# Patient Record
Sex: FEMALE | Race: WHITE | NOT HISPANIC OR LATINO | Employment: OTHER | URBAN - METROPOLITAN AREA
[De-identification: names, ages, dates, MRNs, and addresses within clinical notes are randomized per-mention and may not be internally consistent; named-entity substitution may affect disease eponyms.]

---

## 2017-04-14 ENCOUNTER — CONVERSION ENCOUNTER (OUTPATIENT)
Dept: CARDIOLOGY | Facility: CLINIC | Age: 82
End: 2017-04-14

## 2017-10-13 ENCOUNTER — HOSPITAL ENCOUNTER (OUTPATIENT)
Dept: CARDIOLOGY | Facility: HOSPITAL | Age: 82
Discharge: HOME | End: 2017-10-13
Attending: INTERNAL MEDICINE | Admitting: INTERNAL MEDICINE

## 2017-10-13 ENCOUNTER — CONVERSION ENCOUNTER (OUTPATIENT)
Dept: CARDIOLOGY | Facility: CLINIC | Age: 82
End: 2017-10-13

## 2017-12-15 ENCOUNTER — HOSPITAL ENCOUNTER (OUTPATIENT)
Dept: MAMMOGRAPHY | Facility: HOSPITAL | Age: 82
Discharge: HOME | End: 2017-12-15
Attending: NURSE PRACTITIONER | Admitting: NURSE PRACTITIONER

## 2018-10-22 ENCOUNTER — CONVERSION ENCOUNTER (OUTPATIENT)
Dept: CARDIOLOGY | Facility: CLINIC | Age: 83
End: 2018-10-22

## 2018-10-29 ENCOUNTER — HOSPITAL ENCOUNTER (OUTPATIENT)
Dept: CARDIOLOGY | Facility: HOSPITAL | Age: 83
Discharge: HOME | End: 2018-10-29
Attending: INTERNAL MEDICINE | Admitting: INTERNAL MEDICINE

## 2018-11-02 ENCOUNTER — CONVERSION ENCOUNTER (OUTPATIENT)
Dept: CARDIOLOGY | Facility: CLINIC | Age: 83
End: 2018-11-02

## 2018-11-28 ENCOUNTER — CONVERSION ENCOUNTER (OUTPATIENT)
Dept: CARDIOLOGY | Facility: CLINIC | Age: 83
End: 2018-11-28

## 2018-12-06 ENCOUNTER — OFFICE VISIT (OUTPATIENT)
Dept: CARDIAC SURGERY | Facility: CLINIC | Age: 83
End: 2018-12-06

## 2018-12-06 VITALS
WEIGHT: 191.2 LBS | DIASTOLIC BLOOD PRESSURE: 88 MMHG | RESPIRATION RATE: 20 BRPM | SYSTOLIC BLOOD PRESSURE: 155 MMHG | HEART RATE: 88 BPM | HEIGHT: 63 IN | TEMPERATURE: 97.8 F | BODY MASS INDEX: 33.88 KG/M2 | OXYGEN SATURATION: 91 %

## 2018-12-06 DIAGNOSIS — I63.429 CEREBROVASCULAR ACCIDENT (CVA) DUE TO EMBOLISM OF ANTERIOR CEREBRAL ARTERY, UNSPECIFIED BLOOD VESSEL LATERALITY (HCC): ICD-10-CM

## 2018-12-06 DIAGNOSIS — I05.0 RHEUMATIC MITRAL STENOSIS: Primary | ICD-10-CM

## 2018-12-06 DIAGNOSIS — I07.1 MODERATE TRICUSPID REGURGITATION: ICD-10-CM

## 2018-12-06 DIAGNOSIS — I25.10 ATHEROSCLEROSIS OF NATIVE CORONARY ARTERY OF NATIVE HEART WITHOUT ANGINA PECTORIS: ICD-10-CM

## 2018-12-06 PROCEDURE — 99204 OFFICE O/P NEW MOD 45 MIN: CPT | Performed by: THORACIC SURGERY (CARDIOTHORACIC VASCULAR SURGERY)

## 2018-12-06 RX ORDER — CHOLECALCIFEROL (VITAMIN D3) 25 MCG
1 CAPSULE ORAL EVERY OTHER DAY
COMMUNITY
End: 2022-01-01

## 2018-12-06 RX ORDER — ESCITALOPRAM OXALATE 10 MG/1
5 TABLET ORAL DAILY
Status: ON HOLD | COMMUNITY
Start: 2017-11-21 | End: 2021-01-07

## 2018-12-06 RX ORDER — DOCUSATE CALCIUM 240 MG
240 CAPSULE ORAL 2 TIMES DAILY PRN
COMMUNITY
End: 2020-12-08

## 2018-12-06 RX ORDER — LORATADINE 10 MG/1
10 TABLET, ORALLY DISINTEGRATING ORAL DAILY
COMMUNITY
Start: 2017-10-16 | End: 2019-09-06

## 2018-12-06 RX ORDER — ATORVASTATIN CALCIUM 20 MG/1
20 TABLET, FILM COATED ORAL NIGHTLY
COMMUNITY
Start: 2018-08-27 | End: 2021-01-01 | Stop reason: SDUPTHER

## 2018-12-06 RX ORDER — VIT A/VIT C/VIT E/ZINC/COPPER 4296-226
CAPSULE ORAL
COMMUNITY
End: 2020-06-17

## 2018-12-06 RX ORDER — TEMAZEPAM 15 MG/1
15 CAPSULE ORAL NIGHTLY PRN
COMMUNITY
Start: 2018-03-12 | End: 2021-01-01 | Stop reason: SDUPTHER

## 2018-12-06 RX ORDER — PHENOL 1.4 %
600 AEROSOL, SPRAY (ML) MUCOUS MEMBRANE
COMMUNITY
End: 2020-06-17 | Stop reason: HOSPADM

## 2018-12-06 RX ORDER — LEVOTHYROXINE SODIUM 0.05 MG/1
TABLET ORAL
COMMUNITY
Start: 2018-10-08 | End: 2020-11-30 | Stop reason: DRUGHIGH

## 2018-12-06 RX ORDER — ESOMEPRAZOLE MAGNESIUM 40 MG/1
40 CAPSULE, DELAYED RELEASE ORAL
COMMUNITY
Start: 2018-12-03 | End: 2022-01-01

## 2018-12-08 PROBLEM — I63.9 CEREBROVASCULAR ACCIDENT (CVA) DUE TO EMBOLISM (HCC): Status: ACTIVE | Noted: 2018-12-08

## 2018-12-08 PROBLEM — I05.0 RHEUMATIC MITRAL STENOSIS: Status: ACTIVE | Noted: 2018-12-08

## 2018-12-08 PROBLEM — I34.0 SEVERE MITRAL REGURGITATION: Status: ACTIVE | Noted: 2018-12-08

## 2018-12-08 PROBLEM — I07.1 MODERATE TRICUSPID REGURGITATION: Status: ACTIVE | Noted: 2018-12-08

## 2018-12-08 PROBLEM — I25.10 ATHEROSCLEROSIS OF NATIVE CORONARY ARTERY OF NATIVE HEART WITHOUT ANGINA PECTORIS: Status: ACTIVE | Noted: 2018-12-08

## 2018-12-08 PROBLEM — I34.0 SEVERE MITRAL REGURGITATION: Status: RESOLVED | Noted: 2018-12-08 | Resolved: 2018-12-08

## 2018-12-08 NOTE — PROGRESS NOTES
12/8/2018    Seen on 12/6/18    Chief Complaint     Dyspnea    History of Present Illness:       Dear Dr. Valiente, Demond Trivedi* and Colleagues,  It was nice to see Karen Rubio in consultation at your request. She is a 83 y.o. female with a history of a CVA in 2016, HTN, hyperlipidemia and a heart murmur who has developed progressive dyspnea of minimal effort and orthopnea. She has difficulty climbing stairs and fatigues very easily. She denies PND, recent TIA, syncope, angina or LE edema. She had a 2 D echo followed by a ESTEFANI that showed moderate to severe MS/MR, moderate TR, bi-atrial enlargement and EF of 65-70%. A stress test was abnormal with apical ischemia. She had a cardiac cath that showed 60-70 % ostial RCA, PAPs of 73/30 with a mean of 48 mmhg. She denies rheumatic or scarlet fever as a child.    Patient Active Problem List   Diagnosis   • Moderate tricuspid regurgitation   • Atherosclerosis of native coronary artery of native heart without angina pectoris   • Cerebrovascular accident (CVA) due to embolism (CMS/[facility])   • Rheumatic mitral stenosis       Past Medical History:   Diagnosis Date   • Abnormality of left atrial appendage 05/17/2016    Suspected Clot Noted on ESTEFANI   • Acute renal failure (CMS/HCC) 08/06/2016-[facility]    Front Dehydration   • Arthritis    • Breast density 12/2017   • Carotid stenosis 11/05/2018    R @ 60% and L @ 10-20% Noted on Cardiac Cath & 16-49% on L&R    • Heart murmur    • History of echocardiogram 10/13/2017    Calcified Aortic Leaftlets; Mild Aortic Stenosis Present; Mild Mitral Stenosis; Bilateral Enlargement Noted;  Moderate TR; LV Function of 55-60%   • History of echocardiogram 12/5/14-[facility]    Moderate L Vent Hypertrophy Noted; L Atrial Dilation; Moderate MR; Mild TR; Mild-Moderate Aortic Reg Noted; Normal Root Size/No Effusion   • History of echocardiogram 5/16/16-[facility]    Normal Findings with Mild-Moderate MVS Noted   • History of EKG 2004/2016 & 11/5/18-[facility]    All  Sinus Rhythm w/Infarct Ischemnic Changes Noted   • HLD (hyperlipidemia)     Controlled w/Meds   • Hx of hyperglycemia    • Hypertension     Controlled w/Meds   • Hypothyroidism     Levothyroxine   • Joint pain    • Left atrial dilatation 12/05/2014    Mildly Noted on Echo   • Left ventricular hypertrophy 12/05/2014    Noted on Echo w/EF @ 55-60%   • Mild aortic regurgitation 12/05/2014    to Moderate Noted on Echo   • Mild aortic stenosis 10/13/2017    Noted on Echo & ESTEFANI-11/5/18-Moderate    • Mild mitral insufficiency 10/13/2017    Noted on Echo & ESTEFANI-11/5/18   • Mild tricuspid regurgitation 12/05/2014    Noted on Echo   • Moderate mitral regurgitation 12/05/2014    Noted on Echo   • Moderate mitral stenosis 10/13/2017    Noted on Echo   • Moderate tricuspid insufficiency 10/13/2017    Noted on Echo   • Pneumonia involving left lung 08/6/16-[facility]   • Pulmonary hypertension (CMS/HCC) 10/13/2017 & 11/5/18-Cath    Severe Noted on Echo & Cath   • Slurred speech 05/16/2016   • SOB (shortness of breath) 08/2016   • Thickened mitral leaflet 10/13/2017 & ESTEFANI-11/5/18    Severely Calcified Noted on Echo   • Tricuspid valve insufficiency 11/05/2018    Noted on ESTEFANI       Past Surgical History:   Procedure Laterality Date   • CARDIAC CATHETERIZATION Left 11/5/18-[facility]    w/L Coronary Angiography--Dr. Hernández-RCA @ 60% with Pulmonary Hypertension Noted   • ESTEFANI  05/17/2016-[facility]    Dr. Hernández--Severly Calcified Mitral Leaflets w/Mild-Moderate Mitral Stenosis/Insufficiency; Sig Aortic Stenosis Noted; Suspecion of L Atrial Appendage Clot Noted   • ESTEFANI  11/5/18-[facility]    Dr. Hernández--Moderate Mitral Insufficiency Noted; Mild-Moderate AVS; Moderate Tricuspid Insufficiency Noted; EF of 65-70%       Allergies   Allergen Reactions   • Hydrocodone Shortness Of Breath         Current Outpatient Medications:   •  apixaban (ELIQUIS) 5 MG tablet tablet, TAKE 1 TABLET TWICE DAILY, Disp: , Rfl:   •  atorvastatin (LIPITOR) 20 MG tablet, TAKE 1 TABLET EVERY DAY,  Disp: , Rfl:   •  calcium carbonate (OS-SHERWIN) 600 MG tablet, Take 600 mg by mouth., Disp: , Rfl:   •  Cholecalciferol (VITAMIN D-3) 1000 units capsule, Take 1 capsule by mouth Daily., Disp: , Rfl:   •  docusate calcium (SURFAK) 240 MG capsule, Take  by mouth Daily., Disp: , Rfl:   •  escitalopram (LEXAPRO) 10 MG tablet, Take 10 mg by mouth Daily., Disp: , Rfl:   •  esomeprazole (nexIUM) 40 MG capsule, TAKE 1 CAPSULE EVERY DAY, Disp: , Rfl:   •  levothyroxine (SYNTHROID, LEVOTHROID) 50 MCG tablet, TAKE 1 TABLET EVERY DAY, Disp: , Rfl:   •  loratadine (CLARITIN REDITABS) 10 MG disintegrating tablet, Take 10 mg by mouth Daily., Disp: , Rfl:   •  Multiple Vitamins-Minerals (ICAPS) capsule, Take  by mouth., Disp: , Rfl:   •  temazepam (RESTORIL) 15 MG capsule, TAKE ONE CAPSULE BY MOUTH EVERY NIGHT AT BEDTIME AS NEEDED FOR SLEEP, Disp: , Rfl:     Social History     Socioeconomic History   • Marital status:      Spouse name: Not on file   • Number of children: Not on file   • Years of education: Not on file   • Highest education level: Not on file   Social Needs   • Financial resource strain: Not on file   • Food insecurity - worry: Not on file   • Food insecurity - inability: Not on file   • Transportation needs - medical: Not on file   • Transportation needs - non-medical: Not on file   Occupational History   • Occupation: RETIRED-[employer]   Tobacco Use   • Smoking status: Former Smoker     Types: Cigarettes   • Smokeless tobacco: Never Used   Substance and Sexual Activity   • Alcohol use: No     Frequency: Never   • Drug use: No   • Sexual activity: Defer     Birth control/protection: Post-menopausal   Other Topics Concern   • Not on file   Social History Narrative   • Not on file       Family History   Problem Relation Age of Onset   • Heart disease Mother    • Cancer Father      Review of Systems   Constitutional: Positive for fatigue.   Respiratory: Positive for shortness of breath. Negative for chest  tightness and wheezing.    Cardiovascular: Positive for palpitations. Negative for chest pain and leg swelling.   Genitourinary: Negative for difficulty urinating and dysuria.   Neurological: Negative for dizziness, syncope and numbness.   All other systems reviewed and are negative.       Physical Exam:    Vital Signs:  Weight: 86.7 kg (191 lb 3.2 oz)   Body mass index is 33.87 kg/m².  Temp: 97.8 °F (36.6 °C)   Heart Rate: 88   BP: 155/88     Physical Exam   Constitutional: She is oriented to person, place, and time. She appears well-developed and well-nourished.   HENT:   Head: Normocephalic and atraumatic.   Nose: Nose normal.   Mouth/Throat: Oropharynx is clear and moist.   Eyes: Conjunctivae are normal. Pupils are equal, round, and reactive to light.   Neck: Normal range of motion. Neck supple. No JVD present. No thyromegaly present.   Cardiovascular: Normal rate, regular rhythm, S1 normal, S2 normal and intact distal pulses. PMI is not displaced. Exam reveals no gallop and no friction rub.   Murmur heard.  Pulses:       Radial pulses are 2+ on the right side, and 2+ on the left side.        Popliteal pulses are 2+ on the right side, and 2+ on the left side.   3/6 systolic murmur in apex   Pulmonary/Chest: Effort normal and breath sounds normal. She has no rales.   Abdominal: Soft. Bowel sounds are normal. She exhibits no distension and no mass. There is no tenderness.   Musculoskeletal: Normal range of motion. She exhibits no tenderness or deformity.   Lymphadenopathy:     She has no cervical adenopathy.   Neurological: She is alert and oriented to person, place, and time. She has normal reflexes.   Skin: Skin is warm and dry. No rash noted. No erythema.   Psychiatric: She has a normal mood and affect. Her behavior is normal.   No groin or neck adenopathy     Assessment:     Problem List Items Addressed This Visit        Cardiovascular and Mediastinum    Moderate tricuspid regurgitation    Atherosclerosis of  native coronary artery of native heart without angina pectoris    Cerebrovascular accident (CVA) due to embolism (CMS/HCC)    Rheumatic mitral stenosis - Primary        1. Symptomatic MS/MR  2. CHF class III  3. Hx of embolic stroke  4. Single vessel CAD with abnormal stress test  5. Obesity  6. Moderate TR  7.   Orthopnea    Recommendation/Plan:     I discussed to the patient and family my recommendation of mitral valve replacement, TV repair, KRISTAL ligation and CABG to RCA. I quoted a risk (STS calculated mortality of 10 %), complications of 10 % and possibility of rehab after surgery. They verbalized understanding and the wish to proceed.    Thank you for allowing me to participate in her care.    Regards,    Gibson Medina M.D.

## 2019-01-16 ENCOUNTER — HOSPITAL ENCOUNTER (OUTPATIENT)
Dept: RESPIRATORY THERAPY | Facility: HOSPITAL | Age: 84
Discharge: HOME | End: 2019-01-16
Attending: INTERNAL MEDICINE | Admitting: INTERNAL MEDICINE

## 2019-01-16 LAB
ALBUMIN SERPL-MCNC: 3.8 G/DL (ref 3.5–4.8)
ALP SERPL-CCNC: 70 IU/L (ref 32–91)
ALT SERPL-CCNC: 14 IU/L (ref 14–54)
AST SERPL-CCNC: 19 IU/L (ref 15–41)
BILIRUB DIRECT SERPL-MCNC: 0.1 MG/DL (ref 0.1–0.5)
BILIRUB SERPL-MCNC: 0.8 MG/DL (ref 0.3–1.2)
CONV TOTAL PROTEIN: 7.2 G/DL (ref 6.1–7.9)

## 2019-01-17 LAB — MYELOPEROXIDASE AB SER-ACNC: <0.2 U

## 2019-01-18 LAB
ANA SER QL IA: NORMAL
CHROMATIN AB SERPL-ACNC: <20 [IU]/ML (ref 0–20)

## 2019-01-24 ENCOUNTER — HOSPITAL ENCOUNTER (OUTPATIENT)
Dept: RESPIRATORY THERAPY | Facility: HOSPITAL | Age: 84
Discharge: HOME | End: 2019-01-24
Attending: INTERNAL MEDICINE | Admitting: INTERNAL MEDICINE

## 2019-02-01 ENCOUNTER — HOSPITAL ENCOUNTER (OUTPATIENT)
Dept: RESPIRATORY THERAPY | Facility: HOSPITAL | Age: 84
Discharge: HOME | End: 2019-02-01
Attending: NURSE PRACTITIONER | Admitting: NURSE PRACTITIONER

## 2019-02-13 ENCOUNTER — CONVERSION ENCOUNTER (OUTPATIENT)
Dept: CARDIOLOGY | Facility: CLINIC | Age: 84
End: 2019-02-13

## 2019-02-20 ENCOUNTER — TELEPHONE (OUTPATIENT)
Dept: CARDIAC SURGERY | Facility: CLINIC | Age: 84
End: 2019-02-20

## 2019-05-13 ENCOUNTER — HOSPITAL ENCOUNTER (OUTPATIENT)
Dept: LAB | Facility: HOSPITAL | Age: 84
Discharge: HOME | End: 2019-05-13
Attending: INTERNAL MEDICINE | Admitting: INTERNAL MEDICINE

## 2019-05-13 LAB
ANION GAP SERPL CALC-SCNC: 13.1 MMOL/L (ref 10–20)
BNP SERPL-MCNC: 784 PG/ML
BUN SERPL-MCNC: 16 MG/DL (ref 8–20)
BUN/CREAT SERPL: 14.5 (ref 5.4–26.2)
CALCIUM SERPL-MCNC: 9.3 MG/DL (ref 8.9–10.3)
CHLORIDE SERPL-SCNC: 102 MMOL/L (ref 101–111)
CONV CO2: 27 MMOL/L (ref 22–32)
CREAT UR-MCNC: 1.1 MG/DL (ref 0.4–1)
GLUCOSE SERPL-MCNC: 117 MG/DL (ref 65–99)
POTASSIUM SERPL-SCNC: 4.1 MMOL/L (ref 3.6–5.1)
SODIUM SERPL-SCNC: 138 MMOL/L (ref 136–144)

## 2019-05-30 ENCOUNTER — HOSPITAL ENCOUNTER (OUTPATIENT)
Dept: LAB | Facility: HOSPITAL | Age: 84
Discharge: HOME | End: 2019-05-30
Attending: INTERNAL MEDICINE | Admitting: INTERNAL MEDICINE

## 2019-05-30 LAB
ANION GAP SERPL CALC-SCNC: 12.4 MMOL/L (ref 10–20)
BNP SERPL-MCNC: 89 PG/ML
BUN SERPL-MCNC: 15 MG/DL (ref 8–20)
BUN/CREAT SERPL: 15 (ref 5.4–26.2)
CALCIUM SERPL-MCNC: 9.5 MG/DL (ref 8.9–10.3)
CHLORIDE SERPL-SCNC: 97 MMOL/L (ref 101–111)
CONV CO2: 31 MMOL/L (ref 22–32)
CREAT UR-MCNC: 1 MG/DL (ref 0.4–1)
GLUCOSE SERPL-MCNC: 96 MG/DL (ref 65–99)
POTASSIUM SERPL-SCNC: 3.4 MMOL/L (ref 3.6–5.1)
SODIUM SERPL-SCNC: 137 MMOL/L (ref 136–144)

## 2019-06-03 ENCOUNTER — CONVERSION ENCOUNTER (OUTPATIENT)
Dept: CARDIOLOGY | Facility: CLINIC | Age: 84
End: 2019-06-03

## 2019-06-04 VITALS
SYSTOLIC BLOOD PRESSURE: 125 MMHG | BODY MASS INDEX: 33.73 KG/M2 | HEART RATE: 67 BPM | DIASTOLIC BLOOD PRESSURE: 75 MMHG | WEIGHT: 190.4 LBS | OXYGEN SATURATION: 94 %

## 2019-06-04 VITALS
DIASTOLIC BLOOD PRESSURE: 78 MMHG | BODY MASS INDEX: 32.4 KG/M2 | OXYGEN SATURATION: 93 % | BODY MASS INDEX: 32.51 KG/M2 | HEART RATE: 63 BPM | WEIGHT: 194.5 LBS | OXYGEN SATURATION: 94 % | SYSTOLIC BLOOD PRESSURE: 132 MMHG | SYSTOLIC BLOOD PRESSURE: 119 MMHG | WEIGHT: 201.25 LBS | SYSTOLIC BLOOD PRESSURE: 138 MMHG | HEART RATE: 63 BPM | WEIGHT: 188.75 LBS | DIASTOLIC BLOOD PRESSURE: 70 MMHG | WEIGHT: 197.5 LBS | OXYGEN SATURATION: 93 % | DIASTOLIC BLOOD PRESSURE: 72 MMHG | BODY MASS INDEX: 34.54 KG/M2 | SYSTOLIC BLOOD PRESSURE: 113 MMHG | HEART RATE: 66 BPM | OXYGEN SATURATION: 92 % | DIASTOLIC BLOOD PRESSURE: 73 MMHG | DIASTOLIC BLOOD PRESSURE: 73 MMHG | DIASTOLIC BLOOD PRESSURE: 75 MMHG | BODY MASS INDEX: 34.45 KG/M2 | BODY MASS INDEX: 32.4 KG/M2 | HEART RATE: 126 BPM | OXYGEN SATURATION: 93 % | OXYGEN SATURATION: 96 % | WEIGHT: 188.75 LBS | WEIGHT: 189.4 LBS | BODY MASS INDEX: 33.9 KG/M2 | SYSTOLIC BLOOD PRESSURE: 138 MMHG | SYSTOLIC BLOOD PRESSURE: 125 MMHG | HEART RATE: 64 BPM | HEART RATE: 65 BPM

## 2019-06-06 NOTE — PROGRESS NOTES
Visit Type:  Follow-up Visit  Primary Care Provider:  Eliza Clayton NP    Chief Complaint:  Follow-Up of LE Edema .    History of Present Illness:  6/3/2019  83 -year-old white female patient with known history of hypertension hyperlipidemia admit to the hospital at [facility] in May 2016 with acute CVA. a transesophageal echocardiogram showed suspicion for clot and also mild to moderate mitral   stenosis  Mild to moderate mitral insufficiency  Normal LV systolic function  patient was started on anticoagulation     She stopped  beta-blockers and a losartan due to low blood pressure        Her stress Myoview showed apical ischemia 2018    Cardiac catheterization showed up to 60% ostial RCA disease, 2018   severe pulmonary hypertension more than 70 mm of Hg   transesophageal echocardiogram showed hyperdynamic LV systolic function EF 65-70% moderate mitral stenosis and insufficiency moderate tricuspid insufficiency mild to moderate aortic stenosis   I will review the films with CV surgery for the time being the suggest aggressive medical treatment risk factor modification   Pulmonary follow-up regarding pulmonary hypertension   patient was cleared by Pulmonary for surgery   I discussed with the patient about all the risks and benefits of valve surgery plus or minus single-vessel CABG   patient has some limitations but she is doing reasonably well   Patient and family decided to hold off on the surgery for the time being   the fully understand all the risks of  waiting              Vital Signs:    Patient Profile:    83 Years Old Female  Height:     64 inches  Weight:     190.4 pounds  (Measured Weight:  190.4 lbs.  oz.)  BMI:        32.68  Pulse rate: 67 / minute  O2 Sat:     94 %  Room Air:   room air without exertion    BP sittin / 75  (left arm)  Cuff size:  regular   Vitals Entered By: Fadumo Ledezma CMA (Kathy  3, 2019 11:35 AM)    Medications:  Medications were reviewed with the  patient during this visit.    Allergies:   HYDROCODONE (Critical)    Allergies were reviewed with the patient during this visit.    Current Allergies (reviewed today):  HYDROCODONE (Critical)    Current Medications (including medications started today):   POTASSIUM CHLORIDE ER 10 MEQ ORAL CAPSULE EXTENDED RELEASE (POTASSIUM CHLORIDE) Take 1 tablet by mouth every day  FUROSEMIDE 40 MG ORAL TABLET (FUROSEMIDE) Take (1) one tablet by mouth daily  ALBUTEROL SULFATE (2.5 MG/3ML) 0.083% INHALATION NEBULIZATION SOLUTION (ALBUTEROL SULFATE) prn  RA TURMERIC 500 MG ORAL CAPSULE (TURMERIC) Take 2 tablets by mouth daily  VITAMIN D3 5000 UNIT ORAL CAPSULE (CHOLECALCIFEROL) Take 1 tablet by mouth daily  ICAPS AREDS FORMULA ORAL TABLET (MULTIPLE VITAMINS-MINERALS) Take 1 tablet by mouth daily  CALCIUM + D3 600-200 MG-UNIT ORAL TABLET (CALCIUM CARB-CHOLECALCIFEROL) Take 1 tablet by mouth daily  TEMAZEPAM 15 MG ORAL CAPSULE (TEMAZEPAM) Take 1 capsle by mouth daily at bedtime  LEVOTHYROXINE SODIUM 50 MCG ORAL TABLET (LEVOTHYROXINE SODIUM) Take 1 tablet by mouth daily  ELIQUIS 5 MG ORAL TABLET (APIXABAN) Take 1 tablet by mouth twice daily  ESOMEPRAZOLE MAGNESIUM 40 MG ORAL CAPSULE DELAYED RELEASE (ESOMEPRAZOLE MAGNESIUM) Take 1 capsule by mouth daily  LIPITOR 20 MG ORAL TABLET (ATORVASTATIN CALCIUM) Take 1 tablet by mouth daily at bedtime  ESCITALOPRAM OXALATE 10 MG ORAL TABLET (ESCITALOPRAM OXALATE) Take 1 tablet by mouth daily      Past Medical History:     Reviewed history from 11/27/2018 and no changes required:        Hyperlipidemia        Hypertension~Pulmonary        Hypothyroidism        C V A / Stroke (Cardioembolic from left atrial clot)        G E R D        Cataracts        Obesity        Heart Murmur        Mitral Stenosis    Past Surgical History:     Reviewed history from 11/28/2018 and no changes required:        Cholecystectomy        Tonsillectomy        Knee Arthroscopy        basal cell cancer removed from nose          MOHS procedure with skin graft         ESTEFANI (11/05/2018)        Heart Catherization: No Intervention (11/05/2018)        Right Cataract Extraction (11/12/2018)    Family History Summary:      Reviewed history Last on 02/13/2019 and no changes required:06/03/2019  Daughter - Has FH Diabetes - Entered On: 6/15/2016  Sister - Has Family History of Liver Disease - Entered On: 6/15/2016  Sister - Has FH Kidney Disease - Entered On: 6/15/2016  Sister - Has Family History of Melanoma - Entered On: 6/15/2016  Father - Has FH Other Cancer - Entered On: 6/15/2016  Mother - Has FH Heart Disease - Entered On: 6/15/2016    General Comments - FH:  MI- Mom     Social History:     Reviewed history from 10/22/2018 and no changes required:                Retired        Children~3        Patient is a former smoker.        Passive Smoke: N        Alcohol Use: N        Drug Use: N        HIV/High Risk: N        Regular Exercise: N                Risk Factors:     Smoked Tobacco Use:  Former smoker     Cigarettes:  Yes        Year quit:  2001        Years Since Last Quit:  18  Smokeless Tobacco Use:  Never  Passive smoke exposure:  no  Drug use:  no  HIV high-risk behavior:  no  Caffeine use:  1 drinks per day  Alcohol use:  no  Exercise:  no    Family History Risk Factors:     Family History of MI in females < 65 years old:  no     Family History of MI in males < 55 years old:  no          Physical Exam    General:      well developed, well nourished, in no acute distress.    Head:      normocephalic and atraumatic.    Eyes:      conjunctiva and sclera clear with out nystagmus.    Mouth:      no deformity or lesions with good dentition.    Neck:      no bruit and no JVD.    Lungs:      clear bilaterally to auscultation.    Heart:      regular rhythm, no rubs and no gallops.    2/6 systolic murmur left sternal border  Abdomen:      non-tender.    Msk:       arthritic changes noted  Pulses:      pulses normal in all 4  extremities.    Extremities:      no pedal edema.    Neurologic:      Non focal  Skin:       within normal  Psych:      alert and cooperative; normal mood and affect; normal attention span and concentration.      Diabetes Management Exam:      Foot Exam (with socks and/or shoes not present):        Pulses:           pulses normal in all 4 extremities.        Blood Pressure:  Today's BP: 125/75 mm Hg    Labwork:   Most Recent Lab Results:   LDL: 59 mg/dL 04/07/2017        Impression & Recommendations:    Problem # 1:  PULMONARY HYPERTENSION (ICD-416.0) (EQA84-D63.0)   follow-up with Pulmonary  Her updated medication list for this problem includes:     Furosemide 40 Mg Oral Tablet (Furosemide) ..... Take (1) one tablet by mouth daily    Orders:  96609-Qda Vst-Est Level III (CPT-78499)      Problem # 2:  Hypertension (ICD-401.9) (HDT59-S05)   stable  Her updated medication list for this problem includes:     Furosemide 40 Mg Oral Tablet (Furosemide) ..... Take (1) one tablet by mouth daily    Orders:  [id number]-Jvs Vst-Est Level III (CPT-73545)      Problem # 3:  HYPERLIPIDEMIA (ICD-272.4) (WLU63-K63.5)   stable  Her updated medication list for this problem includes:     Lipitor 20 Mg Oral Tablet (Atorvastatin calcium) ..... Take 1 tablet by mouth daily at bedtime    Orders:  [id number] Vst-Est Level III (CPT-80034)      Problem # 4:  Mitral stenosis (ICD-394.0) (TCQ49-Q73.0)   continue conservative treatment stable  Orders:  [id number]-Flc Vst-Est Level III (CPT-39292)      Medications Added to Medication List This Visit:  1)  Potassium Chloride Er 10 Meq Oral Capsule Extended Release (Potassium chloride) .... Take 1 tablet by mouth every day      Medications:  ELIQUIS 5 MG ORAL TABLET (APIXABAN) Take 1 tablet by mouth twice daily  #180[Tablet] x 3      Entered by: Fadumo Ledezma CMA      Authorized by:  Demond Valiente MD      Electronically signed by:   Demond Valiente MD on 06/03/2019      Method used:     Electronically to               [facility](NOT MAIL)* (retail)              [address] [place]  [address]              Ph: [phone]              Fax: [phone]      Note to Pharmacy: Route: ORAL;       RxID:   [id number]  POTASSIUM CHLORIDE ER 10 MEQ ORAL CAPSULE EXTENDED RELEASE (POTASSIUM CHLORIDE) Take 1 tablet by mouth every day  #30[Capsule] x 1      Entered by: Fadumo Ledezma CMA      Authorized by:  Demond Valiente MD      Electronically signed by:   Fadumo Ledezma CMA on 06/03/2019      Method used:    Electronically to               [facility] (retail)              [address] [place]  [address]              Ph: [phone]              Fax: [phone]      Note to Pharmacy: Route: ORAL;       RxID:   [id number]                Medication Administration    Orders Added:  1)  79671-Xtb Vst-Est Level III [CPT-76168]  ]      Electronically signed by Demond Valiente MD on 06/03/2019 at 1:25 PM  ________________________________________________________________________       Disclaimer: Converted Note message may not contain all data elements that existed in the legacy source system. Please see [employer] System for the original note details.

## 2019-06-18 RX ORDER — FUROSEMIDE 40 MG/1
TABLET ORAL
Qty: 90 TABLET | Refills: 0 | Status: SHIPPED | OUTPATIENT
Start: 2019-06-18 | End: 2019-10-28 | Stop reason: SDUPTHER

## 2019-07-29 ENCOUNTER — TELEPHONE (OUTPATIENT)
Dept: CARDIOLOGY | Facility: CLINIC | Age: 84
End: 2019-07-29

## 2019-07-29 NOTE — TELEPHONE ENCOUNTER
Please fax orders to [facility] registration for labs for upcoming apt, pt going 8/28

## 2019-08-23 RX ORDER — POTASSIUM CHLORIDE 750 MG/1
CAPSULE, EXTENDED RELEASE ORAL
Qty: 30 CAPSULE | Refills: 0 | Status: SHIPPED | OUTPATIENT
Start: 2019-08-23 | End: 2019-10-28 | Stop reason: SDUPTHER

## 2019-08-28 ENCOUNTER — LAB (OUTPATIENT)
Dept: LAB | Facility: HOSPITAL | Age: 84
End: 2019-08-28

## 2019-08-28 ENCOUNTER — TRANSCRIBE ORDERS (OUTPATIENT)
Dept: ADMINISTRATIVE | Facility: HOSPITAL | Age: 84
End: 2019-08-28

## 2019-08-28 DIAGNOSIS — I10 HYPERTENSION, UNSPECIFIED TYPE: ICD-10-CM

## 2019-08-28 DIAGNOSIS — I27.0 PRIMARY PULMONARY HYPERTENSION (HCC): Primary | ICD-10-CM

## 2019-08-28 DIAGNOSIS — I27.0 PRIMARY PULMONARY HYPERTENSION (HCC): ICD-10-CM

## 2019-08-28 DIAGNOSIS — E78.5 HYPERLIPIDEMIA, UNSPECIFIED HYPERLIPIDEMIA TYPE: ICD-10-CM

## 2019-08-28 LAB
ANION GAP SERPL CALCULATED.3IONS-SCNC: 16.2 MMOL/L (ref 5–15)
BUN BLD-MCNC: 12 MG/DL (ref 8–20)
BUN/CREAT SERPL: 10.9 (ref 5.4–26.2)
CALCIUM SPEC-SCNC: 8.8 MG/DL (ref 8.9–10.3)
CHLORIDE SERPL-SCNC: 100 MMOL/L (ref 101–111)
CO2 SERPL-SCNC: 27 MMOL/L (ref 22–32)
CREAT BLD-MCNC: 1.1 MG/DL (ref 0.4–1)
GFR SERPL CREATININE-BSD FRML MDRD: 47 ML/MIN/1.73
GLUCOSE BLD-MCNC: 102 MG/DL (ref 65–99)
POTASSIUM BLD-SCNC: 4.2 MMOL/L (ref 3.6–5.1)
SODIUM BLD-SCNC: 139 MMOL/L (ref 136–144)

## 2019-08-28 PROCEDURE — 80048 BASIC METABOLIC PNL TOTAL CA: CPT

## 2019-08-28 PROCEDURE — 36415 COLL VENOUS BLD VENIPUNCTURE: CPT

## 2019-09-05 NOTE — PROGRESS NOTES
Subjective:     Encounter Date:09/06/2019      Patient ID: Karen Rubio is a 84 y.o. female.    Chief Complaint: Mitral Insufficiency & HTN  History of Present Illness   84 -year-old white female patient with known history of hypertension hyperlipidemia admit to the hospital at [facility] in May 2016 with acute CVA. a transesophageal echocardiogram showed suspicion for clot and also mild to moderate mitral   stenosis  Mild to moderate mitral insufficiency  Normal LV systolic function  patient was started on anticoagulation      She stopped  beta-blockers and a losartan due to low blood pressure      Her stress Myoview showed apical ischemia October 2018     Cardiac catheterization showed up to 60% ostial RCA disease, nov 2018   severe pulmonary hypertension more than 70 mm of Hg   transesophageal echocardiogram showed hyperdynamic LV systolic function EF 65-70% moderate mitral stenosis and insufficiency moderate tricuspid insufficiency mild to moderate aortic stenosis   I will review the films with CV surgery for the time being the suggest aggressive medical treatment risk factor modification   Pulmonary follow-up regarding pulmonary hypertension   patient was cleared by Pulmonary for surgery   I discussed with the patient about all the risks and benefits of valve surgery plus or minus single-vessel CABG   patient has some limitations but she is doing reasonably well   Patient and family decided to hold off on the surgery for the time being   the fully understand all the risks of  waiting    Past Medical History:   Diagnosis Date   • Abnormality of left atrial appendage 05/17/2016    Suspected Clot Noted on ESTEFANI   • Acute renal failure (CMS/HCC) 08/06/2016-[facility]    Front Dehydration   • Arthritis    • Breast density 12/2017   • Carotid stenosis 11/05/2018    R @ 60% and L @ 10-20% Noted on Cardiac Cath & 16-49% on L&R    • Heart murmur    • History of echocardiogram 10/13/2017    Calcified Aortic  Leaftlets; Mild Aortic Stenosis Present; Mild Mitral Stenosis; Bilateral Enlargement Noted;  Moderate TR; LV Function of 55-60%   • History of echocardiogram 12/5/14-[facility]    Moderate L Vent Hypertrophy Noted; L Atrial Dilation; Moderate MR; Mild TR; Mild-Moderate Aortic Reg Noted; Normal Root Size/No Effusion   • History of echocardiogram 5/16/16-[facility]    Normal Findings with Mild-Moderate MVS Noted   • History of EKG 2004/2016 & 11/5/18-[facility]    All Sinus Rhythm w/Infarct Ischemnic Changes Noted   • HLD (hyperlipidemia)     Controlled w/Meds   • Hx of hyperglycemia    • Hypertension     Controlled w/Meds   • Hypothyroidism     Levothyroxine   • Joint pain    • Left atrial dilatation 12/05/2014    Mildly Noted on Echo   • Left ventricular hypertrophy 12/05/2014    Noted on Echo w/EF @ 55-60%   • Mild aortic regurgitation 12/05/2014    to Moderate Noted on Echo   • Mild aortic stenosis 10/13/2017    Noted on Echo & ESTEFANI-11/5/18-Moderate    • Mild mitral insufficiency 10/13/2017    Noted on Echo & ESTEFANI-11/5/18   • Mild tricuspid regurgitation 12/05/2014    Noted on Echo   • Moderate mitral regurgitation 12/05/2014    Noted on Echo   • Moderate mitral stenosis 10/13/2017    Noted on Echo   • Moderate tricuspid insufficiency 10/13/2017    Noted on Echo   • Pneumonia involving left lung 08/6/16-[facility] ER   • Pulmonary hypertension (CMS/HCC) 10/13/2017 & 11/5/18-Cath    Severe Noted on Echo & Cath   • Slurred speech 05/16/2016   • SOB (shortness of breath) 08/2016   • Thickened mitral leaflet 10/13/2017 & ESTEFANI-11/5/18    Severely Calcified Noted on Echo   • Tricuspid valve insufficiency 11/05/2018    Noted on ESTEFANI     Past Surgical History:   Procedure Laterality Date   • CARDIAC CATHETERIZATION Left 11/5/18-[facility]    w/L Coronary Angiography--Dr. Hernández-RCA @ 60% with Pulmonary Hypertension Noted   • ESTEFANI  05/17/2016-[facility]    Dr. Hernández--Severly Calcified Mitral Leaflets w/Mild-Moderate Mitral Stenosis/Insufficiency; Sig Aortic Stenosis Noted;  "Suspecion of L Atrial Appendage Clot Noted   • ESTEFANI  11/5/18-[facility]    Dr. Hernández--Moderate Mitral Insufficiency Noted; Mild-Moderate AVS; Moderate Tricuspid Insufficiency Noted; EF of 65-70%     /68 (BP Location: Left arm, Patient Position: Sitting, Cuff Size: Adult)   Pulse 71   Ht 160 cm (63\")   Wt 87.5 kg (193 lb)   SpO2 90%   BMI 34.19 kg/m²   Family History   Problem Relation Age of Onset   • Heart disease Mother    • Cancer Father        Current Outpatient Medications:   •  albuterol (PROVENTIL) (2.5 MG/3ML) 0.083% nebulizer solution, ALBUTEROL SULFATE (2.5 MG/3ML) 0.083% NEBU, Disp: , Rfl:   •  apixaban (ELIQUIS) 5 MG tablet tablet, TAKE 1 TABLET TWICE DAILY, Disp: , Rfl:   •  atorvastatin (LIPITOR) 20 MG tablet, TAKE 1 TABLET EVERY DAY, Disp: , Rfl:   •  calcium carbonate (OS-SHERWIN) 600 MG tablet, Take 600 mg by mouth., Disp: , Rfl:   •  Cholecalciferol (VITAMIN D-3) 1000 units capsule, Take 1 capsule by mouth Daily., Disp: , Rfl:   •  docusate calcium (SURFAK) 240 MG capsule, Take  by mouth Daily., Disp: , Rfl:   •  escitalopram (LEXAPRO) 10 MG tablet, Take 10 mg by mouth Daily., Disp: , Rfl:   •  esomeprazole (nexIUM) 40 MG capsule, TAKE 1 CAPSULE EVERY DAY, Disp: , Rfl:   •  furosemide (LASIX) 40 MG tablet, TAKE ONE TABLET BY MOUTH DAILY, Disp: 90 tablet, Rfl: 0  •  levothyroxine (SYNTHROID, LEVOTHROID) 50 MCG tablet, TAKE 1 TABLET EVERY DAY, Disp: , Rfl:   •  Multiple Vitamins-Minerals (ICAPS) capsule, Take  by mouth., Disp: , Rfl:   •  potassium chloride (MICRO-K) 10 MEQ CR capsule, TAKE ONE CAPSULE BY MOUTH DAILY, Disp: 30 capsule, Rfl: 0  •  temazepam (RESTORIL) 15 MG capsule, TAKE ONE CAPSULE BY MOUTH EVERY NIGHT AT BEDTIME AS NEEDED FOR SLEEP, Disp: , Rfl:   Social History     Socioeconomic History   • Marital status:      Spouse name: Not on file   • Number of children: Not on file   • Years of education: Not on file   • Highest education level: Not on file   Occupational History   • " Occupation: RETIRED-[employer]   Tobacco Use   • Smoking status: Former Smoker     Types: Cigarettes     Last attempt to quit: 2002     Years since quittin.7   • Smokeless tobacco: Never Used   Substance and Sexual Activity   • Alcohol use: No     Frequency: Never   • Drug use: No   • Sexual activity: Defer     Birth control/protection: Post-menopausal     Allergies   Allergen Reactions   • Hydrocodone Shortness Of Breath     Review of Systems   Constitution: Negative for fever and malaise/fatigue.   HENT: Negative for congestion and hearing loss.    Eyes: Negative for double vision and visual disturbance.   Cardiovascular: Negative for chest pain, claudication, dyspnea on exertion, leg swelling and syncope.   Respiratory: Negative for cough and shortness of breath.    Endocrine: Negative for cold intolerance.   Skin: Negative for color change and rash.   Musculoskeletal: Negative for arthritis and joint pain.   Gastrointestinal: Negative for abdominal pain and heartburn.   Genitourinary: Negative for hematuria.   Neurological: Negative for excessive daytime sleepiness and dizziness.   Psychiatric/Behavioral: Negative for depression. The patient is not nervous/anxious.    All other systems reviewed and are negative.             Objective:     Physical Exam   Constitutional: She is oriented to person, place, and time. She appears well-developed and well-nourished. She is cooperative.   HENT:   Head: Normocephalic and atraumatic.   Mouth/Throat: Uvula is midline and oropharynx is clear and moist. No oral lesions.   Eyes: Conjunctivae are normal. No scleral icterus.   Neck: Trachea normal. Neck supple. No JVD present. Carotid bruit is not present. No thyromegaly present.   Cardiovascular: Normal rate, regular rhythm, S1 normal, S2 normal, intact distal pulses and normal pulses. PMI is not displaced. Exam reveals no gallop and no friction rub.   Murmur heard.  Pulmonary/Chest: Effort normal and breath sounds  normal.   Abdominal: Soft. Bowel sounds are normal.   Musculoskeletal: Normal range of motion.   Neurological: She is alert and oriented to person, place, and time. She has normal strength.   No focal deficits   Skin: Skin is warm. No cyanosis.   Psychiatric: She has a normal mood and affect.       Procedures    Lab Review:       Assessment:          Diagnosis Plan   1. Atherosclerosis of native coronary artery of native heart without angina pectoris  CK    Comprehensive Metabolic Panel    Lipid Panel   2. Cerebrovascular accident (CVA) due to embolism of anterior cerebral artery, unspecified blood vessel laterality (CMS/HCC)  CK    Comprehensive Metabolic Panel    Lipid Panel   3. Mixed hyperlipidemia  CK    Comprehensive Metabolic Panel    Lipid Panel   4. Pulmonary hypertension (CMS/HCC)  CK    Comprehensive Metabolic Panel    Lipid Panel   5. Mitral valve stenosis, unspecified etiology  CK    Comprehensive Metabolic Panel    Lipid Panel          Plan:       Continue medical treatment currently stable  History of CVA on anticoagulation stable  Dyslipidemia needs aggressive cardiac risk factor modification  Pulmonary hypertension followed by pulmonary  Mitral valve stenosis on conservative treatment

## 2019-09-06 ENCOUNTER — OFFICE VISIT (OUTPATIENT)
Dept: CARDIOLOGY | Facility: CLINIC | Age: 84
End: 2019-09-06

## 2019-09-06 VITALS
WEIGHT: 193 LBS | HEIGHT: 63 IN | OXYGEN SATURATION: 90 % | BODY MASS INDEX: 34.2 KG/M2 | DIASTOLIC BLOOD PRESSURE: 68 MMHG | HEART RATE: 71 BPM | SYSTOLIC BLOOD PRESSURE: 134 MMHG

## 2019-09-06 DIAGNOSIS — I27.20 PULMONARY HYPERTENSION (HCC): ICD-10-CM

## 2019-09-06 DIAGNOSIS — E78.2 MIXED HYPERLIPIDEMIA: ICD-10-CM

## 2019-09-06 DIAGNOSIS — I25.10 ATHEROSCLEROSIS OF NATIVE CORONARY ARTERY OF NATIVE HEART WITHOUT ANGINA PECTORIS: Primary | ICD-10-CM

## 2019-09-06 DIAGNOSIS — I63.429 CEREBROVASCULAR ACCIDENT (CVA) DUE TO EMBOLISM OF ANTERIOR CEREBRAL ARTERY, UNSPECIFIED BLOOD VESSEL LATERALITY (HCC): ICD-10-CM

## 2019-09-06 DIAGNOSIS — I05.0 MITRAL VALVE STENOSIS, UNSPECIFIED ETIOLOGY: ICD-10-CM

## 2019-09-06 PROCEDURE — 99214 OFFICE O/P EST MOD 30 MIN: CPT | Performed by: INTERNAL MEDICINE

## 2019-09-06 RX ORDER — ALBUTEROL SULFATE 2.5 MG/3ML
SOLUTION RESPIRATORY (INHALATION)
COMMUNITY
Start: 2019-02-13 | End: 2019-12-02

## 2019-10-28 ENCOUNTER — TELEPHONE (OUTPATIENT)
Dept: CARDIOLOGY | Facility: CLINIC | Age: 84
End: 2019-10-28

## 2019-10-28 RX ORDER — FUROSEMIDE 40 MG/1
TABLET ORAL
Qty: 30 TABLET | Refills: 0 | Status: SHIPPED | OUTPATIENT
Start: 2019-10-28 | End: 2020-03-14 | Stop reason: HOSPADM

## 2019-10-28 RX ORDER — POTASSIUM CHLORIDE 750 MG/1
CAPSULE, EXTENDED RELEASE ORAL
Qty: 30 CAPSULE | Refills: 0 | Status: SHIPPED | OUTPATIENT
Start: 2019-10-28 | End: 2020-02-17

## 2019-10-28 NOTE — TELEPHONE ENCOUNTER
Patient has referral into pulmonary doctor from her PCP, but it's in [place]. Nidia is asking if Dev can ref her to pulm doctor over here? [phone]

## 2019-10-28 NOTE — TELEPHONE ENCOUNTER
S/W Nidia pt seen Dr. Irving in Jan 2019 and I gave her his office number. She will call me if she needs anything else.

## 2019-12-02 ENCOUNTER — OFFICE VISIT (OUTPATIENT)
Dept: PULMONOLOGY | Facility: HOSPITAL | Age: 84
End: 2019-12-02

## 2019-12-02 VITALS
BODY MASS INDEX: 34.04 KG/M2 | SYSTOLIC BLOOD PRESSURE: 132 MMHG | DIASTOLIC BLOOD PRESSURE: 75 MMHG | HEIGHT: 62 IN | OXYGEN SATURATION: 90 % | HEART RATE: 69 BPM | WEIGHT: 185 LBS

## 2019-12-02 DIAGNOSIS — R06.09 DYSPNEA ON EXERTION: Primary | ICD-10-CM

## 2019-12-02 DIAGNOSIS — I27.20 PULMONARY HYPERTENSION (HCC): ICD-10-CM

## 2019-12-02 DIAGNOSIS — J96.11 CHRONIC RESPIRATORY FAILURE WITH HYPOXIA (HCC): ICD-10-CM

## 2019-12-02 PROCEDURE — G0463 HOSPITAL OUTPT CLINIC VISIT: HCPCS

## 2019-12-02 RX ORDER — ALBUTEROL SULFATE 90 UG/1
2 AEROSOL, METERED RESPIRATORY (INHALATION) EVERY 4 HOURS PRN
COMMUNITY
End: 2022-01-01

## 2019-12-02 RX ORDER — DICYCLOMINE HYDROCHLORIDE 10 MG/1
1 CAPSULE ORAL 4 TIMES DAILY
COMMUNITY
Start: 2019-10-10 | End: 2020-06-17

## 2019-12-02 RX ORDER — RANITIDINE 300 MG/1
1 TABLET ORAL DAILY
COMMUNITY
Start: 2019-10-02 | End: 2020-06-17

## 2019-12-02 NOTE — PROGRESS NOTES
12/2/2019     Karen Rubio  1935      Chief Complaint   Patient presents with   • Pulmonary hypertension   • Valvular heart disease         Subjective   84 year old female presented to clinic today for follow up to her Dyspnea.  She was last seen in clinic by me on 2/1/19.  She denies any hospitalizations or antibiotics since last seen.  She was being evaluated for valvular heart surgery, but has decided against any intervention.  She continues to follow up with cardiologist Dr. Hernández.  She is now using continuous oxygen therapy with 2 liters by nasal cannula since October.  She is using an albuterol rescue inhaler as needed.  No cough.  She remains with shortness of air with activity that is better at rest.      Review of System  General: Denies fevers or chills.  Denies any weakness + fatigue.  Denies any weight loss or weight gain.  HEENT: Denies sore throat, rhinorrhea, ear pain.  Denies any change in vision.   LUNGS: + shortness of breath No wheezing.  Denies any cough.  Denies any hemoptysis.  CARDIAC: Denies any chest pain, palpitations. Denies edema  ABD: Denies any abdominal pain.  Denies any N/V/D  PSYCH: Negative for suicidal ideas. Denies anxiety or depression  All other systems reviewed and negative unless stated in HPI       Objective    Vitals:    12/02/19 1108   BP: 132/75   Pulse: 69   SpO2: 90%       General: NAD, alert and oriented x 4, appears age  Cardiac: S1, S2, RRR, no murmur, no edema  Pulmonary: good air movement bilaterally, no wheezing   Abdomen: soft, non-tender, non-distended  : Voids independently, no CVA tenderness   Musculoskeletal: Moves all extremities, equal strength bilaterally  Neuro: alert and oriented x 3, CN 2 - 12 grossly intact  Skin: warm, dry, and intact          Current Outpatient Medications:   •  albuterol sulfate  (90 Base) MCG/ACT inhaler, Inhale 2 puffs Every 4 (Four) Hours As Needed for Wheezing or Shortness of Air., Disp: , Rfl:   •  apixaban  (ELIQUIS) 5 MG tablet tablet, TAKE 1 TABLET TWICE DAILY, Disp: , Rfl:   •  atorvastatin (LIPITOR) 20 MG tablet, TAKE 1 TABLET EVERY DAY, Disp: , Rfl:   •  calcium carbonate (OS-SHERWIN) 600 MG tablet, Take 600 mg by mouth., Disp: , Rfl:   •  Cholecalciferol (VITAMIN D-3) 1000 units capsule, Take 1 capsule by mouth Daily., Disp: , Rfl:   •  dicyclomine (BENTYL) 10 MG capsule, Take 1 capsule by mouth 4 (Four) Times a Day., Disp: , Rfl:   •  docusate calcium (SURFAK) 240 MG capsule, Take  by mouth Daily., Disp: , Rfl:   •  escitalopram (LEXAPRO) 10 MG tablet, Take 10 mg by mouth Daily., Disp: , Rfl:   •  esomeprazole (nexIUM) 40 MG capsule, TAKE 1 CAPSULE EVERY DAY, Disp: , Rfl:   •  furosemide (LASIX) 40 MG tablet, TAKE ONE TABLET BY MOUTH DAILY, Disp: 30 tablet, Rfl: 0  •  levothyroxine (SYNTHROID, LEVOTHROID) 50 MCG tablet, TAKE 1 TABLET EVERY DAY, Disp: , Rfl:   •  Multiple Vitamins-Minerals (ICAPS) capsule, Take  by mouth., Disp: , Rfl:   •  potassium chloride (MICRO-K) 10 MEQ CR capsule, TAKE ONE CAPSULE BY MOUTH DAILY, Disp: 30 capsule, Rfl: 0  •  raNITIdine (ZANTAC) 300 MG tablet, Take 1 tablet by mouth Daily., Disp: , Rfl:   •  rifAXIMin (XIFAXAN PO), Take  by mouth., Disp: , Rfl:   •  temazepam (RESTORIL) 15 MG capsule, TAKE ONE CAPSULE BY MOUTH EVERY NIGHT AT BEDTIME AS NEEDED FOR SLEEP, Disp: , Rfl:   •  TURMERIC PO, Take  by mouth., Disp: , Rfl:     Social History     Socioeconomic History   • Marital status:      Spouse name: Not on file   • Number of children: Not on file   • Years of education: Not on file   • Highest education level: Not on file   Occupational History   • Occupation: RETIRED-[employer]   Tobacco Use   • Smoking status: Former Smoker     Types: Cigarettes     Last attempt to quit:      Years since quittin.9   • Smokeless tobacco: Never Used   Substance and Sexual Activity   • Alcohol use: No     Frequency: Never   • Drug use: No   • Sexual activity: Defer     Birth  control/protection: Post-menopausal       Past Medical History:   Diagnosis Date   • Abnormality of left atrial appendage 05/17/2016    Suspected Clot Noted on ESTEFANI   • Acute renal failure (CMS/HCC) 08/06/2016-[facility]    Front Dehydration   • Arthritis    • Breast density 12/2017   • Carotid stenosis 11/05/2018    R @ 60% and L @ 10-20% Noted on Cardiac Cath & 16-49% on L&R    • Heart murmur    • History of echocardiogram 10/13/2017    Calcified Aortic Leaftlets; Mild Aortic Stenosis Present; Mild Mitral Stenosis; Bilateral Enlargement Noted;  Moderate TR; LV Function of 55-60%   • History of echocardiogram 12/5/14-[facility]    Moderate L Vent Hypertrophy Noted; L Atrial Dilation; Moderate MR; Mild TR; Mild-Moderate Aortic Reg Noted; Normal Root Size/No Effusion   • History of echocardiogram 5/16/16-[facility]    Normal Findings with Mild-Moderate MVS Noted   • History of EKG 2004/2016 & 11/5/18-[facility]    All Sinus Rhythm w/Infarct Ischemnic Changes Noted   • HLD (hyperlipidemia)     Controlled w/Meds   • Hx of hyperglycemia    • Hypertension     Controlled w/Meds   • Hypothyroidism     Levothyroxine   • Joint pain    • Left atrial dilatation 12/05/2014    Mildly Noted on Echo   • Left ventricular hypertrophy 12/05/2014    Noted on Echo w/EF @ 55-60%   • Mild aortic regurgitation 12/05/2014    to Moderate Noted on Echo   • Mild aortic stenosis 10/13/2017    Noted on Echo & ESTEFANI-11/5/18-Moderate    • Mild mitral insufficiency 10/13/2017    Noted on Echo & ESTEFANI-11/5/18   • Mild tricuspid regurgitation 12/05/2014    Noted on Echo   • Moderate mitral regurgitation 12/05/2014    Noted on Echo   • Moderate mitral stenosis 10/13/2017    Noted on Echo   • Moderate tricuspid insufficiency 10/13/2017    Noted on Echo   • Pneumonia involving left lung 08/6/16-[facility] ER   • Pulmonary hypertension (CMS/HCC) 10/13/2017 & 11/5/18-[facility]    Severe Noted on Echo & Cath   • Slurred speech 05/16/2016   • SOB (shortness of breath) 08/2016   • Thickened mitral  leaflet 10/13/2017 & ESTEFANI-11/5/18    Severely Calcified Noted on Echo   • Tricuspid valve insufficiency 11/05/2018    Noted on ESTEFANI               Diagnoses and all orders for this visit:    1. Dyspnea on exertion (Primary)    2. Chronic respiratory failure with hypoxia (CMS/HCC)    3. Pulmonary hypertension (CMS/HCC)      PLAN:  -cont with supplemental oxygen, feels better with use and is benefiting from use  -cont with albuterol rescue inhaler prn  -previous ECHO, EF 65-70% with moderate mitral stenosis, moderate tricuspid insufficiency, mild to moderate aortic stenosis  -pulmonary htn likely related to cardiac/valcular heart disease     This document has been electronically signed by: ZACHARIAH Castillo APRN

## 2020-02-17 RX ORDER — POTASSIUM CHLORIDE 750 MG/1
CAPSULE, EXTENDED RELEASE ORAL
Qty: 30 CAPSULE | Refills: 0 | Status: SHIPPED | OUTPATIENT
Start: 2020-02-17 | End: 2020-03-14 | Stop reason: HOSPADM

## 2020-03-03 ENCOUNTER — LAB (OUTPATIENT)
Dept: LAB | Facility: HOSPITAL | Age: 85
End: 2020-03-03

## 2020-03-03 DIAGNOSIS — I05.0 MITRAL VALVE STENOSIS, UNSPECIFIED ETIOLOGY: ICD-10-CM

## 2020-03-03 DIAGNOSIS — I25.10 ATHEROSCLEROSIS OF NATIVE CORONARY ARTERY OF NATIVE HEART WITHOUT ANGINA PECTORIS: ICD-10-CM

## 2020-03-03 DIAGNOSIS — I27.20 PULMONARY HYPERTENSION (HCC): ICD-10-CM

## 2020-03-03 DIAGNOSIS — E78.2 MIXED HYPERLIPIDEMIA: ICD-10-CM

## 2020-03-03 DIAGNOSIS — I63.429 CEREBROVASCULAR ACCIDENT (CVA) DUE TO EMBOLISM OF ANTERIOR CEREBRAL ARTERY, UNSPECIFIED BLOOD VESSEL LATERALITY (HCC): ICD-10-CM

## 2020-03-03 LAB
ALBUMIN SERPL-MCNC: 4 G/DL (ref 3.5–5.2)
ALBUMIN/GLOB SERPL: 1.1 G/DL
ALP SERPL-CCNC: 114 U/L (ref 39–117)
ALT SERPL W P-5'-P-CCNC: 10 U/L (ref 1–33)
ANION GAP SERPL CALCULATED.3IONS-SCNC: 13.5 MMOL/L (ref 5–15)
AST SERPL-CCNC: 17 U/L (ref 1–32)
BILIRUB SERPL-MCNC: 0.8 MG/DL (ref 0.2–1.2)
BUN BLD-MCNC: 23 MG/DL (ref 8–23)
BUN/CREAT SERPL: 12 (ref 7–25)
CALCIUM SPEC-SCNC: 9.2 MG/DL (ref 8.6–10.5)
CHLORIDE SERPL-SCNC: 98 MMOL/L (ref 98–107)
CHOLEST SERPL-MCNC: 94 MG/DL (ref 0–200)
CK SERPL-CCNC: 19 U/L (ref 20–180)
CO2 SERPL-SCNC: 28.5 MMOL/L (ref 22–29)
CREAT BLD-MCNC: 1.92 MG/DL (ref 0.57–1)
GFR SERPL CREATININE-BSD FRML MDRD: 25 ML/MIN/1.73
GLOBULIN UR ELPH-MCNC: 3.5 GM/DL
GLUCOSE BLD-MCNC: 117 MG/DL (ref 65–99)
HDLC SERPL-MCNC: 39 MG/DL (ref 40–60)
LDLC SERPL CALC-MCNC: 34 MG/DL (ref 0–100)
LDLC/HDLC SERPL: 0.88 {RATIO}
POTASSIUM BLD-SCNC: 3.9 MMOL/L (ref 3.5–5.2)
PROT SERPL-MCNC: 7.5 G/DL (ref 6–8.5)
SODIUM BLD-SCNC: 140 MMOL/L (ref 136–145)
TRIGL SERPL-MCNC: 104 MG/DL (ref 0–150)
VLDLC SERPL-MCNC: 20.8 MG/DL (ref 5–40)

## 2020-03-03 PROCEDURE — 82550 ASSAY OF CK (CPK): CPT

## 2020-03-03 PROCEDURE — 80053 COMPREHEN METABOLIC PANEL: CPT

## 2020-03-03 PROCEDURE — 36415 COLL VENOUS BLD VENIPUNCTURE: CPT

## 2020-03-03 PROCEDURE — 80061 LIPID PANEL: CPT

## 2020-03-09 ENCOUNTER — APPOINTMENT (OUTPATIENT)
Dept: GENERAL RADIOLOGY | Facility: HOSPITAL | Age: 85
End: 2020-03-09

## 2020-03-09 ENCOUNTER — HOSPITAL ENCOUNTER (INPATIENT)
Facility: HOSPITAL | Age: 85
LOS: 5 days | Discharge: NURSING, REHAB OR LONG-TERM CARE | End: 2020-03-14
Attending: INTERNAL MEDICINE | Admitting: INTERNAL MEDICINE

## 2020-03-09 PROBLEM — I48.91 ATRIAL FIBRILLATION WITH RVR (HCC): Status: ACTIVE | Noted: 2020-03-09

## 2020-03-09 PROBLEM — I48.91 ATRIAL FIBRILLATION, NEW ONSET (HCC): Status: ACTIVE | Noted: 2020-03-09

## 2020-03-09 PROBLEM — J96.10 CHRONIC RESPIRATORY FAILURE (HCC): Chronic | Status: ACTIVE | Noted: 2020-03-09

## 2020-03-09 PROBLEM — E87.6 HYPOKALEMIA: Status: ACTIVE | Noted: 2020-03-09

## 2020-03-09 PROBLEM — J18.9 CAP (COMMUNITY ACQUIRED PNEUMONIA): Status: ACTIVE | Noted: 2020-03-09

## 2020-03-09 PROBLEM — N28.9 ACUTE RENAL INSUFFICIENCY: Status: ACTIVE | Noted: 2020-03-09

## 2020-03-09 PROBLEM — R55 SYNCOPE: Status: ACTIVE | Noted: 2020-03-09

## 2020-03-09 PROBLEM — Z86.73 HISTORY OF CVA (CEREBROVASCULAR ACCIDENT): Chronic | Status: ACTIVE | Noted: 2018-12-08

## 2020-03-09 LAB
ALBUMIN SERPL-MCNC: 3.5 G/DL (ref 3.5–5.2)
ALBUMIN/GLOB SERPL: 0.8 G/DL
ALP SERPL-CCNC: 228 U/L (ref 39–117)
ALT SERPL W P-5'-P-CCNC: 31 U/L (ref 1–33)
ANION GAP SERPL CALCULATED.3IONS-SCNC: 15 MMOL/L (ref 5–15)
AST SERPL-CCNC: 48 U/L (ref 1–32)
B PERT DNA SPEC QL NAA+PROBE: NOT DETECTED
BASOPHILS # BLD AUTO: 0 10*3/MM3 (ref 0–0.2)
BASOPHILS NFR BLD AUTO: 0.2 % (ref 0–1.5)
BILIRUB SERPL-MCNC: 1.4 MG/DL (ref 0.2–1.2)
BUN BLD-MCNC: 27 MG/DL (ref 8–23)
BUN/CREAT SERPL: 17.8 (ref 7–25)
C PNEUM DNA NPH QL NAA+NON-PROBE: NOT DETECTED
CALCIUM SPEC-SCNC: 9.6 MG/DL (ref 8.6–10.5)
CHLORIDE SERPL-SCNC: 95 MMOL/L (ref 98–107)
CO2 SERPL-SCNC: 29 MMOL/L (ref 22–29)
CREAT BLD-MCNC: 1.52 MG/DL (ref 0.57–1)
DEPRECATED RDW RBC AUTO: 47.7 FL (ref 37–54)
EOSINOPHIL # BLD AUTO: 0.1 10*3/MM3 (ref 0–0.4)
EOSINOPHIL NFR BLD AUTO: 0.8 % (ref 0.3–6.2)
ERYTHROCYTE [DISTWIDTH] IN BLOOD BY AUTOMATED COUNT: 15.5 % (ref 12.3–15.4)
FLUAV H1 2009 PAND RNA NPH QL NAA+PROBE: NOT DETECTED
FLUAV H1 HA GENE NPH QL NAA+PROBE: NOT DETECTED
FLUAV H3 RNA NPH QL NAA+PROBE: NOT DETECTED
FLUAV SUBTYP SPEC NAA+PROBE: NOT DETECTED
FLUAV SUBTYP SPEC NAA+PROBE: NOT DETECTED
FLUBV RNA ISLT QL NAA+PROBE: NOT DETECTED
FLUBV RNA ISLT QL NAA+PROBE: NOT DETECTED
GFR SERPL CREATININE-BSD FRML MDRD: 33 ML/MIN/1.73
GLOBULIN UR ELPH-MCNC: 4.6 GM/DL
GLUCOSE BLD-MCNC: 100 MG/DL (ref 65–99)
HADV DNA SPEC NAA+PROBE: NOT DETECTED
HCOV 229E RNA SPEC QL NAA+PROBE: NOT DETECTED
HCOV HKU1 RNA SPEC QL NAA+PROBE: NOT DETECTED
HCOV NL63 RNA SPEC QL NAA+PROBE: NOT DETECTED
HCOV OC43 RNA SPEC QL NAA+PROBE: NOT DETECTED
HCT VFR BLD AUTO: 39.5 % (ref 34–46.6)
HGB BLD-MCNC: 12.7 G/DL (ref 12–15.9)
HMPV RNA NPH QL NAA+NON-PROBE: NOT DETECTED
HPIV1 RNA SPEC QL NAA+PROBE: NOT DETECTED
HPIV2 RNA SPEC QL NAA+PROBE: NOT DETECTED
HPIV3 RNA NPH QL NAA+PROBE: NOT DETECTED
HPIV4 P GENE NPH QL NAA+PROBE: NOT DETECTED
LYMPHOCYTES # BLD AUTO: 1.3 10*3/MM3 (ref 0.7–3.1)
LYMPHOCYTES NFR BLD AUTO: 11.6 % (ref 19.6–45.3)
M PNEUMO IGG SER IA-ACNC: NOT DETECTED
MCH RBC QN AUTO: 28.3 PG (ref 26.6–33)
MCHC RBC AUTO-ENTMCNC: 32.2 G/DL (ref 31.5–35.7)
MCV RBC AUTO: 88 FL (ref 79–97)
MONOCYTES # BLD AUTO: 1 10*3/MM3 (ref 0.1–0.9)
MONOCYTES NFR BLD AUTO: 9.3 % (ref 5–12)
NEUTROPHILS # BLD AUTO: 8.6 10*3/MM3 (ref 1.7–7)
NEUTROPHILS NFR BLD AUTO: 78.1 % (ref 42.7–76)
NRBC BLD AUTO-RTO: 0.1 /100 WBC (ref 0–0.2)
NT-PROBNP SERPL-MCNC: 8621 PG/ML (ref 5–1800)
PLATELET # BLD AUTO: 183 10*3/MM3 (ref 140–450)
PMV BLD AUTO: 9.7 FL (ref 6–12)
POTASSIUM BLD-SCNC: 3.3 MMOL/L (ref 3.5–5.2)
PROCALCITONIN SERPL-MCNC: 0.13 NG/ML (ref 0.1–0.25)
PROT SERPL-MCNC: 8.1 G/DL (ref 6–8.5)
RBC # BLD AUTO: 4.49 10*6/MM3 (ref 3.77–5.28)
RHINOVIRUS RNA SPEC NAA+PROBE: NOT DETECTED
RSV RNA NPH QL NAA+NON-PROBE: NOT DETECTED
SODIUM BLD-SCNC: 139 MMOL/L (ref 136–145)
T3FREE SERPL-MCNC: 1.96 PG/ML (ref 2–4.4)
T4 FREE SERPL-MCNC: 1.31 NG/DL (ref 0.93–1.7)
TROPONIN T SERPL-MCNC: <0.01 NG/ML (ref 0–0.03)
TSH SERPL DL<=0.05 MIU/L-ACNC: 5.39 UIU/ML (ref 0.27–4.2)
WBC NRBC COR # BLD: 11 10*3/MM3 (ref 3.4–10.8)

## 2020-03-09 PROCEDURE — 84481 FREE ASSAY (FT-3): CPT | Performed by: INTERNAL MEDICINE

## 2020-03-09 PROCEDURE — 85025 COMPLETE CBC W/AUTO DIFF WBC: CPT | Performed by: NURSE PRACTITIONER

## 2020-03-09 PROCEDURE — 84443 ASSAY THYROID STIM HORMONE: CPT | Performed by: NURSE PRACTITIONER

## 2020-03-09 PROCEDURE — 94640 AIRWAY INHALATION TREATMENT: CPT

## 2020-03-09 PROCEDURE — 84484 ASSAY OF TROPONIN QUANT: CPT | Performed by: NURSE PRACTITIONER

## 2020-03-09 PROCEDURE — 0099U HC BIOFIRE FILMARRAY RESP PANEL 1: CPT | Performed by: INTERNAL MEDICINE

## 2020-03-09 PROCEDURE — 87502 INFLUENZA DNA AMP PROBE: CPT | Performed by: INTERNAL MEDICINE

## 2020-03-09 PROCEDURE — 99222 1ST HOSP IP/OBS MODERATE 55: CPT | Performed by: INTERNAL MEDICINE

## 2020-03-09 PROCEDURE — 80053 COMPREHEN METABOLIC PANEL: CPT | Performed by: NURSE PRACTITIONER

## 2020-03-09 PROCEDURE — 71045 X-RAY EXAM CHEST 1 VIEW: CPT

## 2020-03-09 PROCEDURE — 84145 PROCALCITONIN (PCT): CPT | Performed by: NURSE PRACTITIONER

## 2020-03-09 PROCEDURE — 99223 1ST HOSP IP/OBS HIGH 75: CPT | Performed by: INTERNAL MEDICINE

## 2020-03-09 PROCEDURE — 83880 ASSAY OF NATRIURETIC PEPTIDE: CPT | Performed by: NURSE PRACTITIONER

## 2020-03-09 PROCEDURE — 93005 ELECTROCARDIOGRAM TRACING: CPT | Performed by: NURSE PRACTITIONER

## 2020-03-09 PROCEDURE — 84439 ASSAY OF FREE THYROXINE: CPT | Performed by: INTERNAL MEDICINE

## 2020-03-09 PROCEDURE — 25010000002 CEFTRIAXONE PER 250 MG: Performed by: NURSE PRACTITIONER

## 2020-03-09 RX ORDER — SODIUM CHLORIDE 0.9 % (FLUSH) 0.9 %
10 SYRINGE (ML) INJECTION EVERY 12 HOURS SCHEDULED
Status: DISCONTINUED | OUTPATIENT
Start: 2020-03-09 | End: 2020-03-14

## 2020-03-09 RX ORDER — ONDANSETRON 4 MG/1
4 TABLET, FILM COATED ORAL EVERY 6 HOURS PRN
Status: DISCONTINUED | OUTPATIENT
Start: 2020-03-09 | End: 2020-03-14 | Stop reason: HOSPADM

## 2020-03-09 RX ORDER — POTASSIUM CHLORIDE 20 MEQ/1
40 TABLET, EXTENDED RELEASE ORAL AS NEEDED
Status: DISCONTINUED | OUTPATIENT
Start: 2020-03-09 | End: 2020-03-14 | Stop reason: HOSPADM

## 2020-03-09 RX ORDER — SODIUM CHLORIDE 0.9 % (FLUSH) 0.9 %
10 SYRINGE (ML) INJECTION EVERY 12 HOURS SCHEDULED
Status: DISCONTINUED | OUTPATIENT
Start: 2020-03-09 | End: 2020-03-14 | Stop reason: HOSPADM

## 2020-03-09 RX ORDER — ESCITALOPRAM OXALATE 10 MG/1
10 TABLET ORAL DAILY
Status: DISCONTINUED | OUTPATIENT
Start: 2020-03-10 | End: 2020-03-14 | Stop reason: HOSPADM

## 2020-03-09 RX ORDER — MAGNESIUM SULFATE HEPTAHYDRATE 40 MG/ML
2 INJECTION, SOLUTION INTRAVENOUS AS NEEDED
Status: DISCONTINUED | OUTPATIENT
Start: 2020-03-09 | End: 2020-03-14 | Stop reason: HOSPADM

## 2020-03-09 RX ORDER — IPRATROPIUM BROMIDE AND ALBUTEROL SULFATE 2.5; .5 MG/3ML; MG/3ML
3 SOLUTION RESPIRATORY (INHALATION)
Status: DISCONTINUED | OUTPATIENT
Start: 2020-03-09 | End: 2020-03-14 | Stop reason: HOSPADM

## 2020-03-09 RX ORDER — ACETAMINOPHEN 325 MG/1
650 TABLET ORAL EVERY 4 HOURS PRN
Status: DISCONTINUED | OUTPATIENT
Start: 2020-03-09 | End: 2020-03-14 | Stop reason: HOSPADM

## 2020-03-09 RX ORDER — PANTOPRAZOLE SODIUM 40 MG/1
40 TABLET, DELAYED RELEASE ORAL EVERY MORNING
Status: DISCONTINUED | OUTPATIENT
Start: 2020-03-10 | End: 2020-03-14 | Stop reason: HOSPADM

## 2020-03-09 RX ORDER — BISACODYL 5 MG/1
5 TABLET, DELAYED RELEASE ORAL DAILY PRN
Status: DISCONTINUED | OUTPATIENT
Start: 2020-03-09 | End: 2020-03-14 | Stop reason: HOSPADM

## 2020-03-09 RX ORDER — SODIUM CHLORIDE 9 MG/ML
100 INJECTION, SOLUTION INTRAVENOUS CONTINUOUS
Status: DISCONTINUED | OUTPATIENT
Start: 2020-03-09 | End: 2020-03-11

## 2020-03-09 RX ORDER — HEPARIN SODIUM 5000 [USP'U]/ML
5000 INJECTION, SOLUTION INTRAVENOUS; SUBCUTANEOUS EVERY 12 HOURS SCHEDULED
Status: DISCONTINUED | OUTPATIENT
Start: 2020-03-09 | End: 2020-03-09

## 2020-03-09 RX ORDER — ACETAMINOPHEN 650 MG/1
650 SUPPOSITORY RECTAL EVERY 4 HOURS PRN
Status: DISCONTINUED | OUTPATIENT
Start: 2020-03-09 | End: 2020-03-14 | Stop reason: HOSPADM

## 2020-03-09 RX ORDER — ALBUTEROL SULFATE 2.5 MG/3ML
2.5 SOLUTION RESPIRATORY (INHALATION)
Status: DISCONTINUED | OUTPATIENT
Start: 2020-03-09 | End: 2020-03-14 | Stop reason: HOSPADM

## 2020-03-09 RX ORDER — SODIUM CHLORIDE 0.9 % (FLUSH) 0.9 %
10 SYRINGE (ML) INJECTION AS NEEDED
Status: DISCONTINUED | OUTPATIENT
Start: 2020-03-09 | End: 2020-03-14 | Stop reason: HOSPADM

## 2020-03-09 RX ORDER — CHOLECALCIFEROL (VITAMIN D3) 125 MCG
5 CAPSULE ORAL NIGHTLY PRN
Status: DISCONTINUED | OUTPATIENT
Start: 2020-03-09 | End: 2020-03-14 | Stop reason: HOSPADM

## 2020-03-09 RX ORDER — DICYCLOMINE HYDROCHLORIDE 10 MG/1
10 CAPSULE ORAL 4 TIMES DAILY
Status: DISCONTINUED | OUTPATIENT
Start: 2020-03-09 | End: 2020-03-14 | Stop reason: HOSPADM

## 2020-03-09 RX ORDER — DILTIAZEM HYDROCHLORIDE 5 MG/ML
10 INJECTION INTRAVENOUS ONCE
Status: COMPLETED | OUTPATIENT
Start: 2020-03-10 | End: 2020-03-10

## 2020-03-09 RX ORDER — DILTIAZEM HCL IN NACL,ISO-OSM 125 MG/125
5-15 PLASTIC BAG, INJECTION (ML) INTRAVENOUS
Status: DISCONTINUED | OUTPATIENT
Start: 2020-03-10 | End: 2020-03-12

## 2020-03-09 RX ORDER — POTASSIUM CHLORIDE 1.5 G/1.77G
40 POWDER, FOR SOLUTION ORAL AS NEEDED
Status: DISCONTINUED | OUTPATIENT
Start: 2020-03-09 | End: 2020-03-14 | Stop reason: HOSPADM

## 2020-03-09 RX ORDER — ONDANSETRON 2 MG/ML
4 INJECTION INTRAMUSCULAR; INTRAVENOUS EVERY 6 HOURS PRN
Status: DISCONTINUED | OUTPATIENT
Start: 2020-03-09 | End: 2020-03-14 | Stop reason: HOSPADM

## 2020-03-09 RX ORDER — LEVOTHYROXINE SODIUM 0.05 MG/1
50 TABLET ORAL
Status: DISCONTINUED | OUTPATIENT
Start: 2020-03-10 | End: 2020-03-14 | Stop reason: HOSPADM

## 2020-03-09 RX ORDER — ACETAMINOPHEN 160 MG/5ML
650 SOLUTION ORAL EVERY 4 HOURS PRN
Status: DISCONTINUED | OUTPATIENT
Start: 2020-03-09 | End: 2020-03-14 | Stop reason: HOSPADM

## 2020-03-09 RX ORDER — BISACODYL 10 MG
10 SUPPOSITORY, RECTAL RECTAL DAILY PRN
Status: DISCONTINUED | OUTPATIENT
Start: 2020-03-09 | End: 2020-03-14 | Stop reason: HOSPADM

## 2020-03-09 RX ORDER — ALUMINA, MAGNESIA, AND SIMETHICONE 2400; 2400; 240 MG/30ML; MG/30ML; MG/30ML
15 SUSPENSION ORAL EVERY 6 HOURS PRN
Status: DISCONTINUED | OUTPATIENT
Start: 2020-03-09 | End: 2020-03-14 | Stop reason: HOSPADM

## 2020-03-09 RX ORDER — DOCUSATE SODIUM 100 MG/1
100 CAPSULE, LIQUID FILLED ORAL DAILY
Status: DISCONTINUED | OUTPATIENT
Start: 2020-03-09 | End: 2020-03-14 | Stop reason: HOSPADM

## 2020-03-09 RX ORDER — MAGNESIUM SULFATE 1 G/100ML
1 INJECTION INTRAVENOUS AS NEEDED
Status: DISCONTINUED | OUTPATIENT
Start: 2020-03-09 | End: 2020-03-14 | Stop reason: HOSPADM

## 2020-03-09 RX ORDER — NITROGLYCERIN 0.4 MG/1
0.4 TABLET SUBLINGUAL
Status: DISCONTINUED | OUTPATIENT
Start: 2020-03-09 | End: 2020-03-14 | Stop reason: HOSPADM

## 2020-03-09 RX ORDER — ATORVASTATIN CALCIUM 20 MG/1
20 TABLET, FILM COATED ORAL DAILY
Status: DISCONTINUED | OUTPATIENT
Start: 2020-03-10 | End: 2020-03-14 | Stop reason: HOSPADM

## 2020-03-09 RX ADMIN — DICYCLOMINE HYDROCHLORIDE 10 MG: 10 CAPSULE ORAL at 21:40

## 2020-03-09 RX ADMIN — IPRATROPIUM BROMIDE AND ALBUTEROL SULFATE 3 ML: .5; 3 SOLUTION RESPIRATORY (INHALATION) at 19:50

## 2020-03-09 RX ADMIN — DOXYCYCLINE 100 MG: 100 INJECTION, POWDER, LYOPHILIZED, FOR SOLUTION INTRAVENOUS at 17:57

## 2020-03-09 RX ADMIN — RIFAXIMIN 200 MG: 200 TABLET ORAL at 21:40

## 2020-03-09 RX ADMIN — Medication 10 ML: at 14:36

## 2020-03-09 RX ADMIN — APIXABAN 2.5 MG: 2.5 TABLET, FILM COATED ORAL at 16:30

## 2020-03-09 RX ADMIN — DICYCLOMINE HYDROCHLORIDE 10 MG: 10 CAPSULE ORAL at 17:04

## 2020-03-09 RX ADMIN — Medication 10 ML: at 21:41

## 2020-03-09 RX ADMIN — SODIUM CHLORIDE 500 ML: 900 INJECTION, SOLUTION INTRAVENOUS at 16:32

## 2020-03-09 RX ADMIN — CEFTRIAXONE SODIUM 1 G: 1 INJECTION, POWDER, FOR SOLUTION INTRAMUSCULAR; INTRAVENOUS at 21:40

## 2020-03-09 RX ADMIN — SODIUM CHLORIDE 100 ML/HR: 900 INJECTION, SOLUTION INTRAVENOUS at 16:34

## 2020-03-09 RX ADMIN — METOPROLOL TARTRATE 25 MG: 25 TABLET, FILM COATED ORAL at 16:30

## 2020-03-09 RX ADMIN — DOCUSATE SODIUM 100 MG: 100 CAPSULE, LIQUID FILLED ORAL at 16:30

## 2020-03-09 NOTE — H&P
[facility]      Patient Name: Karen Rubio  : 1935  MRN: [id number]  Primary Care Physician: Eliza Clayton APRN  Date of admission: 3/9/2020    Patient Care Team:  Eliza Clayton APRN as PCP - General (Nurse Practitioner)  Eliza Clayton APRN as PCP - Family Medicine  Demond Valiente MD (Cardiology)  Gibson Medina MD as Surgeon (Cardiothoracic Surgery)          Subjective   History Present Illness     Chief Complaint: Direct admit for new onset afib and acute kidney injury       Ms. Rubio is a 84 y.o. female with known moderate mitral stenosis, moderate tricuspid insufficiency, mild to moderate aortic stenosis followed outpatient by cardiologist Dr. Hernández. She has chronic respiratory failure on supplemental O2 via nasal cannula. She was seen by Dr. Hernández in his office 3/9/20 with reports of fatigue, syncope, and elevated heart rate in the teens. Her symptoms have been ongoing for approximately 2 weeks. She denied any chest pain or palpitations. She has had some shortness of breath but is chronically short of breath. She saw her PCP and had her oxygen increased from 2L to 3L and given a nebulizer machine. No known recent illness. She stated she has been going to the bathroom to brush her teeth and then passed out on two different occasions with most recent over a week ago. She denied any injury to herself. She ambulates independently at home and sometimes with a walker. In Dr. Hernández's office she was noted to have atrial fibrillation with rapid ventricular rate. She was directly admitted for further workup and Dr. Hernández plans ESTEFANI on Wednesday. He also noted she had abnormal kidney function and wanted this evaluated. Upon exam patient's heart rate is 90s-1teens.       Review of Systems   Constitution: Positive for malaise/fatigue.   HENT: Negative.    Eyes: Negative.    Cardiovascular: Positive for syncope.   Respiratory: Negative.     Endocrine: Negative.    Hematologic/Lymphatic: Negative.    Skin: Negative.    Musculoskeletal: Negative.    Gastrointestinal: Negative.    Genitourinary: Negative.    Neurological: Positive for weakness.   Psychiatric/Behavioral: Negative.    Allergic/Immunologic: Negative.    All other systems reviewed and are negative.          Personal History     Past Medical History:   Past Medical History:   Diagnosis Date   • Abnormality of left atrial appendage 05/17/2016    Suspected Clot Noted on ESTEFANI   • Acute renal failure (CMS/HCC) 08/06/2016-[facility]    Front Dehydration   • Arthritis    • Breast density 12/2017   • Carotid stenosis 11/05/2018    R @ 60% and L @ 10-20% Noted on Cardiac Cath & 16-49% on L&R    • Heart murmur    • History of echocardiogram 10/13/2017    Calcified Aortic Leaftlets; Mild Aortic Stenosis Present; Mild Mitral Stenosis; Bilateral Enlargement Noted;  Moderate TR; LV Function of 55-60%   • History of echocardiogram 12/5/14-[facility]    Moderate L Vent Hypertrophy Noted; L Atrial Dilation; Moderate MR; Mild TR; Mild-Moderate Aortic Reg Noted; Normal Root Size/No Effusion   • History of echocardiogram 5/16/16-[facility]    Normal Findings with Mild-Moderate MVS Noted   • History of EKG 2004/2016 & 11/5/18-[facility]    All Sinus Rhythm w/Infarct Ischemnic Changes Noted   • HLD (hyperlipidemia)     Controlled w/Meds   • Hx of hyperglycemia    • Hypertension     Controlled w/Meds   • Hypothyroidism     Levothyroxine   • Joint pain    • Left atrial dilatation 12/05/2014    Mildly Noted on Echo   • Left ventricular hypertrophy 12/05/2014    Noted on Echo w/EF @ 55-60%   • Mild aortic regurgitation 12/05/2014    to Moderate Noted on Echo   • Mild aortic stenosis 10/13/2017    Noted on Echo & ESTEFANI-11/5/18-Moderate    • Mild mitral insufficiency 10/13/2017    Noted on Echo & ESTEFANI-11/5/18   • Mild tricuspid regurgitation 12/05/2014    Noted on Echo   • Moderate mitral regurgitation 12/05/2014    Noted on Echo   • Moderate mitral  stenosis 10/13/2017    Noted on Echo   • Moderate tricuspid insufficiency 10/13/2017    Noted on Echo   • Pneumonia involving left lung 08/6/16-[facility]   • Pulmonary hypertension (CMS/HCC) 10/13/2017 & 11/5/18-Cath    Severe Noted on Echo & Cath   • Slurred speech 05/16/2016   • SOB (shortness of breath) 08/2016   • Thickened mitral leaflet 10/13/2017 & ESTEFANI-11/5/18    Severely Calcified Noted on Echo   • Tricuspid valve insufficiency 11/05/2018    Noted on ESTEFANI       Surgical History:      Past Surgical History:   Procedure Laterality Date   • CARDIAC CATHETERIZATION Left 11/5/18-[facility]    w/L Coronary Angiography--Dr. Hernández-RCA @ 60% with Pulmonary Hypertension Noted   • CHOLECYSTECTOMY     • KNEE ARTHROSCOPY     • ESTEFANI  05/17/2016-[facility]    Dr. Hernández--Severly Calcified Mitral Leaflets w/Mild-Moderate Mitral Stenosis/Insufficiency; Sig Aortic Stenosis Noted; Suspecion of L Atrial Appendage Clot Noted   • ESTEFANI  11/5/18-[facility]    Dr. Hernández--Moderate Mitral Insufficiency Noted; Mild-Moderate AVS; Moderate Tricuspid Insufficiency Noted; EF of 65-70%   • TONSILLECTOMY             Family History: family history includes Cancer in her father; Heart disease in her mother.     Social History:  reports that she quit smoking about 18 years ago. Her smoking use included cigarettes. She has never used smokeless tobacco. She reports that she does not drink alcohol or use drugs.      Medications:  Prior to Admission medications    Medication Sig Start Date End Date Taking? Authorizing Provider   albuterol sulfate  (90 Base) MCG/ACT inhaler Inhale 2 puffs Every 4 (Four) Hours As Needed for Wheezing or Shortness of Air.    Jan Sommer MD   apixaban (ELIQUIS) 5 MG tablet tablet Take 1 tablet by mouth 2 (Two) Times a Day. 1/20/20   Demond Valiente MD   atorvastatin (LIPITOR) 20 MG tablet TAKE 1 TABLET EVERY DAY 8/27/18   Jan Sommer MD   calcium carbonate (OS-SHERWIN) 600 MG tablet Take 600 mg by mouth.    Provider  MD Jan   Cholecalciferol (VITAMIN D-3) 1000 units capsule Take 1 capsule by mouth Daily.    Jan Sommer MD   dicyclomine (BENTYL) 10 MG capsule Take 1 capsule by mouth 4 (Four) Times a Day. 10/10/19   Jan Sommer MD   docusate calcium (SURFAK) 240 MG capsule Take  by mouth Daily.    Jan Sommer MD   escitalopram (LEXAPRO) 10 MG tablet Take 10 mg by mouth Daily. 11/21/17   Jan Sommer MD   esomeprazole (nexIUM) 40 MG capsule TAKE 1 CAPSULE EVERY DAY 12/3/18   Jan Sommer MD   furosemide (LASIX) 40 MG tablet TAKE ONE TABLET BY MOUTH DAILY 10/28/19   Demond Valiente MD   ipratropium-albuterol (DUO-NEB) 0.5-2.5 mg/3 ml nebulizer  1/27/20   Jan Sommer MD   levothyroxine (SYNTHROID, LEVOTHROID) 50 MCG tablet TAKE 1 TABLET EVERY DAY 10/8/18   Jan Sommer MD   Multiple Vitamins-Minerals (ICAPS) capsule Take  by mouth.    Jan Sommer MD   potassium chloride (MICRO-K) 10 MEQ CR capsule TAKE ONE CAPSULE BY MOUTH DAILY 2/17/20   Demond Valiente MD   raNITIdine (ZANTAC) 300 MG tablet Take 1 tablet by mouth Daily. 10/2/19   Jan Sommer MD   rifAXIMin (XIFAXAN PO) Take  by mouth.    Jan Sommer MD   temazepam (RESTORIL) 15 MG capsule TAKE ONE CAPSULE BY MOUTH EVERY NIGHT AT BEDTIME AS NEEDED FOR SLEEP 3/12/18   Jan Sommer MD   TURMERIC PO Take  by mouth.    Jan Sommer MD       Allergies:    Allergies   Allergen Reactions   • Hydrocodone Shortness Of Breath       Objective   Objective     Vital Signs  Temp:  [97.8 °F (36.6 °C)] 97.8 °F (36.6 °C)  Heart Rate:  [] 114  Resp:  [22-24] 22  BP: (112-128)/(72-74) 120/74  SpO2:  [92 %-99 %] 99 %  on  Flow (L/min):  [3] 3;   Device (Oxygen Therapy): nasal cannula  Body mass index is 28.38 kg/m².    Physical Exam   Constitutional: She is oriented to person, place, and time. She appears well-developed and well-nourished. No distress.    HENT:   Head: Normocephalic and atraumatic.   Nose: Nose normal.   Eyes: Pupils are equal, round, and reactive to light. Conjunctivae and EOM are normal. Left eye exhibits no discharge.   Neck: Normal range of motion.   Cardiovascular: Normal heart sounds and intact distal pulses. An irregular rhythm present. Tachycardia present. Exam reveals no gallop and no friction rub.   No murmur heard.  Pulmonary/Chest: Effort normal. No stridor. No respiratory distress. She has no wheezes. She has rhonchi in the right lower field and the left lower field.   Abdominal: Soft. Bowel sounds are normal. She exhibits no distension. There is no tenderness.   Musculoskeletal: Normal range of motion. She exhibits no edema, tenderness or deformity.   Neurological: She is alert and oriented to person, place, and time. No cranial nerve deficit.   Skin: Skin is warm and dry. No rash noted. She is not diaphoretic. No erythema.   Psychiatric: She has a normal mood and affect. Her behavior is normal.   Nursing note and vitals reviewed.      Results Review:  I have personally reviewed most recent cardiac tracings, lab results and radiology images and interpretations and agree with findings.    Results from last 7 days   Lab Units 03/09/20  1418   WBC 10*3/mm3 11.00*   HEMOGLOBIN g/dL 12.7   HEMATOCRIT % 39.5   PLATELETS 10*3/mm3 183     Results from last 7 days   Lab Units 03/09/20  1418   SODIUM mmol/L 139   POTASSIUM mmol/L 3.3*   CHLORIDE mmol/L 95*   CO2 mmol/L 29.0   BUN mg/dL 27*   CREATININE mg/dL 1.52*   GLUCOSE mg/dL 100*   CALCIUM mg/dL 9.6   ALT (SGPT) U/L 31   AST (SGOT) U/L 48*   TROPONIN T ng/mL <0.010   PROBNP pg/mL 8,621.0*     Estimated Creatinine Clearance: 30.9 mL/min (A) (by C-G formula based on SCr of 1.52 mg/dL (H)).  Brief Urine Lab Results     None          Microbiology Results (last 10 days)     ** No results found for the last 240 hours. **          ECG/EMG Results (most recent)     Procedure Component Value Units  Date/Time    ECG 12 Lead [[id number]] Collected:  03/09/20 1334     Updated:  03/09/20 1337    Narrative:       HEART RATE= 104  bpm  RR Interval= 576  ms  MT Interval=   ms  P Horizontal Axis=   deg  P Front Axis=   deg  QRSD Interval= 103  ms  QT Interval= 379  ms  QRS Axis= 120  deg  T Wave Axis= 41  deg  - ABNORMAL ECG -  Atrial fibrillation  Probable right ventricular hypertrophy  Borderline ST elevation, anterior leads  Electronically Signed By:   Date and Time of Study: 2020-03-09 13:34:19          Results for orders placed in visit on 05/16/16   SCANNED VASCULAR STUDIES       Results for orders placed in visit on 12/06/18   SCANNED - ECHOCARDIOGRAM       Xr Chest 1 View    Result Date: 3/9/2020  Hazy opacities throughout left lung, suggesting pneumonia.  Electronically Signed By-DR. Bernard Arriaga MD On:3/9/2020 2:40 PM This report was finalized on [id number] by DR. Bernard Arriaga MD.        Estimated Creatinine Clearance: 30.9 mL/min (A) (by C-G formula based on SCr of 1.52 mg/dL (H)).    Assessment/Plan   Assessment/Plan       Active Hospital Problems    Diagnosis  POA   • **Atrial fibrillation, new onset (CMS/HCC) [I48.91]  Yes     Priority: High   • Atrial fibrillation with RVR (CMS/HCC) [I48.91]  Yes     Priority: High   • CAP (community acquired pneumonia) [J18.9]  Yes     Priority: High   • Acute renal insufficiency [N28.9]  Yes     Priority: High   • Syncope [R55]  Yes     Priority: High   • Hypokalemia [E87.6]  Yes   • Chronic respiratory failure (CMS/HCC) [J96.10]  Yes   • Moderate tricuspid regurgitation [I07.1]  Yes   • Atherosclerosis of native coronary artery of native heart without angina pectoris [I25.10]  Yes   • History of CVA (cerebrovascular accident) [Z86.73]  Not Applicable   • Pulmonary hypertension (CMS/HCC) [I27.20]  Yes   • Hyperlipidemia [E78.5]  Yes   • Mitral valve stenosis [I05.0]  Yes      Resolved Hospital Problems   No resolved problems to display.     New onset Atrial  fibrillation with rapid ventricular rate  -cardiology managing and ordered metoprolol 25mg q12 hours  -started on eliquis  -Troponin negative  -proBNP 8621  -TSH 5.39 with free T4 1.31  -CXR showed hazy opacities throughout left lung suggesting pneumonia    Pneumonia- CAP  -CXR showed hazy opacities throughout left lung suggesting pneumonia  -WBC 11.0, afebrile  -check sputum culture, urine antigens, procalcitonin  -start IV antibiotics rocephin, doxycycline  -check RVP    Acute renal insufficiency  -creatinine 1.52  -possibly from dehydration  -check UA  -low rate IVFs  -monitor Is and Os    Hypokalemia  -Replacement protocol    Syncope  -could have been from atrial fibrillation  -cardiology consulted as above    Chronic respiratory failure on chronic supplemental O2 2-3L  -Continue home dose 3L and monitor oxygen saturation  -duonebs q6 hours  -CXR showed hazy opacities throughout left lung suggesting pneumonia    Valvular heart disease  -moderate mitral stenosis, moderate tricuspid insufficiency, mild to moderate aortic stenosis     Pulmonary hypertension    CAD   -[facility] 2018: 60% disease of ostial RCA, pulmonary hypertension  -no previous intervention    H/o CVA without deficits    Hyperlipidemia  -Continue home statin    Hypothyroidism  -Continue home Synthroid    Depression  -Continue home Lexapro      VTE Prophylaxis - eliquis      CODE STATUS:    Code Status and Medical Interventions:   Ordered at: 03/09/20 1258     Level Of Support Discussed With:    Patient     Code Status:    CPR     Medical Interventions (Level of Support Prior to Arrest):    Full       Admission Status:  I believe this patient meets inpatient criteria.      I discussed the patient's findings and my recommendations with patient, family and nursing staff.        Electronically signed by ZACHARIAH Connolly, 03/09/20, 12:55 PM.  [facility] Hospitalist Team

## 2020-03-10 LAB
ANION GAP SERPL CALCULATED.3IONS-SCNC: 15 MMOL/L (ref 5–15)
BASOPHILS # BLD AUTO: 0 10*3/MM3 (ref 0–0.2)
BASOPHILS NFR BLD AUTO: 0.2 % (ref 0–1.5)
BUN BLD-MCNC: 29 MG/DL (ref 8–23)
BUN/CREAT SERPL: 20.3 (ref 7–25)
CALCIUM SPEC-SCNC: 9.2 MG/DL (ref 8.6–10.5)
CHLORIDE SERPL-SCNC: 101 MMOL/L (ref 98–107)
CO2 SERPL-SCNC: 24 MMOL/L (ref 22–29)
CREAT BLD-MCNC: 1.43 MG/DL (ref 0.57–1)
DEPRECATED RDW RBC AUTO: 49.4 FL (ref 37–54)
EOSINOPHIL # BLD AUTO: 0.1 10*3/MM3 (ref 0–0.4)
EOSINOPHIL NFR BLD AUTO: 0.7 % (ref 0.3–6.2)
ERYTHROCYTE [DISTWIDTH] IN BLOOD BY AUTOMATED COUNT: 15.8 % (ref 12.3–15.4)
GFR SERPL CREATININE-BSD FRML MDRD: 35 ML/MIN/1.73
GLUCOSE BLD-MCNC: 109 MG/DL (ref 65–99)
HCT VFR BLD AUTO: 38.2 % (ref 34–46.6)
HGB BLD-MCNC: 12.1 G/DL (ref 12–15.9)
L PNEUMO1 AG UR QL IA: NEGATIVE
LYMPHOCYTES # BLD AUTO: 1.4 10*3/MM3 (ref 0.7–3.1)
LYMPHOCYTES NFR BLD AUTO: 15 % (ref 19.6–45.3)
MAGNESIUM SERPL-MCNC: 1.7 MG/DL (ref 1.6–2.4)
MCH RBC QN AUTO: 27.8 PG (ref 26.6–33)
MCHC RBC AUTO-ENTMCNC: 31.5 G/DL (ref 31.5–35.7)
MCV RBC AUTO: 88.3 FL (ref 79–97)
MONOCYTES # BLD AUTO: 1 10*3/MM3 (ref 0.1–0.9)
MONOCYTES NFR BLD AUTO: 10.9 % (ref 5–12)
NEUTROPHILS # BLD AUTO: 7 10*3/MM3 (ref 1.7–7)
NEUTROPHILS NFR BLD AUTO: 73.2 % (ref 42.7–76)
NRBC BLD AUTO-RTO: 0 /100 WBC (ref 0–0.2)
PLATELET # BLD AUTO: 159 10*3/MM3 (ref 140–450)
PMV BLD AUTO: 10.1 FL (ref 6–12)
POTASSIUM BLD-SCNC: 4.4 MMOL/L (ref 3.5–5.2)
RBC # BLD AUTO: 4.33 10*6/MM3 (ref 3.77–5.28)
S PNEUM AG SPEC QL LA: NEGATIVE
SODIUM BLD-SCNC: 140 MMOL/L (ref 136–145)
TROPONIN T SERPL-MCNC: <0.01 NG/ML (ref 0–0.03)
WBC NRBC COR # BLD: 9.5 10*3/MM3 (ref 3.4–10.8)

## 2020-03-10 PROCEDURE — 97535 SELF CARE MNGMENT TRAINING: CPT

## 2020-03-10 PROCEDURE — 85025 COMPLETE CBC W/AUTO DIFF WBC: CPT | Performed by: NURSE PRACTITIONER

## 2020-03-10 PROCEDURE — 83735 ASSAY OF MAGNESIUM: CPT | Performed by: NURSE PRACTITIONER

## 2020-03-10 PROCEDURE — 99233 SBSQ HOSP IP/OBS HIGH 50: CPT | Performed by: INTERNAL MEDICINE

## 2020-03-10 PROCEDURE — 84484 ASSAY OF TROPONIN QUANT: CPT | Performed by: NURSE PRACTITIONER

## 2020-03-10 PROCEDURE — 87899 AGENT NOS ASSAY W/OPTIC: CPT | Performed by: NURSE PRACTITIONER

## 2020-03-10 PROCEDURE — 80048 BASIC METABOLIC PNL TOTAL CA: CPT | Performed by: NURSE PRACTITIONER

## 2020-03-10 PROCEDURE — 97162 PT EVAL MOD COMPLEX 30 MIN: CPT

## 2020-03-10 PROCEDURE — 97116 GAIT TRAINING THERAPY: CPT

## 2020-03-10 PROCEDURE — 99232 SBSQ HOSP IP/OBS MODERATE 35: CPT | Performed by: INTERNAL MEDICINE

## 2020-03-10 PROCEDURE — 94799 UNLISTED PULMONARY SVC/PX: CPT

## 2020-03-10 PROCEDURE — 97165 OT EVAL LOW COMPLEX 30 MIN: CPT

## 2020-03-10 PROCEDURE — 25010000002 CEFTRIAXONE PER 250 MG: Performed by: NURSE PRACTITIONER

## 2020-03-10 RX ADMIN — DOXYCYCLINE 100 MG: 100 INJECTION, POWDER, LYOPHILIZED, FOR SOLUTION INTRAVENOUS at 17:18

## 2020-03-10 RX ADMIN — DICYCLOMINE HYDROCHLORIDE 10 MG: 10 CAPSULE ORAL at 08:45

## 2020-03-10 RX ADMIN — APIXABAN 2.5 MG: 2.5 TABLET, FILM COATED ORAL at 08:46

## 2020-03-10 RX ADMIN — DICYCLOMINE HYDROCHLORIDE 10 MG: 10 CAPSULE ORAL at 11:31

## 2020-03-10 RX ADMIN — Medication 10 ML: at 08:51

## 2020-03-10 RX ADMIN — RIFAXIMIN 200 MG: 200 TABLET ORAL at 08:46

## 2020-03-10 RX ADMIN — IPRATROPIUM BROMIDE AND ALBUTEROL SULFATE 3 ML: .5; 3 SOLUTION RESPIRATORY (INHALATION) at 07:23

## 2020-03-10 RX ADMIN — DICYCLOMINE HYDROCHLORIDE 10 MG: 10 CAPSULE ORAL at 21:06

## 2020-03-10 RX ADMIN — SODIUM CHLORIDE 100 ML/HR: 900 INJECTION, SOLUTION INTRAVENOUS at 00:24

## 2020-03-10 RX ADMIN — SODIUM CHLORIDE 5 MG/HR: 900 INJECTION, SOLUTION INTRAVENOUS at 00:22

## 2020-03-10 RX ADMIN — METOPROLOL TARTRATE 25 MG: 25 TABLET, FILM COATED ORAL at 08:45

## 2020-03-10 RX ADMIN — CEFTRIAXONE SODIUM 1 G: 1 INJECTION, POWDER, FOR SOLUTION INTRAMUSCULAR; INTRAVENOUS at 21:06

## 2020-03-10 RX ADMIN — ESCITALOPRAM OXALATE 10 MG: 10 TABLET ORAL at 08:45

## 2020-03-10 RX ADMIN — DILTIAZEM HYDROCHLORIDE 10 MG: 5 INJECTION INTRAVENOUS at 02:23

## 2020-03-10 RX ADMIN — OYSTER SHELL CALCIUM WITH VITAMIN D 1 TABLET: 500; 200 TABLET, FILM COATED ORAL at 08:46

## 2020-03-10 RX ADMIN — IPRATROPIUM BROMIDE AND ALBUTEROL SULFATE 3 ML: .5; 3 SOLUTION RESPIRATORY (INHALATION) at 19:22

## 2020-03-10 RX ADMIN — DOXYCYCLINE 100 MG: 100 INJECTION, POWDER, LYOPHILIZED, FOR SOLUTION INTRAVENOUS at 05:11

## 2020-03-10 RX ADMIN — Medication 10 ML: at 08:46

## 2020-03-10 RX ADMIN — ATORVASTATIN CALCIUM 20 MG: 20 TABLET, FILM COATED ORAL at 08:46

## 2020-03-10 RX ADMIN — RIFAXIMIN 200 MG: 200 TABLET ORAL at 21:06

## 2020-03-10 RX ADMIN — DICYCLOMINE HYDROCHLORIDE 10 MG: 10 CAPSULE ORAL at 17:17

## 2020-03-10 RX ADMIN — APIXABAN 5 MG: 5 TABLET, FILM COATED ORAL at 21:06

## 2020-03-10 RX ADMIN — PANTOPRAZOLE SODIUM 40 MG: 40 TABLET, DELAYED RELEASE ORAL at 08:46

## 2020-03-10 RX ADMIN — METOPROLOL TARTRATE 25 MG: 25 TABLET, FILM COATED ORAL at 21:06

## 2020-03-10 NOTE — THERAPY EVALUATION
Acute Care - Occupational Therapy Initial Evaluation   [facility]     Patient Name: Karen Rubio  : 1935  MRN: [id number]  Today's Date: 3/10/2020             Admit Date: 3/9/2020     No diagnosis found.  Patient Active Problem List   Diagnosis   • Severe mitral regurgitation   • Moderate tricuspid regurgitation   • Atherosclerosis of native coronary artery of native heart without angina pectoris   • History of CVA (cerebrovascular accident)   • Rheumatic mitral stenosis   • Heart murmur   • Hyperlipidemia   • Hypertension   • Pulmonary hypertension (CMS/Formerly [facility])   • Mitral valve stenosis   • Atrial fibrillation, new onset (CMS/Formerly [facility])   • Atrial fibrillation with RVR (CMS/Formerly [facility])   • CAP (community acquired pneumonia)   • Acute renal insufficiency   • Hypokalemia   • Syncope   • Chronic respiratory failure (CMS/Formerly [facility])     Past Medical History:   Diagnosis Date   • Abnormality of left atrial appendage 2016    Suspected Clot Noted on ESTEFANI   • Acute renal failure (CMS/HCC) 2016-[facility]    Front Dehydration   • Arthritis    • Breast density 2017   • Carotid stenosis 2018    R @ 60% and L @ 10-20% Noted on Cardiac Cath & 16-49% on L&R    • Heart murmur    • History of echocardiogram 10/13/2017    Calcified Aortic Leaftlets; Mild Aortic Stenosis Present; Mild Mitral Stenosis; Bilateral Enlargement Noted;  Moderate TR; LV Function of 55-60%   • History of echocardiogram 14-[facility]    Moderate L Vent Hypertrophy Noted; L Atrial Dilation; Moderate MR; Mild TR; Mild-Moderate Aortic Reg Noted; Normal Root Size/No Effusion   • History of echocardiogram 16-[facility]    Normal Findings with Mild-Moderate MVS Noted   • History of EKG  & 18-[facility]    All Sinus Rhythm w/Infarct Ischemnic Changes Noted   • HLD (hyperlipidemia)     Controlled w/Meds   • Hx of hyperglycemia    • Hypertension     Controlled w/Meds   • Hypothyroidism     Levothyroxine   • Joint pain    • Left atrial dilatation 2014     Mildly Noted on Echo   • Left ventricular hypertrophy 12/05/2014    Noted on Echo w/EF @ 55-60%   • Mild aortic regurgitation 12/05/2014    to Moderate Noted on Echo   • Mild aortic stenosis 10/13/2017    Noted on Echo & ESTEFANI-11/5/18-Moderate    • Mild mitral insufficiency 10/13/2017    Noted on Echo & ESTEFANI-11/5/18   • Mild tricuspid regurgitation 12/05/2014    Noted on Echo   • Moderate mitral regurgitation 12/05/2014    Noted on Echo   • Moderate mitral stenosis 10/13/2017    Noted on Echo   • Moderate tricuspid insufficiency 10/13/2017    Noted on Echo   • Pneumonia involving left lung 08/6/16-[facility]   • Pulmonary hypertension (CMS/HCC) 10/13/2017 & 11/5/18-Cath    Severe Noted on Echo & Cath   • Slurred speech 05/16/2016   • SOB (shortness of breath) 08/2016   • Thickened mitral leaflet 10/13/2017 & ESTEFANI-11/5/18    Severely Calcified Noted on Echo   • Tricuspid valve insufficiency 11/05/2018    Noted on ESTEFANI     Past Surgical History:   Procedure Laterality Date   • CARDIAC CATHETERIZATION Left 11/5/18-[facility]    w/L Coronary Angiography--Dr. Hernández-RCA @ 60% with Pulmonary Hypertension Noted   • CHOLECYSTECTOMY     • KNEE ARTHROSCOPY     • ESTEFANI  05/17/2016-[facility]    Dr. Hernández--Severly Calcified Mitral Leaflets w/Mild-Moderate Mitral Stenosis/Insufficiency; Sig Aortic Stenosis Noted; Suspecion of L Atrial Appendage Clot Noted   • ESTEFANI  11/5/18-[facility]    Dr. Hernández--Moderate Mitral Insufficiency Noted; Mild-Moderate AVS; Moderate Tricuspid Insufficiency Noted; EF of 65-70%   • TONSILLECTOMY            OT ASSESSMENT FLOWSHEET (last 12 hours)      Occupational Therapy Evaluation     Row Name 03/10/20 0955                   OT Evaluation Time/Intention    Subjective Information  complains of;fatigue  -SR        Document Type  evaluation  -SR        Mode of Treatment  individual therapy  -SR        Comment  Pt lives at home alone.  She uses rollator and cane for mobility.  She uses shower chair for bathing.  Reports she makes freezer meals.   Reports she does her own grocery shopping, though recently feels like she is having difficulty due to SOA and fatigue.    -SR           General Information    Equipment Currently Used at Home  rollator;shower chair;cane, straight  -SR        Pertinent History of Current Functional Problem  Pt admitted for SOA and fatigue.  She was found to be in afib with rvr.   -SR        Existing Precautions/Restrictions  fall  -SR           Relationship/Environment    Lives With  alone  -SR           Resource/Environmental Concerns    Current Living Arrangements  home/apartment/condo  -SR           Home Main Entrance    Number of Stairs, Main Entrance  two Ramp as well  -SR           Cognitive Assessment/Intervention- PT/OT    Orientation Status (Cognition)  oriented x 3 Reports [facility], though knows she is in NA.   -SR           Bed Mobility Assessment/Treatment    Bed Mobility Assessment/Treatment  supine-sit  -SR        Supine-Sit [place] (Bed Mobility)  minimum assist (75% patient effort)  -SR           Functional Mobility    Functional Mobility- Ind. Level  minimum assist (75% patient effort)  -SR        Functional Mobility- Device  rolling walker  -SR           Transfer Assessment/Treatment    Transfer Assessment/Treatment  sit-stand transfer  -SR           Sit-Stand Transfer    Sit-Stand [place] (Transfers)  minimum assist (75% patient effort)  -SR        Assistive Device (Sit-Stand Transfers)  walker, front-wheeled  -SR           ADL Assessment/Intervention    BADL Assessment/Intervention  lower body dressing;grooming  -SR           Lower Body Dressing Assessment/Training    Lower Body Dressing [place] Level  lower body dressing skills;minimum assist (75% patient effort)  -SR        Comment (Lower Body Dressing)  Assist to thread LLE through pants.    -SR           Grooming Assessment/Training    [place] Level (Grooming)  set up;hair care, combing/brushing  -SR        Grooming Position   supported sitting  -SR           General ROM    GENERAL ROM COMMENTS  BUE WFL   -SR           MMT (Manual Muscle Testing)    General MMT Comments  BUE 4/5  -SR           Positioning and Restraints    Pre-Treatment Position  in bed  -SR        Post Treatment Position  chair  -SR        In Chair  call light within reach;encouraged to call for assist;exit alarm on  -SR           Pain Assessment    Additional Documentation  Pain Scale: Word Pre/Post-Treatment (Group)  -SR           Pain Scale: Word Pre/Post-Treatment    Pain: Word Scale, Pretreatment  0 - no pain  -SR        Pain: Word Scale, Post-Treatment  0 - no pain  -SR           Clinical Impression (OT)    Criteria for Skilled Therapeutic Interventions Met (OT Eval)  yes;treatment indicated  -SR        Rehab Potential (OT Eval)  good, to achieve stated therapy goals  -SR        Therapy Frequency (OT Eval)  3 times/wk  -SR        Predicted Duration of Therapy Intervention (Therapy Eval)  Until discharge   -SR        Anticipated Discharge Disposition (OT)  inpatient rehabilitation facility  -SR           Planned OT Interventions    Planned Therapy Interventions (OT Eval)  activity tolerance training;BADL retraining;functional balance retraining;transfer/mobility retraining;ROM/therapeutic exercise;occupation/activity based interventions;strengthening exercise  -SR           OT Goals    Transfer Goal Selection (OT)  transfer, OT goal 1  -SR        Dressing Goal Selection (OT)  dressing, OT goal 1  -SR        Toileting Goal Selection (OT)  toileting, OT goal 1  -SR           Transfer Goal 1 (OT)    Activity/Assistive Device (Transfer Goal 1, OT)  transfers, all  -SR        [place] Level/Cues Needed (Transfer Goal 1, OT)  supervision required  -SR        Time Frame (Transfer Goal 1, OT)  2 weeks  -SR           Dressing Goal 1 (OT)    Activity/Assistive Device (Dressing Goal 1, OT)  dressing skills, all  -SR        [place]/Cues Needed (Dressing Goal 1, OT)   supervision required  -SR        Time Frame (Dressing Goal 1, OT)  2 weeks  -SR           Toileting Goal 1 (OT)    Activity/Device (Toileting Goal 1, OT)  toileting skills, all  -SR        [place] Level/Cues Needed (Toileting Goal 1, OT)  supervision required  -SR        Time Frame (Toileting Goal 1, OT)  2 weeks  -SR           Living Environment    Home Accessibility  stairs to enter home  -SR          User Key  (r) = Recorded By, (t) = Taken By, (c) = Cosigned By    Initials Name Effective Dates    SR Giana Kothari OT 03/01/19 -          Occupational Therapy Education                 Title: PT OT SLP Therapies (In Progress)     Topic: Occupational Therapy (In Progress)     Point: ADL training (Done)     Description:   Instruct learner(s) on proper safety adaptation and remediation techniques during self care or transfers.   Instruct in proper use of assistive devices.              Learning Progress Summary           Patient Acceptance, E,TB, VU by  at 3/10/2020 1207                   Point: Body mechanics (Done)     Description:   Instruct learner(s) on proper positioning and spine alignment during self-care, functional mobility activities and/or exercises.              Learning Progress Summary           Patient Acceptance, E,TB, VU by SR at 3/10/2020 1207                               User Key     Initials Effective Dates Name Provider Type Discipline     03/01/19 -  Giana Kothari OT Occupational Therapist OT                  OT Recommendation and Plan  Outcome Summary/Treatment Plan (OT)  Anticipated Discharge Disposition (OT): inpatient rehabilitation facility  Planned Therapy Interventions (OT Eval): activity tolerance training, BADL retraining, functional balance retraining, transfer/mobility retraining, ROM/therapeutic exercise, occupation/activity based interventions, strengthening exercise  Therapy Frequency (OT Eval): 3 times/wk  Outcome Summary: 84-year-old white female  patient with known history of moderate to severe mitral stenosis history of severe pulmonary hypertension single-vessel CAD decided not to undergo CABG and valve replacement.  She was admitted with SOA and afib.  She presents with general weakness and low activity tolerence.  She require assist for transfers and lower body ADLs.  She lives alone and is not safe to go home without asisst.  She will require IP rehab at discharge.        Time Calculation:     Therapy Charges for Today     Code Description Service Date Service Provider Modifiers Qty    [id number]  OT EVAL LOW COMPLEXITY 3 3/10/2020 Giana Kothari, OT GO 1    [id number]  OT SELF CARE/MGMT/TRAIN EA 15 MIN 3/10/2020 Giana Kothari OT GO 1               Giana Kothari OT  3/10/2020

## 2020-03-10 NOTE — PLAN OF CARE
Problem: Patient Care Overview  Goal: Plan of Care Review  Outcome: Ongoing (interventions implemented as appropriate)  Flowsheets (Taken 3/10/2020 1540)  Progress: improving  Plan of Care Reviewed With: patient  Outcome Summary: Pt is 85 yo female admitted for new onset A-fib, TRACY, and PNA.  Pt requiring Josette for safety with functional mobility using RW.  Pt fatigues easily and exhibits O2 desat requiring seated rest break for recovery.  Pt appears far from functional mobility baseline of indep living home alone.  At this time due to deficits, PT is recommending IP rehab to address deficits.  PT will follow 3x/week while at [facility] and continue to assess d/c needs.

## 2020-03-10 NOTE — PROGRESS NOTES
Continued Stay Note  [facility]     Patient Name: Karen Rubio  MRN: [id number]  Today's Date: 3/10/2020    Admit Date: 3/9/2020    Discharge Plan     Row Name 03/10/20 1830       Plan    Plan  PT recommends inpt rehab. CM or SW will obtain ECF choices on 3/11. PASRR approved. Will require pre-cert for inpt rehab.      Lora Delgado, KWANW, LSW  PRN   Phone: [phone]

## 2020-03-10 NOTE — PROGRESS NOTES
Discharge Planning Assessment  [facility]     Patient Name: Karen Rubio  MRN: [id number]  Today's Date: 3/10/2020    Admit Date: 3/9/2020    Discharge Needs Assessment     Row Name 03/10/20 1544       Living Environment    Lives With  alone    Current Living Arrangements  home/apartment/condo    Primary Care Provided by  self    Able to Return to Prior Arrangements  -- Pending Hospital Course        Resource/Environmental Concerns    Resource/Environmental Concerns  none    Transportation Concerns  car, none       Transition Planning    Patient/Family Anticipates Transition to  home    Patient/Family Anticipated Services at Transition  none    Transportation Anticipated  family or friend will provide       Discharge Needs Assessment    Readmission Within the Last 30 Days  no previous admission in last 30 days    Concerns to be Addressed  no discharge needs identified    Equipment Currently Used at Home  oxygen;rollator;bath bench Uses oxygen at 3 L cont from [employer]     Anticipated Changes Related to Illness  none    Equipment Needed After Discharge  none        Discharge Plan     Row Name 03/10/20 1547       Plan    Plan  Pending Hospital Course              Demographic Summary     Row Name 03/10/20 1544       General Information    Admission Type  inpatient    Required Notices Provided  Important Message from Medicare    Referral Source  admission list    Reason for Consult  discharge planning    Preferred Language  English        Functional Status     Row Name 03/10/20 1544       Functional Status, IADL    Medications  independent    Meal Preparation  independent    Housekeeping  independent    Laundry  independent    Shopping  assistive person        DC Barriers - Merle GTT    [place] RN, CM  Office Phone [phone]  Cell [phone]

## 2020-03-10 NOTE — THERAPY EVALUATION
Patient Name: Karen Rubio  : 1935    MRN: [id number]                              Today's Date: 3/10/2020       Admit Date: 3/9/2020    Visit Dx: No diagnosis found.  Patient Active Problem List   Diagnosis   • Severe mitral regurgitation   • Moderate tricuspid regurgitation   • Atherosclerosis of native coronary artery of native heart without angina pectoris   • History of CVA (cerebrovascular accident)   • Rheumatic mitral stenosis   • Heart murmur   • Hyperlipidemia   • Hypertension   • Pulmonary hypertension (CMS/Formerly [facility])   • Mitral valve stenosis   • Atrial fibrillation, new onset (CMS/[facility]ly [facility])   • Atrial fibrillation with RVR (CMS/Formerly [facility])   • CAP (community acquired pneumonia)   • Acute renal insufficiency   • Hypokalemia   • Syncope   • Chronic respiratory failure (CMS/Formerly [facility])     Past Medical History:   Diagnosis Date   • Abnormality of left atrial appendage 2016    Suspected Clot Noted on ESTEFANI   • Acute renal failure (CMS/HCC) 2016-[facility]    Front Dehydration   • Arthritis    • Breast density 2017   • Carotid stenosis 2018    R @ 60% and L @ 10-20% Noted on Cardiac Cath & 16-49% on L&R    • Heart murmur    • History of echocardiogram 10/13/2017    Calcified Aortic Leaftlets; Mild Aortic Stenosis Present; Mild Mitral Stenosis; Bilateral Enlargement Noted;  Moderate TR; LV Function of 55-60%   • History of echocardiogram 14-[facility]    Moderate L Vent Hypertrophy Noted; L Atrial Dilation; Moderate MR; Mild TR; Mild-Moderate Aortic Reg Noted; Normal Root Size/No Effusion   • History of echocardiogram 16-[facility]    Normal Findings with Mild-Moderate MVS Noted   • History of EKG  & 18-[facility]    All Sinus Rhythm w/Infarct Ischemnic Changes Noted   • HLD (hyperlipidemia)     Controlled w/Meds   • Hx of hyperglycemia    • Hypertension     Controlled w/Meds   • Hypothyroidism     Levothyroxine   • Joint pain    • Left atrial dilatation 2014    Mildly Noted on Echo   • Left  ventricular hypertrophy 12/05/2014    Noted on Echo w/EF @ 55-60%   • Mild aortic regurgitation 12/05/2014    to Moderate Noted on Echo   • Mild aortic stenosis 10/13/2017    Noted on Echo & ESTEFANI-11/5/18-Moderate    • Mild mitral insufficiency 10/13/2017    Noted on Echo & ESTEFANI-11/5/18   • Mild tricuspid regurgitation 12/05/2014    Noted on Echo   • Moderate mitral regurgitation 12/05/2014    Noted on Echo   • Moderate mitral stenosis 10/13/2017    Noted on Echo   • Moderate tricuspid insufficiency 10/13/2017    Noted on Echo   • Pneumonia involving left lung 08/6/16-[facility]   • Pulmonary hypertension (CMS/HCC) 10/13/2017 & 11/5/18-Cath    Severe Noted on Echo & Cath   • Slurred speech 05/16/2016   • SOB (shortness of breath) 08/2016   • Thickened mitral leaflet 10/13/2017 & ESTEFANI-11/5/18    Severely Calcified Noted on Echo   • Tricuspid valve insufficiency 11/05/2018    Noted on ESTEFANI     Past Surgical History:   Procedure Laterality Date   • CARDIAC CATHETERIZATION Left 11/5/18-[facility]    w/L Coronary Angiography--Dr. Hernández-RCA @ 60% with Pulmonary Hypertension Noted   • CHOLECYSTECTOMY     • KNEE ARTHROSCOPY     • ESTEFANI  05/17/2016-[facility]    Dr. Hernández--Severly Calcified Mitral Leaflets w/Mild-Moderate Mitral Stenosis/Insufficiency; Sig Aortic Stenosis Noted; Suspecion of L Atrial Appendage Clot Noted   • ESTEFANI  11/5/18-[facility]    Dr. Hernández--Moderate Mitral Insufficiency Noted; Mild-Moderate AVS; Moderate Tricuspid Insufficiency Noted; EF of 65-70%   • TONSILLECTOMY       General Information     Row Name 03/10/20 1534          PT Evaluation Time/Intention    Document Type  evaluation  -HD     Mode of Treatment  physical therapy  -HD     Row Name 03/10/20 1534          General Information    Patient Profile Reviewed?  yes  -HD     Prior Level of Function  independent: previously utilizing cane or RW, 3L O2 at home  -HD     Existing Precautions/Restrictions  oxygen therapy device and L/min;fall  -HD     Row Name 03/10/20 153           Relationship/Environment    Lives With  alone  -HD     Row Name 03/10/20 1534          Resource/Environmental Concerns    Current Living Arrangements  home/apartment/condo  -HD     Row Name 03/10/20 1534          Home Main Entrance    Number of Stairs, Main Entrance  two or use of ramp  -HD     Row Name 03/10/20 1534          Cognitive Assessment/Intervention- PT/OT    Orientation Status (Cognition)  oriented x 3  -HD     Cognitive Assessment/Intervention Comment  confused to day of month   -HD     Row Name 03/10/20 1534          Safety Issues, Functional Mobility    Safety Issues Affecting Function (Mobility)  insight into deficits/self awareness;judgment;problem solving;safety precaution awareness  -HD     Impairments Affecting Function (Mobility)  balance;endurance/activity tolerance;postural/trunk control;shortness of breath;strength  -HD       User Key  (r) = Recorded By, (t) = Taken By, (c) = Cosigned By    Initials Name Provider Type    HD Lisette Vasquez, PT Physical Therapist        Mobility     Row Name 03/10/20 1535          Bed Mobility Assessment/Treatment    Bed Mobility Assessment/Treatment  sit-supine  -HD     Sit-Supine [place] (Bed Mobility)  minimum assist (75% patient effort)  -HD     Row Name 03/10/20 1535          Sit-Stand Transfer    Sit-Stand [place] (Transfers)  minimum assist (75% patient effort)  -HD     Assistive Device (Sit-Stand Transfers)  walker, front-wheeled  -HD     Row Name 03/10/20 1535          Gait/Stairs Assessment/Training    Gait/Stairs Assessment/Training  gait/ambulation independence;gait/ambulation assistive device  -HD     [place] Level (Gait)  minimum assist (75% patient effort)  -HD     Assistive Device (Gait)  walker, front-wheeled  -HD     Distance in Feet (Gait)  80 ft, 60 ft  -HD     Comment (Gait/Stairs)  lateral sway noted, impaired balance, Josette for safety with navigating RW  -HD       User Key  (r) = Recorded By, (t) = Taken By, (c) = Cosigned By     Initials Name Provider Type     Lisette Vasquez, PT Physical Therapist        Obj/Interventions     Row Name 03/10/20 1537          General ROM    GENERAL ROM COMMENTS  BLEs WFL  -HD     Row Name 03/10/20 1537          MMT (Manual Muscle Testing)    General MMT Comments  BLEs grossly 4/5 MMT   -HD     Row Name 03/10/20 1537          Static Sitting Balance    Level of [place] (Unsupported Sitting, Static Balance)  supervision  -HD     Sitting Position (Unsupported Sitting, Static Balance)  sitting on edge of bed  -HD     Time Able to Maintain Position (Unsupported Sitting, Static Balance)  1 to 2 minutes  -HD     Row Name 03/10/20 1537          Dynamic Sitting Balance    Level of [place], Reaches Outside Midline (Sitting, Dynamic Balance)  standby assist;contact guard assist  -HD     Sitting Position, Reaches Outside Midline (Sitting, Dynamic Balance)  sitting on edge of bed  -HD     Row Name 03/10/20 1537          Static Standing Balance    Level of [place] (Supported Standing, Static Balance)  contact guard assist;minimal assist, 75% patient effort  -HD     Time Able to Maintain Position (Supported Standing, Static Balance)  1 to 2 minutes  -HD     Assistive Device Utilized (Supported Standing, Static Balance)  walker, rolling  -HD     [facility] Name 03/10/20 1537          Dynamic Standing Balance    Level of [place], Reaches Outside Midline (Standing, Dynamic Balance)  minimal assist, 75% patient effort  -HD     Time Able to Maintain Position, Reaches Outside Midline (Standing, Dynamic Balance)  2 to 3 minutes  -HD     Assistive Device Utilized (Supported Standing, Dynamic Balance)  walker, rolling  -HD       User Key  (r) = Recorded By, (t) = Taken By, (c) = Cosigned By    Initials Name Provider Type     Lisette Vasquez, PT Physical Therapist        Goals/Plan     Row Name 03/10/20 1539          Bed Mobility Goal 1 (PT)    Activity/Assistive Device (Bed Mobility Goal 1, PT)  bed mobility  activities, all  -HD     [place] Level/Cues Needed (Bed Mobility Goal 1, PT)  independent  -HD     Time Frame (Bed Mobility Goal 1, PT)  2 weeks  -HD     Row Name 03/10/20 1539          Transfer Goal 1 (PT)    Activity/Assistive Device (Transfer Goal 1, PT)  transfers, all  -HD     [place] Level/Cues Needed (Transfer Goal 1, PT)  independent  -HD     Time Frame (Transfer Goal 1, PT)  2 weeks  -HD     Row Name 03/10/20 1539          Gait Training Goal 1 (PT)    Activity/Assistive Device (Gait Training Goal 1, PT)  gait (walking locomotion);assistive device use  -HD     [place] Level (Gait Training Goal 1, PT)  supervision required  -HD     Distance (Gait Goal 1, PT)  150 ft  -HD     Time Frame (Gait Training Goal 1, PT)  2 weeks  -HD       User Key  (r) = Recorded By, (t) = Taken By, (c) = Cosigned By    Initials Name Provider Type    HD Lisette Vasquez, PT Physical Therapist        Clinical Impression     Row Name 03/10/20 1538          Pain Assessment    Additional Documentation  Pain Scale: Numbers Pre/Post-Treatment (Group)  -HD     Row Name 03/10/20 1538          Pain Scale: Numbers Pre/Post-Treatment    Pain Scale: Numbers, Pretreatment  0/10 - no pain  -HD     Pain Scale: Numbers, Post-Treatment  0/10 - no pain  -HD     Row Name 03/10/20 1538          Physical Therapy Clinical Impression    Patient/Family Goals Statement (PT Clinical Impression)  Pt is 83 yo female admitted for new onset A-fib, TRACY, and PNA.    -HD     Criteria for Skilled Interventions Met (PT Clinical Impression)  yes;treatment indicated  -HD     Rehab Potential (PT Clinical Summary)  good, to achieve stated therapy goals  -HD     Predicted Duration of Therapy (PT)  2 weeks   -HD     Row Name 03/10/20 1532          Vital Signs    Pre SpO2 (%)  96  -HD     O2 Delivery Pre Treatment  supplemental O2  -HD     Intra SpO2 (%)  86  -HD     O2 Delivery Intra Treatment  supplemental O2  -HD     Post SpO2 (%)  94  -HD     O2 Delivery  Post Treatment  supplemental O2  -HD     Row Name 03/10/20 1538          Positioning and Restraints    Pre-Treatment Position  sitting in chair/recliner  -HD     Post Treatment Position  bed  -HD     In Bed  notified nsg;encouraged to call for assist;call light within reach;exit alarm on;with family/caregiver  -HD       User Key  (r) = Recorded By, (t) = Taken By, (c) = Cosigned By    Initials Name Provider Type    Lisette Hill, PT Physical Therapist        Outcome Measures     Row Name 03/10/20 1540          Functional Assessment    Outcome Measure Options  --  -HD       User Key  (r) = Recorded By, (t) = Taken By, (c) = Cosigned By    Initials Name Provider Type    Lisette Hill, PT Physical Therapist        Physical Therapy Education                 Title: PT OT SLP Therapies (In Progress)     Topic: Physical Therapy (In Progress)     Point: Mobility training (In Progress)     Description:   Instruct learner(s) on safety and technique for assisting patient out of bed, chair or wheelchair.  Instruct in the proper use of assistive devices, such as walker, crutches, cane or brace.              Patient Friendly Description:   It's important to get you on your feet again, but we need to do so in a way that is safe for you. Falling has serious consequences, and your personal safety is the most important thing of all.        When it's time to get out of bed, one of us or a family member will sit next to you on the bed to give you support.     If your doctor or nurse tells you to use a walker, crutches, a cane, or a brace, be sure you use it every time you get out of bed, even if you think you don't need it.    Learning Progress Summary           Patient Acceptance, E, NR by HD at 3/10/2020 1540                   Point: Precautions (In Progress)     Description:   Instruct learner(s) on prescribed precautions during mobility and gait tasks              Learning Progress Summary           Patient Acceptance, E,  NR by HD at 3/10/2020 1540                               User Key     Initials Effective Dates Name Provider Type Discipline    HD 03/01/19 -  Lisette Vasquez, PT Physical Therapist PT              PT Recommendation and Plan  Planned Therapy Interventions (PT Eval): balance training, bed mobility training, gait training, postural re-education, transfer training, neuromuscular re-education, strengthening, patient/family education  Outcome Summary/Treatment Plan (PT)  Anticipated Discharge Disposition (PT): inpatient rehabilitation facility  Plan of Care Reviewed With: patient  Progress: improving  Outcome Summary: Pt is 83 yo female admitted for new onset A-fib, TRACY, and PNA.  Pt requiring Josette for safety with functional mobility using RW.  Pt fatigues easily and exhibits O2 desat requiring seated rest break for recovery.  Pt appears far from functional mobility baseline of indep living home alone.  At this time due to deficits, PT is recommending IP rehab to address deficits.  PT will follow 3x/week while at [facility] and continue to assess d/c needs.     Time Calculation:   PT Charges     Row Name 03/10/20 1542             Time Calculation    Start Time  1402  -HD      Stop Time  1428  -HD      Time Calculation (min)  26 min  -HD      PT Received On  03/10/20  -      PT - Next Appointment  03/11/20  -      PT Goal Re-Cert Due Date  03/24/20  -         Time Calculation- PT    Total Timed Code Minutes- PT  10 minute(s)  -HD        User Key  (r) = Recorded By, (t) = Taken By, (c) = Cosigned By    Initials Name Provider Type     Lisette Vasquez, PT Physical Therapist        Therapy Charges for Today     Code Description Service Date Service Provider Modifiers Qty    82324135667 HC PT EVAL MOD COMPLEXITY 3 3/10/2020 Lisette Vasquez, PT GP 1    58016330985 HC GAIT TRAINING EA 15 MIN 3/10/2020 Lisette Vasquez, PT GP 1               Lisette Vasquez, PT  3/10/2020

## 2020-03-10 NOTE — PROGRESS NOTES
[facility] Daily Progress Note      Hospitalist Team  LOS 1 days      Patient Care Team:  Eliza Clayton APRN as PCP - General (Nurse Practitioner)  Eliza Clayton APRN as PCP - Family Medicine  Demond Valiente MD (Cardiology)  Gibson Medina MD as Surgeon (Cardiothoracic Surgery)    Patient Location: 263/1      Subjective   Subjective     Chief Complaint / Subjective  No chief complaint on file.        Brief Synopsis of Hospital Course/HPI  The patient is an 84-year-old female who was admitted from Dr. Hernández cardiology office with new onset atrial fibrillation.  The patient arrived to the hospital was found to be in acute renal insufficiency secondary to a pneumonia.  The patient has been placed on intravenous antibiotics, breathing treatments and IV rehydration.  Overall the patient is showing significant improvement today.  Cardiology has been consulted and is following the patient.    Date:  3/10/2020  The patient is sitting up eating her lunch today.  She looks much better and much more comfortable today than on admission.  She is also markedly more alert oriented and appropriate.    Review of Systems   Constitution: Negative.   HENT: Negative.    Eyes: Negative.    Cardiovascular: Negative.         Less issues with a rapid heart rate.  She has no complaints of chest pain.   Respiratory: Positive for cough and shortness of breath.         The patient does have some cough and shortness of breath but it is improved over yesterday's examination.   Endocrine: Negative.    Hematologic/Lymphatic: Negative.    Skin: Negative.    Musculoskeletal: Negative.    Gastrointestinal: Negative.    Genitourinary: Negative.    Neurological: Negative.    Psychiatric/Behavioral: Negative.    Allergic/Immunologic: Negative.    All other systems reviewed and are negative.    Objective   Objective      Vital Signs  Temp:  [97.4 °F (36.3 °C)-99.2 °F (37.3 °C)] 99.2 °F  "(37.3 °C)  Heart Rate:  [] 79  Resp:  [16-28] 24  BP: (115-162)/(62-89) 162/86  Oxygen Therapy  SpO2: 99 %  Pulse Oximetry Type: Continuous  Device (Oxygen Therapy): nasal cannula  Flow (L/min): 3  Flowsheet Rows      First Filed Value   Admission Height  170.2 cm (67\") Documented at 03/09/2020 1302   Admission Weight  82.2 kg (181 lb 3.5 oz) Documented at 03/09/2020 1302        Intake & Output (last 3 days)       03/07 0701 - 03/08 0700 03/08 0701 - 03/09 0700 03/09 0701 - 03/10 0700 03/10 0701 - 03/11 0700    P.O.   240 480    IV Piggyback   200     Total Intake(mL/kg)   440 (5.4) 480 (5.8)    Urine (mL/kg/hr)    150 (0.2)    Total Output    150    Net   +440 +330                Lines, Drains & Airways    Active LDAs     Name:   Placement date:   Placement time:   Site:   Days:    Peripheral IV 03/09/20 1533 Left Wrist   03/09/20    1533    Wrist   1    Peripheral IV 03/10/20 0000 Left;Lateral Antecubital   03/10/20    0000    Antecubital   less than 1                  Physical Exam:    Physical Exam   Constitutional: She is oriented to person, place, and time. She appears well-developed and well-nourished. No distress.   HENT:   Head: Normocephalic and atraumatic.   Right Ear: External ear normal.   Left Ear: External ear normal.   Nose: Nose normal.   Mouth/Throat: Oropharynx is clear and moist. No oropharyngeal exudate.   Eyes: Pupils are equal, round, and reactive to light. Conjunctivae and EOM are normal. Right eye exhibits no discharge. Left eye exhibits no discharge. No scleral icterus.   Neck: Normal range of motion. No JVD present. No tracheal deviation present. No thyromegaly present.   Cardiovascular: Normal rate, normal heart sounds and intact distal pulses. Exam reveals no gallop and no friction rub.   No murmur heard.  Irregularly irregular with a 3/6 systolic ejection murmur best at the left lower sternal border.   Pulmonary/Chest: Effort normal. No stridor. No respiratory distress. She has no " wheezes. She has no rales. She exhibits no tenderness.   Few rhonchi bilaterally in the bases.   Abdominal: Soft. Bowel sounds are normal. She exhibits no distension and no mass. There is no tenderness. There is no rebound and no guarding. No hernia.   Musculoskeletal: Normal range of motion. She exhibits no edema, tenderness or deformity.   Lymphadenopathy:     She has no cervical adenopathy.   Neurological: She is alert and oriented to person, place, and time. No cranial nerve deficit or sensory deficit. She exhibits normal muscle tone. Coordination normal.   Skin: Skin is warm and dry. No rash noted. She is not diaphoretic. No erythema.   Psychiatric: She has a normal mood and affect. Her behavior is normal.   Nursing note and vitals reviewed.        Procedures:  Results Review:     I reviewed the patient's new clinical results.    Lab Results (last 24 hours)     Procedure Component Value Units Date/Time    Troponin [[id number]]  (Normal) Collected:  03/10/20 1330    Specimen:  Blood Updated:  03/10/20 1448     Troponin T <0.010 ng/mL     Narrative:       Troponin T Reference Range:  <= 0.03 ng/mL-   Negative for AMI  >0.03 ng/mL-     Abnormal for myocardial necrosis.  Clinicians would have to utilize clinical acumen, EKG, Troponin and serial changes to determine if it is an Acute Myocardial Infarction or myocardial injury due to an underlying chronic condition.       Results may be falsely decreased if patient taking Biotin.      S. Pneumo Ag Urine or CSF - Urine, Urine, Clean Catch [[id number]]  (Normal) Collected:  03/10/20 1311    Specimen:  Urine, Clean Catch Updated:  03/10/20 1410     Strep Pneumo Ag Negative    Legionella Antigen, Urine - Urine, Urine, Clean Catch [[id number]]  (Normal) Collected:  03/10/20 1311    Specimen:  Urine, Clean Catch Updated:  03/10/20 1410     LEGIONELLA ANTIGEN, URINE Negative    Basic Metabolic Panel [[id number]]  (Abnormal) Collected:  03/10/20 0532    Specimen:  Blood Updated:   03/10/20 0644     Glucose 109 mg/dL      BUN 29 mg/dL      Creatinine 1.43 mg/dL      Sodium 140 mmol/L      Potassium 4.4 mmol/L      Comment: Result checked         Chloride 101 mmol/L      CO2 24.0 mmol/L      Calcium 9.2 mg/dL      eGFR Non African Amer 35 mL/min/1.73      BUN/Creatinine Ratio 20.3     Anion Gap 15.0 mmol/L     Narrative:       GFR Normal >60  Chronic Kidney Disease <60  Kidney Failure <15      Troponin [[id number]]  (Normal) Collected:  03/10/20 0532    Specimen:  Blood Updated:  03/10/20 0642     Troponin T <0.010 ng/mL     Narrative:       Troponin T Reference Range:  <= 0.03 ng/mL-   Negative for AMI  >0.03 ng/mL-     Abnormal for myocardial necrosis.  Clinicians would have to utilize clinical acumen, EKG, Troponin and serial changes to determine if it is an Acute Myocardial Infarction or myocardial injury due to an underlying chronic condition.       Results may be falsely decreased if patient taking Biotin.      Magnesium [[id number]]  (Normal) Collected:  03/10/20 0532    Specimen:  Blood Updated:  03/10/20 0642     Magnesium 1.7 mg/dL     CBC Auto Differential [[id number]]  (Abnormal) Collected:  03/10/20 0532    Specimen:  Blood Updated:  03/10/20 0601     WBC 9.50 10*3/mm3      RBC 4.33 10*6/mm3      Hemoglobin 12.1 g/dL      Hematocrit 38.2 %      MCV 88.3 fL      MCH 27.8 pg      MCHC 31.5 g/dL      RDW 15.8 %      RDW-SD 49.4 fl      MPV 10.1 fL      Platelets 159 10*3/mm3      Neutrophil % 73.2 %      Lymphocyte % 15.0 %      Monocyte % 10.9 %      Eosinophil % 0.7 %      Basophil % 0.2 %      Neutrophils, Absolute 7.00 10*3/mm3      Lymphocytes, Absolute 1.40 10*3/mm3      Monocytes, Absolute 1.00 10*3/mm3      Eosinophils, Absolute 0.10 10*3/mm3      Basophils, Absolute 0.00 10*3/mm3      nRBC 0.0 /100 WBC     Troponin [[id number]]  (Normal) Collected:  03/10/20 0019    Specimen:  Blood Updated:  03/10/20 0102     Troponin T <0.010 ng/mL     Narrative:       Troponin T Reference  Range:  <= 0.03 ng/mL-   Negative for AMI  >0.03 ng/mL-     Abnormal for myocardial necrosis.  Clinicians would have to utilize clinical acumen, EKG, Troponin and serial changes to determine if it is an Acute Myocardial Infarction or myocardial injury due to an underlying chronic condition.       Results may be falsely decreased if patient taking Biotin.      T3, Free [[id number]]  (Abnormal) Collected:  03/09/20 1418    Specimen:  Blood Updated:  03/09/20 2219     T3, Free 1.96 pg/mL     Narrative:       Results may be falsely increased if patient taking Biotin.      Respiratory Panel, PCR - Swab, Nasopharynx [[id number]]  (Normal) Collected:  03/09/20 1541    Specimen:  Swab from Nasopharynx Updated:  03/09/20 2109     ADENOVIRUS, PCR Not Detected     Coronavirus 229E Not Detected     Coronavirus HKU1 Not Detected     Coronavirus NL63 Not Detected     Coronavirus OC43 Not Detected     Human Metapneumovirus Not Detected     Human Rhinovirus/Enterovirus Not Detected     Influenza B PCR Not Detected     Parainfluenza Virus 1 Not Detected     Parainfluenza Virus 2 Not Detected     Parainfluenza Virus 3 Not Detected     Parainfluenza Virus 4 Not Detected     Bordetella pertussis pcr Not Detected     Influenza A H1 2009 PCR Not Detected     Chlamydophila pneumoniae PCR Not Detected     Mycoplasma pneumo by PCR Not Detected     Influenza A PCR Not Detected     Influenza A H3 Not Detected     Influenza A H1 Not Detected     RSV, PCR Not Detected    Narrative:       The coronavirus on the RVP is NOT COVID-19 and is NOT indicative of infection with COVID-19.     Procalcitonin [[id number]]  (Normal) Collected:  03/09/20 1844    Specimen:  Blood Updated:  03/09/20 1946     Procalcitonin 0.13 ng/mL     Narrative:       As a Marker for Sepsis (Non-Neonates):   1. <0.5 ng/mL represents a low risk of severe sepsis and/or septic shock.  1. >2 ng/mL represents a high risk of severe sepsis and/or septic shock.    As a Marker for Lower  "Respiratory Tract Infections that require antibiotic therapy:  PCT on Admission     Antibiotic Therapy             6-12 Hrs later  > 0.5                Strongly Recommended            >0.25 - <0.5         Recommended  0.1 - 0.25           Discouraged                   Remeasure/reassess PCT  <0.1                 Strongly Discouraged          Remeasure/reassess PCT      As 28 day mortality risk marker: \"Change in Procalcitonin Result\" (> 80 % or <=80 %) if Day 0 (or Day 1) and Day 4 values are available. Refer to http://[email or web address] – [email or web address]-[email or web address]/   Change in PCT <=80 %   A decrease of PCT levels below or equal to 80 % defines a positive change in PCT test result representing a higher risk for 28-day all-cause mortality of patients diagnosed with severe sepsis or septic shock.  Change in PCT > 80 %   A decrease of PCT levels of more than 80 % defines a negative change in PCT result representing a lower risk for 28-day all-cause mortality of patients diagnosed with severe sepsis or septic shock.                Results may be falsely decreased if patient taking Biotin.     Influenza Antigen, Rapid - Swab, Nasopharynx [[id number]]  (Normal) Collected:  03/09/20 1541    Specimen:  Swab from Nasopharynx Updated:  03/09/20 1928     Influenza A PCR Not Detected     Influenza B PCR Not Detected    T4, Free [[id number]]  (Normal) Collected:  03/09/20 1418    Specimen:  Blood Updated:  03/09/20 1605     Free T4 1.31 ng/dL     Narrative:       Results may be falsely increased if patient taking Biotin.          No results found for: HGBA1C            No results found for: LIPASE  Lab Results   Component Value Date    CHOL 94 03/03/2020    CHLPL 132 10/02/2019    TRIG 104 03/03/2020    HDL 39 (L) 03/03/2020    LDL 34 03/03/2020     No results found for: INTRAOP, PREDX, FINALDX, COMDX    Microbiology Results (last 10 days)     Procedure Component Value - Date/Time    S. Pneumo Ag Urine or CSF - Urine, Urine, Clean Catch " [[id number]]  (Normal) Collected:  03/10/20 1311    Lab Status:  Final result Specimen:  Urine, Clean Catch Updated:  03/10/20 1410     Strep Pneumo Ag Negative    Legionella Antigen, Urine - Urine, Urine, Clean Catch [[id number]]  (Normal) Collected:  03/10/20 1311    Lab Status:  Final result Specimen:  Urine, Clean Catch Updated:  03/10/20 1410     LEGIONELLA ANTIGEN, URINE Negative    Respiratory Panel, PCR - Swab, Nasopharynx [[id number]]  (Normal) Collected:  03/09/20 1541    Lab Status:  Final result Specimen:  Swab from Nasopharynx Updated:  03/09/20 2109     ADENOVIRUS, PCR Not Detected     Coronavirus 229E Not Detected     Coronavirus HKU1 Not Detected     Coronavirus NL63 Not Detected     Coronavirus OC43 Not Detected     Human Metapneumovirus Not Detected     Human Rhinovirus/Enterovirus Not Detected     Influenza B PCR Not Detected     Parainfluenza Virus 1 Not Detected     Parainfluenza Virus 2 Not Detected     Parainfluenza Virus 3 Not Detected     Parainfluenza Virus 4 Not Detected     Bordetella pertussis pcr Not Detected     Influenza A H1 2009 PCR Not Detected     Chlamydophila pneumoniae PCR Not Detected     Mycoplasma pneumo by PCR Not Detected     Influenza A PCR Not Detected     Influenza A H3 Not Detected     Influenza A H1 Not Detected     RSV, PCR Not Detected    Narrative:       The coronavirus on the RVP is NOT COVID-19 and is NOT indicative of infection with COVID-19.     Influenza Antigen, Rapid - Swab, Nasopharynx [[id number]]  (Normal) Collected:  03/09/20 1541    Lab Status:  Final result Specimen:  Swab from Nasopharynx Updated:  03/09/20 1928     Influenza A PCR Not Detected     Influenza B PCR Not Detected        ECG/EMG Results (most recent)     Procedure Component Value Units Date/Time    ECG 12 Lead [[id number]] Collected:  03/09/20 1334     Updated:  03/09/20 1337    Narrative:       HEART RATE= 104  bpm  RR Interval= 576  ms  RI Interval=   ms  P Horizontal Axis=   deg  P Front  Axis=   deg  QRSD Interval= 103  ms  QT Interval= 379  ms  QRS Axis= 120  deg  T Wave Axis= 41  deg  - ABNORMAL ECG -  Atrial fibrillation  Probable right ventricular hypertrophy  Borderline ST elevation, anterior leads  Electronically Signed By:   Date and Time of Study: 2020-03-09 13:34:19        Results for orders placed in visit on 05/16/16   SCANNED VASCULAR STUDIES     Results for orders placed in visit on 12/06/18   SCANNED - ECHOCARDIOGRAM     Xr Chest 1 View    Result Date: 3/9/2020  Hazy opacities throughout left lung, suggesting pneumonia.  Electronically Signed By-DR. Bernard Arriaga MD On:3/9/2020 2:40 PM This report was finalized on [id number] by DR. Bernard Arriaga MD.    Xrays, labs reviewed personally by physician.    Medication Review:   I have reviewed the patient's current medication list    Scheduled Meds    apixaban 5 mg Oral Q12H   atorvastatin 20 mg Oral Daily   calcium-vitamin D 1 tablet Oral Daily   cefTRIAXone 1 g Intravenous Q24H   dicyclomine 10 mg Oral 4x Daily   docusate sodium 100 mg Oral Daily   doxycycline 100 mg Intravenous Q12H   escitalopram 10 mg Oral Daily   ipratropium-albuterol 3 mL Nebulization Q6H - RT   levothyroxine 50 mcg Oral Q AM   metoprolol tartrate 25 mg Oral Q12H   pantoprazole 40 mg Oral QAM   rifAXIMin 200 mg Oral Q12H   sodium chloride 10 mL Intravenous Q12H   sodium chloride 10 mL Intravenous Q12H     Meds Infusions    dilTIAZem 5-15 mg/hr Last Rate: 5 mg/hr (03/10/20 0022)   sodium chloride 100 mL/hr Last Rate: 100 mL/hr (03/10/20 0024)     Meds PRN  •  acetaminophen **OR** acetaminophen **OR** acetaminophen  •  albuterol  •  aluminum-magnesium hydroxide-simethicone  •  bisacodyl  •  bisacodyl  •  magnesium hydroxide  •  magnesium sulfate **OR** magnesium sulfate in D5W 1g/100mL (PREMIX)  •  melatonin  •  nitroglycerin  •  ondansetron **OR** ondansetron  •  potassium chloride  •  potassium chloride  •  sodium chloride  •  sodium  chloride    Assessment/Plan   Assessment/Plan     Active Hospital Problems    Diagnosis  POA   • **Atrial fibrillation, new onset (CMS/HCC) [I48.91]  Yes     Priority: High   • Moderate tricuspid regurgitation [I07.1]  Yes     Priority: High   • Atherosclerosis of native coronary artery of native heart without angina pectoris [I25.10]  Yes     Priority: High   • Pulmonary hypertension (CMS/HCC) [I27.20]  Yes     Priority: High   • Mitral valve stenosis [I05.0]  Yes     Priority: High   • History of CVA (cerebrovascular accident) [Z86.73]  Not Applicable     Priority: Medium   • Hyperlipidemia [E78.5]  Yes     Priority: Low   • Atrial fibrillation with RVR (CMS/HCC) [I48.91]  Yes   • CAP (community acquired pneumonia) [J18.9]  Yes   • Acute renal insufficiency [N28.9]  Yes   • Hypokalemia [E87.6]  Yes   • Syncope [R55]  Yes   • Chronic respiratory failure (CMS/HCC) [J96.10]  Yes      Resolved Hospital Problems   No resolved problems to display.       MEDICAL DECISION MAKING COMPLEXITY BY PROBLEM:   1.  New onset atrial fibrillation with rapid ventricular response  -   Rate is now controlled  -   The patient was already therapeutic on Eliquis prior to this development will be continued.  -   Patient has ruled out for myocardial injury    2.  Community-acquired pneumonia  -   Continue Rocephin and doxycycline  -   Continue breathing treatments  -   Repeat chest x-ray in a.m.    3.  Acute renal insufficiency  -   Continue IV fluids  -   Overall this is resolving  -   Continue to follow closely    4.   Syncope  -   Likely related to the new development of atrial fibrillation with rapid ventricular response  -   No ongoing issues noted since admission  -   Myocardial injury has been ruled out    5.  Pulmonary hypertension  -   Continue Eliquis    6.  Coronary artery disease  -   Left heart cath in 2018 found with a 60% RCA ostial lesion with pulmonary hypertension being noted  -   No intervention    7.  History of CVA  -    No permanent defects    8.  Hypothyroidism  -   Continue home Synthroid  -   We will check free T4 and T3 as TSH is mildly elevated.          VTE Prophylaxis -   Mechanical Order History:      Ordered        03/09/20 1258  Place Sequential Compression Device  Once         03/09/20 1258  Maintain Sequential Compression Device  Continuous                 Pharmalogical Order History:     Ordered     Dose Route Frequency Stop    03/10/20 0927  apixaban (ELIQUIS) tablet 5 mg      5 mg PO Every 12 Hours Scheduled --    03/09/20 1538  heparin (porcine) 5000 UNIT/ML injection 5,000 Units  Status:  Discontinued      5,000 Units SC Every 12 Hours Scheduled 03/09/20 1547    03/09/20 1547  apixaban (ELIQUIS) tablet 2.5 mg  Status:  Discontinued      2.5 mg PO 2 Times Daily 03/09/20 1551    03/09/20 1551  apixaban (ELIQUIS) tablet 2.5 mg  Status:  Discontinued      2.5 mg PO Every 12 Hours Scheduled 03/10/20 0927            Code Status -   Code Status and Medical Interventions:   Ordered at: 03/09/20 1258     Level Of Support Discussed With:    Patient     Code Status:    CPR     Medical Interventions (Level of Support Prior to Arrest):    Full       Discharge Planning      SLP OP Goals     Row Name 03/10/20 1542          Time Calculation    PT Goal Re-Cert Due Date  03/24/20  -HD       User Key  (r) = Recorded By, (t) = Taken By, (c) = Cosigned By    Initials Name Provider Type    HD Lisette Vasquez, PT Physical Therapist          Destination      Coordination has not been started for this encounter.      Durable Medical Equipment      Coordination has not been started for this encounter.      Dialysis/Infusion      Coordination has not been started for this encounter.      Home Medical Care      Coordination has not been started for this encounter.      Therapy      Coordination has not been started for this encounter.      Community Resources      Coordination has not been started for this encounter.            Electronically  signed by Starla Meyer MD, 03/10/20, 15:57.  [facility]ist Team

## 2020-03-10 NOTE — PLAN OF CARE
Pt resting abed at this time. Continues on Cardizem drip and IV fluids.  Plan for ESTEFANI tomorrow am.

## 2020-03-10 NOTE — CONSULTS
Referring Provider: Dr. wilkes  Reason for Consultation: New onset atrial fibrillation    Patient Care Team:  Eliza Clayton APRN as PCP - General (Nurse Practitioner)  Eliza Clayton APRN as PCP - Family Medicine  Demond Valiente MD (Cardiology)  Gibson Medina MD as Surgeon (Cardiothoracic Surgery)    Chief complaint shortness of breath and weakness        History of present illness:  Karen Rubio is a 84 y.o. female who presents with worsening weakness.  84-year-old white female patient with known history of moderate to severe mitral stenosis history of severe pulmonary hypertension single-vessel CAD decided not to undergo CABG and valve replacement now comes to see me in the office with worsening shortness of breath and fatigue in the last 2 weeks  EKG showed new onset atrial fibrillation with rapid ventricle rate  Also acute renal failure noted with worsening creatinine  She denies of any chest pain syncopal episodes lower extremity edema PND orthopnea  Patient was admitted for further evaluation    Review of Systems   Constitution: Positive for malaise/fatigue. Negative for fever.   HENT: Negative for congestion and hearing loss.    Eyes: Negative for double vision and visual disturbance.   Cardiovascular: Positive for dyspnea on exertion. Negative for chest pain, claudication, leg swelling and syncope.   Respiratory: Positive for shortness of breath. Negative for cough.    Endocrine: Negative for cold intolerance.   Skin: Negative for color change and rash.   Musculoskeletal: Negative for arthritis and joint pain.   Gastrointestinal: Negative for abdominal pain and heartburn.   Genitourinary: Negative for hematuria.   Neurological: Negative for excessive daytime sleepiness and dizziness.   Psychiatric/Behavioral: Negative for depression. The patient is not nervous/anxious.    All other systems reviewed and are negative.      History  Past Medical History:   Diagnosis Date   •  Abnormality of left atrial appendage 05/17/2016    Suspected Clot Noted on ESTEFANI   • Acute renal failure (CMS/HCC) 08/06/2016-[facility]    Front Dehydration   • Arthritis    • Breast density 12/2017   • Carotid stenosis 11/05/2018    R @ 60% and L @ 10-20% Noted on Cardiac Cath & 16-49% on L&R    • Heart murmur    • History of echocardiogram 10/13/2017    Calcified Aortic Leaftlets; Mild Aortic Stenosis Present; Mild Mitral Stenosis; Bilateral Enlargement Noted;  Moderate TR; LV Function of 55-60%   • History of echocardiogram 12/5/14-[facility]    Moderate L Vent Hypertrophy Noted; L Atrial Dilation; Moderate MR; Mild TR; Mild-Moderate Aortic Reg Noted; Normal Root Size/No Effusion   • History of echocardiogram 5/16/16-[facility]    Normal Findings with Mild-Moderate MVS Noted   • History of EKG 2004/2016 & 11/5/18-[facility]    All Sinus Rhythm w/Infarct Ischemnic Changes Noted   • HLD (hyperlipidemia)     Controlled w/Meds   • Hx of hyperglycemia    • Hypertension     Controlled w/Meds   • Hypothyroidism     Levothyroxine   • Joint pain    • Left atrial dilatation 12/05/2014    Mildly Noted on Echo   • Left ventricular hypertrophy 12/05/2014    Noted on Echo w/EF @ 55-60%   • Mild aortic regurgitation 12/05/2014    to Moderate Noted on Echo   • Mild aortic stenosis 10/13/2017    Noted on Echo & ESTEFANI-11/5/18-Moderate    • Mild mitral insufficiency 10/13/2017    Noted on Echo & ESTEFANI-11/5/18   • Mild tricuspid regurgitation 12/05/2014    Noted on Echo   • Moderate mitral regurgitation 12/05/2014    Noted on Echo   • Moderate mitral stenosis 10/13/2017    Noted on Echo   • Moderate tricuspid insufficiency 10/13/2017    Noted on Echo   • Pneumonia involving left lung 08/6/16-[facility]   • Pulmonary hypertension (CMS/HCC) 10/13/2017 & 11/5/18-[facility]    Severe Noted on Echo & Cath   • Slurred speech 05/16/2016   • SOB (shortness of breath) 08/2016   • Thickened mitral leaflet 10/13/2017 & ESTEFANI-11/5/18    Severely Calcified Noted on Echo   • Tricuspid valve  insufficiency 2018    Noted on ESTEFANI       Past Surgical History:   Procedure Laterality Date   • CARDIAC CATHETERIZATION Left 18-[facility]    w/L Coronary Angiography--Dr. Hernández-RCA @ 60% with Pulmonary Hypertension Noted   • CHOLECYSTECTOMY     • KNEE ARTHROSCOPY     • ESTEFANI  2016-[facility]    Dr. Hernández--Severly Calcified Mitral Leaflets w/Mild-Moderate Mitral Stenosis/Insufficiency; Sig Aortic Stenosis Noted; Suspecion of L Atrial Appendage Clot Noted   • ESTEFANI  18-[facility]    Dr. Hernández--Moderate Mitral Insufficiency Noted; Mild-Moderate AVS; Moderate Tricuspid Insufficiency Noted; EF of 65-70%   • TONSILLECTOMY         Family History   Problem Relation Age of Onset   • Heart disease Mother    • Cancer Father        Social History     Tobacco Use   • Smoking status: Former Smoker     Types: Cigarettes     Last attempt to quit: 2002     Years since quittin.1   • Smokeless tobacco: Never Used   Substance Use Topics   • Alcohol use: No     Frequency: Never   • Drug use: No        Medications Prior to Admission   Medication Sig Dispense Refill Last Dose   • albuterol sulfate  (90 Base) MCG/ACT inhaler Inhale 2 puffs Every 4 (Four) Hours As Needed for Wheezing or Shortness of Air.   Taking   • apixaban (ELIQUIS) 5 MG tablet tablet Take 1 tablet by mouth 2 (Two) Times a Day. 180 tablet 2 Taking   • atorvastatin (LIPITOR) 20 MG tablet TAKE 1 TABLET EVERY DAY   Taking   • calcium carbonate (OS-SHERWIN) 600 MG tablet Take 600 mg by mouth.   Taking   • Cholecalciferol (VITAMIN D-3) 1000 units capsule Take 1 capsule by mouth Daily.   Taking   • dicyclomine (BENTYL) 10 MG capsule Take 1 capsule by mouth 4 (Four) Times a Day.   Taking   • docusate calcium (SURFAK) 240 MG capsule Take  by mouth Daily.   Taking   • escitalopram (LEXAPRO) 10 MG tablet Take 10 mg by mouth Daily.   Taking   • esomeprazole (nexIUM) 40 MG capsule TAKE 1 CAPSULE EVERY DAY   Taking   • furosemide (LASIX) 40 MG tablet TAKE ONE TABLET BY MOUTH DAILY 30  tablet 0 Taking   • ipratropium-albuterol (DUO-NEB) 0.5-2.5 mg/3 ml nebulizer       • levothyroxine (SYNTHROID, LEVOTHROID) 50 MCG tablet TAKE 1 TABLET EVERY DAY   Taking   • Multiple Vitamins-Minerals (ICAPS) capsule Take  by mouth.   Taking   • potassium chloride (MICRO-K) 10 MEQ CR capsule TAKE ONE CAPSULE BY MOUTH DAILY 30 capsule 0 Taking   • raNITIdine (ZANTAC) 300 MG tablet Take 1 tablet by mouth Daily.   Taking   • rifAXIMin (XIFAXAN PO) Take  by mouth.   Taking   • temazepam (RESTORIL) 15 MG capsule TAKE ONE CAPSULE BY MOUTH EVERY NIGHT AT BEDTIME AS NEEDED FOR SLEEP   Taking   • TURMERIC PO Take  by mouth.   Taking         Hydrocodone    Scheduled Meds:  apixaban 2.5 mg Oral Q12H   [START ON 3/10/2020] atorvastatin 20 mg Oral Daily   [START ON 3/10/2020] calcium-vitamin D 1 tablet Oral Daily   cefTRIAXone 1 g Intravenous Q24H   dicyclomine 10 mg Oral 4x Daily   docusate sodium 100 mg Oral Daily   doxycycline 100 mg Intravenous Q12H   [START ON 3/10/2020] escitalopram 10 mg Oral Daily   ipratropium-albuterol 3 mL Nebulization Q6H - RT   [START ON 3/10/2020] levothyroxine 50 mcg Oral Q AM   metoprolol tartrate 25 mg Oral Q12H   [START ON 3/10/2020] pantoprazole 40 mg Oral QAM   rifAXIMin 200 mg Oral Q12H   sodium chloride 10 mL Intravenous Q12H   sodium chloride 10 mL Intravenous Q12H     Continuous Infusions:  sodium chloride 100 mL/hr Last Rate: 100 mL/hr (03/09/20 1634)     PRN Meds:.•  acetaminophen **OR** acetaminophen **OR** acetaminophen  •  albuterol  •  aluminum-magnesium hydroxide-simethicone  •  bisacodyl  •  bisacodyl  •  magnesium hydroxide  •  magnesium sulfate **OR** magnesium sulfate in D5W 1g/100mL (PREMIX)  •  melatonin  •  nitroglycerin  •  ondansetron **OR** ondansetron  •  potassium chloride  •  potassium chloride  •  sodium chloride  •  sodium chloride        VITAL SIGNS  Vitals:    03/09/20 1302 03/09/20 1401 03/09/20 1801 03/09/20 1939   BP: 128/74 120/74 125/85    BP Location: Left  "arm Right arm Right arm    Patient Position: Sitting Lying Lying    Pulse: (!) 121 114 110    Resp: 24 22 28 28   Temp:  97.8 °F (36.6 °C) 97.4 °F (36.3 °C)    TempSrc: Oral Oral Oral    SpO2: 96% 99% 97%    Weight: 82.2 kg (181 lb 3.5 oz)      Height: 170.2 cm (67\")          Flowsheet Rows      First Filed Value   Admission Height  170.2 cm (67\") Documented at 03/09/2020 1302   Admission Weight  82.2 kg (181 lb 3.5 oz) Documented at 03/09/2020 1302           TELEMETRY: Atrial fibrillation with rapid ventricular rate    Physical Exam:  Physical Exam   Constitutional: She is oriented to person, place, and time. She appears well-developed and well-nourished. She is cooperative.   HENT:   Head: Normocephalic and atraumatic.   Mouth/Throat: Uvula is midline and oropharynx is clear and moist. No oral lesions.   Eyes: Conjunctivae are normal. No scleral icterus.   Neck: Trachea normal. Neck supple. No JVD present. Carotid bruit is not present. No thyromegaly present.   Cardiovascular: Normal rate, S1 normal, S2 normal, intact distal pulses and normal pulses. An irregularly irregular rhythm present. PMI is not displaced. Exam reveals no gallop and no friction rub.   Murmur heard.  Pulmonary/Chest: Effort normal and breath sounds normal.   Abdominal: Soft. Bowel sounds are normal.   Musculoskeletal: Normal range of motion.   Neurological: She is alert and oriented to person, place, and time. She has normal strength.   No focal deficits   Skin: Skin is warm. No cyanosis.   Psychiatric: She has a normal mood and affect.                LAB RESULTS (LAST 7 DAYS)    CBC  Results from last 7 days   Lab Units 03/09/20  1418   WBC 10*3/mm3 11.00*   RBC 10*6/mm3 4.49   HEMOGLOBIN g/dL 12.7   HEMATOCRIT % 39.5   MCV fL 88.0   PLATELETS 10*3/mm3 183       BMP  Results from last 7 days   Lab Units 03/09/20  1418 03/03/20  0903   SODIUM mmol/L 139 140   POTASSIUM mmol/L 3.3* 3.9   CHLORIDE mmol/L 95* 98   CO2 mmol/L 29.0 28.5   BUN " mg/dL 27* 23   CREATININE mg/dL 1.52* 1.92*   GLUCOSE mg/dL 100* 117*       CMP Results from last 7 days   Lab Units 03/09/20  1418 03/03/20  0903   SODIUM mmol/L 139 140   POTASSIUM mmol/L 3.3* 3.9   CHLORIDE mmol/L 95* 98   CO2 mmol/L 29.0 28.5   BUN mg/dL 27* 23   CREATININE mg/dL 1.52* 1.92*   GLUCOSE mg/dL 100* 117*   ALBUMIN g/dL 3.50 4.00   BILIRUBIN mg/dL 1.4* 0.8   ALK PHOS U/L 228* 114   AST (SGOT) U/L 48* 17   ALT (SGPT) U/L 31 10         BNP        TROPONIN  Results from last 7 days   Lab Units 03/09/20  1418 03/03/20  0903   CK TOTAL U/L  --  19*   TROPONIN T ng/mL <0.010  --        CoAg        Creatinine Clearance  Estimated Creatinine Clearance: 30.9 mL/min (A) (by C-G formula based on SCr of 1.52 mg/dL (H)).    ABG        Radiology  Xr Chest 1 View    Result Date: 3/9/2020  Hazy opacities throughout left lung, suggesting pneumonia.  Electronically Signed By-DR. Bernard Arriaga MD On:3/9/2020 2:40 PM This report was finalized on [id number] by DR. Bernard Arriaga MD.          EKG            I personally viewed and interpreted the patient's EKG/Telemetry data:    ECHOCARDIOGRAM:    Results for orders placed in visit on 12/06/18   SCANNED - ECHOCARDIOGRAM       STRESS MYOVIEW:    CARDIAC CATHETERIZATION:    OTHER:         Assessment/Plan       Atrial fibrillation, new onset (CMS/HCC)    Moderate tricuspid regurgitation    Atherosclerosis of native coronary artery of native heart without angina pectoris    History of CVA (cerebrovascular accident)    Hyperlipidemia    Pulmonary hypertension (CMS/HCC)    Mitral valve stenosis    Atrial fibrillation with RVR (CMS/HCC)    CAP (community acquired pneumonia)    Acute renal insufficiency    Hypokalemia    Syncope    Chronic respiratory failure (CMS/HCC)      New onset atrial fibrillation will start rate control  Patient is already on anticoagulation  History of mitral valve stenosis severe pulmonary hypertension we will repeat the transesophageal  echocardiogram  History of CAD needs to modify cardiac risk factors aggressively  Obese history of CVA we will continue anticoagulation  Acute on chronic renal failure nephrology evaluation  Monitor electrolytes closely replace as needed    I discussed the patients findings and my recommendations with patient and family and attending physician    Demond Valiente MD  03/09/20  8:37 PM

## 2020-03-10 NOTE — PLAN OF CARE
Problem: Patient Care Overview  Goal: Plan of Care Review  Outcome: Ongoing (interventions implemented as appropriate)  Flowsheets (Taken 3/10/2020 1123)  Outcome Summary: 84-year-old female patient with known history of moderate to severe mitral stenosis history of severe pulmonary hypertension single-vessel CAD decided not to undergo CABG and valve replacement.  She was admitted with SOA and afib.  She presents with general weakness and low activity tolerence.  She require assist for transfers and lower body ADLs.  She lives alone and is not safe to go home without asisst.  She will require IP rehab at discharge.

## 2020-03-11 ENCOUNTER — ANESTHESIA EVENT (OUTPATIENT)
Dept: CARDIOLOGY | Facility: HOSPITAL | Age: 85
End: 2020-03-11

## 2020-03-11 ENCOUNTER — APPOINTMENT (OUTPATIENT)
Dept: GENERAL RADIOLOGY | Facility: HOSPITAL | Age: 85
End: 2020-03-11

## 2020-03-11 ENCOUNTER — APPOINTMENT (OUTPATIENT)
Dept: CARDIOLOGY | Facility: HOSPITAL | Age: 85
End: 2020-03-11

## 2020-03-11 ENCOUNTER — ANESTHESIA (OUTPATIENT)
Dept: CARDIOLOGY | Facility: HOSPITAL | Age: 85
End: 2020-03-11

## 2020-03-11 LAB
ANION GAP SERPL CALCULATED.3IONS-SCNC: 14 MMOL/L (ref 5–15)
BASOPHILS # BLD AUTO: 0 10*3/MM3 (ref 0–0.2)
BASOPHILS NFR BLD AUTO: 0.3 % (ref 0–1.5)
BH CV ECHO MEAS - BSA(HAYCOCK): 1.9 M^2
BH CV ECHO MEAS - BSA: 1.9 M^2
BH CV ECHO MEAS - BZI_BMI: 32.1 KILOGRAMS/M^2
BH CV ECHO MEAS - BZI_METRIC_HEIGHT: 160 CM
BH CV ECHO MEAS - BZI_METRIC_WEIGHT: 82.1 KG
BUN BLD-MCNC: 26 MG/DL (ref 8–23)
BUN/CREAT SERPL: 20.6 (ref 7–25)
CALCIUM SPEC-SCNC: 9 MG/DL (ref 8.6–10.5)
CHLORIDE SERPL-SCNC: 102 MMOL/L (ref 98–107)
CO2 SERPL-SCNC: 24 MMOL/L (ref 22–29)
CREAT BLD-MCNC: 1.26 MG/DL (ref 0.57–1)
DEPRECATED RDW RBC AUTO: 49 FL (ref 37–54)
EOSINOPHIL # BLD AUTO: 0.1 10*3/MM3 (ref 0–0.4)
EOSINOPHIL NFR BLD AUTO: 1.3 % (ref 0.3–6.2)
ERYTHROCYTE [DISTWIDTH] IN BLOOD BY AUTOMATED COUNT: 15.7 % (ref 12.3–15.4)
GFR SERPL CREATININE-BSD FRML MDRD: 41 ML/MIN/1.73
GLUCOSE BLD-MCNC: 124 MG/DL (ref 65–99)
HCT VFR BLD AUTO: 36.8 % (ref 34–46.6)
HGB BLD-MCNC: 12.2 G/DL (ref 12–15.9)
LV EF 2D ECHO EST: 65 %
LYMPHOCYTES # BLD AUTO: 1.2 10*3/MM3 (ref 0.7–3.1)
LYMPHOCYTES NFR BLD AUTO: 13.1 % (ref 19.6–45.3)
MCH RBC QN AUTO: 29.1 PG (ref 26.6–33)
MCHC RBC AUTO-ENTMCNC: 33.1 G/DL (ref 31.5–35.7)
MCV RBC AUTO: 87.9 FL (ref 79–97)
MONOCYTES # BLD AUTO: 0.9 10*3/MM3 (ref 0.1–0.9)
MONOCYTES NFR BLD AUTO: 10.5 % (ref 5–12)
NEUTROPHILS # BLD AUTO: 6.8 10*3/MM3 (ref 1.7–7)
NEUTROPHILS NFR BLD AUTO: 74.8 % (ref 42.7–76)
NRBC BLD AUTO-RTO: 0 /100 WBC (ref 0–0.2)
PLATELET # BLD AUTO: 150 10*3/MM3 (ref 140–450)
PMV BLD AUTO: 10.5 FL (ref 6–12)
POTASSIUM BLD-SCNC: 3.7 MMOL/L (ref 3.5–5.2)
RBC # BLD AUTO: 4.18 10*6/MM3 (ref 3.77–5.28)
SODIUM BLD-SCNC: 140 MMOL/L (ref 136–145)
TROPONIN T SERPL-MCNC: <0.01 NG/ML (ref 0–0.03)
WBC NRBC COR # BLD: 9 10*3/MM3 (ref 3.4–10.8)

## 2020-03-11 PROCEDURE — 93320 DOPPLER ECHO COMPLETE: CPT

## 2020-03-11 PROCEDURE — 80048 BASIC METABOLIC PNL TOTAL CA: CPT | Performed by: INTERNAL MEDICINE

## 2020-03-11 PROCEDURE — 93312 ECHO TRANSESOPHAGEAL: CPT

## 2020-03-11 PROCEDURE — 71045 X-RAY EXAM CHEST 1 VIEW: CPT

## 2020-03-11 PROCEDURE — 97530 THERAPEUTIC ACTIVITIES: CPT

## 2020-03-11 PROCEDURE — 99232 SBSQ HOSP IP/OBS MODERATE 35: CPT | Performed by: INTERNAL MEDICINE

## 2020-03-11 PROCEDURE — 93325 DOPPLER ECHO COLOR FLOW MAPG: CPT

## 2020-03-11 PROCEDURE — 93325 DOPPLER ECHO COLOR FLOW MAPG: CPT | Performed by: INTERNAL MEDICINE

## 2020-03-11 PROCEDURE — 97116 GAIT TRAINING THERAPY: CPT

## 2020-03-11 PROCEDURE — 94799 UNLISTED PULMONARY SVC/PX: CPT

## 2020-03-11 PROCEDURE — 85025 COMPLETE CBC W/AUTO DIFF WBC: CPT | Performed by: INTERNAL MEDICINE

## 2020-03-11 PROCEDURE — 84484 ASSAY OF TROPONIN QUANT: CPT | Performed by: NURSE PRACTITIONER

## 2020-03-11 PROCEDURE — 93312 ECHO TRANSESOPHAGEAL: CPT | Performed by: INTERNAL MEDICINE

## 2020-03-11 PROCEDURE — 99232 SBSQ HOSP IP/OBS MODERATE 35: CPT | Performed by: NURSE PRACTITIONER

## 2020-03-11 PROCEDURE — 93320 DOPPLER ECHO COMPLETE: CPT | Performed by: INTERNAL MEDICINE

## 2020-03-11 PROCEDURE — 25010000002 PROPOFOL 10 MG/ML EMULSION: Performed by: ANESTHESIOLOGY

## 2020-03-11 PROCEDURE — 25010000002 CEFTRIAXONE PER 250 MG: Performed by: NURSE PRACTITIONER

## 2020-03-11 RX ORDER — EPHEDRINE SULFATE 50 MG/ML
INJECTION INTRAVENOUS AS NEEDED
Status: DISCONTINUED | OUTPATIENT
Start: 2020-03-11 | End: 2020-03-11 | Stop reason: SURG

## 2020-03-11 RX ORDER — PROPOFOL 10 MG/ML
VIAL (ML) INTRAVENOUS AS NEEDED
Status: DISCONTINUED | OUTPATIENT
Start: 2020-03-11 | End: 2020-03-11 | Stop reason: SURG

## 2020-03-11 RX ADMIN — IPRATROPIUM BROMIDE AND ALBUTEROL SULFATE 3 ML: .5; 3 SOLUTION RESPIRATORY (INHALATION) at 10:26

## 2020-03-11 RX ADMIN — APIXABAN 5 MG: 5 TABLET, FILM COATED ORAL at 12:35

## 2020-03-11 RX ADMIN — EPHEDRINE SULFATE 15 MG: 50 INJECTION, SOLUTION INTRAVENOUS at 10:03

## 2020-03-11 RX ADMIN — APIXABAN 5 MG: 5 TABLET, FILM COATED ORAL at 20:51

## 2020-03-11 RX ADMIN — Medication 10 ML: at 08:53

## 2020-03-11 RX ADMIN — OYSTER SHELL CALCIUM WITH VITAMIN D 1 TABLET: 500; 200 TABLET, FILM COATED ORAL at 12:35

## 2020-03-11 RX ADMIN — ATORVASTATIN CALCIUM 20 MG: 20 TABLET, FILM COATED ORAL at 12:36

## 2020-03-11 RX ADMIN — RIFAXIMIN 200 MG: 200 TABLET ORAL at 20:50

## 2020-03-11 RX ADMIN — ESCITALOPRAM OXALATE 10 MG: 10 TABLET ORAL at 12:36

## 2020-03-11 RX ADMIN — DICYCLOMINE HYDROCHLORIDE 10 MG: 10 CAPSULE ORAL at 18:09

## 2020-03-11 RX ADMIN — SODIUM CHLORIDE 5 MG/HR: 900 INJECTION, SOLUTION INTRAVENOUS at 12:38

## 2020-03-11 RX ADMIN — IPRATROPIUM BROMIDE AND ALBUTEROL SULFATE 3 ML: .5; 3 SOLUTION RESPIRATORY (INHALATION) at 06:52

## 2020-03-11 RX ADMIN — SODIUM CHLORIDE 100 ML/HR: 900 INJECTION, SOLUTION INTRAVENOUS at 05:04

## 2020-03-11 RX ADMIN — METOPROLOL TARTRATE 25 MG: 25 TABLET, FILM COATED ORAL at 12:35

## 2020-03-11 RX ADMIN — IPRATROPIUM BROMIDE AND ALBUTEROL SULFATE 3 ML: .5; 3 SOLUTION RESPIRATORY (INHALATION) at 18:35

## 2020-03-11 RX ADMIN — PROPOFOL 100 MG: 10 INJECTION, EMULSION INTRAVENOUS at 09:46

## 2020-03-11 RX ADMIN — DOXYCYCLINE 100 MG: 100 INJECTION, POWDER, LYOPHILIZED, FOR SOLUTION INTRAVENOUS at 05:03

## 2020-03-11 RX ADMIN — CEFTRIAXONE SODIUM 1 G: 1 INJECTION, POWDER, FOR SOLUTION INTRAMUSCULAR; INTRAVENOUS at 20:51

## 2020-03-11 RX ADMIN — RIFAXIMIN 200 MG: 200 TABLET ORAL at 12:35

## 2020-03-11 RX ADMIN — PROPOFOL 50 MG: 10 INJECTION, EMULSION INTRAVENOUS at 09:51

## 2020-03-11 RX ADMIN — DICYCLOMINE HYDROCHLORIDE 10 MG: 10 CAPSULE ORAL at 12:37

## 2020-03-11 RX ADMIN — Medication 10 ML: at 21:08

## 2020-03-11 RX ADMIN — PROPOFOL 50 MG: 10 INJECTION, EMULSION INTRAVENOUS at 09:49

## 2020-03-11 RX ADMIN — DOCUSATE SODIUM 100 MG: 100 CAPSULE, LIQUID FILLED ORAL at 12:35

## 2020-03-11 RX ADMIN — PANTOPRAZOLE SODIUM 40 MG: 40 TABLET, DELAYED RELEASE ORAL at 12:35

## 2020-03-11 RX ADMIN — IPRATROPIUM BROMIDE AND ALBUTEROL SULFATE 3 ML: .5; 3 SOLUTION RESPIRATORY (INHALATION) at 00:58

## 2020-03-11 RX ADMIN — METOPROLOL TARTRATE 25 MG: 25 TABLET, FILM COATED ORAL at 20:51

## 2020-03-11 RX ADMIN — DOXYCYCLINE 100 MG: 100 INJECTION, POWDER, LYOPHILIZED, FOR SOLUTION INTRAVENOUS at 18:09

## 2020-03-11 RX ADMIN — Medication 10 ML: at 20:51

## 2020-03-11 NOTE — PROGRESS NOTES
Reason for follow-up: New onset atrial fibrillation  History of CAD  History of mitral and aortic stenosis  Severe pulmonary hypertension     Patient Care Team:  Eliza Clayton APRN as PCP - General (Nurse Practitioner)  Eliza Clayton APRN as PCP - Family Medicine  Demond Valiente MD (Cardiology)  Gibson Medina MD as Surgeon (Cardiothoracic Surgery)    Subjective .   Feels tired and weak     Review of Systems   Constitution: Positive for malaise/fatigue. Negative for fever.   HENT: Negative for congestion and hearing loss.    Eyes: Negative for double vision and visual disturbance.   Cardiovascular: Positive for dyspnea on exertion. Negative for chest pain, claudication, leg swelling and syncope.   Respiratory: Positive for shortness of breath. Negative for cough.    Endocrine: Negative for cold intolerance.   Skin: Negative for color change and rash.   Musculoskeletal: Negative for arthritis and joint pain.   Gastrointestinal: Negative for abdominal pain and heartburn.   Genitourinary: Negative for hematuria.   Neurological: Negative for excessive daytime sleepiness and dizziness.   Psychiatric/Behavioral: Negative for depression. The patient is not nervous/anxious.    All other systems reviewed and are negative.      Hydrocodone    Scheduled Meds:    apixaban 5 mg Oral Q12H   atorvastatin 20 mg Oral Daily   calcium-vitamin D 1 tablet Oral Daily   cefTRIAXone 1 g Intravenous Q24H   dicyclomine 10 mg Oral 4x Daily   docusate sodium 100 mg Oral Daily   doxycycline 100 mg Intravenous Q12H   escitalopram 10 mg Oral Daily   ipratropium-albuterol 3 mL Nebulization Q6H - RT   levothyroxine 50 mcg Oral Q AM   metoprolol tartrate 25 mg Oral Q12H   pantoprazole 40 mg Oral QAM   rifAXIMin 200 mg Oral Q12H   sodium chloride 10 mL Intravenous Q12H   sodium chloride 10 mL Intravenous Q12H     Continuous Infusions:    dilTIAZem 5-15 mg/hr Last Rate: 5 mg/hr (03/11/20 1238)     PRN Meds:.•   "acetaminophen **OR** acetaminophen **OR** acetaminophen  •  albuterol  •  aluminum-magnesium hydroxide-simethicone  •  bisacodyl  •  bisacodyl  •  magnesium hydroxide  •  magnesium sulfate **OR** magnesium sulfate in D5W 1g/100mL (PREMIX)  •  melatonin  •  nitroglycerin  •  ondansetron **OR** ondansetron  •  potassium chloride  •  potassium chloride  •  sodium chloride  •  sodium chloride    Objective   Looks comfortable lying in the bed    VITAL SIGNS  Vitals:    03/11/20 1115 03/11/20 1120 03/11/20 1125 03/11/20 1500   BP: 130/71   121/65   BP Location:       Patient Position:       Pulse: 87 81 81    Resp:    (!) 30   Temp:    97.3 °F (36.3 °C)   TempSrc:    Axillary   SpO2: 92% 92% 92% 94%   Weight:       Height:           Flowsheet Rows      First Filed Value   Admission Height  170.2 cm (67\") Documented at 03/09/2020 1302   Admission Weight  82.2 kg (181 lb 3.5 oz) Documented at 03/09/2020 1302           TELEMETRY: Atrial fibrillation rate controlled    Physical Exam:  Physical Exam   Constitutional: She is oriented to person, place, and time. She appears well-developed and well-nourished. She is cooperative.   HENT:   Head: Normocephalic and atraumatic.   Mouth/Throat: Uvula is midline and oropharynx is clear and moist. No oral lesions.   Eyes: Conjunctivae are normal. No scleral icterus.   Neck: Trachea normal. Neck supple. No JVD present. Carotid bruit is not present. No thyromegaly present.   Cardiovascular: Normal rate, S1 normal, S2 normal, intact distal pulses and normal pulses. An irregularly irregular rhythm present. PMI is not displaced. Exam reveals no gallop and no friction rub.   Murmur heard.  Pulmonary/Chest: Effort normal and breath sounds normal.   Abdominal: Soft. Bowel sounds are normal.   Musculoskeletal: Normal range of motion.   Neurological: She is alert and oriented to person, place, and time. She has normal strength.   No focal deficits   Skin: Skin is warm. No cyanosis.   Psychiatric: " She has a normal mood and affect.                LAB RESULTS (LAST 7 DAYS)    CBC  Results from last 7 days   Lab Units 03/11/20  0236 03/10/20  0532 03/09/20  1418   WBC 10*3/mm3 9.00 9.50 11.00*   RBC 10*6/mm3 4.18 4.33 4.49   HEMOGLOBIN g/dL 12.2 12.1 12.7   HEMATOCRIT % 36.8 38.2 39.5   MCV fL 87.9 88.3 88.0   PLATELETS 10*3/mm3 150 159 183       BMP  Results from last 7 days   Lab Units 03/11/20  0236 03/10/20  0532 03/09/20  1418   SODIUM mmol/L 140 140 139   POTASSIUM mmol/L 3.7 4.4 3.3*   CHLORIDE mmol/L 102 101 95*   CO2 mmol/L 24.0 24.0 29.0   BUN mg/dL 26* 29* 27*   CREATININE mg/dL 1.26* 1.43* 1.52*   GLUCOSE mg/dL 124* 109* 100*   MAGNESIUM mg/dL  --  1.7  --        CMP   Results from last 7 days   Lab Units 03/11/20  0236 03/10/20  0532 03/09/20  1418   SODIUM mmol/L 140 140 139   POTASSIUM mmol/L 3.7 4.4 3.3*   CHLORIDE mmol/L 102 101 95*   CO2 mmol/L 24.0 24.0 29.0   BUN mg/dL 26* 29* 27*   CREATININE mg/dL 1.26* 1.43* 1.52*   GLUCOSE mg/dL 124* 109* 100*   ALBUMIN g/dL  --   --  3.50   BILIRUBIN mg/dL  --   --  1.4*   ALK PHOS U/L  --   --  228*   AST (SGOT) U/L  --   --  48*   ALT (SGPT) U/L  --   --  31         BNP        TROPONIN  Results from last 7 days   Lab Units 03/11/20  0845   TROPONIN T ng/mL <0.010       CoAg        Creatinine Clearance  Estimated Creatinine Clearance: 37.3 mL/min (A) (by C-G formula based on SCr of 1.26 mg/dL (H)).    ABG        Radiology  Xr Chest 1 View    Result Date: 3/11/2020   1. Interval development of right medial basilar airspace disease. Correlate clinically for pneumonia. 2. Persistent left mid and lower lung airspace opacities. Trace left effusion.  Electronically Signed By-Neal Bourgeois On:3/11/2020 3:42 PM This report was finalized on [id number] by  Neal Bourgeois, .            EKG          I personally viewed and interpreted the patient's EKG/Telemetry data:    ECHOCARDIOGRAM:    Results for orders placed during the hospital encounter of 03/09/20   Adult  Transesophageal Echo (ESTEFANI) W/ Cont if Necessary Per Protocol (Cardiology Department)    Narrative · Estimated EF = 65%.  · Left ventricular systolic function is normal.  · Moderate to severe mitral insufficiency at least moderate mitral   stenosis severe tricuspid insufficiency moderate aortic stenosis noted     Normal LV systolic function EF 65%  Biatrial enlargement  Severely calcified mitral leaflets with at least moderate mitral stenosis   moderate to severe mitral insufficiency  Severe mitral annular calcification  Tricuspid leaflets mildly thickened and sclerosed  There is significant right atrial dilation also RV dilatation  Severe tricuspid insufficiency  Calcified aortic leaflets with decreased opening at least moderate aortic   stenosis  Intra-atrial septum is intact bubble study negative for shunt  Left atrial appendage showed spontaneous echo contrast       STRESS MYOVIEW:    CARDIAC CATHETERIZATION:    OTHER:         Assessment/Plan       Atrial fibrillation, new onset (CMS/HCC)    Moderate tricuspid regurgitation    Atherosclerosis of native coronary artery of native heart without angina pectoris    History of CVA (cerebrovascular accident)    Hyperlipidemia    Pulmonary hypertension (CMS/HCC)    Mitral valve stenosis    Atrial fibrillation with RVR (CMS/HCC)    CAP (community acquired pneumonia)    Acute renal insufficiency    Hypokalemia    Syncope    Chronic respiratory failure (CMS/HCC)      New onset atrial fibrillation with rapid ventricular rate  Continue aggressive control of rate  Anticoagulation  Severe pulmonary hypertension  History of mitral valve stenosis and aortic valve stenosis  Patient underwent transesophageal echocardiogram  Severe tricuspid insufficiency with severe RV dilatation  Moderate severe mitral stenosis and aortic stenosis moderate to severe mitral insufficiency  Continue aggressive control of hypertension dyslipidemia  Patient films were reviewed with Dr. Chappell  Suggest  conservative treatment not a candidate for surgery  Needs aggressive rate control and anticoagulation    Demond Valiente MD  03/11/20  18:35

## 2020-03-11 NOTE — PLAN OF CARE
Problem: Patient Care Overview  Goal: Plan of Care Review  Outcome: Ongoing (interventions implemented as appropriate)  Flowsheets (Taken 3/11/2020 0253)  Progress: improving  Plan of Care Reviewed With: patient  Outcome Summary: Patient denies pain or shortness of breath throughout shift. Patient remains in afib with a controlled rate- Cardizem gtt continues at 5mg/hour. Patient to have possible ESTEFANI this am. Will monitor.

## 2020-03-11 NOTE — PROGRESS NOTES
This is a lady that Dr. Medina saw in 2018 and recommended open heart surgery for valvular heart disease with a risk of 10%.  She is decided at that time not to have surgery.  I think the risk is higher now and doubt she would survive any surgical intervention.  Therefore I would treat medically although this is the not best situation for her.

## 2020-03-11 NOTE — THERAPY TREATMENT NOTE
Patient Name: Karen Rubio  : 1935    MRN: [id number]                              Today's Date: 3/11/2020       Admit Date: 3/9/2020    Visit Dx: No diagnosis found.  Patient Active Problem List   Diagnosis   • Severe mitral regurgitation   • Moderate tricuspid regurgitation   • Atherosclerosis of native coronary artery of native heart without angina pectoris   • History of CVA (cerebrovascular accident)   • Rheumatic mitral stenosis   • Heart murmur   • Hyperlipidemia   • Hypertension   • Pulmonary hypertension (CMS/[facility])   • Mitral valve stenosis   • Atrial fibrillation, new onset (CMS/[facility])   • Atrial fibrillation with RVR (CMS/[facility])   • CAP (community acquired pneumonia)   • Acute renal insufficiency   • Hypokalemia   • Syncope   • Chronic respiratory failure (CMS/[facility])     Past Medical History:   Diagnosis Date   • Abnormality of left atrial appendage 2016    Suspected Clot Noted on ESTEFANI   • Acute renal failure (CMS/HCC) 2016-[facility]    Front Dehydration   • Arthritis    • Breast density 2017   • Carotid stenosis 2018    R @ 60% and L @ 10-20% Noted on Cardiac Cath & 16-49% on L&R    • Heart murmur    • History of echocardiogram 10/13/2017    Calcified Aortic Leaftlets; Mild Aortic Stenosis Present; Mild Mitral Stenosis; Bilateral Enlargement Noted;  Moderate TR; LV Function of 55-60%   • History of echocardiogram 14-[facility]    Moderate L Vent Hypertrophy Noted; L Atrial Dilation; Moderate MR; Mild TR; Mild-Moderate Aortic Reg Noted; Normal Root Size/No Effusion   • History of echocardiogram 16-[facility]    Normal Findings with Mild-Moderate MVS Noted   • History of EKG  & 18-[facility]    All Sinus Rhythm w/Infarct Ischemnic Changes Noted   • HLD (hyperlipidemia)     Controlled w/Meds   • Hx of hyperglycemia    • Hypertension     Controlled w/Meds   • Hypothyroidism     Levothyroxine   • Joint pain    • Left atrial dilatation 2014    Mildly Noted on Echo   • Left  ventricular hypertrophy 12/05/2014    Noted on Echo w/EF @ 55-60%   • Mild aortic regurgitation 12/05/2014    to Moderate Noted on Echo   • Mild aortic stenosis 10/13/2017    Noted on Echo & ESTEFANI-11/5/18-Moderate    • Mild mitral insufficiency 10/13/2017    Noted on Echo & ESTEFANI-11/5/18   • Mild tricuspid regurgitation 12/05/2014    Noted on Echo   • Moderate mitral regurgitation 12/05/2014    Noted on Echo   • Moderate mitral stenosis 10/13/2017    Noted on Echo   • Moderate tricuspid insufficiency 10/13/2017    Noted on Echo   • Pneumonia involving left lung 08/6/16-[facility]   • Pulmonary hypertension (CMS/HCC) 10/13/2017 & 11/5/18-Cath    Severe Noted on Echo & Cath   • Slurred speech 05/16/2016   • SOB (shortness of breath) 08/2016   • Thickened mitral leaflet 10/13/2017 & ESTEFANI-11/5/18    Severely Calcified Noted on Echo   • Tricuspid valve insufficiency 11/05/2018    Noted on ESTEFANI     Past Surgical History:   Procedure Laterality Date   • CARDIAC CATHETERIZATION Left 11/5/18-[facility]    w/L Coronary Angiography--Dr. Hernández-RCA @ 60% with Pulmonary Hypertension Noted   • CHOLECYSTECTOMY     • KNEE ARTHROSCOPY     • ESTEFANI  05/17/2016-[facility]    Dr. Hernández--[place] Calcified Mitral Leaflets w/Mild-Moderate Mitral Stenosis/Insufficiency; Sig Aortic Stenosis Noted; Suspecion of L Atrial Appendage Clot Noted   • ESTEFANI  11/5/18-[facility]    Dr. Hernández--Moderate Mitral Insufficiency Noted; Mild-Moderate AVS; Moderate Tricuspid Insufficiency Noted; EF of 65-70%   • TONSILLECTOMY       General Information     Row Name 03/11/20 1548          PT Evaluation Time/Intention    Document Type  therapy note (daily note)  -     Mode of Treatment  physical therapy  -     Row Name 03/11/20 1548          Cognitive Assessment/Intervention- PT/OT    Orientation Status (Cognition)  oriented x 3  -     Row Name 03/11/20 1548          Safety Issues, Functional Mobility    Safety Issues Affecting Function (Mobility)  safety precaution awareness  -     Impairments  Affecting Function (Mobility)  strength;endurance/activity tolerance  -       User Key  (r) = Recorded By, (t) = Taken By, (c) = Cosigned By    Initials Name Provider Type     Rabia Mendez PTA Physical Therapy Assistant        Mobility     Row Name 03/11/20 1548          Sit-Stand Transfer    Sit-Stand [place] (Transfers)  verbal cues;minimum assist (75% patient effort)  -     Assistive Device (Sit-Stand Transfers)  walker, front-wheeled  -     Row Name 03/11/20 1548          Gait/Stairs Assessment/Training    Gait/Stairs Assessment/Training  gait/ambulation assistive device  -     [place] Level (Gait)  verbal cues;contact guard  -     Assistive Device (Gait)  walker, front-wheeled  -     Distance in Feet (Gait)  60'  -     Deviations/Abnormal Patterns (Gait)  stride length decreased;gait speed decreased  -     Bilateral Gait Deviations  forward flexed posture  -     Comment (Gait/Stairs)  Required multiple standing rests due to onset of afib. Weakness presnt, unable to accept challenge away from midline.  -       User Key  (r) = Recorded By, (t) = Taken By, (c) = Cosigned By    Initials Name Provider Type     Rabia Mendez PTA Physical Therapy Assistant        Obj/Interventions     Row Name 03/11/20 1550          Static Sitting Balance    Level of [place] (Unsupported Sitting, Static Balance)  independent  -     Sitting Position (Unsupported Sitting, Static Balance)  sitting in chair  -[facility] Name 03/11/20 1550          Dynamic Sitting Balance    Level of [place], Reaches Outside Midline (Sitting, Dynamic Balance)  contact guard assist  -     Sitting Position, Reaches Outside Midline (Sitting, Dynamic Balance)  sitting in chair  -     Comment, Reaches Outside Midline (Sitting, Dynamic Balance)  Limited away from midline.  -     Row Name 03/11/20 1550          Static Standing Balance    Level of [place] (Supported Standing, Static Balance)  contact guard  assist  -     Assistive Device Utilized (Supported Standing, Static Balance)  walker, rolling  -     Row Name 03/11/20 1550          Dynamic Standing Balance    Level of [place], Reaches Outside Midline (Standing, Dynamic Balance)  contact guard assist  -     Assistive Device Utilized (Supported Standing, Dynamic Balance)  walker, rolling  -     Comment, Reaches Outside Midline (Standing, Dynamic Balance)  Limited away from midline.  -       User Key  (r) = Recorded By, (t) = Taken By, (c) = Cosigned By    Initials Name Provider Type     Rabia Mendez PTA Physical Therapy Assistant        Goals/Plan    No documentation.       Clinical Impression     [facility] Name 03/11/20 1551          Pain Scale: Numbers Pre/Post-Treatment    Pain Scale: Numbers, Pretreatment  0/10 - no pain  -     Pain Scale: Numbers, Post-Treatment  0/10 - no pain  -[facility] Name 03/11/20 1551          Plan of Care Review    Plan of Care Reviewed With  patient  -     Progress  no change  -     Outcome Summary  Required cga/min A for safe, functional mobility. Min A sit/stand, amb. using rwx. 60' c rwx., also requiring multiple standing rests due to onset of afib. Decreased righting recovery, at risk for falls, needs reinforced safety c rwx. use. Will progress as able, will need rehab at d/c to address deficits.  -     Row Name 03/11/20 1551          Vital Signs    Pretreatment Heart Rate (beats/min)  80  -LH     Intratreatment Heart Rate (beats/min)  165  -     Posttreatment Heart Rate (beats/min)  98  -     Pre SpO2 (%)  95  -     O2 Delivery Pre Treatment  supplemental O2  -     Intra SpO2 (%)  96  -     O2 Delivery Intra Treatment  supplemental O2  -LH     Post SpO2 (%)  99  -     O2 Delivery Post Treatment  supplemental O2  -LH     Pre Patient Position  Sitting  -     Intra Patient Position  Standing  -LH     Post Patient Position  Sitting  -     Row Name 03/11/20 1552          Positioning and Restraints     Pre-Treatment Position  sitting in chair/recliner  -     Post Treatment Position  chair  -LH     In Chair  notified nsg;sitting;call light within reach;exit alarm on  -       User Key  (r) = Recorded By, (t) = Taken By, (c) = Cosigned By    Initials Name Provider Type     Rabia Mendez PTA Physical Therapy Assistant        Outcome Measures     Row Name 03/11/20 1554          How much help from another person do you currently need...    Turning from your back to your side while in flat bed without using bedrails?  3  -LH     Moving from lying on back to sitting on the side of a flat bed without bedrails?  3  -LH     Moving to and from a bed to a chair (including a wheelchair)?  3  -LH     Standing up from a chair using your arms (e.g., wheelchair, bedside chair)?  3  -LH     Climbing 3-5 steps with a railing?  2  -LH     To walk in hospital room?  3  -LH     AM-PAC 6 Clicks Score (PT)  17  -       User Key  (r) = Recorded By, (t) = Taken By, (c) = Cosigned By    Initials Name Provider Type     Rabia Mendez PTA Physical Therapy Assistant        Physical Therapy Education                 Title: PT OT SLP Therapies (Done)     Topic: Physical Therapy (Done)     Point: Mobility training (Done)     Description:   Instruct learner(s) on safety and technique for assisting patient out of bed, chair or wheelchair.  Instruct in the proper use of assistive devices, such as walker, crutches, cane or brace.              Patient Friendly Description:   It's important to get you on your feet again, but we need to do so in a way that is safe for you. Falling has serious consequences, and your personal safety is the most important thing of all.        When it's time to get out of bed, one of us or a family member will sit next to you on the bed to give you support.     If your doctor or nurse tells you to use a walker, crutches, a cane, or a brace, be sure you use it every time you get out of bed, even if you think you  don't need it.    Learning Progress Summary           Patient Acceptance, E,D, DU,NR by  at 3/11/2020 1554    Comment:  Reinforced safe hand placement c rwx. use.    Acceptance, E, NR by AD at 3/10/2020 1736    Acceptance, E, NR by  at 3/10/2020 1540                   Point: Precautions (Done)     Description:   Instruct learner(s) on prescribed precautions during mobility and gait tasks              Learning Progress Summary           Patient Acceptance, E,D, DU,NR by  at 3/11/2020 1554    Comment:  Reinforced safe hand placement c rwx. use.    Acceptance, E, NR by AD at 3/10/2020 1736    Acceptance, E, NR by  at 3/10/2020 1540                               User Key     Initials Effective Dates Name Provider Type Discipline     03/01/19 -  Lisette Vasquez, PT Physical Therapist PT     03/01/19 -  Rabia Mendez PTA Physical Therapy Assistant PT     03/25/19 -  Anne Murcia RN Registered Nurse Nurse              PT Recommendation and Plan     Outcome Summary/Treatment Plan (PT)  Anticipated Discharge Disposition (PT): inpatient rehabilitation facility  Plan of Care Reviewed With: patient  Progress: no change  Outcome Summary: Required cga/min A for safe, functional mobility. Min A sit/stand, amb. using rwx. 60' c rwx., also requiring multiple standing rests due to onset of afib. Decreased righting recovery, at risk for falls, needs reinforced safety c rwx. use. Will progress as able, will need rehab at d/c to address deficits.     Time Calculation:   PT Charges     Row Name 03/11/20 1555             Time Calculation    Start Time  0230  -      Stop Time  0250  -      Time Calculation (min)  20 min  -      PT Received On  03/11/20  -      PT - Next Appointment  03/13/20  -         Time Calculation- PT    Total Timed Code Minutes- PT  20 minute(s)  -         Timed Charges    92356 - Gait Training Minutes   10  -      80206 - PT Therapeutic Activity Minutes  10  -        User Key  (r) =  Recorded By, (t) = Taken By, (c) = Cosigned By    Initials Name Provider Type     Rabia Mendez PTA Physical Therapy Assistant        Therapy Charges for Today     Code Description Service Date Service Provider Modifiers Qty    02195687336 HC GAIT TRAINING EA 15 MIN 3/11/2020 Rabia Mendez PTA GP 1    53988259117 HC PT THERAPEUTIC ACT EA 15 MIN 3/11/2020 Rabia Mendez PTA GP 1          PT G-Codes  AM-PAC 6 Clicks Score (PT): 17    Rabia Mendez PTA  3/11/2020

## 2020-03-11 NOTE — PROGRESS NOTES
Continued Stay Note  [facility]     Patient Name: Karen Rubio  MRN: [id number]  Today's Date: 3/11/2020    Admit Date: 3/9/2020    Discharge Plan     Row Name 03/11/20 1515       Plan    Plan  [facility] subacute at d/c pending pre-cert. PASRR not needed for [facility] (approved if plan changes). Pt's 2nd choice is [facility] & 3rd choice is [facility] if needed.     Plan Comments  [facility] liaison stated that pt is appropriate for subacute pending progress, bed availability & pre-cert.     Row Name 03/11/20 3600       Plan    Plan  [facility] referral pending. Will require pre-cert. PASRR not needed for [facility] (approved if plan changes). Pt's 2nd choice is [facility] and 3rd choice is [facility] if needed.     Plan Comments  CM spoke w/ pt to obtain inpt rehab choices.           Lora Delgado, MSSW, LSW  PRN   Phone: [phone]

## 2020-03-11 NOTE — PLAN OF CARE
Problem: Patient Care Overview  Goal: Plan of Care Review  Outcome: Ongoing (interventions implemented as appropriate)  Flowsheets (Taken 3/11/2020 7571)  Progress: no change  Plan of Care Reviewed With: patient  Outcome Summary: Required cga/min A for safe, functional mobility. Min A sit/stand, amb. using rwx. 60' c rwx., also requiring multiple standing rests due to onset of afib. Decreased righting recovery, at risk for falls, needs reinforced safety c rwx. use. Will progress as able, will need rehab at d/c to address deficits.

## 2020-03-11 NOTE — DISCHARGE PLACEMENT REQUEST
"Karen Rubio (84 y.o. Female)     Date of Birth Social Security Number Address Home Phone MRN    1935  [facility]  [place] [address] [phone] [id number]    Episcopal Marital Status          Anabaptism        Admission Date Admission Type Admitting Provider Attending Provider Department, Room/Bed    3/9/20 Elective Starla Meyer MD Lackey, Diana, MD [facility] NEURO HEART, 263/1    Discharge Date Discharge Disposition Discharge Destination                       Attending Provider:  Starla Meyer MD    Allergies:  Hydrocodone    Isolation:  None   Infection:  None   Code Status:  CPR    Ht:  170.2 cm (67\")   Wt:  82.2 kg (181 lb 3.5 oz)    Admission Cmt:  None   Principal Problem:  Atrial fibrillation, new onset (CMS/HCC) [I48.91]                 Active Insurance as of 3/9/2020     Primary Coverage     Payor Plan Insurance Group Employer/Plan Group    HUMANA MEDICARE REPLACEMENT HUMANA MEDICARE REPLACEMENT [id number]     Payor Plan Address Payor Plan Phone Number Payor Plan Fax Number Effective Dates    [address] [address] [phone]  1/1/2018 - None Entered    [facility] [id number]       Subscriber Name Subscriber Birth Date Member ID       KAREN RUBIO 1935 [id number]                 Emergency Contacts      (Rel.) Home Phone Work Phone Mobile Phone    REX TRISTAN (Daughter) [phone] -- --    SOUTH RUBIO (Daughter) -- -- [phone]               History & Physical      Bebe Govea APRN at 03/09/20 1255     Attestation signed by Starla Meyer MD at 03/09/20 1832    Hospitalist Physician Assessment/Plan    History:  Patient felt poorly and had no appetite.  The patient felt her heart start to race.  She went to see Dr. Hernández who checked some laboratory and had her directly admitted.  The patient does have a history of cardiac issues but not active atrial fibrillation.  This was new onset.    Exam:  General: well-developed and " well-nourished, NAD.  The patient's neck veins are flat and she has poor skin turgor.  Head: Normocephalic and atraumatic.   Eyes: EOM are normal. Pupils are equal, round, and reactive to light.   Heart: Irregularly irregular rate and rhythm. No murmur   Chest: Effort normal and breath sounds normal. No wheezing, rales or rhonchi  Abdominal: Soft. NT/ND. Bowel sounds present  Musculoskeletal: Normal ROM.  No edema. No calf tenderness.  Neurological: AAOx3, no focal deficits  Skin: Skin is warm and dry. No rash  Psychiatric: Normal mood and affect.    Medical Decision Making:  The patient has new onset atrial fib with rapid ventricular response which has slowed and improved.  The patient also has pneumonia which will be covered with empiric antibiotics.  The patient will be cautiously rehydrated and we will follow her both heart rate and rhythm closely.  We will also rule the patient out for myocardial infarction.    I have reviewed and agree with the management of the patient's acute and chronic illnesses as outlined in the documentation above per the NP's assessment and plan.    Attending Physician Attestation    For this patient encounter, I have reviewed the mid-level provider documentation, medical decision making and treatment plan, and I have personally spent time with the patient.  All procedures were done by me, and/or all procedures were performed by the mid-level under my supervision.                         [facility]      Patient Name: Karen Rubio  : 1935  MRN: [id number]  Primary Care Physician: Eliza Clayton APRN  Date of admission: 3/9/2020    Patient Care Team:  Eliza Clayton APRN as PCP - General (Nurse Practitioner)  Eliza Clayton APRN as PCP - Family Medicine  Demond Valiente MD (Cardiology)  Gibson Medina MD as Surgeon (Cardiothoracic Surgery)          Subjective   History Present Illness     Chief Complaint:  Direct admit for new onset afib and acute kidney injury       Ms. Rubio is a 84 y.o. female with known moderate mitral stenosis, moderate tricuspid insufficiency, mild to moderate aortic stenosis followed outpatient by cardiologist Dr. Hernández. She has chronic respiratory failure on supplemental O2 via nasal cannula. She was seen by Dr. Hernández in his office 3/9/20 with reports of fatigue, syncope, and elevated heart rate in the teens. Her symptoms have been ongoing for approximately 2 weeks. She denied any chest pain or palpitations. She has had some shortness of breath but is chronically short of breath. She saw her PCP and had her oxygen increased from 2L to 3L and given a nebulizer machine. No known recent illness. She stated she has been going to the bathroom to brush her teeth and then passed out on two different occasions with most recent over a week ago. She denied any injury to herself. She ambulates independently at home and sometimes with a walker. In Dr. Hernández's office she was noted to have atrial fibrillation with rapid ventricular rate. She was directly admitted for further workup and Dr. Hernández plans ESTEFANI on Wednesday. He also noted she had abnormal kidney function and wanted this evaluated. Upon exam patient's heart rate is 90s-1teens.       Review of Systems   Constitution: Positive for malaise/fatigue.   HENT: Negative.    Eyes: Negative.    Cardiovascular: Positive for syncope.   Respiratory: Negative.    Endocrine: Negative.    Hematologic/Lymphatic: Negative.    Skin: Negative.    Musculoskeletal: Negative.    Gastrointestinal: Negative.    Genitourinary: Negative.    Neurological: Positive for weakness.   Psychiatric/Behavioral: Negative.    Allergic/Immunologic: Negative.    All other systems reviewed and are negative.          Personal History     Past Medical History:   Past Medical History:   Diagnosis Date   • Abnormality of left atrial appendage 05/17/2016    Suspected Clot Noted on ESTEFANI   • Acute  renal failure (CMS/HCC) 08/06/2016-[facility]    Front Dehydration   • Arthritis    • Breast density 12/2017   • Carotid stenosis 11/05/2018    R @ 60% and L @ 10-20% Noted on Cardiac Cath & 16-49% on L&R    • Heart murmur    • History of echocardiogram 10/13/2017    Calcified Aortic Leaftlets; Mild Aortic Stenosis Present; Mild Mitral Stenosis; Bilateral Enlargement Noted;  Moderate TR; LV Function of 55-60%   • History of echocardiogram 12/5/14-[facility]    Moderate L Vent Hypertrophy Noted; L Atrial Dilation; Moderate MR; Mild TR; Mild-Moderate Aortic Reg Noted; Normal Root Size/No Effusion   • History of echocardiogram 5/16/16-[facility]    Normal Findings with Mild-Moderate MVS Noted   • History of EKG 2004/2016 & 11/5/18-[facility]    All Sinus Rhythm w/Infarct Ischemnic Changes Noted   • HLD (hyperlipidemia)     Controlled w/Meds   • Hx of hyperglycemia    • Hypertension     Controlled w/Meds   • Hypothyroidism     Levothyroxine   • Joint pain    • Left atrial dilatation 12/05/2014    Mildly Noted on Echo   • Left ventricular hypertrophy 12/05/2014    Noted on Echo w/EF @ 55-60%   • Mild aortic regurgitation 12/05/2014    to Moderate Noted on Echo   • Mild aortic stenosis 10/13/2017    Noted on Echo & ESTEFANI-11/5/18-Moderate    • Mild mitral insufficiency 10/13/2017    Noted on Echo & ESTEFANI-11/5/18   • Mild tricuspid regurgitation 12/05/2014    Noted on Echo   • Moderate mitral regurgitation 12/05/2014    Noted on Echo   • Moderate mitral stenosis 10/13/2017    Noted on Echo   • Moderate tricuspid insufficiency 10/13/2017    Noted on Echo   • Pneumonia involving left lung 08/6/16-[facility] ER   • Pulmonary hypertension (CMS/HCC) 10/13/2017 & 11/5/18-Cath    Severe Noted on Echo & Cath   • Slurred speech 05/16/2016   • SOB (shortness of breath) 08/2016   • Thickened mitral leaflet 10/13/2017 & ESTEFANI-11/5/18    Severely Calcified Noted on Echo   • Tricuspid valve insufficiency 11/05/2018    Noted on ESTEFANI       Surgical History:      Past Surgical  History:   Procedure Laterality Date   • CARDIAC CATHETERIZATION Left 11/5/18-[facility]    w/L Coronary Angiography--Dr. Hernández-RCA @ 60% with Pulmonary Hypertension Noted   • CHOLECYSTECTOMY     • KNEE ARTHROSCOPY     • ESTEFANI  05/17/2016-[facility]    Dr. Hernández--Severly Calcified Mitral Leaflets w/Mild-Moderate Mitral Stenosis/Insufficiency; Sig Aortic Stenosis Noted; Suspecion of L Atrial Appendage Clot Noted   • ESTEFANI  11/5/18-[facility]    Dr. Hernández--Moderate Mitral Insufficiency Noted; Mild-Moderate AVS; Moderate Tricuspid Insufficiency Noted; EF of 65-70%   • TONSILLECTOMY             Family History: family history includes Cancer in her father; Heart disease in her mother.     Social History:  reports that she quit smoking about 18 years ago. Her smoking use included cigarettes. She has never used smokeless tobacco. She reports that she does not drink alcohol or use drugs.      Medications:  Prior to Admission medications    Medication Sig Start Date End Date Taking? Authorizing Provider   albuterol sulfate  (90 Base) MCG/ACT inhaler Inhale 2 puffs Every 4 (Four) Hours As Needed for Wheezing or Shortness of Air.    Jan Sommer MD   apixaban (ELIQUIS) 5 MG tablet tablet Take 1 tablet by mouth 2 (Two) Times a Day. 1/20/20   Demond Valiente MD   atorvastatin (LIPITOR) 20 MG tablet TAKE 1 TABLET EVERY DAY 8/27/18   Jan Sommer MD   calcium carbonate (OS-SHERWIN) 600 MG tablet Take 600 mg by mouth.    Jan Sommer MD   Cholecalciferol (VITAMIN D-3) 1000 units capsule Take 1 capsule by mouth Daily.    Jan Sommer MD   dicyclomine (BENTYL) 10 MG capsule Take 1 capsule by mouth 4 (Four) Times a Day. 10/10/19   Jan Sommer MD   docusate calcium (SURFAK) 240 MG capsule Take  by mouth Daily.    Jan Sommer MD   escitalopram (LEXAPRO) 10 MG tablet Take 10 mg by mouth Daily. 11/21/17   Jan Sommer MD   esomeprazole (nexIUM) 40 MG capsule TAKE 1 CAPSULE EVERY DAY 12/3/18    Jan Sommer MD   furosemide (LASIX) 40 MG tablet TAKE ONE TABLET BY MOUTH DAILY 10/28/19   Demond Valiente MD   ipratropium-albuterol (DUO-NEB) 0.5-2.5 mg/3 ml nebulizer  1/27/20   Jan Sommer MD   levothyroxine (SYNTHROID, LEVOTHROID) 50 MCG tablet TAKE 1 TABLET EVERY DAY 10/8/18   Jan Sommer MD   Multiple Vitamins-Minerals (ICAPS) capsule Take  by mouth.    Jan Sommer MD   potassium chloride (MICRO-K) 10 MEQ CR capsule TAKE ONE CAPSULE BY MOUTH DAILY 2/17/20   Demond Valiente MD   raNITIdine (ZANTAC) 300 MG tablet Take 1 tablet by mouth Daily. 10/2/19   Jan Sommer MD   rifAXIMin (XIFAXAN PO) Take  by mouth.    Jan Sommer MD   temazepam (RESTORIL) 15 MG capsule TAKE ONE CAPSULE BY MOUTH EVERY NIGHT AT BEDTIME AS NEEDED FOR SLEEP 3/12/18   Jan Sommer MD   TURMERIC PO Take  by mouth.    Jan Sommer MD       Allergies:    Allergies   Allergen Reactions   • Hydrocodone Shortness Of Breath       Objective   Objective     Vital Signs  Temp:  [97.8 °F (36.6 °C)] 97.8 °F (36.6 °C)  Heart Rate:  [] 114  Resp:  [22-24] 22  BP: (112-128)/(72-74) 120/74  SpO2:  [92 %-99 %] 99 %  on  Flow (L/min):  [3] 3;   Device (Oxygen Therapy): nasal cannula  Body mass index is 28.38 kg/m².    Physical Exam   Constitutional: She is oriented to person, place, and time. She appears well-developed and well-nourished. No distress.   HENT:   Head: Normocephalic and atraumatic.   Nose: Nose normal.   Eyes: Pupils are equal, round, and reactive to light. Conjunctivae and EOM are normal. Left eye exhibits no discharge.   Neck: Normal range of motion.   Cardiovascular: Normal heart sounds and intact distal pulses. An irregular rhythm present. Tachycardia present. Exam reveals no gallop and no friction rub.   No murmur heard.  Pulmonary/Chest: Effort normal. No stridor. No respiratory distress. She has no wheezes. She has rhonchi in  the right lower field and the left lower field.   Abdominal: Soft. Bowel sounds are normal. She exhibits no distension. There is no tenderness.   Musculoskeletal: Normal range of motion. She exhibits no edema, tenderness or deformity.   Neurological: She is alert and oriented to person, place, and time. No cranial nerve deficit.   Skin: Skin is warm and dry. No rash noted. She is not diaphoretic. No erythema.   Psychiatric: She has a normal mood and affect. Her behavior is normal.   Nursing note and vitals reviewed.      Results Review:  I have personally reviewed most recent cardiac tracings, lab results and radiology images and interpretations and agree with findings.    Results from last 7 days   Lab Units 03/09/20  1418   WBC 10*3/mm3 11.00*   HEMOGLOBIN g/dL 12.7   HEMATOCRIT % 39.5   PLATELETS 10*3/mm3 183     Results from last 7 days   Lab Units 03/09/20  1418   SODIUM mmol/L 139   POTASSIUM mmol/L 3.3*   CHLORIDE mmol/L 95*   CO2 mmol/L 29.0   BUN mg/dL 27*   CREATININE mg/dL 1.52*   GLUCOSE mg/dL 100*   CALCIUM mg/dL 9.6   ALT (SGPT) U/L 31   AST (SGOT) U/L 48*   TROPONIN T ng/mL <0.010   PROBNP pg/mL 8,621.0*     Estimated Creatinine Clearance: 30.9 mL/min (A) (by C-G formula based on SCr of 1.52 mg/dL (H)).  Brief Urine Lab Results     None          Microbiology Results (last 10 days)     ** No results found for the last 240 hours. **          ECG/EMG Results (most recent)     Procedure Component Value Units Date/Time    ECG 12 Lead [[id number]] Collected:  03/09/20 1334     Updated:  03/09/20 1337    Narrative:       HEART RATE= 104  bpm  RR Interval= 576  ms  CO Interval=   ms  P Horizontal Axis=   deg  P Front Axis=   deg  QRSD Interval= 103  ms  QT Interval= 379  ms  QRS Axis= 120  deg  T Wave Axis= 41  deg  - ABNORMAL ECG -  Atrial fibrillation  Probable right ventricular hypertrophy  Borderline ST elevation, anterior leads  Electronically Signed By:   Date and Time of Study: 2020-03-09 13:34:19           Results for orders placed in visit on 05/16/16   SCANNED VASCULAR STUDIES       Results for orders placed in visit on 12/06/18   SCANNED - ECHOCARDIOGRAM       Xr Chest 1 View    Result Date: 3/9/2020  Hazy opacities throughout left lung, suggesting pneumonia.  Electronically Signed By-DR. Bernard Arriaga MD On:3/9/2020 2:40 PM This report was finalized on [id number] by DR. Bernard Arriaga MD.        Estimated Creatinine Clearance: 30.9 mL/min (A) (by C-G formula based on SCr of 1.52 mg/dL (H)).    Assessment/Plan   Assessment/Plan       Active Hospital Problems    Diagnosis  POA   • **Atrial fibrillation, new onset (CMS/HCC) [I48.91]  Yes     Priority: High   • Atrial fibrillation with RVR (CMS/HCC) [I48.91]  Yes     Priority: High   • CAP (community acquired pneumonia) [J18.9]  Yes     Priority: High   • Acute renal insufficiency [N28.9]  Yes     Priority: High   • Syncope [R55]  Yes     Priority: High   • Hypokalemia [E87.6]  Yes   • Chronic respiratory failure (CMS/HCC) [J96.10]  Yes   • Moderate tricuspid regurgitation [I07.1]  Yes   • Atherosclerosis of native coronary artery of native heart without angina pectoris [I25.10]  Yes   • History of CVA (cerebrovascular accident) [Z86.73]  Not Applicable   • Pulmonary hypertension (CMS/HCC) [I27.20]  Yes   • Hyperlipidemia [E78.5]  Yes   • Mitral valve stenosis [I05.0]  Yes      Resolved Hospital Problems   No resolved problems to display.     New onset Atrial fibrillation with rapid ventricular rate  -cardiology managing and ordered metoprolol 25mg q12 hours  -started on eliquis  -Troponin negative  -proBNP 8621  -TSH 5.39 with free T4 1.31  -CXR showed hazy opacities throughout left lung suggesting pneumonia    Pneumonia- CAP  -CXR showed hazy opacities throughout left lung suggesting pneumonia  -WBC 11.0, afebrile  -check sputum culture, urine antigens, procalcitonin  -start IV antibiotics rocephin, doxycycline  -check RVP    Acute renal  insufficiency  -creatinine 1.52  -possibly from dehydration  -check UA  -low rate IVFs  -monitor Is and Os    Hypokalemia  -Replacement protocol    Syncope  -could have been from atrial fibrillation  -cardiology consulted as above    Chronic respiratory failure on chronic supplemental O2 2-3L  -Continue home dose 3L and monitor oxygen saturation  -duonebs q6 hours  -CXR showed hazy opacities throughout left lung suggesting pneumonia    Valvular heart disease  -moderate mitral stenosis, moderate tricuspid insufficiency, mild to moderate aortic stenosis     Pulmonary hypertension    CAD   -[facility] 2018: 60% disease of ostial RCA, pulmonary hypertension  -no previous intervention    H/o CVA without deficits    Hyperlipidemia  -Continue home statin    Hypothyroidism  -Continue home Synthroid    Depression  -Continue home Lexapro      VTE Prophylaxis - eliquis      CODE STATUS:    Code Status and Medical Interventions:   Ordered at: 03/09/20 1258     Level Of Support Discussed With:    Patient     Code Status:    CPR     Medical Interventions (Level of Support Prior to Arrest):    Full       Admission Status:  I believe this patient meets inpatient criteria.      I discussed the patient's findings and my recommendations with patient, family and nursing staff.        Electronically signed by ZACHARIAH Connolly, 03/09/20, 12:55 PM.  [facility] Hospitalist Team          Electronically signed by Starla Meyer MD at 03/09/20 9687         Current Facility-Administered Medications   Medication Dose Route Frequency Provider Last Rate Last Dose   • acetaminophen (TYLENOL) tablet 650 mg  650 mg Oral Q4H PRN Bebe Govea APRN        Or   • acetaminophen (TYLENOL) 160 MG/5ML solution 650 mg  650 mg Oral Q4H PRN Bebe Govea APRN        Or   • acetaminophen (TYLENOL) suppository 650 mg  650 mg Rectal Q4H PRN Bebe Govea APRN       • albuterol (PROVENTIL) nebulizer solution 0.083% 2.5 mg/3mL  2.5 mg  Nebulization Q3H PRN Starla Meyer MD       • aluminum-magnesium hydroxide-simethicone (MAALOX MAX) 400-400-40 MG/5ML suspension 15 mL  15 mL Oral Q6H PRN Bebe Govea APRN       • apixaban (ELIQUIS) tablet 5 mg  5 mg Oral Q12H Starla Meyer MD   5 mg at 03/11/20 1235   • atorvastatin (LIPITOR) tablet 20 mg  20 mg Oral Daily Starla Meyer MD   20 mg at 03/11/20 1236   • bisacodyl (DULCOLAX) EC tablet 5 mg  5 mg Oral Daily PRN Bebe Govea APRN       • bisacodyl (DULCOLAX) suppository 10 mg  10 mg Rectal Daily PRN Bebe Govea APRN       • calcium-vitamin D 500-200 MG-UNIT per tablet 1 tablet  1 tablet Oral Daily Starla Meyer MD   1 tablet at 03/11/20 1235   • cefTRIAXone (ROCEPHIN) 1 g in sodium chloride 0.9 % 100 mL IVPB  1 g Intravenous Q24H Bebe Govea APRN 200 mL/hr at 03/10/20 2106 1 g at 03/10/20 2106   • dicyclomine (BENTYL) capsule 10 mg  10 mg Oral 4x Daily Starla Meyer MD   10 mg at 03/11/20 1237   • dilTIAZem (CARDIZEM) 125 mg in 125 mL NS (1 mg/mL) infusion  5-15 mg/hr Intravenous Titrated Maximus Cristina DO 5 mL/hr at 03/11/20 1238 5 mg/hr at 03/11/20 1238   • docusate sodium (COLACE) capsule 100 mg  100 mg Oral Daily Starla Meyer MD   100 mg at 03/11/20 1235   • doxycycline (VIBRAMYCIN) 100 mg in sodium chloride 0.9 % 100 mL IVPB  100 mg Intravenous Q12H Bebe Govea APRN   100 mg at 03/11/20 0503   • escitalopram (LEXAPRO) tablet 10 mg  10 mg Oral Daily Starla Meyer MD   10 mg at 03/11/20 1236   • ipratropium-albuterol (DUO-NEB) nebulizer solution 3 mL  3 mL Nebulization Q6H - RT Starla Meyer MD   3 mL at 03/11/20 1026   • levothyroxine (SYNTHROID, LEVOTHROID) tablet 50 mcg  50 mcg Oral Q AM Starla Meyer MD       • magnesium hydroxide (MILK OF MAGNESIA) suspension 2400 mg/10mL 10 mL  10 mL Oral Daily PRN Bebe Govea, APRN       • Magnesium Sulfate 2 gram infusion - Mg less than or equal to 1.5 mg/dL  2 g Intravenous PRN  Bebe Govea APRN        Or   • Magnesium Sulfate 1 gram infusion - Mg 1.6-1.9 mg/dL  1 g Intravenous PRN Bebe Govea APRN       • melatonin tablet 5 mg  5 mg Oral Nightly PRN Bebe Govea APRN       • metoprolol tartrate (LOPRESSOR) tablet 25 mg  25 mg Oral Q12H Demond Valiente MD   25 mg at 03/11/20 1235   • nitroglycerin (NITROSTAT) SL tablet 0.4 mg  0.4 mg Sublingual Q5 Min PRN Starla Meyer MD       • ondansetron (ZOFRAN) tablet 4 mg  4 mg Oral Q6H PRN Bebe Govea APRN        Or   • ondansetron (ZOFRAN) injection 4 mg  4 mg Intravenous Q6H PRN eBbe Govea APRN       • pantoprazole (PROTONIX) EC tablet 40 mg  40 mg Oral QAM Starla Meyer MD   40 mg at 03/11/20 1235   • potassium chloride (K-DUR,KLOR-CON) CR tablet 40 mEq  40 mEq Oral PRN Bebe Govea APRN       • potassium chloride (KLOR-CON) packet 40 mEq  40 mEq Oral PRN Bebe Govea APRN       • rifAXIMin (XIFAXAN) tablet 200 mg  200 mg Oral Q12H Starla Meyer MD   200 mg at 03/11/20 1235   • sodium chloride 0.9 % flush 10 mL  10 mL Intravenous Q12H Bebe Govea APRN   10 mL at 03/11/20 0853   • sodium chloride 0.9 % flush 10 mL  10 mL Intravenous PRN Bebe Govea APRN       • sodium chloride 0.9 % flush 10 mL  10 mL Intravenous Q12H Starla Meyer MD   10 mL at 03/11/20 0853   • sodium chloride 0.9 % flush 10 mL  10 mL Intravenous PRN Starla Meyer MD            Physician Progress Notes (most recent note)      Starla Meyer MD at 03/10/20 1557                [facility] Medicine Services Daily Progress Note      Hospitalist Team  LOS 1 days      Patient Care Team:  Eliza Clayton APRN as PCP - General (Nurse Practitioner)  Eliza Clayton APRN as PCP - Family Medicine  Demond Valiente MD (Cardiology)  Gibson Medina MD as Surgeon (Cardiothoracic Surgery)    Patient Location: 263/1      Subjective   Subjective  "    Chief Complaint / Subjective  No chief complaint on file.        Brief Synopsis of Hospital Course/HPI  The patient is an 84-year-old female who was admitted from Dr. Hernández cardiology office with new onset atrial fibrillation.  The patient arrived to the hospital was found to be in acute renal insufficiency secondary to a pneumonia.  The patient has been placed on intravenous antibiotics, breathing treatments and IV rehydration.  Overall the patient is showing significant improvement today.  Cardiology has been consulted and is following the patient.    Date:  3/10/2020  The patient is sitting up eating her lunch today.  She looks much better and much more comfortable today than on admission.  She is also markedly more alert oriented and appropriate.    Review of Systems   Constitution: Negative.   HENT: Negative.    Eyes: Negative.    Cardiovascular: Negative.         Less issues with a rapid heart rate.  She has no complaints of chest pain.   Respiratory: Positive for cough and shortness of breath.         The patient does have some cough and shortness of breath but it is improved over yesterday's examination.   Endocrine: Negative.    Hematologic/Lymphatic: Negative.    Skin: Negative.    Musculoskeletal: Negative.    Gastrointestinal: Negative.    Genitourinary: Negative.    Neurological: Negative.    Psychiatric/Behavioral: Negative.    Allergic/Immunologic: Negative.    All other systems reviewed and are negative.    Objective   Objective      Vital Signs  Temp:  [97.4 °F (36.3 °C)-99.2 °F (37.3 °C)] 99.2 °F (37.3 °C)  Heart Rate:  [] 79  Resp:  [16-28] 24  BP: (115-162)/(62-89) 162/86  Oxygen Therapy  SpO2: 99 %  Pulse Oximetry Type: Continuous  Device (Oxygen Therapy): nasal cannula  Flow (L/min): 3  Flowsheet Rows      First Filed Value   Admission Height  170.2 cm (67\") Documented at 03/09/2020 1302   Admission Weight  82.2 kg (181 lb 3.5 oz) Documented at 03/09/2020 1302        Intake & Output " (last 3 days)       03/07 0701 - 03/08 0700 03/08 0701 - 03/09 0700 03/09 0701 - 03/10 0700 03/10 0701 - 03/11 0700    P.O.   240 480    IV Piggyback   200     Total Intake(mL/kg)   440 (5.4) 480 (5.8)    Urine (mL/kg/hr)    150 (0.2)    Total Output    150    Net   +440 +330                Lines, Drains & Airways    Active LDAs     Name:   Placement date:   Placement time:   Site:   Days:    Peripheral IV 03/09/20 1533 Left Wrist   03/09/20    1533    Wrist   1    Peripheral IV 03/10/20 0000 Left;Lateral Antecubital   03/10/20    0000    Antecubital   less than 1                  Physical Exam:    Physical Exam   Constitutional: She is oriented to person, place, and time. She appears well-developed and well-nourished. No distress.   HENT:   Head: Normocephalic and atraumatic.   Right Ear: External ear normal.   Left Ear: External ear normal.   Nose: Nose normal.   Mouth/Throat: Oropharynx is clear and moist. No oropharyngeal exudate.   Eyes: Pupils are equal, round, and reactive to light. Conjunctivae and EOM are normal. Right eye exhibits no discharge. Left eye exhibits no discharge. No scleral icterus.   Neck: Normal range of motion. No JVD present. No tracheal deviation present. No thyromegaly present.   Cardiovascular: Normal rate, normal heart sounds and intact distal pulses. Exam reveals no gallop and no friction rub.   No murmur heard.  Irregularly irregular with a 3/6 systolic ejection murmur best at the left lower sternal border.   Pulmonary/Chest: Effort normal. No stridor. No respiratory distress. She has no wheezes. She has no rales. She exhibits no tenderness.   Few rhonchi bilaterally in the bases.   Abdominal: Soft. Bowel sounds are normal. She exhibits no distension and no mass. There is no tenderness. There is no rebound and no guarding. No hernia.   Musculoskeletal: Normal range of motion. She exhibits no edema, tenderness or deformity.   Lymphadenopathy:     She has no cervical adenopathy.    Neurological: She is alert and oriented to person, place, and time. No cranial nerve deficit or sensory deficit. She exhibits normal muscle tone. Coordination normal.   Skin: Skin is warm and dry. No rash noted. She is not diaphoretic. No erythema.   Psychiatric: She has a normal mood and affect. Her behavior is normal.   Nursing note and vitals reviewed.        Procedures:  Results Review:     I reviewed the patient's new clinical results.    Lab Results (last 24 hours)     Procedure Component Value Units Date/Time    Troponin [[id number]]  (Normal) Collected:  03/10/20 1330    Specimen:  Blood Updated:  03/10/20 1448     Troponin T <0.010 ng/mL     Narrative:       Troponin T Reference Range:  <= 0.03 ng/mL-   Negative for AMI  >0.03 ng/mL-     Abnormal for myocardial necrosis.  Clinicians would have to utilize clinical acumen, EKG, Troponin and serial changes to determine if it is an Acute Myocardial Infarction or myocardial injury due to an underlying chronic condition.       Results may be falsely decreased if patient taking Biotin.      S. Pneumo Ag Urine or CSF - Urine, Urine, Clean Catch [[id number]]  (Normal) Collected:  03/10/20 1311    Specimen:  Urine, Clean Catch Updated:  03/10/20 1410     Strep Pneumo Ag Negative    Legionella Antigen, Urine - Urine, Urine, Clean Catch [[id number]]  (Normal) Collected:  03/10/20 1311    Specimen:  Urine, Clean Catch Updated:  03/10/20 1410     LEGIONELLA ANTIGEN, URINE Negative    Basic Metabolic Panel [[id number]]  (Abnormal) Collected:  03/10/20 0532    Specimen:  Blood Updated:  03/10/20 0644     Glucose 109 mg/dL      BUN 29 mg/dL      Creatinine 1.43 mg/dL      Sodium 140 mmol/L      Potassium 4.4 mmol/L      Comment: Result checked         Chloride 101 mmol/L      CO2 24.0 mmol/L      Calcium 9.2 mg/dL      eGFR Non African Amer 35 mL/min/1.73      BUN/Creatinine Ratio 20.3     Anion Gap 15.0 mmol/L     Narrative:       GFR Normal >60  Chronic Kidney Disease  <60  Kidney Failure <15      Troponin [[id number]]  (Normal) Collected:  03/10/20 0532    Specimen:  Blood Updated:  03/10/20 0642     Troponin T <0.010 ng/mL     Narrative:       Troponin T Reference Range:  <= 0.03 ng/mL-   Negative for AMI  >0.03 ng/mL-     Abnormal for myocardial necrosis.  Clinicians would have to utilize clinical acumen, EKG, Troponin and serial changes to determine if it is an Acute Myocardial Infarction or myocardial injury due to an underlying chronic condition.       Results may be falsely decreased if patient taking Biotin.      Magnesium [[id number]]  (Normal) Collected:  03/10/20 0532    Specimen:  Blood Updated:  03/10/20 0642     Magnesium 1.7 mg/dL     CBC Auto Differential [[id number]]  (Abnormal) Collected:  03/10/20 0532    Specimen:  Blood Updated:  03/10/20 0601     WBC 9.50 10*3/mm3      RBC 4.33 10*6/mm3      Hemoglobin 12.1 g/dL      Hematocrit 38.2 %      MCV 88.3 fL      MCH 27.8 pg      MCHC 31.5 g/dL      RDW 15.8 %      RDW-SD 49.4 fl      MPV 10.1 fL      Platelets 159 10*3/mm3      Neutrophil % 73.2 %      Lymphocyte % 15.0 %      Monocyte % 10.9 %      Eosinophil % 0.7 %      Basophil % 0.2 %      Neutrophils, Absolute 7.00 10*3/mm3      Lymphocytes, Absolute 1.40 10*3/mm3      Monocytes, Absolute 1.00 10*3/mm3      Eosinophils, Absolute 0.10 10*3/mm3      Basophils, Absolute 0.00 10*3/mm3      nRBC 0.0 /100 WBC     Troponin [[id number]]  (Normal) Collected:  03/10/20 0019    Specimen:  Blood Updated:  03/10/20 0102     Troponin T <0.010 ng/mL     Narrative:       Troponin T Reference Range:  <= 0.03 ng/mL-   Negative for AMI  >0.03 ng/mL-     Abnormal for myocardial necrosis.  Clinicians would have to utilize clinical acumen, EKG, Troponin and serial changes to determine if it is an Acute Myocardial Infarction or myocardial injury due to an underlying chronic condition.       Results may be falsely decreased if patient taking Biotin.      T3, Free [[id number]]  (Abnormal)  Collected:  03/09/20 1418    Specimen:  Blood Updated:  03/09/20 2219     T3, Free 1.96 pg/mL     Narrative:       Results may be falsely increased if patient taking Biotin.      Respiratory Panel, PCR - Swab, Nasopharynx [[id number]]  (Normal) Collected:  03/09/20 1541    Specimen:  Swab from Nasopharynx Updated:  03/09/20 2109     ADENOVIRUS, PCR Not Detected     Coronavirus 229E Not Detected     Coronavirus HKU1 Not Detected     Coronavirus NL63 Not Detected     Coronavirus OC43 Not Detected     Human Metapneumovirus Not Detected     Human Rhinovirus/Enterovirus Not Detected     Influenza B PCR Not Detected     Parainfluenza Virus 1 Not Detected     Parainfluenza Virus 2 Not Detected     Parainfluenza Virus 3 Not Detected     Parainfluenza Virus 4 Not Detected     Bordetella pertussis pcr Not Detected     Influenza A H1 2009 PCR Not Detected     Chlamydophila pneumoniae PCR Not Detected     Mycoplasma pneumo by PCR Not Detected     Influenza A PCR Not Detected     Influenza A H3 Not Detected     Influenza A H1 Not Detected     RSV, PCR Not Detected    Narrative:       The coronavirus on the RVP is NOT COVID-19 and is NOT indicative of infection with COVID-19.     Procalcitonin [[id number]]  (Normal) Collected:  03/09/20 1844    Specimen:  Blood Updated:  03/09/20 1946     Procalcitonin 0.13 ng/mL     Narrative:       As a Marker for Sepsis (Non-Neonates):   1. <0.5 ng/mL represents a low risk of severe sepsis and/or septic shock.  1. >2 ng/mL represents a high risk of severe sepsis and/or septic shock.    As a Marker for Lower Respiratory Tract Infections that require antibiotic therapy:  PCT on Admission     Antibiotic Therapy             6-12 Hrs later  > 0.5                Strongly Recommended            >0.25 - <0.5         Recommended  0.1 - 0.25           Discouraged                   Remeasure/reassess PCT  <0.1                 Strongly Discouraged          Remeasure/reassess PCT      As 28 day mortality  "risk marker: \"Change in Procalcitonin Result\" (> 80 % or <=80 %) if Day 0 (or Day 1) and Day 4 values are available. Refer to http://www.[facility]-pct-calculator.com/   Change in PCT <=80 %   A decrease of PCT levels below or equal to 80 % defines a positive change in PCT test result representing a higher risk for 28-day all-cause mortality of patients diagnosed with severe sepsis or septic shock.  Change in PCT > 80 %   A decrease of PCT levels of more than 80 % defines a negative change in PCT result representing a lower risk for 28-day all-cause mortality of patients diagnosed with severe sepsis or septic shock.                Results may be falsely decreased if patient taking Biotin.     Influenza Antigen, Rapid - Swab, Nasopharynx [[id number]]  (Normal) Collected:  03/09/20 1541    Specimen:  Swab from Nasopharynx Updated:  03/09/20 1928     Influenza A PCR Not Detected     Influenza B PCR Not Detected    T4, Free [[id number]]  (Normal) Collected:  03/09/20 1418    Specimen:  Blood Updated:  03/09/20 1605     Free T4 1.31 ng/dL     Narrative:       Results may be falsely increased if patient taking Biotin.          No results found for: HGBA1C            No results found for: LIPASE  Lab Results   Component Value Date    CHOL 94 03/03/2020    CHLPL 132 10/02/2019    TRIG 104 03/03/2020    HDL 39 (L) 03/03/2020    LDL 34 03/03/2020     No results found for: INTRAOP, PREDX, FINALDX, COMDX    Microbiology Results (last 10 days)     Procedure Component Value - Date/Time    S. Pneumo Ag Urine or CSF - Urine, Urine, Clean Catch [[id number]]  (Normal) Collected:  03/10/20 1311    Lab Status:  Final result Specimen:  Urine, Clean Catch Updated:  03/10/20 1410     Strep Pneumo Ag Negative    Legionella Antigen, Urine - Urine, Urine, Clean Catch [[id number]]  (Normal) Collected:  03/10/20 1311    Lab Status:  Final result Specimen:  Urine, Clean Catch Updated:  03/10/20 1410     LEGIONELLA ANTIGEN, URINE Negative    Respiratory " Panel, PCR - Swab, Nasopharynx [[id number]]  (Normal) Collected:  03/09/20 1541    Lab Status:  Final result Specimen:  Swab from Nasopharynx Updated:  03/09/20 2109     ADENOVIRUS, PCR Not Detected     Coronavirus 229E Not Detected     Coronavirus HKU1 Not Detected     Coronavirus NL63 Not Detected     Coronavirus OC43 Not Detected     Human Metapneumovirus Not Detected     Human Rhinovirus/Enterovirus Not Detected     Influenza B PCR Not Detected     Parainfluenza Virus 1 Not Detected     Parainfluenza Virus 2 Not Detected     Parainfluenza Virus 3 Not Detected     Parainfluenza Virus 4 Not Detected     Bordetella pertussis pcr Not Detected     Influenza A H1 2009 PCR Not Detected     Chlamydophila pneumoniae PCR Not Detected     Mycoplasma pneumo by PCR Not Detected     Influenza A PCR Not Detected     Influenza A H3 Not Detected     Influenza A H1 Not Detected     RSV, PCR Not Detected    Narrative:       The coronavirus on the RVP is NOT COVID-19 and is NOT indicative of infection with COVID-19.     Influenza Antigen, Rapid - Swab, Nasopharynx [[id number]]  (Normal) Collected:  03/09/20 1541    Lab Status:  Final result Specimen:  Swab from Nasopharynx Updated:  03/09/20 1928     Influenza A PCR Not Detected     Influenza B PCR Not Detected        ECG/EMG Results (most recent)     Procedure Component Value Units Date/Time    ECG 12 Lead [[id number]] Collected:  03/09/20 1334     Updated:  03/09/20 1337    Narrative:       HEART RATE= 104  bpm  RR Interval= 576  ms  SC Interval=   ms  P Horizontal Axis=   deg  P Front Axis=   deg  QRSD Interval= 103  ms  QT Interval= 379  ms  QRS Axis= 120  deg  T Wave Axis= 41  deg  - ABNORMAL ECG -  Atrial fibrillation  Probable right ventricular hypertrophy  Borderline ST elevation, anterior leads  Electronically Signed By:   Date and Time of Study: 2020-03-09 13:34:19        Results for orders placed in visit on 05/16/16   SCANNED VASCULAR STUDIES     Results for orders placed  in visit on 12/06/18   SCANNED - ECHOCARDIOGRAM     Xr Chest 1 View    Result Date: 3/9/2020  Hazy opacities throughout left lung, suggesting pneumonia.  Electronically Signed By-DR. Bernard Arriaga MD On:3/9/2020 2:40 PM This report was finalized on [id number] by DR. Bernard Arriaga MD.    Xrays, labs reviewed personally by physician.    Medication Review:   I have reviewed the patient's current medication list    Scheduled Meds    apixaban 5 mg Oral Q12H   atorvastatin 20 mg Oral Daily   calcium-vitamin D 1 tablet Oral Daily   cefTRIAXone 1 g Intravenous Q24H   dicyclomine 10 mg Oral 4x Daily   docusate sodium 100 mg Oral Daily   doxycycline 100 mg Intravenous Q12H   escitalopram 10 mg Oral Daily   ipratropium-albuterol 3 mL Nebulization Q6H - RT   levothyroxine 50 mcg Oral Q AM   metoprolol tartrate 25 mg Oral Q12H   pantoprazole 40 mg Oral QAM   rifAXIMin 200 mg Oral Q12H   sodium chloride 10 mL Intravenous Q12H   sodium chloride 10 mL Intravenous Q12H     Meds Infusions    dilTIAZem 5-15 mg/hr Last Rate: 5 mg/hr (03/10/20 0022)   sodium chloride 100 mL/hr Last Rate: 100 mL/hr (03/10/20 0024)     Meds PRN  •  acetaminophen **OR** acetaminophen **OR** acetaminophen  •  albuterol  •  aluminum-magnesium hydroxide-simethicone  •  bisacodyl  •  bisacodyl  •  magnesium hydroxide  •  magnesium sulfate **OR** magnesium sulfate in D5W 1g/100mL (PREMIX)  •  melatonin  •  nitroglycerin  •  ondansetron **OR** ondansetron  •  potassium chloride  •  potassium chloride  •  sodium chloride  •  sodium chloride    Assessment/Plan   Assessment/Plan     Active Hospital Problems    Diagnosis  POA   • **Atrial fibrillation, new onset (CMS/HCC) [I48.91]  Yes     Priority: High   • Moderate tricuspid regurgitation [I07.1]  Yes     Priority: High   • Atherosclerosis of native coronary artery of native heart without angina pectoris [I25.10]  Yes     Priority: High   • Pulmonary hypertension (CMS/HCC) [I27.20]  Yes     Priority:  High   • Mitral valve stenosis [I05.0]  Yes     Priority: High   • History of CVA (cerebrovascular accident) [Z86.73]  Not Applicable     Priority: Medium   • Hyperlipidemia [E78.5]  Yes     Priority: Low   • Atrial fibrillation with RVR (CMS/HCC) [I48.91]  Yes   • CAP (community acquired pneumonia) [J18.9]  Yes   • Acute renal insufficiency [N28.9]  Yes   • Hypokalemia [E87.6]  Yes   • Syncope [R55]  Yes   • Chronic respiratory failure (CMS/HCC) [J96.10]  Yes      Resolved Hospital Problems   No resolved problems to display.       MEDICAL DECISION MAKING COMPLEXITY BY PROBLEM:   1.  New onset atrial fibrillation with rapid ventricular response  -   Rate is now controlled  -   The patient was already therapeutic on Eliquis prior to this development will be continued.  -   Patient has ruled out for myocardial injury    2.  Community-acquired pneumonia  -   Continue Rocephin and doxycycline  -   Continue breathing treatments  -   Repeat chest x-ray in a.m.    3.  Acute renal insufficiency  -   Continue IV fluids  -   Overall this is resolving  -   Continue to follow closely    4.   Syncope  -   Likely related to the new development of atrial fibrillation with rapid ventricular response  -   No ongoing issues noted since admission  -   Myocardial injury has been ruled out    5.  Pulmonary hypertension  -   Continue Eliquis    6.  Coronary artery disease  -   Left heart cath in 2018 found with a 60% RCA ostial lesion with pulmonary hypertension being noted  -   No intervention    7.  History of CVA  -   No permanent defects    8.  Hypothyroidism  -   Continue home Synthroid  -   We will check free T4 and T3 as TSH is mildly elevated.          VTE Prophylaxis -   Mechanical Order History:      Ordered        03/09/20 1258  Place Sequential Compression Device  Once         03/09/20 1258  Maintain Sequential Compression Device  Continuous                 Pharmalogical Order History:     Ordered     Dose Route Frequency  Stop    03/10/20 0927  apixaban (ELIQUIS) tablet 5 mg      5 mg PO Every 12 Hours Scheduled --    03/09/20 1538  heparin (porcine) 5000 UNIT/ML injection 5,000 Units  Status:  Discontinued      5,000 Units SC Every 12 Hours Scheduled 03/09/20 1547    03/09/20 1547  apixaban (ELIQUIS) tablet 2.5 mg  Status:  Discontinued      2.5 mg PO 2 Times Daily 03/09/20 1551    03/09/20 1551  apixaban (ELIQUIS) tablet 2.5 mg  Status:  Discontinued      2.5 mg PO Every 12 Hours Scheduled 03/10/20 0927            Code Status -   Code Status and Medical Interventions:   Ordered at: 03/09/20 1258     Level Of Support Discussed With:    Patient     Code Status:    CPR     Medical Interventions (Level of Support Prior to Arrest):    Full       Discharge Planning      SLP OP Goals     Row Name 03/10/20 1542          Time Calculation    PT Goal Re-Cert Due Date  03/24/20  -HD       User Key  (r) = Recorded By, (t) = Taken By, (c) = Cosigned By    Initials Name Provider Type    HD Lisette Vasquez, PT Physical Therapist          Destination      Coordination has not been started for this encounter.      Durable Medical Equipment      Coordination has not been started for this encounter.      Dialysis/Infusion      Coordination has not been started for this encounter.      Home Medical Care      Coordination has not been started for this encounter.      Therapy      Coordination has not been started for this encounter.      Community Resources      Coordination has not been started for this encounter.            Electronically signed by Starla Wilkes MD, 03/10/20, 15:57.  [facility] Hospitalist Team        Electronically signed by Starla Wilkes MD at 03/10/20 1609          Consult Notes (most recent note)      Demond Valiente MD at 03/09/20 2037            Referring Provider: Dr. wilkes  Reason for Consultation: New onset atrial fibrillation    Patient Care Team:  Eliza Clayton APRN as PCP - General (Nurse  Practitioner)  Eliza Clayton APRN as PCP - Family Medicine  Demond Valiente MD (Cardiology)  Gibson Medina MD as Surgeon (Cardiothoracic Surgery)    Chief complaint shortness of breath and weakness        History of present illness:  Karen Rubio is a 84 y.o. female who presents with worsening weakness.  84-year-old white female patient with known history of moderate to severe mitral stenosis history of severe pulmonary hypertension single-vessel CAD decided not to undergo CABG and valve replacement now comes to see me in the office with worsening shortness of breath and fatigue in the last 2 weeks  EKG showed new onset atrial fibrillation with rapid ventricle rate  Also acute renal failure noted with worsening creatinine  She denies of any chest pain syncopal episodes lower extremity edema PND orthopnea  Patient was admitted for further evaluation    Review of Systems   Constitution: Positive for malaise/fatigue. Negative for fever.   HENT: Negative for congestion and hearing loss.    Eyes: Negative for double vision and visual disturbance.   Cardiovascular: Positive for dyspnea on exertion. Negative for chest pain, claudication, leg swelling and syncope.   Respiratory: Positive for shortness of breath. Negative for cough.    Endocrine: Negative for cold intolerance.   Skin: Negative for color change and rash.   Musculoskeletal: Negative for arthritis and joint pain.   Gastrointestinal: Negative for abdominal pain and heartburn.   Genitourinary: Negative for hematuria.   Neurological: Negative for excessive daytime sleepiness and dizziness.   Psychiatric/Behavioral: Negative for depression. The patient is not nervous/anxious.    All other systems reviewed and are negative.      History  Past Medical History:   Diagnosis Date   • Abnormality of left atrial appendage 05/17/2016    Suspected Clot Noted on ESTEFANI   • Acute renal failure (CMS/HCC) 08/06/2016-[facility]    Front Dehydration   • Arthritis     • Breast density 12/2017   • Carotid stenosis 11/05/2018    R @ 60% and L @ 10-20% Noted on Cardiac Cath & 16-49% on L&R    • Heart murmur    • History of echocardiogram 10/13/2017    Calcified Aortic Leaftlets; Mild Aortic Stenosis Present; Mild Mitral Stenosis; Bilateral Enlargement Noted;  Moderate TR; LV Function of 55-60%   • History of echocardiogram 12/5/14-[facility]    Moderate L Vent Hypertrophy Noted; L Atrial Dilation; Moderate MR; Mild TR; Mild-Moderate Aortic Reg Noted; Normal Root Size/No Effusion   • History of echocardiogram 5/16/16-[facility]    Normal Findings with Mild-Moderate MVS Noted   • History of EKG 2004/2016 & 11/5/18-[facility]    All Sinus Rhythm w/Infarct Ischemnic Changes Noted   • HLD (hyperlipidemia)     Controlled w/Meds   • Hx of hyperglycemia    • Hypertension     Controlled w/Meds   • Hypothyroidism     Levothyroxine   • Joint pain    • Left atrial dilatation 12/05/2014    Mildly Noted on Echo   • Left ventricular hypertrophy 12/05/2014    Noted on Echo w/EF @ 55-60%   • Mild aortic regurgitation 12/05/2014    to Moderate Noted on Echo   • Mild aortic stenosis 10/13/2017    Noted on Echo & ESTEFANI-11/5/18-Moderate    • Mild mitral insufficiency 10/13/2017    Noted on Echo & ESTEFANI-11/5/18   • Mild tricuspid regurgitation 12/05/2014    Noted on Echo   • Moderate mitral regurgitation 12/05/2014    Noted on Echo   • Moderate mitral stenosis 10/13/2017    Noted on Echo   • Moderate tricuspid insufficiency 10/13/2017    Noted on Echo   • Pneumonia involving left lung 08/6/16-[facility] ER   • Pulmonary hypertension (CMS/HCC) 10/13/2017 & 11/5/18-Cath    Severe Noted on Echo & Cath   • Slurred speech 05/16/2016   • SOB (shortness of breath) 08/2016   • Thickened mitral leaflet 10/13/2017 & ESTEFANI-11/5/18    Severely Calcified Noted on Echo   • Tricuspid valve insufficiency 11/05/2018    Noted on ESTEFANI       Past Surgical History:   Procedure Laterality Date   • CARDIAC CATHETERIZATION Left 11/5/18-[facility]    w/L Coronary  Angiography--Dr. Hernández-RCA @ 60% with Pulmonary Hypertension Noted   • CHOLECYSTECTOMY     • KNEE ARTHROSCOPY     • ESTEFANI  2016-[facility]    Dr. Hernández--Severly Calcified Mitral Leaflets w/Mild-Moderate Mitral Stenosis/Insufficiency; Sig Aortic Stenosis Noted; Suspecion of L Atrial Appendage Clot Noted   • ESTEFANI  18-[facility]    Dr. Hernández--Moderate Mitral Insufficiency Noted; Mild-Moderate AVS; Moderate Tricuspid Insufficiency Noted; EF of 65-70%   • TONSILLECTOMY         Family History   Problem Relation Age of Onset   • Heart disease Mother    • Cancer Father        Social History     Tobacco Use   • Smoking status: Former Smoker     Types: Cigarettes     Last attempt to quit:      Years since quittin.1   • Smokeless tobacco: Never Used   Substance Use Topics   • Alcohol use: No     Frequency: Never   • Drug use: No        Medications Prior to Admission   Medication Sig Dispense Refill Last Dose   • albuterol sulfate  (90 Base) MCG/ACT inhaler Inhale 2 puffs Every 4 (Four) Hours As Needed for Wheezing or Shortness of Air.   Taking   • apixaban (ELIQUIS) 5 MG tablet tablet Take 1 tablet by mouth 2 (Two) Times a Day. 180 tablet 2 Taking   • atorvastatin (LIPITOR) 20 MG tablet TAKE 1 TABLET EVERY DAY   Taking   • calcium carbonate (OS-SHERWIN) 600 MG tablet Take 600 mg by mouth.   Taking   • Cholecalciferol (VITAMIN D-3) 1000 units capsule Take 1 capsule by mouth Daily.   Taking   • dicyclomine (BENTYL) 10 MG capsule Take 1 capsule by mouth 4 (Four) Times a Day.   Taking   • docusate calcium (SURFAK) 240 MG capsule Take  by mouth Daily.   Taking   • escitalopram (LEXAPRO) 10 MG tablet Take 10 mg by mouth Daily.   Taking   • esomeprazole (nexIUM) 40 MG capsule TAKE 1 CAPSULE EVERY DAY   Taking   • furosemide (LASIX) 40 MG tablet TAKE ONE TABLET BY MOUTH DAILY 30 tablet 0 Taking   • ipratropium-albuterol (DUO-NEB) 0.5-2.5 mg/3 ml nebulizer       • levothyroxine (SYNTHROID, LEVOTHROID) 50 MCG tablet TAKE 1 TABLET EVERY  DAY   Taking   • Multiple Vitamins-Minerals (ICAPS) capsule Take  by mouth.   Taking   • potassium chloride (MICRO-K) 10 MEQ CR capsule TAKE ONE CAPSULE BY MOUTH DAILY 30 capsule 0 Taking   • raNITIdine (ZANTAC) 300 MG tablet Take 1 tablet by mouth Daily.   Taking   • rifAXIMin (XIFAXAN PO) Take  by mouth.   Taking   • temazepam (RESTORIL) 15 MG capsule TAKE ONE CAPSULE BY MOUTH EVERY NIGHT AT BEDTIME AS NEEDED FOR SLEEP   Taking   • TURMERIC PO Take  by mouth.   Taking         Hydrocodone    Scheduled Meds:  apixaban 2.5 mg Oral Q12H   [START ON 3/10/2020] atorvastatin 20 mg Oral Daily   [START ON 3/10/2020] calcium-vitamin D 1 tablet Oral Daily   cefTRIAXone 1 g Intravenous Q24H   dicyclomine 10 mg Oral 4x Daily   docusate sodium 100 mg Oral Daily   doxycycline 100 mg Intravenous Q12H   [START ON 3/10/2020] escitalopram 10 mg Oral Daily   ipratropium-albuterol 3 mL Nebulization Q6H - RT   [START ON 3/10/2020] levothyroxine 50 mcg Oral Q AM   metoprolol tartrate 25 mg Oral Q12H   [START ON 3/10/2020] pantoprazole 40 mg Oral QAM   rifAXIMin 200 mg Oral Q12H   sodium chloride 10 mL Intravenous Q12H   sodium chloride 10 mL Intravenous Q12H     Continuous Infusions:  sodium chloride 100 mL/hr Last Rate: 100 mL/hr (03/09/20 1634)     PRN Meds:.•  acetaminophen **OR** acetaminophen **OR** acetaminophen  •  albuterol  •  aluminum-magnesium hydroxide-simethicone  •  bisacodyl  •  bisacodyl  •  magnesium hydroxide  •  magnesium sulfate **OR** magnesium sulfate in D5W 1g/100mL (PREMIX)  •  melatonin  •  nitroglycerin  •  ondansetron **OR** ondansetron  •  potassium chloride  •  potassium chloride  •  sodium chloride  •  sodium chloride        VITAL SIGNS  Vitals:    03/09/20 1302 03/09/20 1401 03/09/20 1801 03/09/20 1939   BP: 128/74 120/74 125/85    BP Location: Left arm Right arm Right arm    Patient Position: Sitting Lying Lying    Pulse: (!) 121 114 110    Resp: 24 22 28 28   Temp:  97.8 °F (36.6 °C) 97.4 °F (36.3 °C)   "  TempSrc: Oral Oral Oral    SpO2: 96% 99% 97%    Weight: 82.2 kg (181 lb 3.5 oz)      Height: 170.2 cm (67\")          Flowsheet Rows      First Filed Value   Admission Height  170.2 cm (67\") Documented at 03/09/2020 1302   Admission Weight  82.2 kg (181 lb 3.5 oz) Documented at 03/09/2020 1302           TELEMETRY: Atrial fibrillation with rapid ventricular rate    Physical Exam:  Physical Exam   Constitutional: She is oriented to person, place, and time. She appears well-developed and well-nourished. She is cooperative.   HENT:   Head: Normocephalic and atraumatic.   Mouth/Throat: Uvula is midline and oropharynx is clear and moist. No oral lesions.   Eyes: Conjunctivae are normal. No scleral icterus.   Neck: Trachea normal. Neck supple. No JVD present. Carotid bruit is not present. No thyromegaly present.   Cardiovascular: Normal rate, S1 normal, S2 normal, intact distal pulses and normal pulses. An irregularly irregular rhythm present. PMI is not displaced. Exam reveals no gallop and no friction rub.   Murmur heard.  Pulmonary/Chest: Effort normal and breath sounds normal.   Abdominal: Soft. Bowel sounds are normal.   Musculoskeletal: Normal range of motion.   Neurological: She is alert and oriented to person, place, and time. She has normal strength.   No focal deficits   Skin: Skin is warm. No cyanosis.   Psychiatric: She has a normal mood and affect.                LAB RESULTS (LAST 7 DAYS)    CBC  Results from last 7 days   Lab Units 03/09/20  1418   WBC 10*3/mm3 11.00*   RBC 10*6/mm3 4.49   HEMOGLOBIN g/dL 12.7   HEMATOCRIT % 39.5   MCV fL 88.0   PLATELETS 10*3/mm3 183       BMP  Results from last 7 days   Lab Units 03/09/20  1418 03/03/20  0903   SODIUM mmol/L 139 140   POTASSIUM mmol/L 3.3* 3.9   CHLORIDE mmol/L 95* 98   CO2 mmol/L 29.0 28.5   BUN mg/dL 27* 23   CREATININE mg/dL 1.52* 1.92*   GLUCOSE mg/dL 100* 117*       CMP Results from last 7 days   Lab Units 03/09/20  1418 03/03/20  0903   SODIUM mmol/L " 139 140   POTASSIUM mmol/L 3.3* 3.9   CHLORIDE mmol/L 95* 98   CO2 mmol/L 29.0 28.5   BUN mg/dL 27* 23   CREATININE mg/dL 1.52* 1.92*   GLUCOSE mg/dL 100* 117*   ALBUMIN g/dL 3.50 4.00   BILIRUBIN mg/dL 1.4* 0.8   ALK PHOS U/L 228* 114   AST (SGOT) U/L 48* 17   ALT (SGPT) U/L 31 10         BNP        TROPONIN  Results from last 7 days   Lab Units 03/09/20  1418 03/03/20  0903   CK TOTAL U/L  --  19*   TROPONIN T ng/mL <0.010  --        CoAg        Creatinine Clearance  Estimated Creatinine Clearance: 30.9 mL/min (A) (by C-G formula based on SCr of 1.52 mg/dL (H)).    ABG        Radiology  Xr Chest 1 View    Result Date: 3/9/2020  Hazy opacities throughout left lung, suggesting pneumonia.  Electronically Signed By-DR. Bernard Arriaga MD On:3/9/2020 2:40 PM This report was finalized on [id number] by DR. Bernard Arriaga MD.          EKG            I personally viewed and interpreted the patient's EKG/Telemetry data:    ECHOCARDIOGRAM:    Results for orders placed in visit on 12/06/18   SCANNED - ECHOCARDIOGRAM       STRESS MYOVIEW:    CARDIAC CATHETERIZATION:    OTHER:         Assessment/Plan       Atrial fibrillation, new onset (CMS/HCC)    Moderate tricuspid regurgitation    Atherosclerosis of native coronary artery of native heart without angina pectoris    History of CVA (cerebrovascular accident)    Hyperlipidemia    Pulmonary hypertension (CMS/HCC)    Mitral valve stenosis    Atrial fibrillation with RVR (CMS/HCC)    CAP (community acquired pneumonia)    Acute renal insufficiency    Hypokalemia    Syncope    Chronic respiratory failure (CMS/HCC)      New onset atrial fibrillation will start rate control  Patient is already on anticoagulation  History of mitral valve stenosis severe pulmonary hypertension we will repeat the transesophageal echocardiogram  History of CAD needs to modify cardiac risk factors aggressively  Obese history of CVA we will continue anticoagulation  Acute on chronic renal failure  nephrology evaluation  Monitor electrolytes closely replace as needed    I discussed the patients findings and my recommendations with patient and family and attending physician    Demond Valiente MD  20  8:37 PM                Electronically signed by Demond Valiente MD at 20          Physical Therapy Notes (most recent note)      Lisette Vasquez, PT at 03/10/20 1542  Version 1 of 1         Patient Name: Karen Rubio  : 1935    MRN: [id number]                              Today's Date: 3/10/2020       Admit Date: 3/9/2020    Visit Dx: No diagnosis found.  Patient Active Problem List   Diagnosis   • Severe mitral regurgitation   • Moderate tricuspid regurgitation   • Atherosclerosis of native coronary artery of native heart without angina pectoris   • History of CVA (cerebrovascular accident)   • Rheumatic mitral stenosis   • Heart murmur   • Hyperlipidemia   • Hypertension   • Pulmonary hypertension (CMS/HCC)   • Mitral valve stenosis   • Atrial fibrillation, new onset (CMS/HCC)   • Atrial fibrillation with RVR (CMS/[facility])   • CAP (community acquired pneumonia)   • Acute renal insufficiency   • Hypokalemia   • Syncope   • Chronic respiratory failure (CMS/HCC)     Past Medical History:   Diagnosis Date   • Abnormality of left atrial appendage 2016    Suspected Clot Noted on ESTEFANI   • Acute renal failure (CMS/HCC) 2016-[facility]    Front Dehydration   • Arthritis    • Breast density 2017   • Carotid stenosis 2018    R @ 60% and L @ 10-20% Noted on Cardiac Cath & 16-49% on L&R    • Heart murmur    • History of echocardiogram 10/13/2017    Calcified Aortic Leaftlets; Mild Aortic Stenosis Present; Mild Mitral Stenosis; Bilateral Enlargement Noted;  Moderate TR; LV Function of 55-60%   • History of echocardiogram 14-[facility]    Moderate L Vent Hypertrophy Noted; L Atrial Dilation; Moderate MR; Mild TR; Mild-Moderate Aortic Reg Noted; Normal Root Size/No  Effusion   • History of echocardiogram 5/16/16-[facility]    Normal Findings with Mild-Moderate MVS Noted   • History of EKG 2004/2016 & 11/5/18-[facility]    All Sinus Rhythm w/Infarct Ischemnic Changes Noted   • HLD (hyperlipidemia)     Controlled w/Meds   • Hx of hyperglycemia    • Hypertension     Controlled w/Meds   • Hypothyroidism     Levothyroxine   • Joint pain    • Left atrial dilatation 12/05/2014    Mildly Noted on Echo   • Left ventricular hypertrophy 12/05/2014    Noted on Echo w/EF @ 55-60%   • Mild aortic regurgitation 12/05/2014    to Moderate Noted on Echo   • Mild aortic stenosis 10/13/2017    Noted on Echo & ESTEFANI-11/5/18-Moderate    • Mild mitral insufficiency 10/13/2017    Noted on Echo & ESTEFANI-11/5/18   • Mild tricuspid regurgitation 12/05/2014    Noted on Echo   • Moderate mitral regurgitation 12/05/2014    Noted on Echo   • Moderate mitral stenosis 10/13/2017    Noted on Echo   • Moderate tricuspid insufficiency 10/13/2017    Noted on Echo   • Pneumonia involving left lung 08/6/16-[facility] ER   • Pulmonary hypertension (CMS/HCC) 10/13/2017 & 11/5/18-Cath    Severe Noted on Echo & Cath   • Slurred speech 05/16/2016   • SOB (shortness of breath) 08/2016   • Thickened mitral leaflet 10/13/2017 & ESTEFANI-11/5/18    Severely Calcified Noted on Echo   • Tricuspid valve insufficiency 11/05/2018    Noted on ESTEFANI     Past Surgical History:   Procedure Laterality Date   • CARDIAC CATHETERIZATION Left 11/5/18-[facility]    w/L Coronary Angiography--Dr. Hernández-RCA @ 60% with Pulmonary Hypertension Noted   • CHOLECYSTECTOMY     • KNEE ARTHROSCOPY     • ESTEFANI  05/17/2016-[facility]    Dr. Hernández--Severly Calcified Mitral Leaflets w/Mild-Moderate Mitral Stenosis/Insufficiency; Sig Aortic Stenosis Noted; Suspecion of L Atrial Appendage Clot Noted   • ESTEFANI  11/5/18-[facility]    Dr. Hernánedz--Moderate Mitral Insufficiency Noted; Mild-Moderate AVS; Moderate Tricuspid Insufficiency Noted; EF of 65-70%   • TONSILLECTOMY       General Information     Row Name 03/10/20 9943           PT Evaluation Time/Intention    Document Type  evaluation  -HD     Mode of Treatment  physical therapy  -HD     Row Name 03/10/20 1534          General Information    Patient Profile Reviewed?  yes  -HD     Prior Level of Function  independent: previously utilizing cane or RW, 3L O2 at home  -HD     Existing Precautions/Restrictions  oxygen therapy device and L/min;fall  -HD     Row Name 03/10/20 1534          Relationship/Environment    Lives With  alone  -HD     Row Name 03/10/20 1534          Resource/Environmental Concerns    Current Living Arrangements  home/apartment/condo  -HD     Row Name 03/10/20 1534          Home Main Entrance    Number of Stairs, Main Entrance  two or use of ramp  -HD     Row Name 03/10/20 1534          Cognitive Assessment/Intervention- PT/OT    Orientation Status (Cognition)  oriented x 3  -HD     Cognitive Assessment/Intervention Comment  confused to day of month   -HD     Row Name 03/10/20 1534          Safety Issues, Functional Mobility    Safety Issues Affecting Function (Mobility)  insight into deficits/self awareness;judgment;problem solving;safety precaution awareness  -HD     Impairments Affecting Function (Mobility)  balance;endurance/activity tolerance;postural/trunk control;shortness of breath;strength  -HD       User Key  (r) = Recorded By, (t) = Taken By, (c) = Cosigned By    Initials Name Provider Type    HD Lisette Vasquez, PT Physical Therapist        Mobility     Row Name 03/10/20 1535          Bed Mobility Assessment/Treatment    Bed Mobility Assessment/Treatment  sit-supine  -HD     Sit-Supine [place] (Bed Mobility)  minimum assist (75% patient effort)  -HD     Row Name 03/10/20 1535          Sit-Stand Transfer    Sit-Stand [place] (Transfers)  minimum assist (75% patient effort)  -HD     Assistive Device (Sit-Stand Transfers)  walker, front-wheeled  -HD     Row Name 03/10/20 1535          Gait/Stairs Assessment/Training    Gait/Stairs  Assessment/Training  gait/ambulation independence;gait/ambulation assistive device  -HD     [place] Level (Gait)  minimum assist (75% patient effort)  -HD     Assistive Device (Gait)  walker, front-wheeled  -HD     Distance in Feet (Gait)  80 ft, 60 ft  -HD     Comment (Gait/Stairs)  lateral sway noted, impaired balance, Josette for safety with navigating RW  -HD       User Key  (r) = Recorded By, (t) = Taken By, (c) = Cosigned By    Initials Name Provider Type    HD Lisette Vasquez, PT Physical Therapist        Obj/Interventions     Row Name 03/10/20 1537          General ROM    GENERAL ROM COMMENTS  BLEs WFL  -HD     Row Name 03/10/20 1537          MMT (Manual Muscle Testing)    General MMT Comments  BLEs grossly 4/5 MMT   -HD     Row Name 03/10/20 1537          Static Sitting Balance    Level of [place] (Unsupported Sitting, Static Balance)  supervision  -HD     Sitting Position (Unsupported Sitting, Static Balance)  sitting on edge of bed  -HD     Time Able to Maintain Position (Unsupported Sitting, Static Balance)  1 to 2 minutes  -HD     Row Name 03/10/20 1537          Dynamic Sitting Balance    Level of [place], Reaches Outside Midline (Sitting, Dynamic Balance)  standby assist;contact guard assist  -HD     Sitting Position, Reaches Outside Midline (Sitting, Dynamic Balance)  sitting on edge of bed  -HD     Row Name 03/10/20 1537          Static Standing Balance    Level of [place] (Supported Standing, Static Balance)  contact guard assist;minimal assist, 75% patient effort  -HD     Time Able to Maintain Position (Supported Standing, Static Balance)  1 to 2 minutes  -HD     Assistive Device Utilized (Supported Standing, Static Balance)  walker, rolling  -HD     Row Name 03/10/20 1537          Dynamic Standing Balance    Level of [place], Reaches Outside Midline (Standing, Dynamic Balance)  minimal assist, 75% patient effort  -HD     Time Able to Maintain Position, Reaches Outside Midline  (Standing, Dynamic Balance)  2 to 3 minutes  -HD     Assistive Device Utilized (Supported Standing, Dynamic Balance)  walker, rolling  -HD       User Key  (r) = Recorded By, (t) = Taken By, (c) = Cosigned By    Initials Name Provider Type    HD Lisette Vasquez, PT Physical Therapist        Goals/Plan     Row Name 03/10/20 1539          Bed Mobility Goal 1 (PT)    Activity/Assistive Device (Bed Mobility Goal 1, PT)  bed mobility activities, all  -HD     [place] Level/Cues Needed (Bed Mobility Goal 1, PT)  independent  -HD     Time Frame (Bed Mobility Goal 1, PT)  2 weeks  -HD     Row Name 03/10/20 1531          Transfer Goal 1 (PT)    Activity/Assistive Device (Transfer Goal 1, PT)  transfers, all  -HD     [place] Level/Cues Needed (Transfer Goal 1, PT)  independent  -HD     Time Frame (Transfer Goal 1, PT)  2 weeks  -HD     Row Name 03/10/20 1536          Gait Training Goal 1 (PT)    Activity/Assistive Device (Gait Training Goal 1, PT)  gait (walking locomotion);assistive device use  -HD     [place] Level (Gait Training Goal 1, PT)  supervision required  -HD     Distance (Gait Goal 1, PT)  150 ft  -HD     Time Frame (Gait Training Goal 1, PT)  2 weeks  -HD       User Key  (r) = Recorded By, (t) = Taken By, (c) = Cosigned By    Initials Name Provider Type    Lisette Hill, PT Physical Therapist        Clinical Impression     Row Name 03/10/20 1538          Pain Assessment    Additional Documentation  Pain Scale: Numbers Pre/Post-Treatment (Group)  -HD     Row Name 03/10/20 1538          Pain Scale: Numbers Pre/Post-Treatment    Pain Scale: Numbers, Pretreatment  0/10 - no pain  -HD     Pain Scale: Numbers, Post-Treatment  0/10 - no pain  -HD     Row Name 03/10/20 1538          Physical Therapy Clinical Impression    Patient/Family Goals Statement (PT Clinical Impression)  Pt is 83 yo female admitted for new onset A-fib, TRACY, and PNA.    -HD     Criteria for Skilled Interventions Met (PT Clinical  Impression)  yes;treatment indicated  -HD     Rehab Potential (PT Clinical Summary)  good, to achieve stated therapy goals  -HD     Predicted Duration of Therapy (PT)  2 weeks   -HD     Row Name 03/10/20 1538          Vital Signs    Pre SpO2 (%)  96  -HD     O2 Delivery Pre Treatment  supplemental O2  -HD     Intra SpO2 (%)  86  -HD     O2 Delivery Intra Treatment  supplemental O2  -HD     Post SpO2 (%)  94  -HD     O2 Delivery Post Treatment  supplemental O2  -HD     Row Name 03/10/20 1538          Positioning and Restraints    Pre-Treatment Position  sitting in chair/recliner  -HD     Post Treatment Position  bed  -HD     In Bed  notified nsg;encouraged to call for assist;call light within reach;exit alarm on;with family/caregiver  -HD       User Key  (r) = Recorded By, (t) = Taken By, (c) = Cosigned By    Initials Name Provider Type    Lisette Hill, PT Physical Therapist        Outcome Measures     Row Name 03/10/20 1540          Functional Assessment    Outcome Measure Options  --  -HD       User Key  (r) = Recorded By, (t) = Taken By, (c) = Cosigned By    Initials Name Provider Type    Lisette Hill, PT Physical Therapist        Physical Therapy Education                 Title: PT OT SLP Therapies (In Progress)     Topic: Physical Therapy (In Progress)     Point: Mobility training (In Progress)     Description:   Instruct learner(s) on safety and technique for assisting patient out of bed, chair or wheelchair.  Instruct in the proper use of assistive devices, such as walker, crutches, cane or brace.              Patient Friendly Description:   It's important to get you on your feet again, but we need to do so in a way that is safe for you. Falling has serious consequences, and your personal safety is the most important thing of all.        When it's time to get out of bed, one of us or a family member will sit next to you on the bed to give you support.     If your doctor or nurse tells you to use a  walker, crutches, a cane, or a brace, be sure you use it every time you get out of bed, even if you think you don't need it.    Learning Progress Summary           Patient Acceptance, E, NR by HD at 3/10/2020 1540                   Point: Precautions (In Progress)     Description:   Instruct learner(s) on prescribed precautions during mobility and gait tasks              Learning Progress Summary           Patient Acceptance, E, NR by HD at 3/10/2020 1540                               User Key     Initials Effective Dates Name Provider Type Discipline     03/01/19 -  Lisette Vasquez, PT Physical Therapist PT              PT Recommendation and Plan  Planned Therapy Interventions (PT Eval): balance training, bed mobility training, gait training, postural re-education, transfer training, neuromuscular re-education, strengthening, patient/family education  Outcome Summary/Treatment Plan (PT)  Anticipated Discharge Disposition (PT): inpatient rehabilitation facility  Plan of Care Reviewed With: patient  Progress: improving  Outcome Summary: Pt is 83 yo female admitted for new onset A-fib, TRACY, and PNA.  Pt requiring Josette for safety with functional mobility using RW.  Pt fatigues easily and exhibits O2 desat requiring seated rest break for recovery.  Pt appears far from functional mobility baseline of indep living home alone.  At this time due to deficits, PT is recommending IP rehab to address deficits.  PT will follow 3x/week while at [facility] and continue to assess d/c needs.     Time Calculation:   PT Charges     Row Name 03/10/20 1542             Time Calculation    Start Time  1402  -HD      Stop Time  1428  -HD      Time Calculation (min)  26 min  -HD      PT Received On  03/10/20  -HD      PT - Next Appointment  03/11/20  -HD      PT Goal Re-Cert Due Date  03/24/20  -HD         Time Calculation- PT    Total Timed Code Minutes- PT  10 minute(s)  -HD        User Key  (r) = Recorded By, (t) = Taken By, (c) = Cosigned By     Initials Name Provider Type     Lisette Vasquez, PT Physical Therapist        Therapy Charges for Today     Code Description Service Date Service Provider Modifiers Qty    71655532856 HC PT EVAL MOD COMPLEXITY 3 3/10/2020 Lisette Vasquez, PT GP 1    04469911481 HC GAIT TRAINING EA 15 MIN 3/10/2020 Lisette Vasquez, PT GP 1               Lisette Vasquez PT  3/10/2020         Electronically signed by Lisette Vasquez, PT at 03/10/20 3653          Occupational Therapy Notes (most recent note)      Giana Kothari, OT at 03/10/20 1210          Acute Care - Occupational Therapy Initial Evaluation   Gab     Patient Name: Karen Rubio  : 1935  MRN: [id number]  Today's Date: 3/10/2020             Admit Date: 3/9/2020     No diagnosis found.  Patient Active Problem List   Diagnosis   • Severe mitral regurgitation   • Moderate tricuspid regurgitation   • Atherosclerosis of native coronary artery of native heart without angina pectoris   • History of CVA (cerebrovascular accident)   • Rheumatic mitral stenosis   • Heart murmur   • Hyperlipidemia   • Hypertension   • Pulmonary hypertension (CMS/HCC)   • Mitral valve stenosis   • Atrial fibrillation, new onset (CMS/HCC)   • Atrial fibrillation with RVR (CMS/[facility])   • CAP (community acquired pneumonia)   • Acute renal insufficiency   • Hypokalemia   • Syncope   • Chronic respiratory failure (CMS/HCC)     Past Medical History:   Diagnosis Date   • Abnormality of left atrial appendage 2016    Suspected Clot Noted on ESTEFANI   • Acute renal failure (CMS/HCC) 2016-[facility]    Front Dehydration   • Arthritis    • Breast density 2017   • Carotid stenosis 2018    R @ 60% and L @ 10-20% Noted on Cardiac Cath & 16-49% on L&R    • Heart murmur    • History of echocardiogram 10/13/2017    Calcified Aortic Leaftlets; Mild Aortic Stenosis Present; Mild Mitral Stenosis; Bilateral Enlargement Noted;  Moderate TR; LV Function of 55-60%   • History of  echocardiogram 12/5/14-[facility]    Moderate L Vent Hypertrophy Noted; L Atrial Dilation; Moderate MR; Mild TR; Mild-Moderate Aortic Reg Noted; Normal Root Size/No Effusion   • History of echocardiogram 5/16/16-[facility]    Normal Findings with Mild-Moderate MVS Noted   • History of EKG 2004/2016 & 11/5/18-[facility]    All Sinus Rhythm w/Infarct Ischemnic Changes Noted   • HLD (hyperlipidemia)     Controlled w/Meds   • Hx of hyperglycemia    • Hypertension     Controlled w/Meds   • Hypothyroidism     Levothyroxine   • Joint pain    • Left atrial dilatation 12/05/2014    Mildly Noted on Echo   • Left ventricular hypertrophy 12/05/2014    Noted on Echo w/EF @ 55-60%   • Mild aortic regurgitation 12/05/2014    to Moderate Noted on Echo   • Mild aortic stenosis 10/13/2017    Noted on Echo & ESTEFANI-11/5/18-Moderate    • Mild mitral insufficiency 10/13/2017    Noted on Echo & ESTEFANI-11/5/18   • Mild tricuspid regurgitation 12/05/2014    Noted on Echo   • Moderate mitral regurgitation 12/05/2014    Noted on Echo   • Moderate mitral stenosis 10/13/2017    Noted on Echo   • Moderate tricuspid insufficiency 10/13/2017    Noted on Echo   • Pneumonia involving left lung 08/6/16-[facility] ER   • Pulmonary hypertension (CMS/HCC) 10/13/2017 & 11/5/18-Cath    Severe Noted on Echo & Cath   • Slurred speech 05/16/2016   • SOB (shortness of breath) 08/2016   • Thickened mitral leaflet 10/13/2017 & ESTEFANI-11/5/18    Severely Calcified Noted on Echo   • Tricuspid valve insufficiency 11/05/2018    Noted on ESTEFANI     Past Surgical History:   Procedure Laterality Date   • CARDIAC CATHETERIZATION Left 11/5/18-[facility]    w/L Coronary Angiography--Dr. Hernández-RCA @ 60% with Pulmonary Hypertension Noted   • CHOLECYSTECTOMY     • KNEE ARTHROSCOPY     • ESTEFANI  05/17/2016-[facility]    Dr. Hernández--Severly Calcified Mitral Leaflets w/Mild-Moderate Mitral Stenosis/Insufficiency; Sig Aortic Stenosis Noted; Suspecion of L Atrial Appendage Clot Noted   • ESTEFANI  11/5/18-[facility]    Dr. Hernández--Moderate Mitral  Insufficiency Noted; Mild-Moderate AVS; Moderate Tricuspid Insufficiency Noted; EF of 65-70%   • TONSILLECTOMY            OT ASSESSMENT FLOWSHEET (last 12 hours)      Occupational Therapy Evaluation     Row Name 03/10/20 0955                   OT Evaluation Time/Intention    Subjective Information  complains of;fatigue  -SR        Document Type  evaluation  -SR        Mode of Treatment  individual therapy  -SR        Comment  Pt lives at home alone.  She uses rollator and cane for mobility.  She uses shower chair for bathing.  Reports she makes freezer meals.  Reports she does her own grocery shopping, though recently feels like she is having difficulty due to SOA and fatigue.    -SR           General Information    Equipment Currently Used at Home  rollator;shower chair;cane, straight  -SR        Pertinent History of Current Functional Problem  Pt admitted for SOA and fatigue.  She was found to be in afib with rvr.   -SR        Existing Precautions/Restrictions  fall  -SR           Relationship/Environment    Lives With  alone  -SR           Resource/Environmental Concerns    Current Living Arrangements  home/apartment/condo  -SR           Home Main Entrance    Number of Stairs, Main Entrance  two Ramp as well  -SR           Cognitive Assessment/Intervention- PT/OT    Orientation Status (Cognition)  oriented x 3 Reports [facility], though knows she is in NA.   -SR           Bed Mobility Assessment/Treatment    Bed Mobility Assessment/Treatment  supine-sit  -SR        Supine-Sit [place] (Bed Mobility)  minimum assist (75% patient effort)  -SR           Functional Mobility    Functional Mobility- Ind. Level  minimum assist (75% patient effort)  -SR        Functional Mobility- Device  rolling walker  -SR           Transfer Assessment/Treatment    Transfer Assessment/Treatment  sit-stand transfer  -SR           Sit-Stand Transfer    Sit-Stand [place] (Transfers)  minimum assist (75% patient effort)  -SR         Assistive Device (Sit-Stand Transfers)  walker, front-wheeled  -SR           ADL Assessment/Intervention    BADL Assessment/Intervention  lower body dressing;grooming  -SR           Lower Body Dressing Assessment/Training    Lower Body Dressing [place] Level  lower body dressing skills;minimum assist (75% patient effort)  -SR        Comment (Lower Body Dressing)  Assist to thread LLE through pants.    -SR           Grooming Assessment/Training    [place] Level (Grooming)  set up;hair care, combing/brushing  -SR        Grooming Position  supported sitting  -SR           General ROM    GENERAL ROM COMMENTS  BUE WFL   -SR           MMT (Manual Muscle Testing)    General MMT Comments  BUE 4/5  -SR           Positioning and Restraints    Pre-Treatment Position  in bed  -SR        Post Treatment Position  chair  -SR        In Chair  call light within reach;encouraged to call for assist;exit alarm on  -SR           Pain Assessment    Additional Documentation  Pain Scale: Word Pre/Post-Treatment (Group)  -SR           Pain Scale: Word Pre/Post-Treatment    Pain: Word Scale, Pretreatment  0 - no pain  -SR        Pain: Word Scale, Post-Treatment  0 - no pain  -SR           Clinical Impression (OT)    Criteria for Skilled Therapeutic Interventions Met (OT Eval)  yes;treatment indicated  -SR        Rehab Potential (OT Eval)  good, to achieve stated therapy goals  -SR        Therapy Frequency (OT Eval)  3 times/wk  -SR        Predicted Duration of Therapy Intervention (Therapy Eval)  Until discharge   -SR        Anticipated Discharge Disposition (OT)  inpatient rehabilitation facility  -SR           Planned OT Interventions    Planned Therapy Interventions (OT Eval)  activity tolerance training;BADL retraining;functional balance retraining;transfer/mobility retraining;ROM/therapeutic exercise;occupation/activity based interventions;strengthening exercise  -SR           OT Goals    Transfer Goal Selection (OT)   transfer, OT goal 1  -SR        Dressing Goal Selection (OT)  dressing, OT goal 1  -SR        Toileting Goal Selection (OT)  toileting, OT goal 1  -SR           Transfer Goal 1 (OT)    Activity/Assistive Device (Transfer Goal 1, OT)  transfers, all  -SR        [place] Level/Cues Needed (Transfer Goal 1, OT)  supervision required  -SR        Time Frame (Transfer Goal 1, OT)  2 weeks  -SR           Dressing Goal 1 (OT)    Activity/Assistive Device (Dressing Goal 1, OT)  dressing skills, all  -SR        [place]/Cues Needed (Dressing Goal 1, OT)  supervision required  -SR        Time Frame (Dressing Goal 1, OT)  2 weeks  -SR           Toileting Goal 1 (OT)    Activity/Device (Toileting Goal 1, OT)  toileting skills, all  -SR        [place] Level/Cues Needed (Toileting Goal 1, OT)  supervision required  -SR        Time Frame (Toileting Goal 1, OT)  2 weeks  -SR           Living Environment    Home Accessibility  stairs to enter home  -SR          User Key  (r) = Recorded By, (t) = Taken By, (c) = Cosigned By    Initials Name Effective Dates    SR Giana Kothari, OT 03/01/19 -          Occupational Therapy Education                 Title: PT OT SLP Therapies (In Progress)     Topic: Occupational Therapy (In Progress)     Point: ADL training (Done)     Description:   Instruct learner(s) on proper safety adaptation and remediation techniques during self care or transfers.   Instruct in proper use of assistive devices.              Learning Progress Summary           Patient Acceptance, E,TB, VU by SR at 3/10/2020 1207                   Point: Body mechanics (Done)     Description:   Instruct learner(s) on proper positioning and spine alignment during self-care, functional mobility activities and/or exercises.              Learning Progress Summary           Patient Acceptance, E,TB, VU by SR at 3/10/2020 1207                               User Key     Initials Effective Dates Name Provider Type  Discipline    SR 03/01/19 -  Giana Kothari OT Occupational Therapist OT                  OT Recommendation and Plan  Outcome Summary/Treatment Plan (OT)  Anticipated Discharge Disposition (OT): inpatient rehabilitation facility  Planned Therapy Interventions (OT Eval): activity tolerance training, BADL retraining, functional balance retraining, transfer/mobility retraining, ROM/therapeutic exercise, occupation/activity based interventions, strengthening exercise  Therapy Frequency (OT Eval): 3 times/wk  Outcome Summary: 84-year-old white female patient with known history of moderate to severe mitral stenosis history of severe pulmonary hypertension single-vessel CAD decided not to undergo CABG and valve replacement.  She was admitted with SOA and afib.  She presents with general weakness and low activity tolerence.  She require assist for transfers and lower body ADLs.  She lives alone and is not safe to go home without asisst.  She will require IP rehab at discharge.        Time Calculation:     Therapy Charges for Today     Code Description Service Date Service Provider Modifiers Qty    [id number]  OT EVAL LOW COMPLEXITY 3 3/10/2020 Giana Kothari OT GO 1    [id number]  OT SELF CARE/MGMT/TRAIN EA 15 MIN 3/10/2020 Giana Kothari OT GO 1               Giana Kothari OT  3/10/2020    Electronically signed by Giana Kothari OT at 03/10/20 1211

## 2020-03-11 NOTE — ANESTHESIA POSTPROCEDURE EVALUATION
Patient: Karen Rubio    Procedure Summary     Date:  03/11/20 Room / Location:  [facility]    Anesthesia Start:  0942 Anesthesia Stop:  1007    Procedure:  ADULT TRANSESOPHAGEAL ECHO (ESTEFANI) W/ CONT IF NECESSARY PER PROTOCOL Diagnosis:  (Valvular Disease)    Scheduled Providers:  Demond Valiente MD Provider:  Bandar Johnson MD    Anesthesia Type:  MAC ASA Status:  4          Anesthesia Type: MAC    Vitals  No vitals data found for the desired time range.          Post Anesthesia Care and Evaluation    Patient location during evaluation: bedside  Patient participation: complete - patient participated  Level of consciousness: awake and alert  Pain score: 1  Pain management: adequate  Airway patency: patent  Anesthetic complications: No anesthetic complications  PONV Status: none  Cardiovascular status: acceptable  Respiratory status: acceptable  Hydration status: acceptable  Post Neuraxial Block status: Motor and sensory function returned to baseline  Comments: bvm needed post procedure for 2-3 mins.  held

## 2020-03-11 NOTE — ANESTHESIA PREPROCEDURE EVALUATION
Anesthesia Evaluation     Patient summary reviewed and Nursing notes reviewed   NPO Solid Status: > 6 hours  NPO Liquid Status: > 6 hours           Airway   Mallampati: II  TM distance: >3 FB  Neck ROM: full  No difficulty expected  Dental - normal exam     Pulmonary     breath sounds clear to auscultation  (+) pneumonia , shortness of breath, decreased breath sounds, rales,   Cardiovascular     ECG reviewed  Rhythm: irregular  Rate: abnormal    (+) hypertension, valvular problems/murmurs, CAD, dysrhythmias, hyperlipidemia,  carotid artery disease      Neuro/Psych  (+) syncope,     GI/Hepatic/Renal/Endo    (+)   renal disease,     Musculoskeletal     Abdominal  - normal exam    Abdomen: soft.  Bowel sounds: normal.   Substance History - negative use     OB/GYN negative ob/gyn ROS         Other   arthritis,                      Anesthesia Plan    ASA 4     MAC     intravenous induction     Anesthetic plan, all risks, benefits, and alternatives have been provided, discussed and informed consent has been obtained with: patient.

## 2020-03-11 NOTE — PLAN OF CARE
Problem: Patient Care Overview  Goal: Plan of Care Review  3/11/2020 1728 by Marguerite Davis RN  Outcome: Ongoing (interventions implemented as appropriate)  Flowsheets (Taken 3/11/2020 1728)  Plan of Care Reviewed With: patient  Note:   Patient doing well, no complaints of pain. Her breathing was a bit labored earlier and that has resolved.      Problem: Patient Care Overview  Goal: Individualization and Mutuality  3/11/2020 1728 by Marguerite Davis RN  Outcome: Ongoing (interventions implemented as appropriate)     Problem: Patient Care Overview  Goal: Discharge Needs Assessment  3/11/2020 1728 by Marguerite Davis RN  Outcome: Ongoing (interventions implemented as appropriate)     Problem: Patient Care Overview  Goal: Interprofessional Rounds/Family Conf  3/11/2020 1728 by Marguerite Davis RN  Outcome: Ongoing (interventions implemented as appropriate)     Problem: Fall Risk (Adult)  Goal: Identify Related Risk Factors and Signs and Symptoms  3/11/2020 1728 by Marguerite Davis RN  Outcome: Ongoing (interventions implemented as appropriate)

## 2020-03-11 NOTE — PROGRESS NOTES
[facility] Medicine Services Daily Progress Note      Hospitalist Team  LOS 2 days      Patient Care Team:  Eliza Clayton APRN as PCP - General (Nurse Practitioner)  Eliza Clayton APRN as PCP - Family Medicine  Demond Valiente MD (Cardiology)  Gibson Medina MD as Surgeon (Cardiothoracic Surgery)    Patient Location: 263/1      Subjective   Subjective     Chief Complaint / Subjective  No chief complaint on file.        Brief Synopsis of Hospital Course/HPI  The patient is an 84-year-old female who was admitted from Dr. Hernández cardiology office with new onset atrial fibrillation.  The patient arrived to the hospital was found to be in acute renal insufficiency secondary to a pneumonia.  The patient has been placed on intravenous antibiotics, breathing treatments and IV rehydration.  Overall the patient is showing significant improvement today.  Cardiology has been consulted and is following the patient.    Date:  3/10/2020  The patient is sitting up eating her lunch today.  She looks much better and much more comfortable today than on admission.  She is also markedly more alert oriented and appropriate.    3/11/2020  Today the patient was seen after her ESTEFANI.  She was able to engage in conversation but not very significantly.  The patient apparently tolerated the procedure well.  It looks as though she would not do well with surgery at this time.  Will await more definitive documentation from cardiothoracic surgery.    Review of Systems   Constitution: Negative.   HENT: Negative.    Eyes: Negative.    Cardiovascular: Negative.         Feels much better today than yesterday.  No complaints of chest pain.   Respiratory: Positive for cough and shortness of breath.         The patient does have some cough and shortness of breath but it is improving day over day.   Endocrine: Negative.    Hematologic/Lymphatic: Negative.    Skin: Negative.    Musculoskeletal: Negative.   "  Gastrointestinal: Negative.    Genitourinary: Negative.    Neurological: Negative.    Psychiatric/Behavioral: Negative.    Allergic/Immunologic: Negative.    All other systems reviewed and are negative.    Objective   Objective      Vital Signs  Temp:  [97.3 °F (36.3 °C)-98.4 °F (36.9 °C)] 97.3 °F (36.3 °C)  Heart Rate:  [] 81  Resp:  [12-30] 30  BP: (100-151)/(58-81) 121/65  Oxygen Therapy  SpO2: 94 %  Pulse Oximetry Type: Continuous  Device (Oxygen Therapy): nasal cannula  Flow (L/min): 3  Oxygen Concentration (%): 100  Flowsheet Rows      First Filed Value   Admission Height  170.2 cm (67\") Documented at 03/09/2020 1302   Admission Weight  82.2 kg (181 lb 3.5 oz) Documented at 03/09/2020 1302        Intake & Output (last 3 days)       03/08 0701 - 03/09 0700 03/09 0701 - 03/10 0700 03/10 0701 - 03/11 0700 03/11 0701 - 03/12 0700    P.O.  240 480 240    I.V. (mL/kg)   2385 (29) 761.7 (9.3)    IV Piggyback  200      Total Intake(mL/kg)  440 (5.4) 2865 (34.9) 1001.7 (12.2)    Urine (mL/kg/hr)   150 (0.1)     Total Output   150     Net  +440 +2715 +1001.7                Lines, Drains & Airways    Active LDAs     Name:   Placement date:   Placement time:   Site:   Days:    Peripheral IV 03/09/20 1533 Left Wrist   03/09/20    1533    Wrist   1    Peripheral IV 03/10/20 0000 Left;Lateral Antecubital   03/10/20    0000    Antecubital   less than 1                  Physical Exam:    Physical Exam   Constitutional: She is oriented to person, place, and time. She appears well-developed and well-nourished. No distress.   HENT:   Head: Normocephalic and atraumatic.   Right Ear: External ear normal.   Left Ear: External ear normal.   Nose: Nose normal.   Mouth/Throat: Oropharynx is clear and moist. No oropharyngeal exudate.   Eyes: Pupils are equal, round, and reactive to light. Conjunctivae and EOM are normal. Right eye exhibits no discharge. Left eye exhibits no discharge. No scleral icterus.   Neck: Normal range of " motion. No JVD present. No tracheal deviation present. No thyromegaly present.   Cardiovascular: Normal rate, normal heart sounds and intact distal pulses. Exam reveals no gallop and no friction rub.   No murmur heard.  Irregularly irregular with a 3/6 systolic ejection murmur best at the left lower sternal border.   Pulmonary/Chest: Effort normal. No stridor. No respiratory distress. She has no wheezes. She has no rales. She exhibits no tenderness.   Few rhonchi bilaterally in the bases.   Abdominal: Soft. Bowel sounds are normal. She exhibits no distension and no mass. There is no tenderness. There is no rebound and no guarding. No hernia.   Musculoskeletal: Normal range of motion. She exhibits no edema, tenderness or deformity.   Lymphadenopathy:     She has no cervical adenopathy.   Neurological: She is alert and oriented to person, place, and time. No cranial nerve deficit or sensory deficit. She exhibits normal muscle tone. Coordination normal.   Skin: Skin is warm and dry. No rash noted. She is not diaphoretic. No erythema.   Psychiatric: She has a normal mood and affect. Her behavior is normal.   Nursing note and vitals reviewed.        Procedures:  Results Review:     I reviewed the patient's new clinical results.    Lab Results (last 24 hours)     Procedure Component Value Units Date/Time    Troponin [[id number]]  (Normal) Collected:  03/11/20 0845    Specimen:  Blood Updated:  03/11/20 0935     Troponin T <0.010 ng/mL     Narrative:       Troponin T Reference Range:  <= 0.03 ng/mL-   Negative for AMI  >0.03 ng/mL-     Abnormal for myocardial necrosis.  Clinicians would have to utilize clinical acumen, EKG, Troponin and serial changes to determine if it is an Acute Myocardial Infarction or myocardial injury due to an underlying chronic condition.       Results may be falsely decreased if patient taking Biotin.      Troponin [[id number]]  (Normal) Collected:  03/11/20 0236    Specimen:  Blood Updated:   03/11/20 0334     Troponin T <0.010 ng/mL     Narrative:       Troponin T Reference Range:  <= 0.03 ng/mL-   Negative for AMI  >0.03 ng/mL-     Abnormal for myocardial necrosis.  Clinicians would have to utilize clinical acumen, EKG, Troponin and serial changes to determine if it is an Acute Myocardial Infarction or myocardial injury due to an underlying chronic condition.       Results may be falsely decreased if patient taking Biotin.      Basic Metabolic Panel [[id number]]  (Abnormal) Collected:  03/11/20 0236    Specimen:  Blood Updated:  03/11/20 0334     Glucose 124 mg/dL      BUN 26 mg/dL      Creatinine 1.26 mg/dL      Sodium 140 mmol/L      Potassium 3.7 mmol/L      Chloride 102 mmol/L      CO2 24.0 mmol/L      Calcium 9.0 mg/dL      eGFR Non African Amer 41 mL/min/1.73      BUN/Creatinine Ratio 20.6     Anion Gap 14.0 mmol/L     Narrative:       GFR Normal >60  Chronic Kidney Disease <60  Kidney Failure <15      CBC & Differential [[id number]] Collected:  03/11/20 0236    Specimen:  Blood Updated:  03/11/20 0309    Narrative:       The following orders were created for panel order CBC & Differential.  Procedure                               Abnormality         Status                     ---------                               -----------         ------                     CBC Auto Differential[[id number]]        Abnormal            Final result                 Please view results for these tests on the individual orders.    CBC Auto Differential [[id number]]  (Abnormal) Collected:  03/11/20 0236    Specimen:  Blood Updated:  03/11/20 0309     WBC 9.00 10*3/mm3      RBC 4.18 10*6/mm3      Hemoglobin 12.2 g/dL      Hematocrit 36.8 %      MCV 87.9 fL      MCH 29.1 pg      MCHC 33.1 g/dL      RDW 15.7 %      RDW-SD 49.0 fl      MPV 10.5 fL      Platelets 150 10*3/mm3      Neutrophil % 74.8 %      Lymphocyte % 13.1 %      Monocyte % 10.5 %      Eosinophil % 1.3 %      Basophil % 0.3 %      Neutrophils, Absolute  6.80 10*3/mm3      Lymphocytes, Absolute 1.20 10*3/mm3      Monocytes, Absolute 0.90 10*3/mm3      Eosinophils, Absolute 0.10 10*3/mm3      Basophils, Absolute 0.00 10*3/mm3      nRBC 0.0 /100 WBC     Troponin [[id number]]  (Normal) Collected:  03/10/20 2001    Specimen:  Blood Updated:  03/10/20 2034     Troponin T <0.010 ng/mL     Narrative:       Troponin T Reference Range:  <= 0.03 ng/mL-   Negative for AMI  >0.03 ng/mL-     Abnormal for myocardial necrosis.  Clinicians would have to utilize clinical acumen, EKG, Troponin and serial changes to determine if it is an Acute Myocardial Infarction or myocardial injury due to an underlying chronic condition.       Results may be falsely decreased if patient taking Biotin.          No results found for: HGBA1C            No results found for: LIPASE  Lab Results   Component Value Date    CHOL 94 03/03/2020    CHLPL 132 10/02/2019    TRIG 104 03/03/2020    HDL 39 (L) 03/03/2020    LDL 34 03/03/2020     No results found for: INTRAOP, PREDX, FINALDX, COMDX    Microbiology Results (last 10 days)     Procedure Component Value - Date/Time    S. Pneumo Ag Urine or CSF - Urine, Urine, Clean Catch [[id number]]  (Normal) Collected:  03/10/20 1311    Lab Status:  Final result Specimen:  Urine, Clean Catch Updated:  03/10/20 1410     Strep Pneumo Ag Negative    Legionella Antigen, Urine - Urine, Urine, Clean Catch [[id number]]  (Normal) Collected:  03/10/20 1311    Lab Status:  Final result Specimen:  Urine, Clean Catch Updated:  03/10/20 1410     LEGIONELLA ANTIGEN, URINE Negative    Respiratory Panel, PCR - Swab, Nasopharynx [[id number]]  (Normal) Collected:  03/09/20 1541    Lab Status:  Final result Specimen:  Swab from Nasopharynx Updated:  03/09/20 2109     ADENOVIRUS, PCR Not Detected     Coronavirus 229E Not Detected     Coronavirus HKU1 Not Detected     Coronavirus NL63 Not Detected     Coronavirus OC43 Not Detected     Human Metapneumovirus Not Detected     Human  Rhinovirus/Enterovirus Not Detected     Influenza B PCR Not Detected     Parainfluenza Virus 1 Not Detected     Parainfluenza Virus 2 Not Detected     Parainfluenza Virus 3 Not Detected     Parainfluenza Virus 4 Not Detected     Bordetella pertussis pcr Not Detected     Influenza A H1 2009 PCR Not Detected     Chlamydophila pneumoniae PCR Not Detected     Mycoplasma pneumo by PCR Not Detected     Influenza A PCR Not Detected     Influenza A H3 Not Detected     Influenza A H1 Not Detected     RSV, PCR Not Detected    Narrative:       The coronavirus on the RVP is NOT COVID-19 and is NOT indicative of infection with COVID-19.     Influenza Antigen, Rapid - Swab, Nasopharynx [[id number]]  (Normal) Collected:  03/09/20 1541    Lab Status:  Final result Specimen:  Swab from Nasopharynx Updated:  03/09/20 1928     Influenza A PCR Not Detected     Influenza B PCR Not Detected        ECG/EMG Results (most recent)     Procedure Component Value Units Date/Time    ECG 12 Lead [[id number]] Collected:  03/09/20 1334     Updated:  03/09/20 1337    Narrative:       HEART RATE= 104  bpm  RR Interval= 576  ms  RI Interval=   ms  P Horizontal Axis=   deg  P Front Axis=   deg  QRSD Interval= 103  ms  QT Interval= 379  ms  QRS Axis= 120  deg  T Wave Axis= 41  deg  - ABNORMAL ECG -  Atrial fibrillation  Probable right ventricular hypertrophy  Borderline ST elevation, anterior leads  Electronically Signed By:   Date and Time of Study: 2020-03-09 13:34:19    Adult Transesophageal Echo (ESTEFANI) W/ Cont if Necessary Per Protocol (Cardiology Department) [[id number]] Collected:  03/11/20 0920     Updated:  03/11/20 1254     BSA 1.9 m^2       CV ECHO CHENTE - BZI_BMI 32.1 kilograms/m^2       CV ECHO CHENTE - BSA(HAYCOCK) 1.9 m^2       CV ECHO CHENTE - BZI_METRIC_WEIGHT 82.1 kg       CV ECHO CHENTE - BZI_METRIC_HEIGHT 160.0 cm         Results for orders placed in visit on 05/16/16   SCANNED VASCULAR STUDIES     Results for orders placed in visit on  12/06/18   SCANNED - ECHOCARDIOGRAM     Xr Chest 1 View    Result Date: 3/11/2020   1. Interval development of right medial basilar airspace disease. Correlate clinically for pneumonia. 2. Persistent left mid and lower lung airspace opacities. Trace left effusion.  Electronically Signed By-Neal Bourgeois On:3/11/2020 3:42 PM This report was finalized on [id number] by  Neal Bourgeois, .    Xr Chest 1 View    Result Date: 3/9/2020  Hazy opacities throughout left lung, suggesting pneumonia.  Electronically Signed By-DR. Bernard Arriaga MD On:3/9/2020 2:40 PM This report was finalized on [id number] by DR. Bernard Arriaga MD.    Xrays, labs reviewed personally by physician.    Medication Review:   I have reviewed the patient's current medication list    Scheduled Meds    apixaban 5 mg Oral Q12H   atorvastatin 20 mg Oral Daily   calcium-vitamin D 1 tablet Oral Daily   cefTRIAXone 1 g Intravenous Q24H   dicyclomine 10 mg Oral 4x Daily   docusate sodium 100 mg Oral Daily   doxycycline 100 mg Intravenous Q12H   escitalopram 10 mg Oral Daily   ipratropium-albuterol 3 mL Nebulization Q6H - RT   levothyroxine 50 mcg Oral Q AM   metoprolol tartrate 25 mg Oral Q12H   pantoprazole 40 mg Oral QAM   rifAXIMin 200 mg Oral Q12H   sodium chloride 10 mL Intravenous Q12H   sodium chloride 10 mL Intravenous Q12H     Meds Infusions    dilTIAZem 5-15 mg/hr Last Rate: 5 mg/hr (03/11/20 1238)     Meds PRN  •  acetaminophen **OR** acetaminophen **OR** acetaminophen  •  albuterol  •  aluminum-magnesium hydroxide-simethicone  •  bisacodyl  •  bisacodyl  •  magnesium hydroxide  •  magnesium sulfate **OR** magnesium sulfate in D5W 1g/100mL (PREMIX)  •  melatonin  •  nitroglycerin  •  ondansetron **OR** ondansetron  •  potassium chloride  •  potassium chloride  •  sodium chloride  •  sodium chloride    Assessment/Plan   Assessment/Plan     Active Hospital Problems    Diagnosis  POA   • **Atrial fibrillation, new onset (CMS/HCC) [I48.91]  Yes      Priority: High   • Moderate tricuspid regurgitation [I07.1]  Yes     Priority: High   • Atherosclerosis of native coronary artery of native heart without angina pectoris [I25.10]  Yes     Priority: High   • Pulmonary hypertension (CMS/HCC) [I27.20]  Yes     Priority: High   • Mitral valve stenosis [I05.0]  Yes     Priority: High   • History of CVA (cerebrovascular accident) [Z86.73]  Not Applicable     Priority: Medium   • Hyperlipidemia [E78.5]  Yes     Priority: Low   • Atrial fibrillation with RVR (CMS/HCC) [I48.91]  Yes   • CAP (community acquired pneumonia) [J18.9]  Yes   • Acute renal insufficiency [N28.9]  Yes   • Hypokalemia [E87.6]  Yes   • Syncope [R55]  Yes   • Chronic respiratory failure (CMS/HCC) [J96.10]  Yes      Resolved Hospital Problems   No resolved problems to display.       MEDICAL DECISION MAKING COMPLEXITY BY PROBLEM:   1.  New onset atrial fibrillation with rapid ventricular response  -   Rate is now controlled  -   The patient was already therapeutic on Eliquis prior to this development will be continued.  -   Patient has ruled out for myocardial injury    2.  Community-acquired pneumonia  -   Continue Rocephin and doxycycline  -   Continue breathing treatments  -   Repeat chest x-ray in a.m.    3.  Acute renal insufficiency  -   Continue IV fluids  -   Overall this is resolving  -   Continue to follow closely    4.   Syncope  -   Likely related to the new development of atrial fibrillation with rapid ventricular response  -   No ongoing issues noted since admission  -   Myocardial injury has been ruled out    5.  Pulmonary hypertension  -   Continue Eliquis    6.  Coronary artery disease  -   Left heart cath in 2018 found with a 60% RCA ostial lesion with pulmonary hypertension being noted  -   No intervention  -   Now with worsening aortic and mitral valve stenosis.    -   Status post ESTEFANI today    7.  History of CVA  -   No permanent defects    8.  Hypothyroidism  -   Continue home  Synthroid  -   We will check free T4 and T3 as TSH is mildly elevated.          VTE Prophylaxis -   Mechanical Order History:      Ordered        03/09/20 1258  Place Sequential Compression Device  Once         03/09/20 1258  Maintain Sequential Compression Device  Continuous                 Pharmalogical Order History:     Ordered     Dose Route Frequency Stop    03/10/20 0927  apixaban (ELIQUIS) tablet 5 mg      5 mg PO Every 12 Hours Scheduled --    03/09/20 1538  heparin (porcine) 5000 UNIT/ML injection 5,000 Units  Status:  Discontinued      5,000 Units SC Every 12 Hours Scheduled 03/09/20 1547    03/09/20 1547  apixaban (ELIQUIS) tablet 2.5 mg  Status:  Discontinued      2.5 mg PO 2 Times Daily 03/09/20 1551    03/09/20 1551  apixaban (ELIQUIS) tablet 2.5 mg  Status:  Discontinued      2.5 mg PO Every 12 Hours Scheduled 03/10/20 0927            Code Status -   Code Status and Medical Interventions:   Ordered at: 03/09/20 1258     Level Of Support Discussed With:    Patient     Code Status:    CPR     Medical Interventions (Level of Support Prior to Arrest):    Full       Discharge Planning          Destination      Coordination has not been started for this encounter.      Durable Medical Equipment      Coordination has not been started for this encounter.      Dialysis/Infusion      Coordination has not been started for this encounter.      Home Medical Care      Coordination has not been started for this encounter.      Therapy      Coordination has not been started for this encounter.      Community Resources      Coordination has not been started for this encounter.            Electronically signed by Starla Meyer MD, 03/11/20, 17:04.  [facility] Hospitalist Team

## 2020-03-11 NOTE — CONSULTS
Patient Care Team:  Eliza Clayton APRN as PCP - General (Nurse Practitioner)  Eliza Clayton APRN as PCP - Family Medicine  Demond Valiente MD (Cardiology)  Gibson Medina MD as Surgeon (Cardiothoracic Surgery)  Referring Provider:  Dr. Hernández  Reason for consultation:  Mitral stenosis, Tricuspid regurgitation, Aortic Stenosis    Chief complaint: Fatigue, Syncope    Subjective     History of Present Illness: Patient known to our service after Dr. Medina was consulted in December 2018 for surgical intervention of aortic stenosis, mitral valve regurgitation/stenosis, tricuspid valve regurgitation, and RCA stenosis.. Dr. Medina offered her surgery and quoted her a risk of mortality at 10%. The patient ultimately decided not to pursue cardiac surgery at that time. The patient was recently seen by cardiologist Dr. Hernández in office for complaints of fatigue, syncope, and elevated heart rate. She was noted to be in afib with RVR and was admitted to [facility] from his office. On admission, the CXR showed hazy opacities in left lung concerning for PNA. Cr was elevated at 1.52. Patient was placed on IV antibiotics and IV rehydration with improvement in Cr and WBC. She underwent a ESTEFANI showing continued valve disease per verbal report (report pending). [facility] has been consulted for surgical consideration.    Review of Systems   Constitutional: Positive for activity change and fatigue.   Respiratory: Positive for shortness of breath.    Cardiovascular: Negative for chest pain.   Neurological: Positive for syncope and light-headedness.        Past Medical History:   Diagnosis Date   • Abnormality of left atrial appendage 05/17/2016    Suspected Clot Noted on ESTEFANI   • Acute renal failure (CMS/[facility]) 08/06/2016-[facility]    Front Dehydration   • Arthritis    • Breast density 12/2017   • Carotid stenosis 11/05/2018    R @ 60% and L @ 10-20% Noted on Cardiac Cath & 16-49% on L&R    • Heart murmur    • History of  echocardiogram 10/13/2017    Calcified Aortic Leaftlets; Mild Aortic Stenosis Present; Mild Mitral Stenosis; Bilateral Enlargement Noted;  Moderate TR; LV Function of 55-60%   • History of echocardiogram 12/5/14-[facility]    Moderate L Vent Hypertrophy Noted; L Atrial Dilation; Moderate MR; Mild TR; Mild-Moderate Aortic Reg Noted; Normal Root Size/No Effusion   • History of echocardiogram 5/16/16-[facility]    Normal Findings with Mild-Moderate MVS Noted   • History of EKG 2004/2016 & 11/5/18-[facility]    All Sinus Rhythm w/Infarct Ischemnic Changes Noted   • HLD (hyperlipidemia)     Controlled w/Meds   • Hx of hyperglycemia    • Hypertension     Controlled w/Meds   • Hypothyroidism     Levothyroxine   • Joint pain    • Left atrial dilatation 12/05/2014    Mildly Noted on Echo   • Left ventricular hypertrophy 12/05/2014    Noted on Echo w/EF @ 55-60%   • Mild aortic regurgitation 12/05/2014    to Moderate Noted on Echo   • Mild aortic stenosis 10/13/2017    Noted on Echo & ESTEFANI-11/5/18-Moderate    • Mild mitral insufficiency 10/13/2017    Noted on Echo & ESTEFANI-11/5/18   • Mild tricuspid regurgitation 12/05/2014    Noted on Echo   • Moderate mitral regurgitation 12/05/2014    Noted on Echo   • Moderate mitral stenosis 10/13/2017    Noted on Echo   • Moderate tricuspid insufficiency 10/13/2017    Noted on Echo   • Pneumonia involving left lung 08/6/16-[facility] ER   • Pulmonary hypertension (CMS/HCC) 10/13/2017 & 11/5/18-Cath    Severe Noted on Echo & Cath   • Slurred speech 05/16/2016   • SOB (shortness of breath) 08/2016   • Thickened mitral leaflet 10/13/2017 & ESTEFANI-11/5/18    Severely Calcified Noted on Echo   • Tricuspid valve insufficiency 11/05/2018    Noted on ESTEFANI     Past Surgical History:   Procedure Laterality Date   • CARDIAC CATHETERIZATION Left 11/5/18-[facility]    w/L Coronary Angiography--Dr. Hernández-RCA @ 60% with Pulmonary Hypertension Noted   • CHOLECYSTECTOMY     • KNEE ARTHROSCOPY     • ESTEFANI  05/17/2016-[facility]    Dr. Hernández--Severly Calcified  Mitral Leaflets w/Mild-Moderate Mitral Stenosis/Insufficiency; Sig Aortic Stenosis Noted; Suspecion of L Atrial Appendage Clot Noted   • ESTEFANI  18-[facility]    Dr. Hernández--Moderate Mitral Insufficiency Noted; Mild-Moderate AVS; Moderate Tricuspid Insufficiency Noted; EF of 65-70%   • TONSILLECTOMY       Family History   Problem Relation Age of Onset   • Heart disease Mother    • Cancer Father      Social History     Tobacco Use   • Smoking status: Former Smoker     Types: Cigarettes     Last attempt to quit:      Years since quittin.2   • Smokeless tobacco: Never Used   Substance Use Topics   • Alcohol use: No     Frequency: Never   • Drug use: No     Medications Prior to Admission   Medication Sig Dispense Refill Last Dose   • albuterol sulfate  (90 Base) MCG/ACT inhaler Inhale 2 puffs Every 4 (Four) Hours As Needed for Wheezing or Shortness of Air.   Taking   • apixaban (ELIQUIS) 5 MG tablet tablet Take 1 tablet by mouth 2 (Two) Times a Day. 180 tablet 2 Taking   • atorvastatin (LIPITOR) 20 MG tablet TAKE 1 TABLET EVERY DAY   Taking   • calcium carbonate (OS-SHERWIN) 600 MG tablet Take 600 mg by mouth.   Taking   • Cholecalciferol (VITAMIN D-3) 1000 units capsule Take 1 capsule by mouth Daily.   Taking   • dicyclomine (BENTYL) 10 MG capsule Take 1 capsule by mouth 4 (Four) Times a Day.   Taking   • docusate calcium (SURFAK) 240 MG capsule Take  by mouth Daily.   Taking   • escitalopram (LEXAPRO) 10 MG tablet Take 10 mg by mouth Daily.   Taking   • esomeprazole (nexIUM) 40 MG capsule TAKE 1 CAPSULE EVERY DAY   Taking   • furosemide (LASIX) 40 MG tablet TAKE ONE TABLET BY MOUTH DAILY 30 tablet 0 Taking   • ipratropium-albuterol (DUO-NEB) 0.5-2.5 mg/3 ml nebulizer       • levothyroxine (SYNTHROID, LEVOTHROID) 50 MCG tablet TAKE 1 TABLET EVERY DAY   Taking   • Multiple Vitamins-Minerals (ICAPS) capsule Take  by mouth.   Taking   • potassium chloride (MICRO-K) 10 MEQ CR capsule TAKE ONE CAPSULE BY MOUTH DAILY 30  "capsule 0 Taking   • raNITIdine (ZANTAC) 300 MG tablet Take 1 tablet by mouth Daily.   Taking   • rifAXIMin (XIFAXAN PO) Take  by mouth.   Taking   • temazepam (RESTORIL) 15 MG capsule TAKE ONE CAPSULE BY MOUTH EVERY NIGHT AT BEDTIME AS NEEDED FOR SLEEP   Taking   • TURMERIC PO Take  by mouth.   Taking       apixaban 5 mg Oral Q12H   atorvastatin 20 mg Oral Daily   calcium-vitamin D 1 tablet Oral Daily   cefTRIAXone 1 g Intravenous Q24H   dicyclomine 10 mg Oral 4x Daily   docusate sodium 100 mg Oral Daily   doxycycline 100 mg Intravenous Q12H   escitalopram 10 mg Oral Daily   ipratropium-albuterol 3 mL Nebulization Q6H - RT   levothyroxine 50 mcg Oral Q AM   metoprolol tartrate 25 mg Oral Q12H   pantoprazole 40 mg Oral QAM   rifAXIMin 200 mg Oral Q12H   sodium chloride 10 mL Intravenous Q12H   sodium chloride 10 mL Intravenous Q12H     Allergies:  Hydrocodone    Objective      Vital Signs  Temp:  [97.3 °F (36.3 °C)-98.4 °F (36.9 °C)] 97.3 °F (36.3 °C)  Heart Rate:  [] 81  Resp:  [12-30] 30  BP: (100-166)/(58-81) 121/65    Flowsheet Rows      First Filed Value   Admission Height  170.2 cm (67\") Documented at 03/09/2020 1302   Admission Weight  82.2 kg (181 lb 3.5 oz) Documented at 03/09/2020 1302        170.2 cm (67\")    Physical Exam   Constitutional: She is oriented to person, place, and time. She appears well-developed and well-nourished.   HENT:   Head: Normocephalic and atraumatic.   Eyes: Pupils are equal, round, and reactive to light. EOM are normal.   Neck: Normal range of motion. Neck supple.   Cardiovascular: Normal rate and intact distal pulses. An irregularly irregular rhythm present.   Murmur heard.  Pulmonary/Chest: Effort normal. She has wheezes.   Abdominal: Soft. Bowel sounds are normal.   Musculoskeletal: Normal range of motion.   Neurological: She is alert and oriented to person, place, and time.   Skin: Skin is warm and dry.   Psychiatric: She has a normal mood and affect. Her behavior is " normal. Judgment and thought content normal.       Results Review:   Lab Results (last 24 hours)     Procedure Component Value Units Date/Time    Troponin [[id number]]  (Normal) Collected:  03/11/20 0845    Specimen:  Blood Updated:  03/11/20 0935     Troponin T <0.010 ng/mL     Narrative:       Troponin T Reference Range:  <= 0.03 ng/mL-   Negative for AMI  >0.03 ng/mL-     Abnormal for myocardial necrosis.  Clinicians would have to utilize clinical acumen, EKG, Troponin and serial changes to determine if it is an Acute Myocardial Infarction or myocardial injury due to an underlying chronic condition.       Results may be falsely decreased if patient taking Biotin.      Troponin [[id number]]  (Normal) Collected:  03/11/20 0236    Specimen:  Blood Updated:  03/11/20 0334     Troponin T <0.010 ng/mL     Narrative:       Troponin T Reference Range:  <= 0.03 ng/mL-   Negative for AMI  >0.03 ng/mL-     Abnormal for myocardial necrosis.  Clinicians would have to utilize clinical acumen, EKG, Troponin and serial changes to determine if it is an Acute Myocardial Infarction or myocardial injury due to an underlying chronic condition.       Results may be falsely decreased if patient taking Biotin.      Basic Metabolic Panel [[id number]]  (Abnormal) Collected:  03/11/20 0236    Specimen:  Blood Updated:  03/11/20 0334     Glucose 124 mg/dL      BUN 26 mg/dL      Creatinine 1.26 mg/dL      Sodium 140 mmol/L      Potassium 3.7 mmol/L      Chloride 102 mmol/L      CO2 24.0 mmol/L      Calcium 9.0 mg/dL      eGFR Non African Amer 41 mL/min/1.73      BUN/Creatinine Ratio 20.6     Anion Gap 14.0 mmol/L     Narrative:       GFR Normal >60  Chronic Kidney Disease <60  Kidney Failure <15      CBC & Differential [[id number]] Collected:  03/11/20 0236    Specimen:  Blood Updated:  03/11/20 0309    Narrative:       The following orders were created for panel order CBC & Differential.  Procedure                               Abnormality          Status                     ---------                               -----------         ------                     CBC Auto Differential[[id number]]        Abnormal            Final result                 Please view results for these tests on the individual orders.    CBC Auto Differential [[id number]]  (Abnormal) Collected:  03/11/20 0236    Specimen:  Blood Updated:  03/11/20 0309     WBC 9.00 10*3/mm3      RBC 4.18 10*6/mm3      Hemoglobin 12.2 g/dL      Hematocrit 36.8 %      MCV 87.9 fL      MCH 29.1 pg      MCHC 33.1 g/dL      RDW 15.7 %      RDW-SD 49.0 fl      MPV 10.5 fL      Platelets 150 10*3/mm3      Neutrophil % 74.8 %      Lymphocyte % 13.1 %      Monocyte % 10.5 %      Eosinophil % 1.3 %      Basophil % 0.3 %      Neutrophils, Absolute 6.80 10*3/mm3      Lymphocytes, Absolute 1.20 10*3/mm3      Monocytes, Absolute 0.90 10*3/mm3      Eosinophils, Absolute 0.10 10*3/mm3      Basophils, Absolute 0.00 10*3/mm3      nRBC 0.0 /100 WBC     Troponin [[id number]]  (Normal) Collected:  03/10/20 2001    Specimen:  Blood Updated:  03/10/20 2034     Troponin T <0.010 ng/mL     Narrative:       Troponin T Reference Range:  <= 0.03 ng/mL-   Negative for AMI  >0.03 ng/mL-     Abnormal for myocardial necrosis.  Clinicians would have to utilize clinical acumen, EKG, Troponin and serial changes to determine if it is an Acute Myocardial Infarction or myocardial injury due to an underlying chronic condition.       Results may be falsely decreased if patient taking Biotin.                Assessment/Plan       Atrial fibrillation, new onset (CMS/HCC)    Moderate tricuspid regurgitation    Atherosclerosis of native coronary artery of native heart without angina pectoris    History of CVA (cerebrovascular accident)    Hyperlipidemia    Pulmonary hypertension (CMS/HCC)    Mitral valve stenosis    Atrial fibrillation with RVR (CMS/HCC)    CAP (community acquired pneumonia)    Acute renal insufficiency    Hypokalemia     Syncope    Chronic respiratory failure (CMS/HCC)      Assessment & Plan  New onset atrial fibrillation-- Eliquis, Lopressor  Syncope  Community-acquired PNA-- Doxycycline  Aortic Stenosis  Mitral valve Stenosis/Mitral valve regurgitation  Tricuspid valve regurgitation  Pulmonary Hypertension-- uses 2L oxygen at home  CAD-- 60% ostial RCA per cath in 2018  HTN-- stable  Hx of CVA  Hypothyroidism-- Synthroid      [facility] consulted for consideration of surgical intervention of aortic stenosis, mitral valve regurgitation/stenosis, tricuspid valve regurgitation and RCA stenosis. Patient was previously offered cardiac surgery in 2018 with a quoted risk of mortality of 10% by Dr. Medina. Patient declined surgery at that time. Dr. Chappell to review films, including recent ESTEFANI and offer recommendation.    Thank you for allowing us to participate in the care of this patient.      KAREN GALLEGO, APRN  03/11/20  15:14    **all problems new to this examiner  **EKG and CXR independently reviewed and interpreted

## 2020-03-12 LAB
ANION GAP SERPL CALCULATED.3IONS-SCNC: 12 MMOL/L (ref 5–15)
BASOPHILS # BLD AUTO: 0 10*3/MM3 (ref 0–0.2)
BASOPHILS NFR BLD AUTO: 0.4 % (ref 0–1.5)
BUN BLD-MCNC: 23 MG/DL (ref 8–23)
BUN/CREAT SERPL: 17.3 (ref 7–25)
CALCIUM SPEC-SCNC: 9.4 MG/DL (ref 8.6–10.5)
CHLORIDE SERPL-SCNC: 103 MMOL/L (ref 98–107)
CO2 SERPL-SCNC: 24 MMOL/L (ref 22–29)
CREAT BLD-MCNC: 1.33 MG/DL (ref 0.57–1)
DEPRECATED RDW RBC AUTO: 52.5 FL (ref 37–54)
EOSINOPHIL # BLD AUTO: 0.2 10*3/MM3 (ref 0–0.4)
EOSINOPHIL NFR BLD AUTO: 1.9 % (ref 0.3–6.2)
ERYTHROCYTE [DISTWIDTH] IN BLOOD BY AUTOMATED COUNT: 16.1 % (ref 12.3–15.4)
GFR SERPL CREATININE-BSD FRML MDRD: 38 ML/MIN/1.73
GLUCOSE BLD-MCNC: 109 MG/DL (ref 65–99)
HCT VFR BLD AUTO: 38.6 % (ref 34–46.6)
HGB BLD-MCNC: 11.9 G/DL (ref 12–15.9)
LYMPHOCYTES # BLD AUTO: 1.1 10*3/MM3 (ref 0.7–3.1)
LYMPHOCYTES NFR BLD AUTO: 12.5 % (ref 19.6–45.3)
MCH RBC QN AUTO: 27.9 PG (ref 26.6–33)
MCHC RBC AUTO-ENTMCNC: 30.8 G/DL (ref 31.5–35.7)
MCV RBC AUTO: 90.5 FL (ref 79–97)
MONOCYTES # BLD AUTO: 0.9 10*3/MM3 (ref 0.1–0.9)
MONOCYTES NFR BLD AUTO: 10.3 % (ref 5–12)
NEUTROPHILS # BLD AUTO: 6.5 10*3/MM3 (ref 1.7–7)
NEUTROPHILS NFR BLD AUTO: 74.9 % (ref 42.7–76)
NRBC BLD AUTO-RTO: 0.1 /100 WBC (ref 0–0.2)
PLATELET # BLD AUTO: 166 10*3/MM3 (ref 140–450)
PMV BLD AUTO: 9.8 FL (ref 6–12)
POTASSIUM BLD-SCNC: 3.7 MMOL/L (ref 3.5–5.2)
RBC # BLD AUTO: 4.26 10*6/MM3 (ref 3.77–5.28)
SODIUM BLD-SCNC: 139 MMOL/L (ref 136–145)
TROPONIN T SERPL-MCNC: <0.01 NG/ML (ref 0–0.03)
TROPONIN T SERPL-MCNC: <0.01 NG/ML (ref 0–0.03)
WBC NRBC COR # BLD: 8.6 10*3/MM3 (ref 3.4–10.8)

## 2020-03-12 PROCEDURE — 99232 SBSQ HOSP IP/OBS MODERATE 35: CPT | Performed by: INTERNAL MEDICINE

## 2020-03-12 PROCEDURE — 94799 UNLISTED PULMONARY SVC/PX: CPT

## 2020-03-12 PROCEDURE — 97535 SELF CARE MNGMENT TRAINING: CPT

## 2020-03-12 PROCEDURE — 97116 GAIT TRAINING THERAPY: CPT

## 2020-03-12 PROCEDURE — 80048 BASIC METABOLIC PNL TOTAL CA: CPT | Performed by: INTERNAL MEDICINE

## 2020-03-12 PROCEDURE — 97530 THERAPEUTIC ACTIVITIES: CPT

## 2020-03-12 PROCEDURE — 25010000002 CEFTRIAXONE PER 250 MG: Performed by: NURSE PRACTITIONER

## 2020-03-12 PROCEDURE — 84484 ASSAY OF TROPONIN QUANT: CPT | Performed by: NURSE PRACTITIONER

## 2020-03-12 PROCEDURE — 85025 COMPLETE CBC W/AUTO DIFF WBC: CPT | Performed by: INTERNAL MEDICINE

## 2020-03-12 PROCEDURE — 99024 POSTOP FOLLOW-UP VISIT: CPT | Performed by: THORACIC SURGERY (CARDIOTHORACIC VASCULAR SURGERY)

## 2020-03-12 RX ADMIN — DICYCLOMINE HYDROCHLORIDE 10 MG: 10 CAPSULE ORAL at 18:44

## 2020-03-12 RX ADMIN — DOXYCYCLINE 100 MG: 100 INJECTION, POWDER, LYOPHILIZED, FOR SOLUTION INTRAVENOUS at 18:44

## 2020-03-12 RX ADMIN — ESCITALOPRAM OXALATE 10 MG: 10 TABLET ORAL at 09:45

## 2020-03-12 RX ADMIN — METOPROLOL TARTRATE 25 MG: 25 TABLET, FILM COATED ORAL at 09:44

## 2020-03-12 RX ADMIN — IPRATROPIUM BROMIDE AND ALBUTEROL SULFATE 3 ML: .5; 3 SOLUTION RESPIRATORY (INHALATION) at 18:58

## 2020-03-12 RX ADMIN — IPRATROPIUM BROMIDE AND ALBUTEROL SULFATE 3 ML: .5; 3 SOLUTION RESPIRATORY (INHALATION) at 10:47

## 2020-03-12 RX ADMIN — Medication 10 ML: at 09:45

## 2020-03-12 RX ADMIN — DICYCLOMINE HYDROCHLORIDE 10 MG: 10 CAPSULE ORAL at 09:45

## 2020-03-12 RX ADMIN — APIXABAN 5 MG: 5 TABLET, FILM COATED ORAL at 09:44

## 2020-03-12 RX ADMIN — IPRATROPIUM BROMIDE AND ALBUTEROL SULFATE 3 ML: .5; 3 SOLUTION RESPIRATORY (INHALATION) at 23:48

## 2020-03-12 RX ADMIN — DICYCLOMINE HYDROCHLORIDE 10 MG: 10 CAPSULE ORAL at 21:29

## 2020-03-12 RX ADMIN — PANTOPRAZOLE SODIUM 40 MG: 40 TABLET, DELAYED RELEASE ORAL at 06:27

## 2020-03-12 RX ADMIN — Medication 10 ML: at 21:29

## 2020-03-12 RX ADMIN — RIFAXIMIN 200 MG: 200 TABLET ORAL at 09:44

## 2020-03-12 RX ADMIN — DOCUSATE SODIUM 100 MG: 100 CAPSULE, LIQUID FILLED ORAL at 09:45

## 2020-03-12 RX ADMIN — IPRATROPIUM BROMIDE AND ALBUTEROL SULFATE 3 ML: .5; 3 SOLUTION RESPIRATORY (INHALATION) at 06:53

## 2020-03-12 RX ADMIN — LEVOTHYROXINE SODIUM 50 MCG: 50 TABLET ORAL at 06:27

## 2020-03-12 RX ADMIN — DOXYCYCLINE 100 MG: 100 INJECTION, POWDER, LYOPHILIZED, FOR SOLUTION INTRAVENOUS at 06:27

## 2020-03-12 RX ADMIN — APIXABAN 5 MG: 5 TABLET, FILM COATED ORAL at 21:29

## 2020-03-12 RX ADMIN — RIFAXIMIN 200 MG: 200 TABLET ORAL at 23:09

## 2020-03-12 RX ADMIN — METOPROLOL TARTRATE 25 MG: 25 TABLET, FILM COATED ORAL at 21:29

## 2020-03-12 RX ADMIN — CEFTRIAXONE SODIUM 1 G: 1 INJECTION, POWDER, FOR SOLUTION INTRAMUSCULAR; INTRAVENOUS at 20:05

## 2020-03-12 RX ADMIN — IPRATROPIUM BROMIDE AND ALBUTEROL SULFATE 3 ML: .5; 3 SOLUTION RESPIRATORY (INHALATION) at 00:04

## 2020-03-12 RX ADMIN — OYSTER SHELL CALCIUM WITH VITAMIN D 1 TABLET: 500; 200 TABLET, FILM COATED ORAL at 09:44

## 2020-03-12 RX ADMIN — ATORVASTATIN CALCIUM 20 MG: 20 TABLET, FILM COATED ORAL at 09:45

## 2020-03-12 RX ADMIN — Medication 10 ML: at 21:30

## 2020-03-12 NOTE — CONSULTS
"Nutrition Services    Patient Name:  Karen Rubio  YOB: 1935  MRN: [id number]  Admit Date:  3/9/2020    Comments: Boost plus BID to provide additional calories and protein (720 kcal and 28 gm Protein) if consumed.     Reason for Assessment                Reason for Assessment    Reason For Assessment Date: 3/12/20    TIFFANY        Diagnosis H&P: Acute Renal Failure, HLD, HTN, Hypothroidism, Pulmonary HTN, SOB     Current Problems:   Afib with RVR    PNA    Acute Renal Failure    Syncope    Pulmonary HTN    Hypothyroidism         Nutrition/Diet History                Nutrition/Diet History    Narrative     Pt confirmed weight loss and poor intake. Pt does not want to gain weight back. Typical intake Breakfast: donuts, cereal or Egg in a hole, Lunch: Cottage cheese and fruit, Dinner: pasta or gumbo or meat, veg, starch. Does like Boost and drinks one per day at home. Agreed to Boost plus BID.      Functional Status Daughter provides assistance with food and cooking.      Food Allergies  NKFA     Factors Affecting Nutritional Intake  poor appetite          Anthropometrics             Anthropometrics    Height 67\"     Weight 181 lb  (3/9/20)        Admit Weight    Admit Weight 181 lb  (3/9/20)        Ideal Body Weight (IBW)    Ideal Body Weight (IBW) 135 lb     % Ideal Body Weight 134%       Usual Body Weight (UBW)    Usual Body Weight 190 lb     % Usual Body Weight 95%    Weight Hx  193 lb (9/13/19) 6.7% x 6 month not significant weight loss.   189 lb (11/28/18)  201 lb (10/13/18)        Body Mass Index (BMI)    BMI (kg/m2) 28.38           Labs/Medications                Labs    Pertinent Lab Results Comments Gluc 109 (H), Crt 1.33 (H), Hgb 11.9 (L)        Medications    Pertinent Medications Comments Lipitor, Quentin with Vitamin D, Rocephin, Colace, Doxycycline, Lexapro, Synthroid,          Physical Findings                Physical Findings    Overall Physical Appearance Appear well nourished      Edema  " No edema     Gastrointestinal +BM 3/9/10 None x 3 days      Tubes      Oral/Mouth Cavity No swallowing or chewing problems      Skin No skin breakdown          Estimated/Assessed Needs              Calorie Requirements     Height Used for Calorie Calculations       Weight Used for Calorie Calculations      Formula chosen for Calorie Calculations       Estimated Calorie Requirements (kcals/day)              Protein Requirements    Weight Used For Protein Calculations       Est Protein Requirement Amount (gms/kg)       Estimated Protein Requirements (gms/day)          Fluid Requirements     Estimated Fluid Requirements (mL/day)            Fluid Deficit       Current Sodium Level (mEq/L)      Desired Sodium Level (mEq/L)      Estimated Fluid Deficit Needs (L)           Nutrition Prescription Ordered                Nutrition Prescription PO    Current PO Diet  HH     Supplement         Nutrition Prescription EN    Enteral Route -     TF modular -     TF Delivery Method -     Current Ordered TF  -     Current Ordered Water flush  -     TF Observation  -        Nutrition Prescription TPN    TPN Route -     Current Ordered TPN Volume  -     Dextrose (gm/kcals)  -     Amino Acid (gm/kcals) -     Lipids (ML/Concentration/Frequency)  -     TPN Observation  -          Evaluation of Received Nutrient/Fluid Intake                PO Evaluation    % PO Intake 50-75%        EN Evaluation    TF Changes -     TF Residual -     TF Tolerance      Average EN Delivered  -        TPN Evaluation    Total Number of Days on TPN  -           Clinical Course      Clinical Nutrition Course Details           Problem/Interventions:                     Nutrition Diagnoses Problem 1      Problem 1   Inadequate meal intake related to poor appetite as evidenced by leaving > 25% of meals uneaten.      Nutrition Diagnoses Problem 2      Problem 2            Intervention Goal                Intervention Goal    General Eats > 65%     Drinks ONS 1-2 per  day         Nutrition Intervention                Nutrition Intervention    RD/Tech Action Start ONS         Nutrition Prescription                Nutrition Prescription PO      Diet  HH     Supplement Boost Plus BID         Nutrition Prescription EN    Enteral Prescription -        Nutrition Prescription TPN    TPN Prescription -         Monitor/Evaluation                Monitor/Evaluation    Monitor  M/E wt, intake, labs, meds, skin, BM           Electronically signed by:  Latasha Voss RD  03/12/20 11:08

## 2020-03-12 NOTE — THERAPY TREATMENT NOTE
Acute Care - Occupational Therapy Treatment Note  [facility]     Patient Name: Karen Rubio  : 1935  MRN: [id number]  Today's Date: 3/12/2020             Admit Date: 3/9/2020     No diagnosis found.  Patient Active Problem List   Diagnosis   • Severe mitral regurgitation   • Moderate tricuspid regurgitation   • Atherosclerosis of native coronary artery of native heart without angina pectoris   • History of CVA (cerebrovascular accident)   • Rheumatic mitral stenosis   • Heart murmur   • Hyperlipidemia   • Hypertension   • Pulmonary hypertension (CMS/formerly [facility])   • Mitral valve stenosis   • Atrial fibrillation, new onset (CMS/formerly [facility])   • Atrial fibrillation with RVR (CMS/formerly [facility])   • CAP (community acquired pneumonia)   • Acute renal insufficiency   • Hypokalemia   • Syncope   • Chronic respiratory failure (CMS/formerly [facility])     Past Medical History:   Diagnosis Date   • Abnormality of left atrial appendage 2016    Suspected Clot Noted on ESTEFANI   • Acute renal failure (CMS/formerly [facility]) 2016-[facility]    Front Dehydration   • Arthritis    • Breast density 2017   • Carotid stenosis 2018    R @ 60% and L @ 10-20% Noted on Cardiac Cath & 16-49% on L&R    • Heart murmur    • History of echocardiogram 10/13/2017    Calcified Aortic Leaftlets; Mild Aortic Stenosis Present; Mild Mitral Stenosis; Bilateral Enlargement Noted;  Moderate TR; LV Function of 55-60%   • History of echocardiogram 14-[facility]    Moderate L Vent Hypertrophy Noted; L Atrial Dilation; Moderate MR; Mild TR; Mild-Moderate Aortic Reg Noted; Normal Root Size/No Effusion   • History of echocardiogram 16-[facility]    Normal Findings with Mild-Moderate MVS Noted   • History of EKG  & 18-[facility]    All Sinus Rhythm w/Infarct Ischemnic Changes Noted   • HLD (hyperlipidemia)     Controlled w/Meds   • Hx of hyperglycemia    • Hypertension     Controlled w/Meds   • Hypothyroidism     Levothyroxine   • Joint pain    • Left atrial dilatation 2014    Mildly  Noted on Echo   • Left ventricular hypertrophy 12/05/2014    Noted on Echo w/EF @ 55-60%   • Mild aortic regurgitation 12/05/2014    to Moderate Noted on Echo   • Mild aortic stenosis 10/13/2017    Noted on Echo & ESTEFANI-11/5/18-Moderate    • Mild mitral insufficiency 10/13/2017    Noted on Echo & ESTEFANI-11/5/18   • Mild tricuspid regurgitation 12/05/2014    Noted on Echo   • Moderate mitral regurgitation 12/05/2014    Noted on Echo   • Moderate mitral stenosis 10/13/2017    Noted on Echo   • Moderate tricuspid insufficiency 10/13/2017    Noted on Echo   • Pneumonia involving left lung 08/6/16-[facility]   • Pulmonary hypertension (CMS/HCC) 10/13/2017 & 11/5/18-Cath    Severe Noted on Echo & Cath   • Slurred speech 05/16/2016   • SOB (shortness of breath) 08/2016   • Thickened mitral leaflet 10/13/2017 & ESTEFANI-11/5/18    Severely Calcified Noted on Echo   • Tricuspid valve insufficiency 11/05/2018    Noted on ESTEFANI     Past Surgical History:   Procedure Laterality Date   • CARDIAC CATHETERIZATION Left 11/5/18-[facility]    w/L Coronary Angiography--Dr. Hernández-RCA @ 60% with Pulmonary Hypertension Noted   • CHOLECYSTECTOMY     • KNEE ARTHROSCOPY     • ESTEFANI  05/17/2016-[facility]    Dr. Hernández--Severly Calcified Mitral Leaflets w/Mild-Moderate Mitral Stenosis/Insufficiency; Sig Aortic Stenosis Noted; Suspecion of L Atrial Appendage Clot Noted   • ESTEFANI  11/5/18-[facility]    Dr. Hernández--Moderate Mitral Insufficiency Noted; Mild-Moderate AVS; Moderate Tricuspid Insufficiency Noted; EF of 65-70%   • TONSILLECTOMY         Therapy Treatment    Rehabilitation Treatment Summary     Row Name 03/12/20 1500             Treatment Time/Intention    Discipline  occupational therapist  -      Mode of Treatment  --  -      Comment  Pt is SOA during standing oral care & hair wsahing at the sink. She does not desat of O2 but needs 2L continuous supplimental via NC.   -      Recorded by [] Jaleesa Godoy OT 03/12/20 1451      Row Name 03/12/20 1500             Functional  Mobility    Functional Mobility- Ind. Level  contact guard assist  -      Functional Mobility- Device  rolling walker  -      Functional Mobility-Distance (Feet)  40  -      Functional Mobility- Comment  impulsive w/ RW. unsafe transfer techniques & leaves RW behind in bathroom but needs it for balance.  -MH      Recorded by [] Jaleesa Godoy OT 03/12/20 1559      Row Name 03/12/20 1500             Sit-Stand Transfer    Sit-Stand [place] (Transfers)  contact guard  -      Assistive Device (Sit-Stand Transfers)  walker, front-wheeled  -MH      Recorded by [] Jaleesa Godoy, OT 03/12/20 1559      Row Name 03/12/20 1500             ADL Assessment/Intervention    BADL Assessment/Intervention  grooming;feeding  -      Recorded by [] Jaleesa Godoy OT 03/12/20 1559      Row Name 03/12/20 1500             Grooming Assessment/Training    [place] Level (Grooming)  oral care regimen;hair care, combing/brushing;set up;supervision  -      Comment (Grooming)  washed & rinsed hair w/ max (A) but was able to dry & comb it w/ setup & supervision. Needed cues for supervision.  -MH      Recorded by [] Jaleesa Godoy OT 03/12/20 1559      Row Name 03/12/20 1500             Self-Feeding Assessment/Training    [place] Level (Feeding)  conditional independence  -      Recorded by [] Jaleesa Godoy, OT 03/12/20 1559      Row Name 03/12/20 1500             BADL Safety/Performance    Impairments, BADL Safety/Performance  balance;cognition;endurance/activity tolerance;coordination;trunk/postural control;shortness of breath  -      Recorded by [] Jaleesa Godoy OT 03/12/20 1559      Row Name 03/12/20 1500             Pain Scale: Word Pre/Post-Treatment    Pain: Word Scale, Pretreatment  0 - no pain  -      Pain: Word Scale, Post-Treatment  0 - no pain  -      Recorded by [] Jaleesa Godoy OT 03/12/20 1559      Row Name 03/12/20 1500             Plan of Care Review    Plan of Care Reviewed  With  patient  -MH      Progress  improving  -MH      Outcome Summary  Pt is doing better w/ basic mobility & ADL skill but still requires supervisoin & cues for task sequencing, CGA for balance, safety cues for use of RW. Pt will not be safe at home alone at d/c & should not drive right now. OT recommends ST IP rehab to facilitate return to (I), active lifestyle.  -MH      Recorded by [] Jaleesa Godoy, OT 03/12/20 1559      Row Name 03/12/20 1500             Outcome Summary/Treatment Plan (OT)    Anticipated Discharge Disposition (OT)  inpatient rehabilitation facility  -MH      Recorded by [] Jaleesa Godoy, OT 03/12/20 1559        User Key  (r) = Recorded By, (t) = Taken By, (c) = Cosigned By    Initials Name Effective Dates Discipline     Jaleesa Godoy, OT 03/01/19 -  OT                 OT Recommendation and Plan  Outcome Summary/Treatment Plan (OT)  Anticipated Discharge Disposition (OT): inpatient rehabilitation facility  Plan of Care Review  Plan of Care Reviewed With: patient  Plan of Care Reviewed With: patient  Outcome Summary: Pt is doing better w/ basic mobility & ADL skill but still requires supervisoin & cues for task sequencing, CGA for balance, safety cues for use of RW. Pt will not be safe at home alone at d/c & should not drive right now. OT recommends ST IP rehab to facilitate return to (I), active lifestyle.  Outcome Measures     Row Name 03/12/20 1500             How much help from another person do you currently need...    Turning from your back to your side while in flat bed without using bedrails?  3  -MH      Moving from lying on back to sitting on the side of a flat bed without bedrails?  3  -MH      Moving to and from a bed to a chair (including a wheelchair)?  3  -MH      Standing up from a chair using your arms (e.g., wheelchair, bedside chair)?  3  -MH      Climbing 3-5 steps with a railing?  2  -MH      To walk in hospital room?  3  -MH      AM-PAC 6 Clicks Score (PT)  17  -MH         User Key  (r) = Recorded By, (t) = Taken By, (c) = Cosigned By    Initials Name Provider Type     Jaleesa Godoy, OT Occupational Therapist           Time Calculation:   Time Calculation- OT     Row Name 03/12/20 1600             Time Calculation-     OT Start Time  1445  -      OT Stop Time  1505  -      OT Time Calculation (min)  20 min  -      Total Timed Code Minutes- OT  20 minute(s)  -      OT Received On  03/12/20  -      OT - Next Appointment  03/13/20  -        User Key  (r) = Recorded By, (t) = Taken By, (c) = Cosigned By    Initials Name Provider Type     Jaleesa Godoy, OT Occupational Therapist        Therapy Charges for Today     Code Description Service Date Service Provider Modifiers Qty    47991583223  OT SELF CARE/MGMT/TRAIN EA 15 MIN 3/12/2020 Jaleesa Godoy OT GO 1    [id number]  OT THERAPEUTIC ACT EA 15 MIN 3/12/2020 Jaleesa Godoy OT GO 1               Jaleesa Godoy OT  3/12/2020

## 2020-03-12 NOTE — PROGRESS NOTES
Consult received this morning. ESTEFANI shows mode AS, severe MR, severe TR.     Full consult to follow.    Addendum: Dr. Chappell reviewed the patient's information yesterday.  I discussed the case with him.  This patient several severe comorbidities and in short is a prohibitive risk for surgical intervention.

## 2020-03-12 NOTE — PLAN OF CARE
Problem: Patient Care Overview  Goal: Plan of Care Review  Outcome: Ongoing (interventions implemented as appropriate)  Flowsheets  Taken 3/12/2020 0639 by Kayy Barrientos, RN  Progress: improving  Outcome Summary: Pt resting well through night. No complaints voiced. Pt continues in a-fib of a rate 60's to 80's. Will continue to monitor.  Taken 3/11/2020 1728 by Marguerite Davis, RN  Plan of Care Reviewed With: patient

## 2020-03-12 NOTE — PLAN OF CARE
Problem: Patient Care Overview  Goal: Plan of Care Review  Outcome: Ongoing (interventions implemented as appropriate)  Flowsheets (Taken 3/12/2020 1415)  Progress: improving  Plan of Care Reviewed With: patient  Outcome Summary: Pt able to complete transfers with CGA-Josette and ambulate with RW and CGA-Josette.  Pt exhibiting tachycardic response to mobility with HR up to 148 limiting mobility.  On third distance, pt able to ambulate longer distance due to HR remaining <127.  Pt fatigues easily with short distance ambulation requiring seated rest break for energy recovery.  Pt remains far from mobility baseline and does not appear safe to return home alone at current level of mobility due to risk for falls and weakness.  PT recommending IP rehab to address deficits and increase pt independence with mobility.

## 2020-03-12 NOTE — THERAPY TREATMENT NOTE
Patient Name: Karen Rubio  : 1935    MRN: [id number]                              Today's Date: 3/12/2020       Admit Date: 3/9/2020    Visit Dx: No diagnosis found.  Patient Active Problem List   Diagnosis   • Severe mitral regurgitation   • Moderate tricuspid regurgitation   • Atherosclerosis of native coronary artery of native heart without angina pectoris   • History of CVA (cerebrovascular accident)   • Rheumatic mitral stenosis   • Heart murmur   • Hyperlipidemia   • Hypertension   • Pulmonary hypertension (CMS/[facility])   • Mitral valve stenosis   • Atrial fibrillation, new onset (CMS/[facility])   • Atrial fibrillation with RVR (CMS/[facility])   • CAP (community acquired pneumonia)   • Acute renal insufficiency   • Hypokalemia   • Syncope   • Chronic respiratory failure (CMS/[facility])     Past Medical History:   Diagnosis Date   • Abnormality of left atrial appendage 2016    Suspected Clot Noted on ESTEFANI   • Acute renal failure (CMS/HCC) 2016-[facility]    Front Dehydration   • Arthritis    • Breast density 2017   • Carotid stenosis 2018    R @ 60% and L @ 10-20% Noted on Cardiac Cath & 16-49% on L&R    • Heart murmur    • History of echocardiogram 10/13/2017    Calcified Aortic Leaftlets; Mild Aortic Stenosis Present; Mild Mitral Stenosis; Bilateral Enlargement Noted;  Moderate TR; LV Function of 55-60%   • History of echocardiogram 14-[facility]    Moderate L Vent Hypertrophy Noted; L Atrial Dilation; Moderate MR; Mild TR; Mild-Moderate Aortic Reg Noted; Normal Root Size/No Effusion   • History of echocardiogram 16-[facility]    Normal Findings with Mild-Moderate MVS Noted   • History of EKG  & 18-[facility]    All Sinus Rhythm w/Infarct Ischemnic Changes Noted   • HLD (hyperlipidemia)     Controlled w/Meds   • Hx of hyperglycemia    • Hypertension     Controlled w/Meds   • Hypothyroidism     Levothyroxine   • Joint pain    • Left atrial dilatation 2014    Mildly Noted on Echo   • Left  ventricular hypertrophy 12/05/2014    Noted on Echo w/EF @ 55-60%   • Mild aortic regurgitation 12/05/2014    to Moderate Noted on Echo   • Mild aortic stenosis 10/13/2017    Noted on Echo & ESTEFANI-11/5/18-Moderate    • Mild mitral insufficiency 10/13/2017    Noted on Echo & ESTEFANI-11/5/18   • Mild tricuspid regurgitation 12/05/2014    Noted on Echo   • Moderate mitral regurgitation 12/05/2014    Noted on Echo   • Moderate mitral stenosis 10/13/2017    Noted on Echo   • Moderate tricuspid insufficiency 10/13/2017    Noted on Echo   • Pneumonia involving left lung 08/6/16-[facility]   • Pulmonary hypertension (CMS/HCC) 10/13/2017 & 11/5/18-Cath    Severe Noted on Echo & Cath   • Slurred speech 05/16/2016   • SOB (shortness of breath) 08/2016   • Thickened mitral leaflet 10/13/2017 & ESTEFANI-11/5/18    Severely Calcified Noted on Echo   • Tricuspid valve insufficiency 11/05/2018    Noted on ESTEFANI     Past Surgical History:   Procedure Laterality Date   • CARDIAC CATHETERIZATION Left 11/5/18-[facility]    w/L Coronary Angiography--Dr. Hernández-RCA @ 60% with Pulmonary Hypertension Noted   • CHOLECYSTECTOMY     • KNEE ARTHROSCOPY     • ESTEFANI  05/17/2016-[facility]    Dr. Hernández--Severly Calcified Mitral Leaflets w/Mild-Moderate Mitral Stenosis/Insufficiency; Sig Aortic Stenosis Noted; Suspecion of L Atrial Appendage Clot Noted   • ESTEFANI  11/5/18-[facility]    Dr. Hernández--Moderate Mitral Insufficiency Noted; Mild-Moderate AVS; Moderate Tricuspid Insufficiency Noted; EF of 65-70%   • TONSILLECTOMY       General Information     Row Name 03/12/20 1411          PT Evaluation Time/Intention    Document Type  therapy note (daily note)  -HD     Mode of Treatment  physical therapy  -HD     Row Name 03/12/20 1411          General Information    Patient Profile Reviewed?  yes  -HD     Existing Precautions/Restrictions  fall;oxygen therapy device and L/min tachycardia  -HD     Row Name 03/12/20 1411          Cognitive Assessment/Intervention- PT/OT    Orientation Status (Cognition)   oriented x 3  -HD     Row Name 03/12/20 1411          Safety Issues, Functional Mobility    Safety Issues Affecting Function (Mobility)  safety precaution awareness;judgment  -HD     Impairments Affecting Function (Mobility)  balance;endurance/activity tolerance;strength;postural/trunk control tachycardia  -HD       User Key  (r) = Recorded By, (t) = Taken By, (c) = Cosigned By    Initials Name Provider Type     Lisette Vasquez, PT Physical Therapist        Mobility     Row Name 03/12/20 1412          Bed Mobility Assessment/Treatment    Bed Mobility Assessment/Treatment  -- sitting EOB  -HD     Row Name 03/12/20 1412          Sit-Stand Transfer    Sit-Stand [place] (Transfers)  minimum assist (75% patient effort);contact guard  -HD     Assistive Device (Sit-Stand Transfers)  walker, front-wheeled  -HD     Row Name 03/12/20 1412          Gait/Stairs Assessment/Training    Gait/Stairs Assessment/Training  gait/ambulation independence;gait/ambulation assistive device  -HD     [place] Level (Gait)  minimum assist (75% patient effort);contact guard  -HD     Assistive Device (Gait)  walker, front-wheeled  -HD     Distance in Feet (Gait)  40 ft, 40 ft, 80 ft   -HD     Deviations/Abnormal Patterns (Gait)  miladis decreased  -HD     Bilateral Gait Deviations  forward flexed posture;heel strike decreased  -HD     Comment (Gait/Stairs)  limited by tachycardic response to mobility, RN notified  -HD       User Key  (r) = Recorded By, (t) = Taken By, (c) = Cosigned By    Initials Name Provider Type    HD Lisette Vasquez, PT Physical Therapist        Obj/Interventions     Row Name 03/12/20 1413          Static Sitting Balance    Level of [place] (Unsupported Sitting, Static Balance)  independent  -HD     Sitting Position (Unsupported Sitting, Static Balance)  sitting on edge of bed  -HD     Time Able to Maintain Position (Unsupported Sitting, Static Balance)  2 to 3 minutes  -HD     Row Name 03/12/20 1413           Dynamic Sitting Balance    Level of [place], Reaches Outside Midline (Sitting, Dynamic Balance)  supervision  -HD     Sitting Position, Reaches Outside Midline (Sitting, Dynamic Balance)  sitting on edge of bed  -HD     [facility] Name 03/12/20 1413          Static Standing Balance    Level of [place] (Supported Standing, Static Balance)  minimal assist, 75% patient effort;contact guard assist  -HD     Time Able to Maintain Position (Supported Standing, Static Balance)  1 to 2 minutes  -HD     Assistive Device Utilized (Supported Standing, Static Balance)  walker, rolling  -HD     [facility] Name 03/12/20 1413          Dynamic Standing Balance    Level of [place], Reaches Outside Midline (Standing, Dynamic Balance)  minimal assist, 75% patient effort;contact guard assist  -HD     Time Able to Maintain Position, Reaches Outside Midline (Standing, Dynamic Balance)  2 to 3 minutes  -HD     Assistive Device Utilized (Supported Standing, Dynamic Balance)  walker, rolling  -HD       User Key  (r) = Recorded By, (t) = Taken By, (c) = Cosigned By    Initials Name Provider Type     Lisette Vasquez, PT Physical Therapist        Goals/Plan    No documentation.       Clinical Impression     Row Name 03/12/20 1413          Pain Scale: Numbers Pre/Post-Treatment    Pain Scale: Numbers, Pretreatment  0/10 - no pain  -HD     Pain Scale: Numbers, Post-Treatment  0/10 - no pain  -HD     Row Name 03/12/20 1413          Physical Therapy Clinical Impression    Rehab Potential (PT Clinical Summary)  good, to achieve stated therapy goals  -HD     Row Name 03/12/20 1413          Vital Signs    Pretreatment Heart Rate (beats/min)  96  -HD     Intratreatment Heart Rate (beats/min)  148  -HD     Posttreatment Heart Rate (beats/min)  97  -HD     Pre SpO2 (%)  94  -HD     O2 Delivery Pre Treatment  supplemental O2  -HD     Intra SpO2 (%)  86  -HD     O2 Delivery Intra Treatment  supplemental O2  -HD     Post SpO2 (%)  92  -HD     O2 Delivery  Post Treatment  supplemental O2  -HD     Row Name 03/12/20 1413          Positioning and Restraints    Pre-Treatment Position  -- sitting EOB  -HD     Post Treatment Position  chair  -HD     In Chair  notified nsg;call light within reach;exit alarm on;encouraged to call for assist  -HD       User Key  (r) = Recorded By, (t) = Taken By, (c) = Cosigned By    Initials Name Provider Type    Lisette Hill, PT Physical Therapist        Outcome Measures    No documentation.         PT Recommendation and Plan  Planned Therapy Interventions (PT Eval): balance training, bed mobility training, gait training, postural re-education, transfer training, neuromuscular re-education, strengthening, patient/family education  Outcome Summary/Treatment Plan (PT)  Anticipated Discharge Disposition (PT): inpatient rehabilitation facility  Plan of Care Reviewed With: patient  Progress: improving  Outcome Summary: Pt able to complete transfers with CGA-Josette and ambulate with RW and CGA-Josette.  Pt exhibiting tachycardic response to mobility with HR up to 148 limiting mobility.  On third distance, pt able to ambulate longer distance due to HR remaining <127.  Pt fatigues easily with short distance ambulation requiring seated rest break for energy recovery.  Pt remains far from mobility baseline and does not appear safe to return home alone at current level of mobility due to risk for falls and weakness.  PT recommending IP rehab to address deficits and increase pt independence with mobility.     Time Calculation:   PT Charges     Row Name 03/12/20 1419             Time Calculation    Start Time  1332  -HD      Stop Time  1353  -HD      Time Calculation (min)  21 min  -HD      PT Received On  03/12/20  -HD      PT - Next Appointment  03/15/20  -HD         Time Calculation- PT    Total Timed Code Minutes- PT  21 minute(s)  -HD        User Key  (r) = Recorded By, (t) = Taken By, (c) = Cosigned By    Initials Name Provider Type    HD Dukes,  Lisette HORTA, PT Physical Therapist        Therapy Charges for Today     Code Description Service Date Service Provider Modifiers Qty    89567971326 HC GAIT TRAINING EA 15 MIN 3/12/2020 Lisette Vasquez, PT GP 1    60944315817 HC PT THERAPEUTIC ACT EA 15 MIN 3/12/2020 Lisette Vasquez, PT GP 1          PT G-Codes  AM-PAC 6 Clicks Score (PT): 17    Lisette Vasquez, PT  3/12/2020

## 2020-03-12 NOTE — PLAN OF CARE
Problem: Patient Care Overview  Goal: Plan of Care Review  Outcome: Ongoing (interventions implemented as appropriate)  Flowsheets  Taken 3/12/2020 1559  Progress: improving  Plan of Care Reviewed With: patient  Taken 3/12/2020 1500  Outcome Summary: Pt is doing better w/ basic mobility & ADL skill but still requires supervisoin & cues for task sequencing, CGA for balance, safety cues for use of RW. Pt will not be safe at home alone at d/c & should not drive right now. OT recommends ST  rehab to facilitate return to (I), active lifestyle.

## 2020-03-12 NOTE — PROGRESS NOTES
[facility] Medicine Services Daily Progress Note      Hospitalist Team  LOS 3 days      Patient Care Team:  Eliza Clayton APRN as PCP - General (Nurse Practitioner)  Eliza Clayton APRN as PCP - Family Medicine  Demond Valiente MD (Cardiology)  Gibson Medina MD as Surgeon (Cardiothoracic Surgery)    Patient Location: 263/1      Subjective   Subjective     Chief Complaint / Subjective  No chief complaint on file.        Brief Synopsis of Hospital Course/HPI  The patient is an 84-year-old female who was admitted from Dr. Hernández cardiology office with new onset atrial fibrillation.  The patient arrived to the hospital was found to be in acute renal insufficiency secondary to a pneumonia.  The patient has been placed on intravenous antibiotics, breathing treatments and IV rehydration.  Overall the patient is showing significant improvement today.  Cardiology has been consulted and is following the patient.    Date:  3/10/2020  The patient is sitting up eating her lunch today.  She looks much better and much more comfortable today than on admission.  She is also markedly more alert oriented and appropriate.    3/11/2020  Today the patient was seen after her ESTEFANI.  She was able to engage in conversation but not very significantly.  The patient apparently tolerated the procedure well.  It looks as though she would not do well with surgery at this time.  Will await more definitive documentation from cardiothoracic surgery.    3/12/2020  Today the patient seemed a little down in the dumps.  She does not really have any new complaints but is anxious to return home.    Review of Systems   Constitution: Negative.   HENT: Negative.    Eyes: Negative.    Cardiovascular: Negative.         Feels much better today than yesterday.  No complaints of chest pain.   Respiratory: Positive for cough and shortness of breath.         The patient does have some cough and shortness of breath but  "it is improving day over day.   Endocrine: Negative.    Hematologic/Lymphatic: Negative.    Skin: Negative.    Musculoskeletal: Negative.    Gastrointestinal: Negative.    Genitourinary: Negative.    Neurological: Negative.    Psychiatric/Behavioral: Negative.    Allergic/Immunologic: Negative.    All other systems reviewed and are negative.    Objective   Objective      Vital Signs  Temp:  [97.3 °F (36.3 °C)-97.6 °F (36.4 °C)] 97.3 °F (36.3 °C)  Heart Rate:  [59-98] 98  Resp:  [16-29] 29  BP: ()/() 154/77  Oxygen Therapy  SpO2: 94 %  Pulse Oximetry Type: Continuous  Device (Oxygen Therapy): nasal cannula  Flow (L/min): 3  Oxygen Concentration (%): 100  Flowsheet Rows      First Filed Value   Admission Height  170.2 cm (67\") Documented at 03/09/2020 1302   Admission Weight  82.2 kg (181 lb 3.5 oz) Documented at 03/09/2020 1302        Intake & Output (last 3 days)       03/09 0701 - 03/10 0700 03/10 0701 - 03/11 0700 03/11 0701 - 03/12 0700 03/12 0701 - 03/13 0700    P.O. 240 480 480 120    I.V. (mL/kg)  2385 (29) 761.7 (9.3)     IV Piggyback 200  100     Total Intake(mL/kg) 440 (5.4) 2865 (34.9) 1341.7 (16.3) 120 (1.5)    Urine (mL/kg/hr)  150 (0.1)      Total Output  150      Net +440 +2715 +1341.7 +120            Urine Unmeasured Occurrence   1 x         Lines, Drains & Airways    Active LDAs     Name:   Placement date:   Placement time:   Site:   Days:    Peripheral IV 03/09/20 1533 Left Wrist   03/09/20    1533    Wrist   1    Peripheral IV 03/10/20 0000 Left;Lateral Antecubital   03/10/20    0000    Antecubital   less than 1                  Physical Exam:    Physical Exam   Constitutional: She is oriented to person, place, and time. She appears well-developed and well-nourished. No distress.   Patient was sitting up in a chair playing solitaire.  No new problems.   HENT:   Head: Normocephalic and atraumatic.   Right Ear: External ear normal.   Left Ear: External ear normal.   Nose: Nose normal. "   Mouth/Throat: Oropharynx is clear and moist. No oropharyngeal exudate.   Eyes: Pupils are equal, round, and reactive to light. Conjunctivae and EOM are normal. Right eye exhibits no discharge. Left eye exhibits no discharge. No scleral icterus.   Neck: Normal range of motion. No JVD present. No tracheal deviation present. No thyromegaly present.   Cardiovascular: Normal rate, normal heart sounds and intact distal pulses. Exam reveals no gallop and no friction rub.   No murmur heard.  Irregularly irregular with a 3/6 systolic ejection murmur best at the left lower sternal border.   Pulmonary/Chest: Effort normal. No stridor. No respiratory distress. She has no wheezes. She has no rales. She exhibits no tenderness.   Few rhonchi bilaterally in the bases.   Abdominal: Soft. Bowel sounds are normal. She exhibits no distension and no mass. There is no tenderness. There is no rebound and no guarding. No hernia.   Musculoskeletal: Normal range of motion. She exhibits no edema, tenderness or deformity.   Lymphadenopathy:     She has no cervical adenopathy.   Neurological: She is alert and oriented to person, place, and time. No cranial nerve deficit or sensory deficit. She exhibits normal muscle tone. Coordination normal.   Skin: Skin is warm and dry. No rash noted. She is not diaphoretic. No erythema.   Psychiatric: She has a normal mood and affect. Her behavior is normal.   Nursing note and vitals reviewed.        Procedures:  Results Review:     I reviewed the patient's new clinical results.    Lab Results (last 24 hours)     Procedure Component Value Units Date/Time    Troponin [[id number]]  (Normal) Collected:  03/12/20 0714    Specimen:  Blood Updated:  03/12/20 0825     Troponin T <0.010 ng/mL     Narrative:       Troponin T Reference Range:  <= 0.03 ng/mL-   Negative for AMI  >0.03 ng/mL-     Abnormal for myocardial necrosis.  Clinicians would have to utilize clinical acumen, EKG, Troponin and serial changes to  determine if it is an Acute Myocardial Infarction or myocardial injury due to an underlying chronic condition.       Results may be falsely decreased if patient taking Biotin.      Basic Metabolic Panel [[id number]]  (Abnormal) Collected:  03/12/20 0714    Specimen:  Blood Updated:  03/12/20 0825     Glucose 109 mg/dL      BUN 23 mg/dL      Creatinine 1.33 mg/dL      Sodium 139 mmol/L      Potassium 3.7 mmol/L      Chloride 103 mmol/L      CO2 24.0 mmol/L      Calcium 9.4 mg/dL      eGFR Non African Amer 38 mL/min/1.73      BUN/Creatinine Ratio 17.3     Anion Gap 12.0 mmol/L     Narrative:       GFR Normal >60  Chronic Kidney Disease <60  Kidney Failure <15      CBC & Differential [[id number]] Collected:  03/12/20 0714    Specimen:  Blood Updated:  03/12/20 0810    Narrative:       The following orders were created for panel order CBC & Differential.  Procedure                               Abnormality         Status                     ---------                               -----------         ------                     CBC Auto Differential[[id number]]        Abnormal            Final result                 Please view results for these tests on the individual orders.    CBC Auto Differential [[id number]]  (Abnormal) Collected:  03/12/20 0714    Specimen:  Blood Updated:  03/12/20 0810     WBC 8.60 10*3/mm3      RBC 4.26 10*6/mm3      Hemoglobin 11.9 g/dL      Hematocrit 38.6 %      MCV 90.5 fL      MCH 27.9 pg      MCHC 30.8 g/dL      RDW 16.1 %      RDW-SD 52.5 fl      MPV 9.8 fL      Platelets 166 10*3/mm3      Neutrophil % 74.9 %      Lymphocyte % 12.5 %      Monocyte % 10.3 %      Eosinophil % 1.9 %      Basophil % 0.4 %      Neutrophils, Absolute 6.50 10*3/mm3      Lymphocytes, Absolute 1.10 10*3/mm3      Monocytes, Absolute 0.90 10*3/mm3      Eosinophils, Absolute 0.20 10*3/mm3      Basophils, Absolute 0.00 10*3/mm3      nRBC 0.1 /100 WBC     Troponin [[id number]]  (Normal) Collected:  03/11/20 3999     Specimen:  Blood Updated:  03/12/20 0024     Troponin T <0.010 ng/mL     Narrative:       Troponin T Reference Range:  <= 0.03 ng/mL-   Negative for AMI  >0.03 ng/mL-     Abnormal for myocardial necrosis.  Clinicians would have to utilize clinical acumen, EKG, Troponin and serial changes to determine if it is an Acute Myocardial Infarction or myocardial injury due to an underlying chronic condition.       Results may be falsely decreased if patient taking Biotin.      Troponin [[id number]]  (Normal) Collected:  03/11/20 1739    Specimen:  Blood Updated:  03/11/20 1850     Troponin T <0.010 ng/mL     Narrative:       Troponin T Reference Range:  <= 0.03 ng/mL-   Negative for AMI  >0.03 ng/mL-     Abnormal for myocardial necrosis.  Clinicians would have to utilize clinical acumen, EKG, Troponin and serial changes to determine if it is an Acute Myocardial Infarction or myocardial injury due to an underlying chronic condition.       Results may be falsely decreased if patient taking Biotin.          No results found for: HGBA1C            No results found for: LIPASE  Lab Results   Component Value Date    CHOL 94 03/03/2020    CHLPL 132 10/02/2019    TRIG 104 03/03/2020    HDL 39 (L) 03/03/2020    LDL 34 03/03/2020     No results found for: INTRAOP, PREDX, FINALDX, COMDX    Microbiology Results (last 10 days)     Procedure Component Value - Date/Time    S. Pneumo Ag Urine or CSF - Urine, Urine, Clean Catch [[id number]]  (Normal) Collected:  03/10/20 1311    Lab Status:  Final result Specimen:  Urine, Clean Catch Updated:  03/10/20 1410     Strep Pneumo Ag Negative    Legionella Antigen, Urine - Urine, Urine, Clean Catch [[id number]]  (Normal) Collected:  03/10/20 1311    Lab Status:  Final result Specimen:  Urine, Clean Catch Updated:  03/10/20 1410     LEGIONELLA ANTIGEN, URINE Negative    Respiratory Panel, PCR - Swab, Nasopharynx [[id number]]  (Normal) Collected:  03/09/20 1541    Lab Status:  Final result Specimen:   Swab from Nasopharynx Updated:  03/09/20 2109     ADENOVIRUS, PCR Not Detected     Coronavirus 229E Not Detected     Coronavirus HKU1 Not Detected     Coronavirus NL63 Not Detected     Coronavirus OC43 Not Detected     Human Metapneumovirus Not Detected     Human Rhinovirus/Enterovirus Not Detected     Influenza B PCR Not Detected     Parainfluenza Virus 1 Not Detected     Parainfluenza Virus 2 Not Detected     Parainfluenza Virus 3 Not Detected     Parainfluenza Virus 4 Not Detected     Bordetella pertussis pcr Not Detected     Influenza A H1 2009 PCR Not Detected     Chlamydophila pneumoniae PCR Not Detected     Mycoplasma pneumo by PCR Not Detected     Influenza A PCR Not Detected     Influenza A H3 Not Detected     Influenza A H1 Not Detected     RSV, PCR Not Detected    Narrative:       The coronavirus on the RVP is NOT COVID-19 and is NOT indicative of infection with COVID-19.     Influenza Antigen, Rapid - Swab, Nasopharynx [[id number]]  (Normal) Collected:  03/09/20 1541    Lab Status:  Final result Specimen:  Swab from Nasopharynx Updated:  03/09/20 1928     Influenza A PCR Not Detected     Influenza B PCR Not Detected        ECG/EMG Results (most recent)     Procedure Component Value Units Date/Time    ECG 12 Lead [[id number]] Collected:  03/09/20 1334     Updated:  03/09/20 1337    Narrative:       HEART RATE= 104  bpm  RR Interval= 576  ms  OR Interval=   ms  P Horizontal Axis=   deg  P Front Axis=   deg  QRSD Interval= 103  ms  QT Interval= 379  ms  QRS Axis= 120  deg  T Wave Axis= 41  deg  - ABNORMAL ECG -  Atrial fibrillation  Probable right ventricular hypertrophy  Borderline ST elevation, anterior leads  Electronically Signed By:   Date and Time of Study: 2020-03-09 13:34:19    Adult Transesophageal Echo (ESTEFANI) W/ Cont if Necessary Per Protocol (Cardiology Department) [[id number]] Collected:  03/11/20 0920     Updated:  03/11/20 1707     BSA 1.9 m^2      BH CV ECHO CHENTE - BZI_BMI 32.1 kilograms/m^2        CV ECHO CHENTE - BSA([facility]) 1.9 m^2       CV ECHO CHENTE - BZI_METRIC_WEIGHT 82.1 kg       CV ECHO CHENTE - BZI_METRIC_HEIGHT 160.0 cm      Echo EF Estimated 65 %     Narrative:         · Estimated EF = 65%.  · Left ventricular systolic function is normal.  · Moderate to severe mitral insufficiency at least moderate mitral   stenosis severe tricuspid insufficiency moderate aortic stenosis noted     Normal LV systolic function EF 65%  Biatrial enlargement  Severely calcified mitral leaflets with at least moderate mitral stenosis   moderate to severe mitral insufficiency  Severe mitral annular calcification  Tricuspid leaflets mildly thickened and sclerosed  There is significant right atrial dilation also RV dilatation  Severe tricuspid insufficiency  Calcified aortic leaflets with decreased opening at least moderate aortic   stenosis  Intra-atrial septum is intact bubble study negative for shunt  Left atrial appendage showed spontaneous echo contrast          Results for orders placed in visit on 05/16/16   SCANNED VASCULAR STUDIES     Results for orders placed during the hospital encounter of 03/09/20   Adult Transesophageal Echo (ESTEFANI) W/ Cont if Necessary Per Protocol (Cardiology Department)    Narrative · Estimated EF = 65%.  · Left ventricular systolic function is normal.  · Moderate to severe mitral insufficiency at least moderate mitral   stenosis severe tricuspid insufficiency moderate aortic stenosis noted     Normal LV systolic function EF 65%  Biatrial enlargement  Severely calcified mitral leaflets with at least moderate mitral stenosis   moderate to severe mitral insufficiency  Severe mitral annular calcification  Tricuspid leaflets mildly thickened and sclerosed  There is significant right atrial dilation also RV dilatation  Severe tricuspid insufficiency  Calcified aortic leaflets with decreased opening at least moderate aortic   stenosis  Intra-atrial septum is intact bubble study negative for  shunt  Left atrial appendage showed spontaneous echo contrast       Xr Chest 1 View    Result Date: 3/11/2020   1. Interval development of right medial basilar airspace disease. Correlate clinically for pneumonia. 2. Persistent left mid and lower lung airspace opacities. Trace left effusion.  Electronically Signed By-Neal Bourgeois On:3/11/2020 3:42 PM This report was finalized on [id number] by  Neal Bourgeois, .    Xr Chest 1 View    Result Date: 3/9/2020  Hazy opacities throughout left lung, suggesting pneumonia.  Electronically Signed By-DR. Bernard Arriaga MD On:3/9/2020 2:40 PM This report was finalized on [id number] by DR. Bernard Arriaga MD.    Xrays, labs reviewed personally by physician.    Medication Review:   I have reviewed the patient's current medication list    Scheduled Meds    apixaban 5 mg Oral Q12H   atorvastatin 20 mg Oral Daily   calcium-vitamin D 1 tablet Oral Daily   cefTRIAXone 1 g Intravenous Q24H   dicyclomine 10 mg Oral 4x Daily   docusate sodium 100 mg Oral Daily   doxycycline 100 mg Intravenous Q12H   escitalopram 10 mg Oral Daily   ipratropium-albuterol 3 mL Nebulization Q6H - RT   levothyroxine 50 mcg Oral Q AM   metoprolol tartrate 25 mg Oral Q12H   pantoprazole 40 mg Oral QAM   rifAXIMin 200 mg Oral Q12H   sodium chloride 10 mL Intravenous Q12H   sodium chloride 10 mL Intravenous Q12H     Meds Infusions    dilTIAZem 5-15 mg/hr Last Rate: Stopped (03/12/20 0945)     Meds PRN  •  acetaminophen **OR** acetaminophen **OR** acetaminophen  •  albuterol  •  aluminum-magnesium hydroxide-simethicone  •  bisacodyl  •  bisacodyl  •  magnesium hydroxide  •  magnesium sulfate **OR** magnesium sulfate in D5W 1g/100mL (PREMIX)  •  melatonin  •  nitroglycerin  •  ondansetron **OR** ondansetron  •  potassium chloride  •  potassium chloride  •  sodium chloride  •  sodium chloride    Assessment/Plan   Assessment/Plan     Active Hospital Problems    Diagnosis  POA   • **Atrial fibrillation, new  onset (CMS/HCC) [I48.91]  Yes     Priority: High   • Moderate tricuspid regurgitation [I07.1]  Yes     Priority: High   • Atherosclerosis of native coronary artery of native heart without angina pectoris [I25.10]  Yes     Priority: High   • Pulmonary hypertension (CMS/HCC) [I27.20]  Yes     Priority: High   • Mitral valve stenosis [I05.0]  Yes     Priority: High   • History of CVA (cerebrovascular accident) [Z86.73]  Not Applicable     Priority: Medium   • Hyperlipidemia [E78.5]  Yes     Priority: Low   • Atrial fibrillation with RVR (CMS/HCC) [I48.91]  Yes   • CAP (community acquired pneumonia) [J18.9]  Yes   • Acute renal insufficiency [N28.9]  Yes   • Hypokalemia [E87.6]  Yes   • Syncope [R55]  Yes   • Chronic respiratory failure (CMS/HCC) [J96.10]  Yes      Resolved Hospital Problems   No resolved problems to display.       MEDICAL DECISION MAKING COMPLEXITY BY PROBLEM:   1.  New onset atrial fibrillation with rapid ventricular response  -   Rate is now controlled  -   The patient was already therapeutic on Eliquis prior to this development will be continued.  -   Patient has ruled out for myocardial injury    2.  Community-acquired pneumonia  -   Continue Rocephin and doxycycline  -   Continue breathing treatments  -   Repeat chest x-ray in a.m. 3/13/2020    3.  Acute renal insufficiency  -   Continue IV fluids  -   Overall this is resolving  -   Continue to follow closely    4.   Syncope  -   Likely related to the new development of atrial fibrillation with rapid ventricular response  -   No ongoing issues noted since admission  -   Myocardial injury has been ruled out    5.  Pulmonary hypertension  -   Continue Eliquis    6.  Coronary artery disease  -   Left heart cath in 2018 found with a 60% RCA ostial lesion with pulmonary hypertension being noted  -   No intervention  -   Now with worsening aortic and mitral valve stenosis.    -   Status post ESTEFANI yesterday    7.  History of CVA  -   No permanent  defects    8.  Hypothyroidism  -   Continue home Synthroid  -   Free T4 is normal and T3 is slightly low as TSH is mildly elevated.          VTE Prophylaxis -   Mechanical Order History:      Ordered        03/09/20 1258  Place Sequential Compression Device  Once         03/09/20 1258  Maintain Sequential Compression Device  Continuous                 Pharmalogical Order History:     Ordered     Dose Route Frequency Stop    03/10/20 0927  apixaban (ELIQUIS) tablet 5 mg      5 mg PO Every 12 Hours Scheduled --    03/09/20 1538  heparin (porcine) 5000 UNIT/ML injection 5,000 Units  Status:  Discontinued      5,000 Units SC Every 12 Hours Scheduled 03/09/20 1547    03/09/20 1547  apixaban (ELIQUIS) tablet 2.5 mg  Status:  Discontinued      2.5 mg PO 2 Times Daily 03/09/20 1551    03/09/20 1551  apixaban (ELIQUIS) tablet 2.5 mg  Status:  Discontinued      2.5 mg PO Every 12 Hours Scheduled 03/10/20 0927            Code Status -   Code Status and Medical Interventions:   Ordered at: 03/09/20 1258     Level Of Support Discussed With:    Patient     Code Status:    CPR     Medical Interventions (Level of Support Prior to Arrest):    Full       Discharge Planning  Likely nearing discharge        Destination      Coordination has not been started for this encounter.      Durable Medical Equipment      Coordination has not been started for this encounter.      Dialysis/Infusion      Coordination has not been started for this encounter.      Home Medical Care      Coordination has not been started for this encounter.      Therapy      Coordination has not been started for this encounter.      Community Resources      Coordination has not been started for this encounter.            Electronically signed by Starla Meyer MD, 03/12/20, 15:08.  [facility] Hospitalist Team

## 2020-03-13 LAB
ANION GAP SERPL CALCULATED.3IONS-SCNC: 12 MMOL/L (ref 5–15)
BASOPHILS # BLD AUTO: 0 10*3/MM3 (ref 0–0.2)
BASOPHILS NFR BLD AUTO: 0.4 % (ref 0–1.5)
BUN BLD-MCNC: 20 MG/DL (ref 8–23)
BUN/CREAT SERPL: 17.4 (ref 7–25)
CALCIUM SPEC-SCNC: 9.7 MG/DL (ref 8.6–10.5)
CHLORIDE SERPL-SCNC: 105 MMOL/L (ref 98–107)
CO2 SERPL-SCNC: 24 MMOL/L (ref 22–29)
CREAT BLD-MCNC: 1.15 MG/DL (ref 0.57–1)
DEPRECATED RDW RBC AUTO: 48.6 FL (ref 37–54)
EOSINOPHIL # BLD AUTO: 0.1 10*3/MM3 (ref 0–0.4)
EOSINOPHIL NFR BLD AUTO: 1.7 % (ref 0.3–6.2)
ERYTHROCYTE [DISTWIDTH] IN BLOOD BY AUTOMATED COUNT: 15.6 % (ref 12.3–15.4)
GFR SERPL CREATININE-BSD FRML MDRD: 45 ML/MIN/1.73
GLUCOSE BLD-MCNC: 115 MG/DL (ref 65–99)
HCT VFR BLD AUTO: 36.9 % (ref 34–46.6)
HGB BLD-MCNC: 11.7 G/DL (ref 12–15.9)
LYMPHOCYTES # BLD AUTO: 1 10*3/MM3 (ref 0.7–3.1)
LYMPHOCYTES NFR BLD AUTO: 12.3 % (ref 19.6–45.3)
MCH RBC QN AUTO: 27.8 PG (ref 26.6–33)
MCHC RBC AUTO-ENTMCNC: 31.7 G/DL (ref 31.5–35.7)
MCV RBC AUTO: 87.9 FL (ref 79–97)
MONOCYTES # BLD AUTO: 0.7 10*3/MM3 (ref 0.1–0.9)
MONOCYTES NFR BLD AUTO: 8.8 % (ref 5–12)
NEUTROPHILS # BLD AUTO: 6.2 10*3/MM3 (ref 1.7–7)
NEUTROPHILS NFR BLD AUTO: 76.8 % (ref 42.7–76)
NRBC BLD AUTO-RTO: 0 /100 WBC (ref 0–0.2)
PLATELET # BLD AUTO: 169 10*3/MM3 (ref 140–450)
PMV BLD AUTO: 9.9 FL (ref 6–12)
POTASSIUM BLD-SCNC: 4 MMOL/L (ref 3.5–5.2)
RBC # BLD AUTO: 4.19 10*6/MM3 (ref 3.77–5.28)
SODIUM BLD-SCNC: 141 MMOL/L (ref 136–145)
WBC NRBC COR # BLD: 8.1 10*3/MM3 (ref 3.4–10.8)

## 2020-03-13 PROCEDURE — 85025 COMPLETE CBC W/AUTO DIFF WBC: CPT | Performed by: INTERNAL MEDICINE

## 2020-03-13 PROCEDURE — 99232 SBSQ HOSP IP/OBS MODERATE 35: CPT | Performed by: INTERNAL MEDICINE

## 2020-03-13 PROCEDURE — 97116 GAIT TRAINING THERAPY: CPT

## 2020-03-13 PROCEDURE — 94799 UNLISTED PULMONARY SVC/PX: CPT

## 2020-03-13 PROCEDURE — 80048 BASIC METABOLIC PNL TOTAL CA: CPT | Performed by: INTERNAL MEDICINE

## 2020-03-13 PROCEDURE — 25010000002 ONDANSETRON PER 1 MG: Performed by: NURSE PRACTITIONER

## 2020-03-13 PROCEDURE — 25010000002 CEFTRIAXONE PER 250 MG: Performed by: NURSE PRACTITIONER

## 2020-03-13 PROCEDURE — 97530 THERAPEUTIC ACTIVITIES: CPT

## 2020-03-13 RX ADMIN — DICYCLOMINE HYDROCHLORIDE 10 MG: 10 CAPSULE ORAL at 12:05

## 2020-03-13 RX ADMIN — CEFTRIAXONE SODIUM 1 G: 1 INJECTION, POWDER, FOR SOLUTION INTRAMUSCULAR; INTRAVENOUS at 21:05

## 2020-03-13 RX ADMIN — RIFAXIMIN 200 MG: 200 TABLET ORAL at 21:05

## 2020-03-13 RX ADMIN — IPRATROPIUM BROMIDE AND ALBUTEROL SULFATE 3 ML: .5; 3 SOLUTION RESPIRATORY (INHALATION) at 20:34

## 2020-03-13 RX ADMIN — Medication 10 ML: at 21:06

## 2020-03-13 RX ADMIN — IPRATROPIUM BROMIDE AND ALBUTEROL SULFATE 3 ML: .5; 3 SOLUTION RESPIRATORY (INHALATION) at 11:32

## 2020-03-13 RX ADMIN — DICYCLOMINE HYDROCHLORIDE 10 MG: 10 CAPSULE ORAL at 09:55

## 2020-03-13 RX ADMIN — DICYCLOMINE HYDROCHLORIDE 10 MG: 10 CAPSULE ORAL at 17:58

## 2020-03-13 RX ADMIN — ATORVASTATIN CALCIUM 20 MG: 20 TABLET, FILM COATED ORAL at 09:55

## 2020-03-13 RX ADMIN — LEVOTHYROXINE SODIUM 50 MCG: 50 TABLET ORAL at 06:22

## 2020-03-13 RX ADMIN — METOPROLOL TARTRATE 25 MG: 25 TABLET, FILM COATED ORAL at 09:54

## 2020-03-13 RX ADMIN — METOPROLOL TARTRATE 25 MG: 25 TABLET, FILM COATED ORAL at 21:05

## 2020-03-13 RX ADMIN — DOXYCYCLINE 100 MG: 100 INJECTION, POWDER, LYOPHILIZED, FOR SOLUTION INTRAVENOUS at 17:58

## 2020-03-13 RX ADMIN — ESCITALOPRAM OXALATE 10 MG: 10 TABLET ORAL at 09:55

## 2020-03-13 RX ADMIN — Medication 10 ML: at 09:56

## 2020-03-13 RX ADMIN — IPRATROPIUM BROMIDE AND ALBUTEROL SULFATE 3 ML: .5; 3 SOLUTION RESPIRATORY (INHALATION) at 06:40

## 2020-03-13 RX ADMIN — DOCUSATE SODIUM 100 MG: 100 CAPSULE, LIQUID FILLED ORAL at 09:55

## 2020-03-13 RX ADMIN — RIFAXIMIN 200 MG: 200 TABLET ORAL at 09:55

## 2020-03-13 RX ADMIN — PANTOPRAZOLE SODIUM 40 MG: 40 TABLET, DELAYED RELEASE ORAL at 06:22

## 2020-03-13 RX ADMIN — ONDANSETRON 4 MG: 2 INJECTION INTRAMUSCULAR; INTRAVENOUS at 22:51

## 2020-03-13 RX ADMIN — OYSTER SHELL CALCIUM WITH VITAMIN D 1 TABLET: 500; 200 TABLET, FILM COATED ORAL at 09:55

## 2020-03-13 RX ADMIN — DOXYCYCLINE 100 MG: 100 INJECTION, POWDER, LYOPHILIZED, FOR SOLUTION INTRAVENOUS at 06:21

## 2020-03-13 RX ADMIN — APIXABAN 5 MG: 5 TABLET, FILM COATED ORAL at 09:54

## 2020-03-13 RX ADMIN — DICYCLOMINE HYDROCHLORIDE 10 MG: 10 CAPSULE ORAL at 21:05

## 2020-03-13 RX ADMIN — APIXABAN 5 MG: 5 TABLET, FILM COATED ORAL at 21:05

## 2020-03-13 NOTE — PLAN OF CARE
Problem: Patient Care Overview  Goal: Plan of Care Review  Outcome: Ongoing (interventions implemented as appropriate)  Goal: Individualization and Mutuality  Outcome: Ongoing (interventions implemented as appropriate)  Goal: Discharge Needs Assessment  Outcome: Ongoing (interventions implemented as appropriate)  Goal: Interprofessional Rounds/Family Conf  Outcome: Ongoing (interventions implemented as appropriate)     Problem: Fall Risk (Adult)  Goal: Identify Related Risk Factors and Signs and Symptoms  Outcome: Ongoing (interventions implemented as appropriate)  Goal: Absence of Fall  Outcome: Ongoing (interventions implemented as appropriate)     Problem: Arrhythmia/Dysrhythmia (Symptomatic) (Adult)  Goal: Signs and Symptoms of Listed Potential Problems Will be Absent, Minimized or Managed (Arrhythmia/Dysrhythmia)  Outcome: Ongoing (interventions implemented as appropriate)     Problem: Skin Injury Risk (Adult)  Goal: Identify Related Risk Factors and Signs and Symptoms  Outcome: Ongoing (interventions implemented as appropriate)  Goal: Skin Health and Integrity  Outcome: Ongoing (interventions implemented as appropriate)

## 2020-03-13 NOTE — PROGRESS NOTES
Continued Stay Note  ROBY De Guzman     Patient Name: Karen Rubio  MRN: [id number]  Today's Date: 3/13/2020    Admit Date: 3/9/2020    Discharge Plan     Row Name 03/13/20 1700       Plan    Plan  DC Plan: [facility] subacute - accepted. Precert obtained 3/13 (valid until end of day 3/14). PASRR approved (not needed for [facility]). MD notified via secure chat.     Plan Comments  [facility] liaison (Uzma) informed SW/CM that precert was approved for pt. Valid until end of day on 3/14 for pt to admit. MD notified via secure chat.      ZOEY Khan    Phone: [phone]  Cell: [phone]  Fax: [phone]  [email or web address]

## 2020-03-13 NOTE — PROGRESS NOTES
[facility] Medicine Services Daily Progress Note      Hospitalist Team  LOS 4 days      Patient Care Team:  Eliza Clayton APRN as PCP - General (Nurse Practitioner)  Eliza Clayton APRN as PCP - Family Medicine  Demond Valiente MD (Cardiology)  Gibson Medina MD as Surgeon (Cardiothoracic Surgery)    Patient Location: 263/1      Subjective   Subjective     Chief Complaint / Subjective  No chief complaint on file.        Brief Synopsis of Hospital Course/HPI  The patient is an 84-year-old female who was admitted from Dr. Hernández cardiology office with new onset atrial fibrillation.  The patient arrived to the hospital was found to be in acute renal insufficiency secondary to a pneumonia.  The patient has been placed on intravenous antibiotics, breathing treatments and IV rehydration.  Overall the patient is showing significant improvement today.  Cardiology has been consulted and is following the patient.    Date:  3/10/2020  The patient is sitting up eating her lunch today.  She looks much better and much more comfortable today than on admission.  She is also markedly more alert oriented and appropriate.    3/11/2020  Today the patient was seen after her ESTEFANI.  She was able to engage in conversation but not very significantly.  The patient apparently tolerated the procedure well.  It looks as though she would not do well with surgery at this time.  Will await more definitive documentation from cardiothoracic surgery.    3/12/2020  Today the patient seemed a little down in the dumps.  She does not really have any new complaints but is anxious to return home.    3/13/2020  The patient overall is doing better.  She does appear tired today.  She says that she did not sleep last night.  She is agreeable to going to rehab and we were awaiting her approval.    Review of Systems   Constitution: Negative.        Patient did not sleep well last evening.   HENT: Negative.    Eyes:  "Negative.    Cardiovascular: Negative.         Feels much better today than yesterday.  No complaints of chest pain.   Respiratory: Positive for cough and shortness of breath.         The patient does have some cough and shortness of breath but it is improving day over day.   Endocrine: Negative.    Hematologic/Lymphatic: Negative.    Skin: Negative.    Musculoskeletal: Negative.    Gastrointestinal: Negative.    Genitourinary: Negative.    Neurological: Negative.    Psychiatric/Behavioral: Negative.    Allergic/Immunologic: Negative.    All other systems reviewed and are negative.    Objective   Objective      Vital Signs  Temp:  [97.1 °F (36.2 °C)-97.6 °F (36.4 °C)] 97.2 °F (36.2 °C)  Heart Rate:  [] 109  Resp:  [18-25] 25  BP: (128-152)/(57-98) 149/98  Oxygen Therapy  SpO2: 90 %  Pulse Oximetry Type: Continuous  Device (Oxygen Therapy): nasal cannula  Flow (L/min): 3  Oxygen Concentration (%): 100  Flowsheet Rows      First Filed Value   Admission Height  170.2 cm (67\") Documented at 03/09/2020 1302   Admission Weight  82.2 kg (181 lb 3.5 oz) Documented at 03/09/2020 1302        Intake & Output (last 3 days)       03/10 0701 - 03/11 0700 03/11 0701 - 03/12 0700 03/12 0701 - 03/13 0700 03/13 0701 - 03/14 0700    P.O. 480 480 320 520    I.V. (mL/kg) 2385 (29) 761.7 (9.3)      IV Piggyback  100 100     Total Intake(mL/kg) 2865 (34.9) 1341.7 (16.3) 420 (5.1) 520 (6.3)    Urine (mL/kg/hr) 150 (0.1)       Total Output 150       Net +2715 +1341.7 +420 +520            Urine Unmeasured Occurrence  1 x 2 x 1 x    Stool Unmeasured Occurrence    2 x        Lines, Drains & Airways    Active LDAs     Name:   Placement date:   Placement time:   Site:   Days:    Peripheral IV 03/09/20 1533 Left Wrist   03/09/20    1533    Wrist   1    Peripheral IV 03/10/20 0000 Left;Lateral Antecubital   03/10/20    0000    Antecubital   less than 1                  Physical Exam:    Physical Exam   Constitutional: She is oriented to " person, place, and time. She appears well-developed and well-nourished. No distress.   Patient was sitting up in a chair playing [employer].  No new problems.   HENT:   Head: Normocephalic and atraumatic.   Right Ear: External ear normal.   Left Ear: External ear normal.   Nose: Nose normal.   Mouth/Throat: Oropharynx is clear and moist. No oropharyngeal exudate.   Eyes: Pupils are equal, round, and reactive to light. Conjunctivae and EOM are normal. Right eye exhibits no discharge. Left eye exhibits no discharge. No scleral icterus.   Neck: Normal range of motion. No JVD present. No tracheal deviation present. No thyromegaly present.   Cardiovascular: Normal rate, normal heart sounds and intact distal pulses. Exam reveals no gallop and no friction rub.   No murmur heard.  Irregularly irregular with a 3/6 systolic ejection murmur best at the left lower sternal border.   Pulmonary/Chest: Effort normal. No stridor. No respiratory distress. She has no wheezes. She has no rales. She exhibits no tenderness.   Few rhonchi bilaterally in the bases.   Abdominal: Soft. Bowel sounds are normal. She exhibits no distension and no mass. There is no tenderness. There is no rebound and no guarding. No hernia.   Musculoskeletal: Normal range of motion. She exhibits no edema, tenderness or deformity.   Lymphadenopathy:     She has no cervical adenopathy.   Neurological: She is alert and oriented to person, place, and time. No cranial nerve deficit or sensory deficit. She exhibits normal muscle tone. Coordination normal.   Skin: Skin is warm and dry. No rash noted. She is not diaphoretic. No erythema.   Psychiatric: She has a normal mood and affect. Her behavior is normal.   Nursing note and vitals reviewed.        Procedures:  Results Review:     I reviewed the patient's new clinical results.    Lab Results (last 24 hours)     Procedure Component Value Units Date/Time    Basic Metabolic Panel [[id number]]  (Abnormal) Collected:   03/13/20 0349    Specimen:  Blood Updated:  03/13/20 0609     Glucose 115 mg/dL      BUN 20 mg/dL      Creatinine 1.15 mg/dL      Sodium 141 mmol/L      Potassium 4.0 mmol/L      Chloride 105 mmol/L      CO2 24.0 mmol/L      Calcium 9.7 mg/dL      eGFR Non African Amer 45 mL/min/1.73      BUN/Creatinine Ratio 17.4     Anion Gap 12.0 mmol/L     Narrative:       GFR Normal >60  Chronic Kidney Disease <60  Kidney Failure <15      CBC & Differential [[id number]] Collected:  03/13/20 0349    Specimen:  Blood Updated:  03/13/20 0445    Narrative:       The following orders were created for panel order CBC & Differential.  Procedure                               Abnormality         Status                     ---------                               -----------         ------                     CBC Auto Differential[[id number]]        Abnormal            Final result                 Please view results for these tests on the individual orders.    CBC Auto Differential [[id number]]  (Abnormal) Collected:  03/13/20 0349    Specimen:  Blood Updated:  03/13/20 0445     WBC 8.10 10*3/mm3      RBC 4.19 10*6/mm3      Hemoglobin 11.7 g/dL      Hematocrit 36.9 %      MCV 87.9 fL      MCH 27.8 pg      MCHC 31.7 g/dL      RDW 15.6 %      RDW-SD 48.6 fl      MPV 9.9 fL      Platelets 169 10*3/mm3      Neutrophil % 76.8 %      Lymphocyte % 12.3 %      Monocyte % 8.8 %      Eosinophil % 1.7 %      Basophil % 0.4 %      Neutrophils, Absolute 6.20 10*3/mm3      Lymphocytes, Absolute 1.00 10*3/mm3      Monocytes, Absolute 0.70 10*3/mm3      Eosinophils, Absolute 0.10 10*3/mm3      Basophils, Absolute 0.00 10*3/mm3      nRBC 0.0 /100 WBC         No results found for: HGBA1C            No results found for: LIPASE  Lab Results   Component Value Date    CHOL 94 03/03/2020    CHLPL 132 10/02/2019    TRIG 104 03/03/2020    HDL 39 (L) 03/03/2020    LDL 34 03/03/2020     No results found for: INTRAOP, PREDX, FINALDX, COMDX    Microbiology Results  (last 10 days)     Procedure Component Value - Date/Time    S. Pneumo Ag Urine or CSF - Urine, Urine, Clean Catch [[id number]]  (Normal) Collected:  03/10/20 1311    Lab Status:  Final result Specimen:  Urine, Clean Catch Updated:  03/10/20 1410     Strep Pneumo Ag Negative    Legionella Antigen, Urine - Urine, Urine, Clean Catch [[id number]]  (Normal) Collected:  03/10/20 1311    Lab Status:  Final result Specimen:  Urine, Clean Catch Updated:  03/10/20 1410     LEGIONELLA ANTIGEN, URINE Negative    Respiratory Panel, PCR - Swab, Nasopharynx [[id number]]  (Normal) Collected:  03/09/20 1541    Lab Status:  Final result Specimen:  Swab from Nasopharynx Updated:  03/09/20 2109     ADENOVIRUS, PCR Not Detected     Coronavirus 229E Not Detected     Coronavirus HKU1 Not Detected     Coronavirus NL63 Not Detected     Coronavirus OC43 Not Detected     Human Metapneumovirus Not Detected     Human Rhinovirus/Enterovirus Not Detected     Influenza B PCR Not Detected     Parainfluenza Virus 1 Not Detected     Parainfluenza Virus 2 Not Detected     Parainfluenza Virus 3 Not Detected     Parainfluenza Virus 4 Not Detected     Bordetella pertussis pcr Not Detected     Influenza A H1 2009 PCR Not Detected     Chlamydophila pneumoniae PCR Not Detected     Mycoplasma pneumo by PCR Not Detected     Influenza A PCR Not Detected     Influenza A H3 Not Detected     Influenza A H1 Not Detected     RSV, PCR Not Detected    Narrative:       The coronavirus on the RVP is NOT COVID-19 and is NOT indicative of infection with COVID-19.     Influenza Antigen, Rapid - Swab, Nasopharynx [[id number]]  (Normal) Collected:  03/09/20 1541    Lab Status:  Final result Specimen:  Swab from Nasopharynx Updated:  03/09/20 1928     Influenza A PCR Not Detected     Influenza B PCR Not Detected        ECG/EMG Results (most recent)     Procedure Component Value Units Date/Time    ECG 12 Lead [[id number]] Collected:  03/09/20 1334     Updated:  03/09/20 1337     Narrative:       HEART RATE= 104  bpm  RR Interval= 576  ms  UT Interval=   ms  P Horizontal Axis=   deg  P Front Axis=   deg  QRSD Interval= 103  ms  QT Interval= 379  ms  QRS Axis= 120  deg  T Wave Axis= 41  deg  - ABNORMAL ECG -  Atrial fibrillation  Probable right ventricular hypertrophy  Borderline ST elevation, anterior leads  Electronically Signed By:   Date and Time of Study: 2020-03-09 13:34:19    Adult Transesophageal Echo (ESTEFANI) W/ Cont if Necessary Per Protocol (Cardiology Department) [[id number]] Collected:  03/11/20 0920     Updated:  03/11/20 1707     BSA 1.9 m^2       CV ECHO CHENTE - BZI_BMI 32.1 kilograms/m^2       CV ECHO CHENTE - BSA(HAYCOCK) 1.9 m^2      BH CV ECHO CHENTE - BZI_METRIC_WEIGHT 82.1 kg       CV ECHO CHENTE - BZI_METRIC_HEIGHT 160.0 cm      Echo EF Estimated 65 %     Narrative:         · Estimated EF = 65%.  · Left ventricular systolic function is normal.  · Moderate to severe mitral insufficiency at least moderate mitral   stenosis severe tricuspid insufficiency moderate aortic stenosis noted     Normal LV systolic function EF 65%  Biatrial enlargement  Severely calcified mitral leaflets with at least moderate mitral stenosis   moderate to severe mitral insufficiency  Severe mitral annular calcification  Tricuspid leaflets mildly thickened and sclerosed  There is significant right atrial dilation also RV dilatation  Severe tricuspid insufficiency  Calcified aortic leaflets with decreased opening at least moderate aortic   stenosis  Intra-atrial septum is intact bubble study negative for shunt  Left atrial appendage showed spontaneous echo contrast          Results for orders placed in visit on 05/16/16   SCANNED VASCULAR STUDIES     Results for orders placed during the hospital encounter of 03/09/20   Adult Transesophageal Echo (ESTEFANI) W/ Cont if Necessary Per Protocol (Cardiology Department)    Narrative · Estimated EF = 65%.  · Left ventricular systolic function is normal.  · Moderate  to severe mitral insufficiency at least moderate mitral   stenosis severe tricuspid insufficiency moderate aortic stenosis noted     Normal LV systolic function EF 65%  Biatrial enlargement  Severely calcified mitral leaflets with at least moderate mitral stenosis   moderate to severe mitral insufficiency  Severe mitral annular calcification  Tricuspid leaflets mildly thickened and sclerosed  There is significant right atrial dilation also RV dilatation  Severe tricuspid insufficiency  Calcified aortic leaflets with decreased opening at least moderate aortic   stenosis  Intra-atrial septum is intact bubble study negative for shunt  Left atrial appendage showed spontaneous echo contrast       Xr Chest 1 View    Result Date: 3/11/2020   1. Interval development of right medial basilar airspace disease. Correlate clinically for pneumonia. 2. Persistent left mid and lower lung airspace opacities. Trace left effusion.  Electronically Signed By-Neal Bourgeois On:3/11/2020 3:42 PM This report was finalized on [id number] by  Neal Bourgeois, .    Xr Chest 1 View    Result Date: 3/9/2020  Hazy opacities throughout left lung, suggesting pneumonia.  Electronically Signed By-DR. Bernard Arriaga MD On:3/9/2020 2:40 PM This report was finalized on [id number] by DR. Bernard Arriaga MD.    Xrays, labs reviewed personally by physician.    Medication Review:   I have reviewed the patient's current medication list    Scheduled Meds    apixaban 5 mg Oral Q12H   atorvastatin 20 mg Oral Daily   calcium-vitamin D 1 tablet Oral Daily   cefTRIAXone 1 g Intravenous Q24H   dicyclomine 10 mg Oral 4x Daily   docusate sodium 100 mg Oral Daily   doxycycline 100 mg Intravenous Q12H   escitalopram 10 mg Oral Daily   ipratropium-albuterol 3 mL Nebulization Q6H - RT   levothyroxine 50 mcg Oral Q AM   metoprolol tartrate 25 mg Oral Q12H   pantoprazole 40 mg Oral QAM   rifAXIMin 200 mg Oral Q12H   sodium chloride 10 mL Intravenous Q12H   sodium  chloride 10 mL Intravenous Q12H     Meds Infusions     Meds PRN  •  acetaminophen **OR** acetaminophen **OR** acetaminophen  •  albuterol  •  aluminum-magnesium hydroxide-simethicone  •  bisacodyl  •  bisacodyl  •  magnesium hydroxide  •  magnesium sulfate **OR** magnesium sulfate in D5W 1g/100mL (PREMIX)  •  melatonin  •  nitroglycerin  •  ondansetron **OR** ondansetron  •  potassium chloride  •  potassium chloride  •  sodium chloride  •  sodium chloride    Assessment/Plan   Assessment/Plan     Active Hospital Problems    Diagnosis  POA   • **Atrial fibrillation, new onset (CMS/HCC) [I48.91]  Yes     Priority: High   • Moderate tricuspid regurgitation [I07.1]  Yes     Priority: High   • Atherosclerosis of native coronary artery of native heart without angina pectoris [I25.10]  Yes     Priority: High   • Pulmonary hypertension (CMS/HCC) [I27.20]  Yes     Priority: High   • Mitral valve stenosis [I05.0]  Yes     Priority: High   • History of CVA (cerebrovascular accident) [Z86.73]  Not Applicable     Priority: Medium   • Hyperlipidemia [E78.5]  Yes     Priority: Low   • Atrial fibrillation with RVR (CMS/HCC) [I48.91]  Yes   • CAP (community acquired pneumonia) [J18.9]  Yes   • Acute renal insufficiency [N28.9]  Yes   • Hypokalemia [E87.6]  Yes   • Syncope [R55]  Yes   • Chronic respiratory failure (CMS/HCC) [J96.10]  Yes      Resolved Hospital Problems   No resolved problems to display.       MEDICAL DECISION MAKING COMPLEXITY BY PROBLEM:   1.  New onset atrial fibrillation with rapid ventricular response  -   Rate is now controlled  -   The patient was already therapeutic on Eliquis prior to this development will be continued.  -   Patient has ruled out for myocardial injury    2.  Community-acquired pneumonia  -   Continue Rocephin and doxycycline  -   Continue breathing treatments  -   Repeat chest x-ray in a.m. 3/13/2020    3.  Acute renal insufficiency  -   Continue IV fluids  -   Overall this is resolving  -    Continue to follow closely    4.   Syncope  -   Likely related to the new development of atrial fibrillation with rapid ventricular response  -   No ongoing issues noted since admission  -   Myocardial injury has been ruled out    5.  Pulmonary hypertension  -   Continue Eliquis    6.  Coronary artery disease  -   Left heart cath in 2018 found with a 60% RCA ostial lesion with pulmonary hypertension being noted  -   No intervention  -   Now with worsening aortic and mitral valve stenosis.    -   Status post ESTEFANI yesterday    7.  History of CVA  -   No permanent defects    8.  Hypothyroidism  -   Continue home Synthroid  -   Free T4 is normal and T3 is slightly low as TSH is mildly elevated.    9.  Deconditioning   -   Patient will likely transfer to inpatient rehab in the morning.      VTE Prophylaxis -   Mechanical Order History:      Ordered        03/09/20 1258  Place Sequential Compression Device  Once         03/09/20 1258  Maintain Sequential Compression Device  Continuous                 Pharmalogical Order History:     Ordered     Dose Route Frequency Stop    03/10/20 0927  apixaban (ELIQUIS) tablet 5 mg      5 mg PO Every 12 Hours Scheduled --    03/09/20 1538  heparin (porcine) 5000 UNIT/ML injection 5,000 Units  Status:  Discontinued      5,000 Units SC Every 12 Hours Scheduled 03/09/20 1547    03/09/20 1547  apixaban (ELIQUIS) tablet 2.5 mg  Status:  Discontinued      2.5 mg PO 2 Times Daily 03/09/20 1551    03/09/20 1551  apixaban (ELIQUIS) tablet 2.5 mg  Status:  Discontinued      2.5 mg PO Every 12 Hours Scheduled 03/10/20 0927            Code Status -   Code Status and Medical Interventions:   Ordered at: 03/09/20 1258     Level Of Support Discussed With:    Patient     Code Status:    CPR     Medical Interventions (Level of Support Prior to Arrest):    Full       Discharge Planning  Likely nearing discharge        Destination      Coordination has not been started for this encounter.      Durable  Medical Equipment      Coordination has not been started for this encounter.      Dialysis/Infusion      Coordination has not been started for this encounter.      Home Medical Care      Coordination has not been started for this encounter.      Therapy      Coordination has not been started for this encounter.      Community Resources      Coordination has not been started for this encounter.            Electronically signed by Starla Meyer MD, 03/13/20, 17:05.  [facility] Hospitalist Team

## 2020-03-13 NOTE — THERAPY TREATMENT NOTE
Patient Name: Karen Rubio  : 1935    MRN: [id number]                              Today's Date: 3/13/2020       Admit Date: 3/9/2020    Visit Dx: No diagnosis found.  Patient Active Problem List   Diagnosis   • Severe mitral regurgitation   • Moderate tricuspid regurgitation   • Atherosclerosis of native coronary artery of native heart without angina pectoris   • History of CVA (cerebrovascular accident)   • Rheumatic mitral stenosis   • Heart murmur   • Hyperlipidemia   • Hypertension   • Pulmonary hypertension (CMS/[facility])   • Mitral valve stenosis   • Atrial fibrillation, new onset (CMS/[facility])   • Atrial fibrillation with RVR (CMS/[facility])   • CAP (community acquired pneumonia)   • Acute renal insufficiency   • Hypokalemia   • Syncope   • Chronic respiratory failure (CMS/[facility])     Past Medical History:   Diagnosis Date   • Abnormality of left atrial appendage 2016    Suspected Clot Noted on ESTEFANI   • Acute renal failure (CMS/HCC) 2016-Located within [facility]    Front Dehydration   • Arthritis    • Breast density 2017   • Carotid stenosis 2018    R @ 60% and L @ 10-20% Noted on Cardiac Cath & 16-49% on L&R    • Heart murmur    • History of echocardiogram 10/13/2017    Calcified Aortic Leaftlets; Mild Aortic Stenosis Present; Mild Mitral Stenosis; Bilateral Enlargement Noted;  Moderate TR; LV Function of 55-60%   • History of echocardiogram 14-Located within [facility]    Moderate L Vent Hypertrophy Noted; L Atrial Dilation; Moderate MR; Mild TR; Mild-Moderate Aortic Reg Noted; Normal Root Size/No Effusion   • History of echocardiogram 16-Located within [facility]    Normal Findings with Mild-Moderate MVS Noted   • History of EKG  & 18-Located within [facility]    All Sinus Rhythm w/Infarct Ischemnic Changes Noted   • HLD (hyperlipidemia)     Controlled w/Meds   • Hx of hyperglycemia    • Hypertension     Controlled w/Meds   • Hypothyroidism     Levothyroxine   • Joint pain    • Left atrial dilatation 2014    Mildly Noted on Echo   • Left  ventricular hypertrophy 12/05/2014    Noted on Echo w/EF @ 55-60%   • Mild aortic regurgitation 12/05/2014    to Moderate Noted on Echo   • Mild aortic stenosis 10/13/2017    Noted on Echo & ESTEFANI-11/5/18-Moderate    • Mild mitral insufficiency 10/13/2017    Noted on Echo & ESTEFANI-11/5/18   • Mild tricuspid regurgitation 12/05/2014    Noted on Echo   • Moderate mitral regurgitation 12/05/2014    Noted on Echo   • Moderate mitral stenosis 10/13/2017    Noted on Echo   • Moderate tricuspid insufficiency 10/13/2017    Noted on Echo   • Pneumonia involving left lung 08/6/16-[facility] ER   • Pulmonary hypertension (CMS/HCC) 10/13/2017 & 11/5/18-Cath    Severe Noted on Echo & Cath   • Slurred speech 05/16/2016   • SOB (shortness of breath) 08/2016   • Thickened mitral leaflet 10/13/2017 & ESTEFANI-11/5/18    Severely Calcified Noted on Echo   • Tricuspid valve insufficiency 11/05/2018    Noted on ESTEFANI     Past Surgical History:   Procedure Laterality Date   • CARDIAC CATHETERIZATION Left 11/5/18-[facility]    w/L Coronary Angiography--Dr. Hernández-RCA @ 60% with Pulmonary Hypertension Noted   • CHOLECYSTECTOMY     • KNEE ARTHROSCOPY     • ESTEFANI  05/17/2016-[facility]    Dr. Hernández--Severly Calcified Mitral Leaflets w/Mild-Moderate Mitral Stenosis/Insufficiency; Sig Aortic Stenosis Noted; Suspecion of L Atrial Appendage Clot Noted   • ESTEFANI  11/5/18-[facility]    Dr. Hernández--Moderate Mitral Insufficiency Noted; Mild-Moderate AVS; Moderate Tricuspid Insufficiency Noted; EF of 65-70%   • TONSILLECTOMY       General Information     Row Name 03/13/20 1615          PT Evaluation Time/Intention    Document Type  therapy note (daily note)  -     Mode of Treatment  physical therapy  -     Row Name 03/13/20 1615          Cognitive Assessment/Intervention- PT/OT    Orientation Status (Cognition)  oriented x 3  -     Row Name 03/13/20 1610          Safety Issues, Functional Mobility    Safety Issues Affecting Function (Mobility)  positioning of assistive device  -      Impairments Affecting Function (Mobility)  endurance/activity tolerance;strength  -     Comment, Safety Issues/Impairments (Mobility)  Monitor HR due to Afib. Non surgical candidate.  -       User Key  (r) = Recorded By, (t) = Taken By, (c) = Cosigned By    Initials Name Provider Type     Rabia Mendez PTA Physical Therapy Assistant        Mobility     Row Name 03/13/20 1616          Sit-Stand Transfer    Sit-Stand [place] (Transfers)  supervision;verbal cues  -     Assistive Device (Sit-Stand Transfers)  walker, front-wheeled  -     Row Name 03/13/20 1616          Gait/Stairs Assessment/Training    Gait/Stairs Assessment/Training  gait/ambulation assistive device  -     [place] Level (Gait)  verbal cues;contact guard  -     Assistive Device (Gait)  walker, front-wheeled  -     Distance in Feet (Gait)  45'  -     Deviations/Abnormal Patterns (Gait)  gait speed decreased  -     Bilateral Gait Deviations  forward flexed posture  -     Comment (Gait/Stairs)   Limited by Afib, monitor HR during amb. Fluctuates this rx between . Pt. requiring vcs to stop for rests. Weakness present,no lob    -       User Key  (r) = Recorded By, (t) = Taken By, (c) = Cosigned By    Initials Name Provider Type     Rabia Mendez PTA Physical Therapy Assistant        Obj/Interventions     [facility] Name 03/13/20 1619          Static Sitting Balance    Level of [place] (Unsupported Sitting, Static Balance)  independent  -     Sitting Position (Unsupported Sitting, Static Balance)  sitting in chair  -[facility] Name 03/13/20 1619          Dynamic Sitting Balance    Level of [place], Reaches Outside Midline (Sitting, Dynamic Balance)  supervision  -     Sitting Position, Reaches Outside Midline (Sitting, Dynamic Balance)  sitting in chair  -     Comment, Reaches Outside Midline (Sitting, Dynamic Balance)  Limited away from midline.  -     Row Name 03/13/20 1619          Static Standing  Balance    Level of [place] (Supported Standing, Static Balance)  supervision;1 person assist  -     Assistive Device Utilized (Supported Standing, Static Balance)  walker, rolling  -[facility] Name 03/13/20 1619          Dynamic Standing Balance    Level of [place], Reaches Outside Midline (Standing, Dynamic Balance)  contact guard assist;1 person assist  -     Assistive Device Utilized (Supported Standing, Dynamic Balance)  walker, rolling  [facility]       User Key  (r) = Recorded By, (t) = Taken By, (c) = Cosigned By    Initials Name Provider Type     Rabia Mendez PTA Physical Therapy Assistant        Goals/Plan    No documentation.       Clinical Impression     [facility] Name 03/13/20 1620          Pain Scale: Numbers Pre/Post-Treatment    Pain Scale: Numbers, Pretreatment  0/10 - no pain  -     Pain Scale: Numbers, Post-Treatment  0/10 - no pain  -[facility] Name 03/13/20 1620          Plan of Care Review    Plan of Care Reviewed With  patient  -     Progress  no change  -     Outcome Summary  Requiring sup/cga for safe, functional mobility. Amb. 40' c rwx., slowed miladis, low activity tolerance, weakness present. Monitor HR c activity, Afib this rx, reached 184. Limited righting recovery, reinforce safety c rwx. use. Will progress as able, will need rehab at d/c to address deficits..  -[facility] Name 03/13/20 1620          Vital Signs    Pretreatment Heart Rate (beats/min)  90  -     Intratreatment Heart Rate (beats/min)  184  -LH     Posttreatment Heart Rate (beats/min)  98  -     O2 Delivery Pre Treatment  supplemental O2  -     O2 Delivery Intra Treatment  supplemental O2  -     O2 Delivery Post Treatment  supplemental O2  -     Pre Patient Position  Sitting  -     Intra Patient Position  Standing  -     Post Patient Position  Sitting  -[facility] Name 03/13/20 1620          Positioning and Restraints    Pre-Treatment Position  sitting in chair/recliner  -     Post Treatment  Position  chair  -LH     In Chair  notified nsg;sitting;call light within reach;exit alarm on  -       User Key  (r) = Recorded By, (t) = Taken By, (c) = Cosigned By    Initials Name Provider Type     Rabia Mendez PTA Physical Therapy Assistant        Outcome Measures     Row Name 03/13/20 1623          How much help from another person do you currently need...    Turning from your back to your side while in flat bed without using bedrails?  3  -LH     Moving from lying on back to sitting on the side of a flat bed without bedrails?  3  -LH     Moving to and from a bed to a chair (including a wheelchair)?  3  -LH     Standing up from a chair using your arms (e.g., wheelchair, bedside chair)?  4  -LH     Climbing 3-5 steps with a railing?  2  -LH     To walk in hospital room?  3  -LH     AM-PAC 6 Clicks Score (PT)  18  -       User Key  (r) = Recorded By, (t) = Taken By, (c) = Cosigned By    Initials Name Provider Type     Rabia Mendez PTA Physical Therapy Assistant          PT Recommendation and Plan     Outcome Summary/Treatment Plan (PT)  Anticipated Discharge Disposition (PT): inpatient rehabilitation facility  Plan of Care Reviewed With: patient  Progress: no change  Outcome Summary: Requiring sup/cga for safe, functional mobility. Amb. 40' c rwx., slowed miladis, low activity tolerance, weakness present. Monitor HR c activity, Afib this rx, reached 184. Limited righting recovery, reinforce safety c rwx. use. Will progress as able, will need rehab at d/c to address deficits..     Time Calculation:   PT Charges     Row Name 03/13/20 1625 03/13/20 0636          Time Calculation    Start Time  0300  -  --     Stop Time  0318  -  --     Time Calculation (min)  18 min  -  --     PT Received On  03/13/20  -  --     PT - Next Appointment  03/15/20  -  03/13/20  -        Time Calculation- PT    Total Timed Code Minutes- PT  18 minute(s)  -  --        Timed Charges    19214 - Gait Training Minutes    10  -  --     88913 - PT Therapeutic Activity Minutes  8  -LH  --       User Key  (r) = Recorded By, (t) = Taken By, (c) = Cosigned By    Initials Name Provider Type    Kimmie Elliott, PT Physical Therapist     Rabia Mendez PTA Physical Therapy Assistant        Therapy Charges for Today     Code Description Service Date Service Provider Modifiers Qty    22058388154 HC GAIT TRAINING EA 15 MIN 3/13/2020 Rabia Mendez PTA GP 1    [id number]  PT THERAPEUTIC ACT EA 15 MIN 3/13/2020 Rabia Mendez PTA GP 1          PT G-Codes  AM-PAC 6 Clicks Score (PT): 18    Rabia Mendez PTA  3/13/2020

## 2020-03-13 NOTE — PROGRESS NOTES
Reason for follow-up: New onset atrial fibrillation  History of CAD  History of mitral and aortic stenosis  Severe pulmonary hypertension     Patient Care Team:  Eliza Clayton APRN as PCP - General (Nurse Practitioner)  Eliza Clayton APRN as PCP - Family Medicine  Demond Valiente MD (Cardiology)  Gibson Medina MD as Surgeon (Cardiothoracic Surgery)    Subjective .   Feels tired and weak     Review of Systems   Constitution: Positive for malaise/fatigue. Negative for fever.   HENT: Negative for congestion and hearing loss.    Eyes: Negative for double vision and visual disturbance.   Cardiovascular: Positive for dyspnea on exertion. Negative for chest pain, claudication, leg swelling and syncope.   Respiratory: Positive for shortness of breath. Negative for cough.    Endocrine: Negative for cold intolerance.   Skin: Negative for color change and rash.   Musculoskeletal: Negative for arthritis and joint pain.   Gastrointestinal: Negative for abdominal pain and heartburn.   Genitourinary: Negative for hematuria.   Neurological: Negative for excessive daytime sleepiness and dizziness.   Psychiatric/Behavioral: Negative for depression. The patient is not nervous/anxious.    All other systems reviewed and are negative.      Hydrocodone    Scheduled Meds:    apixaban 5 mg Oral Q12H   atorvastatin 20 mg Oral Daily   calcium-vitamin D 1 tablet Oral Daily   cefTRIAXone 1 g Intravenous Q24H   dicyclomine 10 mg Oral 4x Daily   docusate sodium 100 mg Oral Daily   doxycycline 100 mg Intravenous Q12H   escitalopram 10 mg Oral Daily   ipratropium-albuterol 3 mL Nebulization Q6H - RT   levothyroxine 50 mcg Oral Q AM   metoprolol tartrate 25 mg Oral Q12H   pantoprazole 40 mg Oral QAM   rifAXIMin 200 mg Oral Q12H   sodium chloride 10 mL Intravenous Q12H   sodium chloride 10 mL Intravenous Q12H     Continuous Infusions:     PRN Meds:.•  acetaminophen **OR** acetaminophen **OR** acetaminophen  •   "albuterol  •  aluminum-magnesium hydroxide-simethicone  •  bisacodyl  •  bisacodyl  •  magnesium hydroxide  •  magnesium sulfate **OR** magnesium sulfate in D5W 1g/100mL (PREMIX)  •  melatonin  •  nitroglycerin  •  ondansetron **OR** ondansetron  •  potassium chloride  •  potassium chloride  •  sodium chloride  •  sodium chloride    Objective   Looks comfortable lying in the bed    VITAL SIGNS  Vitals:    03/13/20 0153 03/13/20 0607 03/13/20 0640 03/13/20 0644   BP: 149/66 152/57     BP Location: Right arm Right arm     Patient Position: Lying Lying     Pulse: 99 95 97 109   Resp: 18 18 18 22   Temp: 97.6 °F (36.4 °C) 97.5 °F (36.4 °C)     TempSrc: Oral Oral     SpO2: 95% 95% 95%    Weight:       Height:           Flowsheet Rows      First Filed Value   Admission Height  170.2 cm (67\") Documented at 03/09/2020 1302   Admission Weight  82.2 kg (181 lb 3.5 oz) Documented at 03/09/2020 1302           TELEMETRY: Atrial fibrillation rate controlled    Physical Exam:  Physical Exam   Constitutional: She is oriented to person, place, and time. She appears well-developed and well-nourished. She is cooperative.   HENT:   Head: Normocephalic and atraumatic.   Mouth/Throat: Uvula is midline and oropharynx is clear and moist. No oral lesions.   Eyes: Conjunctivae are normal. No scleral icterus.   Neck: Trachea normal. Neck supple. No JVD present. Carotid bruit is not present. No thyromegaly present.   Cardiovascular: Normal rate, S1 normal, S2 normal, intact distal pulses and normal pulses. An irregularly irregular rhythm present. PMI is not displaced. Exam reveals no gallop and no friction rub.   Murmur heard.  Pulmonary/Chest: Effort normal and breath sounds normal.   Abdominal: Soft. Bowel sounds are normal.   Musculoskeletal: Normal range of motion.   Neurological: She is alert and oriented to person, place, and time. She has normal strength.   No focal deficits   Skin: Skin is warm. No cyanosis.   Psychiatric: She has a " normal mood and affect.                LAB RESULTS (LAST 7 DAYS)    CBC  Results from last 7 days   Lab Units 03/13/20  0349 03/12/20  0714 03/11/20  0236 03/10/20  0532 03/09/20  1418   WBC 10*3/mm3 8.10 8.60 9.00 9.50 11.00*   RBC 10*6/mm3 4.19 4.26 4.18 4.33 4.49   HEMOGLOBIN g/dL 11.7* 11.9* 12.2 12.1 12.7   HEMATOCRIT % 36.9 38.6 36.8 38.2 39.5   MCV fL 87.9 90.5 87.9 88.3 88.0   PLATELETS 10*3/mm3 169 166 150 159 183       BMP  Results from last 7 days   Lab Units 03/13/20  0349 03/12/20  0714 03/11/20  0236 03/10/20  0532 03/09/20  1418   SODIUM mmol/L 141 139 140 140 139   POTASSIUM mmol/L 4.0 3.7 3.7 4.4 3.3*   CHLORIDE mmol/L 105 103 102 101 95*   CO2 mmol/L 24.0 24.0 24.0 24.0 29.0   BUN mg/dL 20 23 26* 29* 27*   CREATININE mg/dL 1.15* 1.33* 1.26* 1.43* 1.52*   GLUCOSE mg/dL 115* 109* 124* 109* 100*   MAGNESIUM mg/dL  --   --   --  1.7  --        CMP   Results from last 7 days   Lab Units 03/13/20  0349 03/12/20  0714 03/11/20  0236 03/10/20  0532 03/09/20  1418   SODIUM mmol/L 141 139 140 140 139   POTASSIUM mmol/L 4.0 3.7 3.7 4.4 3.3*   CHLORIDE mmol/L 105 103 102 101 95*   CO2 mmol/L 24.0 24.0 24.0 24.0 29.0   BUN mg/dL 20 23 26* 29* 27*   CREATININE mg/dL 1.15* 1.33* 1.26* 1.43* 1.52*   GLUCOSE mg/dL 115* 109* 124* 109* 100*   ALBUMIN g/dL  --   --   --   --  3.50   BILIRUBIN mg/dL  --   --   --   --  1.4*   ALK PHOS U/L  --   --   --   --  228*   AST (SGOT) U/L  --   --   --   --  48*   ALT (SGPT) U/L  --   --   --   --  31         BNP        TROPONIN  Results from last 7 days   Lab Units 03/12/20  0714   TROPONIN T ng/mL <0.010       CoAg        Creatinine Clearance  Estimated Creatinine Clearance: 40.8 mL/min (A) (by C-G formula based on SCr of 1.15 mg/dL (H)).    ABG        Radiology  Xr Chest 1 View    Result Date: 3/11/2020   1. Interval development of right medial basilar airspace disease. Correlate clinically for pneumonia. 2. Persistent left mid and lower lung airspace opacities. Trace left  effusion.  Electronically Signed By-Neal Bourgeois On:3/11/2020 3:42 PM This report was finalized on [id number] by  Neal Bourgeois, .            EKG          I personally viewed and interpreted the patient's EKG/Telemetry data:    ECHOCARDIOGRAM:    Results for orders placed during the hospital encounter of 03/09/20   Adult Transesophageal Echo (ESTEFANI) W/ Cont if Necessary Per Protocol (Cardiology Department)    Narrative · Estimated EF = 65%.  · Left ventricular systolic function is normal.  · Moderate to severe mitral insufficiency at least moderate mitral   stenosis severe tricuspid insufficiency moderate aortic stenosis noted     Normal LV systolic function EF 65%  Biatrial enlargement  Severely calcified mitral leaflets with at least moderate mitral stenosis   moderate to severe mitral insufficiency  Severe mitral annular calcification  Tricuspid leaflets mildly thickened and sclerosed  There is significant right atrial dilation also RV dilatation  Severe tricuspid insufficiency  Calcified aortic leaflets with decreased opening at least moderate aortic   stenosis  Intra-atrial septum is intact bubble study negative for shunt  Left atrial appendage showed spontaneous echo contrast       STRESS MYOVIEW:    CARDIAC CATHETERIZATION:    OTHER:         Assessment/Plan       Atrial fibrillation, new onset (CMS/HCC)    Moderate tricuspid regurgitation    Atherosclerosis of native coronary artery of native heart without angina pectoris    History of CVA (cerebrovascular accident)    Hyperlipidemia    Pulmonary hypertension (CMS/HCC)    Mitral valve stenosis    Atrial fibrillation with RVR (CMS/HCC)    CAP (community acquired pneumonia)    Acute renal insufficiency    Hypokalemia    Syncope    Chronic respiratory failure (CMS/HCC)      New onset atrial fibrillation with rapid ventricular rate  Continue aggressive control of rate  Anticoagulation  Severe pulmonary hypertension  History of mitral valve stenosis and aortic  valve stenosis  Patient underwent transesophageal echocardiogram  Severe tricuspid insufficiency with severe RV dilatation  Moderate severe mitral stenosis and aortic stenosis moderate to severe mitral insufficiency  Continue aggressive control of hypertension dyslipidemia  Patient films were reviewed with Dr. Chappell  Suggest conservative treatment not a candidate for surgery  Needs aggressive rate control and anticoagulation    Demond Valiente MD  03/13/20  09:24

## 2020-03-13 NOTE — PLAN OF CARE
Pt feeling better today,ambulating with walker and 1 assist. Vitsls stable with HR  when abed or chair. Still rapid ventricular rate when ambulating. No c/ovoiced. Likely to rehab facility tomorrow.

## 2020-03-13 NOTE — PROGRESS NOTES
Reason for follow-up: New onset atrial fibrillation  History of CAD  History of mitral and aortic stenosis  Severe pulmonary hypertension     Patient Care Team:  Eliza Clayton APRN as PCP - General (Nurse Practitioner)  Eliza Clayton APRN as PCP - Family Medicine  Demond Valiente MD (Cardiology)  Gibson Medina MD as Surgeon (Cardiothoracic Surgery)    Subjective .   Feels tired and weak     Review of Systems   Constitution: Positive for malaise/fatigue. Negative for fever.   HENT: Negative for congestion and hearing loss.    Eyes: Negative for double vision and visual disturbance.   Cardiovascular: Positive for dyspnea on exertion. Negative for chest pain, claudication, leg swelling and syncope.   Respiratory: Positive for shortness of breath. Negative for cough.    Endocrine: Negative for cold intolerance.   Skin: Negative for color change and rash.   Musculoskeletal: Negative for arthritis and joint pain.   Gastrointestinal: Negative for abdominal pain and heartburn.   Genitourinary: Negative for hematuria.   Neurological: Negative for excessive daytime sleepiness and dizziness.   Psychiatric/Behavioral: Negative for depression. The patient is not nervous/anxious.    All other systems reviewed and are negative.      Hydrocodone    Scheduled Meds:    apixaban 5 mg Oral Q12H   atorvastatin 20 mg Oral Daily   calcium-vitamin D 1 tablet Oral Daily   cefTRIAXone 1 g Intravenous Q24H   dicyclomine 10 mg Oral 4x Daily   docusate sodium 100 mg Oral Daily   doxycycline 100 mg Intravenous Q12H   escitalopram 10 mg Oral Daily   ipratropium-albuterol 3 mL Nebulization Q6H - RT   levothyroxine 50 mcg Oral Q AM   metoprolol tartrate 25 mg Oral Q12H   pantoprazole 40 mg Oral QAM   rifAXIMin 200 mg Oral Q12H   sodium chloride 10 mL Intravenous Q12H   sodium chloride 10 mL Intravenous Q12H     Continuous Infusions:     PRN Meds:.•  acetaminophen **OR** acetaminophen **OR** acetaminophen  •   "albuterol  •  aluminum-magnesium hydroxide-simethicone  •  bisacodyl  •  bisacodyl  •  magnesium hydroxide  •  magnesium sulfate **OR** magnesium sulfate in D5W 1g/100mL (PREMIX)  •  melatonin  •  nitroglycerin  •  ondansetron **OR** ondansetron  •  potassium chloride  •  potassium chloride  •  sodium chloride  •  sodium chloride    Objective   Looks comfortable lying in the bed    VITAL SIGNS  Vitals:    03/13/20 1058 03/13/20 1132 03/13/20 1133 03/13/20 1452   BP: 128/72   149/98   BP Location: Right arm   Left arm   Patient Position: Sitting   Sitting   Pulse: 90 93 84 109   Resp: 21 20 20 25   Temp: 97.4 °F (36.3 °C)   97.2 °F (36.2 °C)   TempSrc: Oral   Oral   SpO2: 94%   90%   Weight:       Height:           Flowsheet Rows      First Filed Value   Admission Height  170.2 cm (67\") Documented at 03/09/2020 1302   Admission Weight  82.2 kg (181 lb 3.5 oz) Documented at 03/09/2020 1302           TELEMETRY: Atrial fibrillation rate controlled    Physical Exam:  Physical Exam   Constitutional: She is oriented to person, place, and time. She appears well-developed and well-nourished. She is cooperative.   HENT:   Head: Normocephalic and atraumatic.   Mouth/Throat: Uvula is midline and oropharynx is clear and moist. No oral lesions.   Eyes: Conjunctivae are normal. No scleral icterus.   Neck: Trachea normal. Neck supple. No JVD present. Carotid bruit is not present. No thyromegaly present.   Cardiovascular: Normal rate, S1 normal, S2 normal, intact distal pulses and normal pulses. An irregularly irregular rhythm present. PMI is not displaced. Exam reveals no gallop and no friction rub.   Murmur heard.  Pulmonary/Chest: Effort normal and breath sounds normal.   Abdominal: Soft. Bowel sounds are normal.   Musculoskeletal: Normal range of motion.   Neurological: She is alert and oriented to person, place, and time. She has normal strength.   No focal deficits   Skin: Skin is warm. No cyanosis.   Psychiatric: She has a " normal mood and affect.                LAB RESULTS (LAST 7 DAYS)    CBC  Results from last 7 days   Lab Units 03/13/20  0349 03/12/20  0714 03/11/20  0236 03/10/20  0532 03/09/20  1418   WBC 10*3/mm3 8.10 8.60 9.00 9.50 11.00*   RBC 10*6/mm3 4.19 4.26 4.18 4.33 4.49   HEMOGLOBIN g/dL 11.7* 11.9* 12.2 12.1 12.7   HEMATOCRIT % 36.9 38.6 36.8 38.2 39.5   MCV fL 87.9 90.5 87.9 88.3 88.0   PLATELETS 10*3/mm3 169 166 150 159 183       BMP  Results from last 7 days   Lab Units 03/13/20  0349 03/12/20  0714 03/11/20  0236 03/10/20  0532 03/09/20  1418   SODIUM mmol/L 141 139 140 140 139   POTASSIUM mmol/L 4.0 3.7 3.7 4.4 3.3*   CHLORIDE mmol/L 105 103 102 101 95*   CO2 mmol/L 24.0 24.0 24.0 24.0 29.0   BUN mg/dL 20 23 26* 29* 27*   CREATININE mg/dL 1.15* 1.33* 1.26* 1.43* 1.52*   GLUCOSE mg/dL 115* 109* 124* 109* 100*   MAGNESIUM mg/dL  --   --   --  1.7  --        CMP   Results from last 7 days   Lab Units 03/13/20  0349 03/12/20  0714 03/11/20  0236 03/10/20  0532 03/09/20  1418   SODIUM mmol/L 141 139 140 140 139   POTASSIUM mmol/L 4.0 3.7 3.7 4.4 3.3*   CHLORIDE mmol/L 105 103 102 101 95*   CO2 mmol/L 24.0 24.0 24.0 24.0 29.0   BUN mg/dL 20 23 26* 29* 27*   CREATININE mg/dL 1.15* 1.33* 1.26* 1.43* 1.52*   GLUCOSE mg/dL 115* 109* 124* 109* 100*   ALBUMIN g/dL  --   --   --   --  3.50   BILIRUBIN mg/dL  --   --   --   --  1.4*   ALK PHOS U/L  --   --   --   --  228*   AST (SGOT) U/L  --   --   --   --  48*   ALT (SGPT) U/L  --   --   --   --  31         BNP        TROPONIN  Results from last 7 days   Lab Units 03/12/20  0714   TROPONIN T ng/mL <0.010       CoAg        Creatinine Clearance  Estimated Creatinine Clearance: 40.8 mL/min (A) (by C-G formula based on SCr of 1.15 mg/dL (H)).    ABG        Radiology  No radiology results for the last day          EKG          I personally viewed and interpreted the patient's EKG/Telemetry data:    ECHOCARDIOGRAM:    Results for orders placed during the hospital encounter of  03/09/20   Adult Transesophageal Echo (ESTEFANI) W/ Cont if Necessary Per Protocol (Cardiology Department)    Narrative · Estimated EF = 65%.  · Left ventricular systolic function is normal.  · Moderate to severe mitral insufficiency at least moderate mitral   stenosis severe tricuspid insufficiency moderate aortic stenosis noted     Normal LV systolic function EF 65%  Biatrial enlargement  Severely calcified mitral leaflets with at least moderate mitral stenosis   moderate to severe mitral insufficiency  Severe mitral annular calcification  Tricuspid leaflets mildly thickened and sclerosed  There is significant right atrial dilation also RV dilatation  Severe tricuspid insufficiency  Calcified aortic leaflets with decreased opening at least moderate aortic   stenosis  Intra-atrial septum is intact bubble study negative for shunt  Left atrial appendage showed spontaneous echo contrast       STRESS MYOVIEW:    CARDIAC CATHETERIZATION:    OTHER:         Assessment/Plan       Atrial fibrillation, new onset (CMS/HCC)    Moderate tricuspid regurgitation    Atherosclerosis of native coronary artery of native heart without angina pectoris    History of CVA (cerebrovascular accident)    Hyperlipidemia    Pulmonary hypertension (CMS/HCC)    Mitral valve stenosis    Atrial fibrillation with RVR (CMS/HCC)    CAP (community acquired pneumonia)    Acute renal insufficiency    Hypokalemia    Syncope    Chronic respiratory failure (CMS/HCC)      New onset atrial fibrillation with rapid ventricular rate  Continue aggressive control of rate  Anticoagulation  Severe pulmonary hypertension  History of mitral valve stenosis and aortic valve stenosis  Patient underwent transesophageal echocardiogram  Severe tricuspid insufficiency with severe RV dilatation  Moderate severe mitral stenosis and aortic stenosis moderate to severe mitral insufficiency  Continue aggressive control of hypertension dyslipidemia  Patient films were reviewed with   Misti  Suggest conservative treatment not a candidate for surgery  Needs aggressive rate control and anticoagulation    Demond Valiente MD  03/13/20  17:50

## 2020-03-13 NOTE — PLAN OF CARE
Problem: Patient Care Overview  Goal: Plan of Care Review  Outcome: Ongoing (interventions implemented as appropriate)  Flowsheets  Taken 3/13/2020 1624  Progress: no change  Taken 3/13/2020 1620  Plan of Care Reviewed With: patient  Outcome Summary: Requiring sup/cga for safe, functional mobility. Amb. 40' c rwx., slowed miladis, low activity tolerance, weakness present. Monitor HR c activity, Afib this rx, reached 184. Limited righting recovery, reinforce safety c rwx. use. Will progress as able, will need rehab at d/c to address deficits..

## 2020-03-13 NOTE — PROGRESS NOTES
Exercise Oximetry    Patient Name:Karen Rubio   MRN: [id number]   Date: 03/13/20             ROOM AIR BASELINE   SpO2% 87%   Heart Rate    Blood Pressure      EXERCISE ON ROOM AIR SpO2% EXERCISE ON O2 @  LPM SpO2%   1 MINUTE  1 MINUTE    2 MINUTES  2 MINUTES    3 MINUTES  3 MINUTES    4 MINUTES  4 MINUTES    5 MINUTES  5 MINUTES    6 MINUTES  6 MINUTES               Distance Walked   Distance Walked   Dyspnea ([place] Scale)   Dyspnea ([place] Scale)   Fatigue ([place] Scale)   Fatigue ([place] Scale)   SpO2% Post Exercise   SpO2% Post Exercise   HR Post Exercise   HR Post Exercise   Time to Recovery   Time to Recovery     Comments: Patient's oxygen came up to 94% on 3L nasal cannula

## 2020-03-14 VITALS
HEART RATE: 95 BPM | TEMPERATURE: 97.4 F | HEIGHT: 67 IN | BODY MASS INDEX: 31.59 KG/M2 | DIASTOLIC BLOOD PRESSURE: 65 MMHG | SYSTOLIC BLOOD PRESSURE: 117 MMHG | OXYGEN SATURATION: 95 % | WEIGHT: 201.28 LBS | RESPIRATION RATE: 22 BRPM

## 2020-03-14 LAB
ANION GAP SERPL CALCULATED.3IONS-SCNC: 9 MMOL/L (ref 5–15)
BASOPHILS # BLD AUTO: 0 10*3/MM3 (ref 0–0.2)
BASOPHILS NFR BLD AUTO: 0.5 % (ref 0–1.5)
BUN BLD-MCNC: 19 MG/DL (ref 8–23)
BUN/CREAT SERPL: 17.3 (ref 7–25)
CALCIUM SPEC-SCNC: 10.1 MG/DL (ref 8.6–10.5)
CHLORIDE SERPL-SCNC: 106 MMOL/L (ref 98–107)
CO2 SERPL-SCNC: 25 MMOL/L (ref 22–29)
CREAT BLD-MCNC: 1.1 MG/DL (ref 0.57–1)
DEPRECATED RDW RBC AUTO: 49.4 FL (ref 37–54)
EOSINOPHIL # BLD AUTO: 0.1 10*3/MM3 (ref 0–0.4)
EOSINOPHIL NFR BLD AUTO: 1.7 % (ref 0.3–6.2)
ERYTHROCYTE [DISTWIDTH] IN BLOOD BY AUTOMATED COUNT: 15.8 % (ref 12.3–15.4)
GFR SERPL CREATININE-BSD FRML MDRD: 47 ML/MIN/1.73
GLUCOSE BLD-MCNC: 124 MG/DL (ref 65–99)
HCT VFR BLD AUTO: 37 % (ref 34–46.6)
HGB BLD-MCNC: 12.2 G/DL (ref 12–15.9)
LYMPHOCYTES # BLD AUTO: 1.1 10*3/MM3 (ref 0.7–3.1)
LYMPHOCYTES NFR BLD AUTO: 13 % (ref 19.6–45.3)
MCH RBC QN AUTO: 28.9 PG (ref 26.6–33)
MCHC RBC AUTO-ENTMCNC: 32.8 G/DL (ref 31.5–35.7)
MCV RBC AUTO: 88 FL (ref 79–97)
MONOCYTES # BLD AUTO: 0.8 10*3/MM3 (ref 0.1–0.9)
MONOCYTES NFR BLD AUTO: 9.3 % (ref 5–12)
NEUTROPHILS # BLD AUTO: 6.4 10*3/MM3 (ref 1.7–7)
NEUTROPHILS NFR BLD AUTO: 75.5 % (ref 42.7–76)
NRBC BLD AUTO-RTO: 0.1 /100 WBC (ref 0–0.2)
PLATELET # BLD AUTO: 164 10*3/MM3 (ref 140–450)
PMV BLD AUTO: 10 FL (ref 6–12)
POTASSIUM BLD-SCNC: 4.2 MMOL/L (ref 3.5–5.2)
RBC # BLD AUTO: 4.21 10*6/MM3 (ref 3.77–5.28)
SODIUM BLD-SCNC: 140 MMOL/L (ref 136–145)
WBC NRBC COR # BLD: 8.5 10*3/MM3 (ref 3.4–10.8)

## 2020-03-14 PROCEDURE — 94799 UNLISTED PULMONARY SVC/PX: CPT

## 2020-03-14 PROCEDURE — 85025 COMPLETE CBC W/AUTO DIFF WBC: CPT | Performed by: INTERNAL MEDICINE

## 2020-03-14 PROCEDURE — 99232 SBSQ HOSP IP/OBS MODERATE 35: CPT | Performed by: INTERNAL MEDICINE

## 2020-03-14 PROCEDURE — 99239 HOSP IP/OBS DSCHRG MGMT >30: CPT | Performed by: INTERNAL MEDICINE

## 2020-03-14 PROCEDURE — 80048 BASIC METABOLIC PNL TOTAL CA: CPT | Performed by: INTERNAL MEDICINE

## 2020-03-14 RX ORDER — NITROGLYCERIN 0.4 MG/1
0.4 TABLET SUBLINGUAL
Qty: 25 TABLET | Refills: 0 | Status: SHIPPED | OUTPATIENT
Start: 2020-03-14 | End: 2020-12-03

## 2020-03-14 RX ORDER — DOXYCYCLINE HYCLATE 100 MG/1
100 TABLET, DELAYED RELEASE ORAL 2 TIMES DAILY
Qty: 8 TABLET | Refills: 0 | Status: SHIPPED | OUTPATIENT
Start: 2020-03-14 | End: 2020-03-18

## 2020-03-14 RX ORDER — ONDANSETRON 4 MG/1
4 TABLET, FILM COATED ORAL EVERY 6 HOURS PRN
Qty: 30 TABLET | Refills: 0 | Status: SHIPPED | OUTPATIENT
Start: 2020-03-14 | End: 2020-06-17

## 2020-03-14 RX ORDER — ACETAMINOPHEN 325 MG/1
650 TABLET ORAL EVERY 4 HOURS PRN
Qty: 1 BOTTLE | Refills: 0 | Status: SHIPPED | OUTPATIENT
Start: 2020-03-14 | End: 2022-01-01

## 2020-03-14 RX ORDER — ALUMINA, MAGNESIA, AND SIMETHICONE 2400; 2400; 240 MG/30ML; MG/30ML; MG/30ML
15 SUSPENSION ORAL EVERY 6 HOURS PRN
Qty: 300 ML | Refills: 0 | Status: SHIPPED | OUTPATIENT
Start: 2020-03-14 | End: 2020-06-17 | Stop reason: HOSPADM

## 2020-03-14 RX ORDER — CEFDINIR 300 MG/1
300 CAPSULE ORAL 2 TIMES DAILY
Qty: 8 CAPSULE | Refills: 0 | Status: SHIPPED | OUTPATIENT
Start: 2020-03-14 | End: 2020-03-18

## 2020-03-14 RX ADMIN — DOCUSATE SODIUM 100 MG: 100 CAPSULE, LIQUID FILLED ORAL at 08:59

## 2020-03-14 RX ADMIN — DOXYCYCLINE 100 MG: 100 INJECTION, POWDER, LYOPHILIZED, FOR SOLUTION INTRAVENOUS at 06:11

## 2020-03-14 RX ADMIN — DICYCLOMINE HYDROCHLORIDE 10 MG: 10 CAPSULE ORAL at 08:59

## 2020-03-14 RX ADMIN — DICYCLOMINE HYDROCHLORIDE 10 MG: 10 CAPSULE ORAL at 14:59

## 2020-03-14 RX ADMIN — RIFAXIMIN 200 MG: 200 TABLET ORAL at 08:59

## 2020-03-14 RX ADMIN — DICYCLOMINE HYDROCHLORIDE 10 MG: 10 CAPSULE ORAL at 17:33

## 2020-03-14 RX ADMIN — ATORVASTATIN CALCIUM 20 MG: 20 TABLET, FILM COATED ORAL at 09:00

## 2020-03-14 RX ADMIN — LEVOTHYROXINE SODIUM 50 MCG: 50 TABLET ORAL at 06:11

## 2020-03-14 RX ADMIN — IPRATROPIUM BROMIDE AND ALBUTEROL SULFATE 3 ML: .5; 3 SOLUTION RESPIRATORY (INHALATION) at 11:22

## 2020-03-14 RX ADMIN — IPRATROPIUM BROMIDE AND ALBUTEROL SULFATE 3 ML: .5; 3 SOLUTION RESPIRATORY (INHALATION) at 09:11

## 2020-03-14 RX ADMIN — PANTOPRAZOLE SODIUM 40 MG: 40 TABLET, DELAYED RELEASE ORAL at 06:11

## 2020-03-14 RX ADMIN — Medication 10 ML: at 08:59

## 2020-03-14 RX ADMIN — ESCITALOPRAM OXALATE 10 MG: 10 TABLET ORAL at 08:59

## 2020-03-14 RX ADMIN — METOPROLOL TARTRATE 25 MG: 25 TABLET, FILM COATED ORAL at 09:00

## 2020-03-14 RX ADMIN — OYSTER SHELL CALCIUM WITH VITAMIN D 1 TABLET: 500; 200 TABLET, FILM COATED ORAL at 09:00

## 2020-03-14 RX ADMIN — APIXABAN 5 MG: 5 TABLET, FILM COATED ORAL at 08:59

## 2020-03-14 NOTE — PROGRESS NOTES
Case Management/Social Work    Patient Name:  Karen Rubio  YOB: 1935  MRN: [id number]  Admit Date:  3/9/2020        HALI Vaughan stated she will call Pt's daughter to inform of their no visitor policy starting tomorrow.      Electronically signed by:  Lynette Saini  03/14/20 16:56

## 2020-03-14 NOTE — PLAN OF CARE
Problem: Patient Care Overview  Goal: Plan of Care Review  Outcome: Ongoing (interventions implemented as appropriate)  Flowsheets  Taken 3/14/2020 0439  Progress: no change  Taken 3/13/2020 2034  Plan of Care Reviewed With: patient  Goal: Individualization and Mutuality  Outcome: Ongoing (interventions implemented as appropriate)  Goal: Discharge Needs Assessment  Outcome: Ongoing (interventions implemented as appropriate)  Goal: Interprofessional Rounds/Family Conf  Outcome: Ongoing (interventions implemented as appropriate)     Problem: Fall Risk (Adult)  Goal: Identify Related Risk Factors and Signs and Symptoms  Outcome: Ongoing (interventions implemented as appropriate)  Goal: Absence of Fall  Outcome: Ongoing (interventions implemented as appropriate)     Problem: Arrhythmia/Dysrhythmia (Symptomatic) (Adult)  Goal: Signs and Symptoms of Listed Potential Problems Will be Absent, Minimized or Managed (Arrhythmia/Dysrhythmia)  Outcome: Ongoing (interventions implemented as appropriate)     Problem: Skin Injury Risk (Adult)  Goal: Identify Related Risk Factors and Signs and Symptoms  Outcome: Ongoing (interventions implemented as appropriate)  Goal: Skin Health and Integrity  Outcome: Ongoing (interventions implemented as appropriate)

## 2020-03-14 NOTE — PLAN OF CARE
Patient A&Ox3, v/s stable, controlled Afib, call light in reach, daughter at bedside, awaiting transport to [facility].

## 2020-03-14 NOTE — PROGRESS NOTES
Reason for follow-up: New onset atrial fibrillation  History of CAD  History of mitral and aortic stenosis  Severe pulmonary hypertension     Patient Care Team:  Eliza Clayton APRN as PCP - General (Nurse Practitioner)  Eliza Clayton APRN as PCP - Family Medicine  Demond Valiente MD (Cardiology)  Gibson Medina MD as Surgeon (Cardiothoracic Surgery)    Subjective .   Feels tired and weak     Review of Systems   Constitution: Positive for malaise/fatigue. Negative for fever.   HENT: Negative for congestion and hearing loss.    Eyes: Negative for double vision and visual disturbance.   Cardiovascular: Positive for dyspnea on exertion. Negative for chest pain, claudication, leg swelling and syncope.   Respiratory: Positive for shortness of breath. Negative for cough.    Endocrine: Negative for cold intolerance.   Skin: Negative for color change and rash.   Musculoskeletal: Negative for arthritis and joint pain.   Gastrointestinal: Negative for abdominal pain and heartburn.   Genitourinary: Negative for hematuria.   Neurological: Negative for excessive daytime sleepiness and dizziness.   Psychiatric/Behavioral: Negative for depression. The patient is not nervous/anxious.    All other systems reviewed and are negative.      Hydrocodone    Scheduled Meds:    apixaban 5 mg Oral Q12H   atorvastatin 20 mg Oral Daily   calcium-vitamin D 1 tablet Oral Daily   cefTRIAXone 1 g Intravenous Q24H   dicyclomine 10 mg Oral 4x Daily   docusate sodium 100 mg Oral Daily   doxycycline 100 mg Intravenous Q12H   escitalopram 10 mg Oral Daily   ipratropium-albuterol 3 mL Nebulization Q6H - RT   levothyroxine 50 mcg Oral Q AM   metoprolol tartrate 25 mg Oral Q12H   pantoprazole 40 mg Oral QAM   rifAXIMin 200 mg Oral Q12H   sodium chloride 10 mL Intravenous Q12H     Continuous Infusions:     PRN Meds:.•  acetaminophen **OR** acetaminophen **OR** acetaminophen  •  albuterol  •  aluminum-magnesium  "hydroxide-simethicone  •  bisacodyl  •  bisacodyl  •  magnesium hydroxide  •  magnesium sulfate **OR** magnesium sulfate in D5W 1g/100mL (PREMIX)  •  melatonin  •  nitroglycerin  •  ondansetron **OR** ondansetron  •  potassium chloride  •  potassium chloride  •  sodium chloride  •  sodium chloride    Objective   Looks comfortable lying in the bed    VITAL SIGNS  Vitals:    03/14/20 0911 03/14/20 0914 03/14/20 1119 03/14/20 1122   BP:   134/65    BP Location:   Right arm    Patient Position:   Sitting    Pulse: 103 111 92 107   Resp: 24 23 16 18   Temp:   97.5 °F (36.4 °C)    TempSrc:   Oral    SpO2: 97% 98% 97% 98%   Weight:       Height:           Flowsheet Rows      First Filed Value   Admission Height  170.2 cm (67\") Documented at 03/09/2020 1302   Admission Weight  82.2 kg (181 lb 3.5 oz) Documented at 03/09/2020 1302           TELEMETRY: Atrial fibrillation rate controlled    Physical Exam:  Physical Exam   Constitutional: She is oriented to person, place, and time. She appears well-developed and well-nourished. She is cooperative.   HENT:   Head: Normocephalic and atraumatic.   Mouth/Throat: Uvula is midline and oropharynx is clear and moist. No oral lesions.   Eyes: Conjunctivae are normal. No scleral icterus.   Neck: Trachea normal. Neck supple. No JVD present. Carotid bruit is not present. No thyromegaly present.   Cardiovascular: Normal rate, S1 normal, S2 normal, intact distal pulses and normal pulses. An irregularly irregular rhythm present. PMI is not displaced. Exam reveals no gallop and no friction rub.   Murmur heard.  Pulmonary/Chest: Effort normal and breath sounds normal.   Abdominal: Soft. Bowel sounds are normal.   Musculoskeletal: Normal range of motion.   Neurological: She is alert and oriented to person, place, and time. She has normal strength.   No focal deficits   Skin: Skin is warm. No cyanosis.   Psychiatric: She has a normal mood and affect.                LAB RESULTS (LAST 7 " DAYS)    CBC  Results from last 7 days   Lab Units 03/14/20 0322 03/13/20 0349 03/12/20  0714 03/11/20  0236 03/10/20  0532 03/09/20  1418   WBC 10*3/mm3 8.50 8.10 8.60 9.00 9.50 11.00*   RBC 10*6/mm3 4.21 4.19 4.26 4.18 4.33 4.49   HEMOGLOBIN g/dL 12.2 11.7* 11.9* 12.2 12.1 12.7   HEMATOCRIT % 37.0 36.9 38.6 36.8 38.2 39.5   MCV fL 88.0 87.9 90.5 87.9 88.3 88.0   PLATELETS 10*3/mm3 164 169 166 150 159 183       BMP  Results from last 7 days   Lab Units 03/14/20 0322 03/13/20 0349 03/12/20  0714 03/11/20  0236 03/10/20  0532 03/09/20  1418   SODIUM mmol/L 140 141 139 140 140 139   POTASSIUM mmol/L 4.2 4.0 3.7 3.7 4.4 3.3*   CHLORIDE mmol/L 106 105 103 102 101 95*   CO2 mmol/L 25.0 24.0 24.0 24.0 24.0 29.0   BUN mg/dL 19 20 23 26* 29* 27*   CREATININE mg/dL 1.10* 1.15* 1.33* 1.26* 1.43* 1.52*   GLUCOSE mg/dL 124* 115* 109* 124* 109* 100*   MAGNESIUM mg/dL  --   --   --   --  1.7  --        CMP   Results from last 7 days   Lab Units 03/14/20 0322 03/13/20 0349 03/12/20  0714 03/11/20  0236 03/10/20  0532 03/09/20  1418   SODIUM mmol/L 140 141 139 140 140 139   POTASSIUM mmol/L 4.2 4.0 3.7 3.7 4.4 3.3*   CHLORIDE mmol/L 106 105 103 102 101 95*   CO2 mmol/L 25.0 24.0 24.0 24.0 24.0 29.0   BUN mg/dL 19 20 23 26* 29* 27*   CREATININE mg/dL 1.10* 1.15* 1.33* 1.26* 1.43* 1.52*   GLUCOSE mg/dL 124* 115* 109* 124* 109* 100*   ALBUMIN g/dL  --   --   --   --   --  3.50   BILIRUBIN mg/dL  --   --   --   --   --  1.4*   ALK PHOS U/L  --   --   --   --   --  228*   AST (SGOT) U/L  --   --   --   --   --  48*   ALT (SGPT) U/L  --   --   --   --   --  31         BNP        TROPONIN  Results from last 7 days   Lab Units 03/12/20  0714   TROPONIN T ng/mL <0.010       CoAg        Creatinine Clearance  Estimated Creatinine Clearance: 45 mL/min (A) (by C-G formula based on SCr of 1.1 mg/dL (H)).    ABG        Radiology  No radiology results for the last day          EKG          I personally viewed and interpreted the patient's  EKG/Telemetry data:    ECHOCARDIOGRAM:    Results for orders placed during the hospital encounter of 03/09/20   Adult Transesophageal Echo (ESTEFANI) W/ Cont if Necessary Per Protocol (Cardiology Department)    Narrative · Estimated EF = 65%.  · Left ventricular systolic function is normal.  · Moderate to severe mitral insufficiency at least moderate mitral   stenosis severe tricuspid insufficiency moderate aortic stenosis noted     Normal LV systolic function EF 65%  Biatrial enlargement  Severely calcified mitral leaflets with at least moderate mitral stenosis   moderate to severe mitral insufficiency  Severe mitral annular calcification  Tricuspid leaflets mildly thickened and sclerosed  There is significant right atrial dilation also RV dilatation  Severe tricuspid insufficiency  Calcified aortic leaflets with decreased opening at least moderate aortic   stenosis  Intra-atrial septum is intact bubble study negative for shunt  Left atrial appendage showed spontaneous echo contrast       STRESS MYOVIEW:    CARDIAC CATHETERIZATION:    OTHER:         Assessment/Plan       Atrial fibrillation, new onset (CMS/HCC)    Moderate tricuspid regurgitation    Atherosclerosis of native coronary artery of native heart without angina pectoris    History of CVA (cerebrovascular accident)    Hyperlipidemia    Pulmonary hypertension (CMS/HCC)    Mitral valve stenosis    Atrial fibrillation with RVR (CMS/HCC)    CAP (community acquired pneumonia)    Acute renal insufficiency    Hypokalemia    Syncope    Chronic respiratory failure (CMS/HCC)      New onset atrial fibrillation with rapid ventricular rate  Continue aggressive control of rate  Anticoagulation  Severe pulmonary hypertension  History of mitral valve stenosis and aortic valve stenosis  Patient underwent transesophageal echocardiogram  Severe tricuspid insufficiency with severe RV dilatation  Moderate severe mitral stenosis and aortic stenosis moderate to severe mitral  insufficiency  Continue aggressive control of hypertension dyslipidemia  Patient films were reviewed with Dr. Chappell  Suggest conservative treatment not a candidate for surgery  Needs aggressive rate control and anticoagulation    Demond Valiente MD  03/14/20  11:24

## 2020-03-14 NOTE — PROGRESS NOTES
Discharge Planning Assessment   Gab     Patient Name: Karen Rubio  MRN: [id number]  Today's Date: 3/14/2020    Admit Date: 3/9/2020      Discharge Plan     Row Name 20 1114       Plan    Plan Comments  Delmy stated precert will  at the end of today, Saturday, 2020.  If medically stable, Pt can transfer to facility today to room 125 bed 1.  If Pt does not transfer today, precert will need to be restarted during the week.        Destination - Selection Complete      Service Provider Request Status Selected Services Address Phone Number Fax Number    [facility] [address] [address] [phone]          Lynette BRYAN, LSW  Weekend   Care Management Dept  Cell [phone]  Weekday Department [phone]

## 2020-03-14 NOTE — NURSING NOTE
Pt's family turned off chair alarm and moved patient to bed and left without notifying staff.  Patient was found up out of bed on BSC across the room.  Made zero attempt to use call light, unhooked all her own wires.  Pt educated on falls risk.  NOT very receptive.

## 2020-03-14 NOTE — DISCHARGE SUMMARY
[facility] Medicine Services  DISCHARGE SUMMARY        Prepared For PCP:  Eliza Clayton APRN    Patient Name: Karen Rubio  : 1935  MRN: [id number]      Date of Admission:   3/9/2020    Date of Discharge:  3/14/2020    Length of stay:  LOS: 5 days     Hospital Course     Presenting Problem:   Atrial fibrillation, new onset (CMS/HCC) [I48.91]  Atrial fibrillation with RVR (CMS/HCC) [I48.91]  Acute kidney injury-resolved    Active Hospital Problems    Diagnosis  POA   • **Atrial fibrillation, new onset (CMS/HCC) [I48.91]  Yes     Priority: High   • CAP (community acquired pneumonia) [J18.9]  Yes     Priority: High   • Moderate tricuspid regurgitation [I07.1]  Yes     Priority: High   • Atherosclerosis of native coronary artery of native heart without angina pectoris [I25.10]  Yes     Priority: High   • Pulmonary hypertension (CMS/HCC) [I27.20]  Yes     Priority: High   • Mitral valve stenosis [I05.0]  Yes     Priority: High   • Acute renal insufficiency [N28.9]  Yes     Priority: Medium   • Hypokalemia [E87.6]  Yes     Priority: Medium   • Chronic respiratory failure (CMS/HCC) [J96.10]  Yes     Priority: Medium   • History of CVA (cerebrovascular accident) [Z86.73]  Not Applicable     Priority: Medium   • Atrial fibrillation with RVR (CMS/HCC) [I48.91]  Yes     Priority: Low   • Hyperlipidemia [E78.5]  Yes     Priority: Low   • Syncope [R55]  Yes      Resolved Hospital Problems   No resolved problems to display.           Hospital Course:  Karen Rubio is a 84 y.o. female who was admitted on 3/9/2020 for new onset atrial fibrillation with a rapid ventricular response as well as a community-acquired pneumonia.  In addition to these issues the patient also had acute renal injury.  The patient's acute renal injury has dramatically improved and her renal function is now back to normal.  The patient was started on Eliquis and cardiology adjusted the patient's medication.  She  may need additional therapy to further decrease her rate as an outpatient.  She has been cleared by cardiology to discharge to [facility].    The patient came in with marked renal injury with an initial creatinine of 1.92 which has improved to a creatinine of 1.1.  The patient has been taken off of her Lasix and potassium and her electrolytes are doing well as is her renal function.  The patient was also noted to have a pneumonia.  All the cultures were negative, and she was empirically treated with Rocephin and doxycycline.  This has been changed to Omnicef and doxycycline by mouth for an additional 4 days to complete her course.  The patient will need a chest x-ray done in 1 week.    The patient is a full code at the time of discharge.  The patient does have some cultures that have not finally reported.  The patient is on a cardiac diet.  The patient's medications are as outlined in that section of this report.  The patient is transferred to [facility] for rehabilitation care prior to returning to her home environment.  The patient should follow-up with her primary care physician in 3 to 5 days and with her cardiologist in 3 weeks.  The patient will need a chest x-ray done in 1 to 2 weeks to document clearing of her infiltrate.  The patient is discharged in satisfactory condition.          Recommendation for Outpatient Providers:             Reasons For Change In Medications and Indications for New Medications:        Day of Discharge     HPI:   The patient offers no new complaints.  She is ready to go to rehab.    Vital Signs:   Temp:  [97.2 °F (36.2 °C)-97.6 °F (36.4 °C)] 97.5 °F (36.4 °C)  Heart Rate:  [] 158  Resp:  [16-27] 18  BP: (122-165)/(65-98) 134/65     Physical Exam:  Physical Exam   Constitutional: She is oriented to person, place, and time. She appears well-developed and well-nourished. No distress.   HENT:   Head: Normocephalic and atraumatic.    Right Ear: External ear normal.   Left Ear: External ear normal.   Nose: Nose normal.   Mouth/Throat: Oropharynx is clear and moist. No oropharyngeal exudate.   Eyes: Pupils are equal, round, and reactive to light. Conjunctivae and EOM are normal. Right eye exhibits no discharge. Left eye exhibits no discharge. No scleral icterus.   Neck: Normal range of motion. No JVD present. No tracheal deviation present. No thyromegaly present.   Cardiovascular: Normal rate, regular rhythm, normal heart sounds and intact distal pulses. Exam reveals no gallop and no friction rub.   No murmur heard.  Pulmonary/Chest: Effort normal and breath sounds normal. No stridor. No respiratory distress. She has no wheezes. She has no rales. She exhibits no tenderness.   Abdominal: Soft. Bowel sounds are normal. She exhibits no distension and no mass. There is no tenderness. There is no rebound and no guarding. No hernia.   Musculoskeletal: Normal range of motion. She exhibits no edema, tenderness or deformity.   Lymphadenopathy:     She has no cervical adenopathy.   Neurological: She is alert and oriented to person, place, and time. No cranial nerve deficit or sensory deficit. She exhibits normal muscle tone. Coordination normal.   Skin: Skin is warm and dry. No rash noted. She is not diaphoretic. No erythema.   Psychiatric: She has a normal mood and affect. Her behavior is normal.   Nursing note and vitals reviewed.      Pertinent  and/or Most Recent Results     Results from last 7 days   Lab Units 03/14/20  0322 03/13/20  0349 03/12/20  0714 03/11/20  0236 03/10/20  0532 03/09/20  1418   WBC 10*3/mm3 8.50 8.10 8.60 9.00 9.50 11.00*   HEMOGLOBIN g/dL 12.2 11.7* 11.9* 12.2 12.1 12.7   HEMATOCRIT % 37.0 36.9 38.6 36.8 38.2 39.5   PLATELETS 10*3/mm3 164 169 166 150 159 183   SODIUM mmol/L 140 141 139 140 140 139   POTASSIUM mmol/L 4.2 4.0 3.7 3.7 4.4 3.3*   CHLORIDE mmol/L 106 105 103 102 101 95*   CO2 mmol/L 25.0 24.0 24.0 24.0 24.0 29.0    BUN mg/dL 19 20 23 26* 29* 27*   CREATININE mg/dL 1.10* 1.15* 1.33* 1.26* 1.43* 1.52*   GLUCOSE mg/dL 124* 115* 109* 124* 109* 100*   CALCIUM mg/dL 10.1 9.7 9.4 9.0 9.2 9.6     Results from last 7 days   Lab Units 03/09/20  1418   BILIRUBIN mg/dL 1.4*   ALK PHOS U/L 228*   ALT (SGPT) U/L 31   AST (SGOT) U/L 48*           Invalid input(s): TG, LDLCALC, LDLREALC  Results from last 7 days   Lab Units 03/12/20  0714 03/11/20  2352 03/11/20  1739 03/11/20  0845 03/11/20  0236  03/09/20  1844 03/09/20  1418   TSH uIU/mL  --   --   --   --   --   --   --  5.390*   PROBNP pg/mL  --   --   --   --   --   --   --  8,621.0*   TROPONIN T ng/mL <0.010 <0.010 <0.010 <0.010 <0.010   < >  --  <0.010   PROCALCITONIN ng/mL  --   --   --   --   --   --  0.13  --     < > = values in this interval not displayed.       Brief Urine Lab Results     None          Microbiology Results Abnormal     Procedure Component Value - Date/Time    S. Pneumo Ag Urine or CSF - Urine, Urine, Clean Catch [[id number]]  (Normal) Collected:  03/10/20 1311    Lab Status:  Final result Specimen:  Urine, Clean Catch Updated:  03/10/20 1410     Strep Pneumo Ag Negative    Legionella Antigen, Urine - Urine, Urine, Clean Catch [[id number]]  (Normal) Collected:  03/10/20 1311    Lab Status:  Final result Specimen:  Urine, Clean Catch Updated:  03/10/20 1410     LEGIONELLA ANTIGEN, URINE Negative    Respiratory Panel, PCR - Swab, Nasopharynx [[id number]]  (Normal) Collected:  03/09/20 1541    Lab Status:  Final result Specimen:  Swab from Nasopharynx Updated:  03/09/20 2109     ADENOVIRUS, PCR Not Detected     Coronavirus 229E Not Detected     Coronavirus HKU1 Not Detected     Coronavirus NL63 Not Detected     Coronavirus OC43 Not Detected     Human Metapneumovirus Not Detected     Human Rhinovirus/Enterovirus Not Detected     Influenza B PCR Not Detected     Parainfluenza Virus 1 Not Detected     Parainfluenza Virus 2 Not Detected     Parainfluenza Virus 3 Not  Detected     Parainfluenza Virus 4 Not Detected     Bordetella pertussis pcr Not Detected     Influenza A H1 2009 PCR Not Detected     Chlamydophila pneumoniae PCR Not Detected     Mycoplasma pneumo by PCR Not Detected     Influenza A PCR Not Detected     Influenza A H3 Not Detected     Influenza A H1 Not Detected     RSV, PCR Not Detected    Narrative:       The coronavirus on the RVP is NOT COVID-19 and is NOT indicative of infection with COVID-19.     Influenza Antigen, Rapid - Swab, Nasopharynx [[id number]]  (Normal) Collected:  03/09/20 1541    Lab Status:  Final result Specimen:  Swab from Nasopharynx Updated:  03/09/20 1928     Influenza A PCR Not Detected     Influenza B PCR Not Detected          Xr Chest 1 View    Result Date: 3/11/2020  Impression:  1. Interval development of right medial basilar airspace disease. Correlate clinically for pneumonia. 2. Persistent left mid and lower lung airspace opacities. Trace left effusion.  Electronically Signed By-Neal Bourgeois On:3/11/2020 3:42 PM This report was finalized on [id number] by  Neal Bourgeois, .    Xr Chest 1 View    Result Date: 3/9/2020  Impression: Hazy opacities throughout left lung, suggesting pneumonia.  Electronically Signed By-DR. Bernard Arriaga MD On:3/9/2020 2:40 PM This report was finalized on [id number] by DR. Bernard Arriaga MD.      Results for orders placed in visit on 05/16/16   SCANNED VASCULAR STUDIES       Results for orders placed in visit on 05/16/16   SCANNED VASCULAR STUDIES       Results for orders placed during the hospital encounter of 03/09/20   Adult Transesophageal Echo (ESTEFANI) W/ Cont if Necessary Per Protocol (Cardiology Department)    Narrative · Estimated EF = 65%.  · Left ventricular systolic function is normal.  · Moderate to severe mitral insufficiency at least moderate mitral   stenosis severe tricuspid insufficiency moderate aortic stenosis noted     Normal LV systolic function EF 65%  Biatrial  enlargement  Severely calcified mitral leaflets with at least moderate mitral stenosis   moderate to severe mitral insufficiency  Severe mitral annular calcification  Tricuspid leaflets mildly thickened and sclerosed  There is significant right atrial dilation also RV dilatation  Severe tricuspid insufficiency  Calcified aortic leaflets with decreased opening at least moderate aortic   stenosis  Intra-atrial septum is intact bubble study negative for shunt  Left atrial appendage showed spontaneous echo contrast                 Test Results Pending at Discharge        Procedures Performed           Consults:   Consults     Date and Time Order Name Status Description    3/11/2020 1513 Inpatient Cardiothoracic Surgery Consult Completed     3/9/2020 1550 Inpatient Cardiology Consult              Discharge Details        Discharge Medications      New Medications      Instructions Start Date   acetaminophen 325 MG tablet  Commonly known as:  TYLENOL   650 mg, Oral, Every 4 Hours PRN      aluminum-magnesium hydroxide-simethicone 400-400-40 MG/5ML suspension  Commonly known as:  MAALOX MAX   15 mL, Oral, Every 6 Hours PRN      cefdinir 300 MG capsule  Commonly known as:  OMNICEF   300 mg, Oral, 2 Times Daily      doxycycline 100 MG enteric coated tablet  Commonly known as:  DORYX   100 mg, Oral, 2 Times Daily      metoprolol tartrate 25 MG tablet  Commonly known as:  LOPRESSOR   25 mg, Oral, Every 12 Hours Scheduled      nitroglycerin 0.4 MG SL tablet  Commonly known as:  NITROSTAT   0.4 mg, Sublingual, Every 5 Minutes PRN, Take no more than 3 doses in 15 minutes.      ondansetron 4 MG tablet  Commonly known as:  ZOFRAN   4 mg, Oral, Every 6 Hours PRN         Continue These Medications      Instructions Start Date   albuterol sulfate  (90 Base) MCG/ACT inhaler  Commonly known as:  PROVENTIL HFA;VENTOLIN HFA;PROAIR HFA   2 puffs, Inhalation, Every 4 Hours PRN      apixaban 5 MG tablet tablet  Commonly known as:   ELIQUIS   5 mg, Oral, 2 Times Daily      atorvastatin 20 MG tablet  Commonly known as:  LIPITOR   TAKE 1 TABLET EVERY DAY      calcium carbonate 600 MG tablet  Commonly known as:  OS-SHERWIN   600 mg, Oral      dicyclomine 10 MG capsule  Commonly known as:  BENTYL   1 capsule, Oral, 4 Times Daily      docusate calcium 240 MG capsule  Commonly known as:  SURFAK   Oral, Daily      escitalopram 10 MG tablet  Commonly known as:  LEXAPRO   10 mg, Oral, Daily      esomeprazole 40 MG capsule  Commonly known as:  nexIUM   TAKE 1 CAPSULE EVERY DAY      ICAPS capsule   Oral      ipratropium-albuterol 0.5-2.5 mg/3 ml nebulizer  Commonly known as:  DUO-NEB   No dose, route, or frequency recorded.      levothyroxine 50 MCG tablet  Commonly known as:  SYNTHROID, LEVOTHROID   TAKE 1 TABLET EVERY DAY      raNITIdine 300 MG tablet  Commonly known as:  ZANTAC   1 tablet, Oral, Daily      temazepam 15 MG capsule  Commonly known as:  RESTORIL   TAKE ONE CAPSULE BY MOUTH EVERY NIGHT AT BEDTIME AS NEEDED FOR SLEEP      TURMERIC PO   Oral      Vitamin D-3 25 MCG (1000 UT) capsule   1 capsule, Oral, Daily      XIFAXAN PO   Oral         Stop These Medications    furosemide 40 MG tablet  Commonly known as:  LASIX     potassium chloride 10 MEQ CR capsule  Commonly known as:  MICRO-K            Allergies   Allergen Reactions   • Hydrocodone Shortness Of Breath         Discharge Disposition:  Rehab Facility or Unit (DC - External)    Diet:  Hospital:  Diet Order   Procedures   • Diet Cardiac; Healthy Heart         Discharge Activity:         CODE STATUS:    Code Status and Medical Interventions:   Ordered at: 03/09/20 1258     Level Of Support Discussed With:    Patient     Code Status:    CPR     Medical Interventions (Level of Support Prior to Arrest):    Full         Follow-up Appointments  Future Appointments   Date Time Provider Department Center   6/17/2020 11:00 AM MD, [facility] None             Condition on Discharge:       Stable          Electronically signed by Starla Meyer MD, 03/14/20, 1:50 PM.    Time: I spent  40  minutes on this discharge activity which included face-to-face encounter with the patient/reviewing the data in the system/coordination of the care with the nursing staff as well as consultants/documentation/entering orders.

## 2020-03-15 ENCOUNTER — LAB REQUISITION (OUTPATIENT)
Dept: LAB | Facility: HOSPITAL | Age: 85
End: 2020-03-15

## 2020-03-15 DIAGNOSIS — I48.91 UNSPECIFIED ATRIAL FIBRILLATION (HCC): ICD-10-CM

## 2020-03-15 DIAGNOSIS — N28.9 DISORDER OF KIDNEY AND URETER, UNSPECIFIED: ICD-10-CM

## 2020-03-15 DIAGNOSIS — J18.9 PNEUMONIA, UNSPECIFIED ORGANISM: ICD-10-CM

## 2020-03-15 LAB
ANION GAP SERPL CALCULATED.3IONS-SCNC: 13 MMOL/L (ref 5–15)
BUN BLD-MCNC: 20 MG/DL (ref 8–23)
BUN/CREAT SERPL: 16 (ref 7–25)
CALCIUM SPEC-SCNC: 9.9 MG/DL (ref 8.6–10.5)
CHLORIDE SERPL-SCNC: 105 MMOL/L (ref 98–107)
CO2 SERPL-SCNC: 23 MMOL/L (ref 22–29)
CREAT BLD-MCNC: 1.25 MG/DL (ref 0.57–1)
DACRYOCYTES BLD QL SMEAR: ABNORMAL
DEPRECATED RDW RBC AUTO: 56 FL (ref 37–54)
ERYTHROCYTE [DISTWIDTH] IN BLOOD BY AUTOMATED COUNT: 16.8 % (ref 12.3–15.4)
GFR SERPL CREATININE-BSD FRML MDRD: 41 ML/MIN/1.73
GLUCOSE BLD-MCNC: 106 MG/DL (ref 65–99)
HCT VFR BLD AUTO: 41.2 % (ref 34–46.6)
HGB BLD-MCNC: 12.4 G/DL (ref 12–15.9)
LYMPHOCYTES # BLD MANUAL: 0.92 10*3/MM3 (ref 0.7–3.1)
LYMPHOCYTES NFR BLD MANUAL: 12 % (ref 19.6–45.3)
LYMPHOCYTES NFR BLD MANUAL: 3 % (ref 5–12)
MCH RBC QN AUTO: 28.1 PG (ref 26.6–33)
MCHC RBC AUTO-ENTMCNC: 30.2 G/DL (ref 31.5–35.7)
MCV RBC AUTO: 93.1 FL (ref 79–97)
MONOCYTES # BLD AUTO: 0.23 10*3/MM3 (ref 0.1–0.9)
NEUTROPHILS # BLD AUTO: 6.55 10*3/MM3 (ref 1.7–7)
NEUTROPHILS NFR BLD MANUAL: 80 % (ref 42.7–76)
NEUTS BAND NFR BLD MANUAL: 5 % (ref 0–5)
NRBC SPEC MANUAL: 2 /100 WBC (ref 0–0.2)
PLATELET # BLD AUTO: 137 10*3/MM3 (ref 140–450)
PMV BLD AUTO: 9.7 FL (ref 6–12)
POIKILOCYTOSIS BLD QL SMEAR: ABNORMAL
POTASSIUM BLD-SCNC: 4.9 MMOL/L (ref 3.5–5.2)
RBC # BLD AUTO: 4.42 10*6/MM3 (ref 3.77–5.28)
SCAN SLIDE: NORMAL
SMALL PLATELETS BLD QL SMEAR: ADEQUATE
SODIUM BLD-SCNC: 141 MMOL/L (ref 136–145)
WBC MORPH BLD: NORMAL
WBC NRBC COR # BLD: 7.7 10*3/MM3 (ref 3.4–10.8)

## 2020-03-15 PROCEDURE — 80048 BASIC METABOLIC PNL TOTAL CA: CPT

## 2020-03-15 PROCEDURE — 85025 COMPLETE CBC W/AUTO DIFF WBC: CPT

## 2020-03-16 NOTE — PROGRESS NOTES
Case Management Discharge Note      Final Note: [facility] - Subacute         Destination - Selection Complete      Service Provider Request Status Selected Services Address Phone Number Fax Number    [facility] [address] [address] [phone]                Final Discharge Disposition Code: 03 - skilled nursing facility (SNF)

## 2020-03-18 ENCOUNTER — HOSPITAL ENCOUNTER (OUTPATIENT)
Dept: GENERAL RADIOLOGY | Facility: HOSPITAL | Age: 85
Discharge: HOME | End: 2020-03-18

## 2020-03-18 ENCOUNTER — LAB REQUISITION (OUTPATIENT)
Dept: LAB | Facility: HOSPITAL | Age: 85
End: 2020-03-18

## 2020-03-18 DIAGNOSIS — J18.9 PNEUMONIA: ICD-10-CM

## 2020-03-18 DIAGNOSIS — Z00.00 ENCOUNTER FOR GENERAL ADULT MEDICAL EXAMINATION WITHOUT ABNORMAL FINDINGS: ICD-10-CM

## 2020-03-18 LAB
ANION GAP SERPL CALCULATED.3IONS-SCNC: 7 MMOL/L (ref 5–15)
BUN BLD-MCNC: 26 MG/DL (ref 8–23)
BUN/CREAT SERPL: 20.5 (ref 7–25)
CALCIUM SPEC-SCNC: 10.1 MG/DL (ref 8.6–10.5)
CHLORIDE SERPL-SCNC: 104 MMOL/L (ref 98–107)
CO2 SERPL-SCNC: 31 MMOL/L (ref 22–29)
CREAT BLD-MCNC: 1.27 MG/DL (ref 0.57–1)
DEPRECATED RDW RBC AUTO: 53.4 FL (ref 37–54)
ERYTHROCYTE [DISTWIDTH] IN BLOOD BY AUTOMATED COUNT: 16.8 % (ref 12.3–15.4)
GFR SERPL CREATININE-BSD FRML MDRD: 40 ML/MIN/1.73
GLUCOSE BLD-MCNC: 107 MG/DL (ref 65–99)
HCT VFR BLD AUTO: 35.6 % (ref 34–46.6)
HGB BLD-MCNC: 11.6 G/DL (ref 12–15.9)
IRON 24H UR-MRATE: 41 MCG/DL (ref 37–145)
MCH RBC QN AUTO: 29.1 PG (ref 26.6–33)
MCHC RBC AUTO-ENTMCNC: 32.5 G/DL (ref 31.5–35.7)
MCV RBC AUTO: 89.4 FL (ref 79–97)
PLATELET # BLD AUTO: 133 10*3/MM3 (ref 140–450)
PMV BLD AUTO: 10.7 FL (ref 6–12)
POTASSIUM BLD-SCNC: 4.5 MMOL/L (ref 3.5–5.2)
RBC # BLD AUTO: 3.98 10*6/MM3 (ref 3.77–5.28)
SODIUM BLD-SCNC: 142 MMOL/L (ref 136–145)
WBC NRBC COR # BLD: 6.9 10*3/MM3 (ref 3.4–10.8)

## 2020-03-18 PROCEDURE — 83540 ASSAY OF IRON: CPT

## 2020-03-18 PROCEDURE — 80048 BASIC METABOLIC PNL TOTAL CA: CPT

## 2020-03-18 PROCEDURE — 71046 X-RAY EXAM CHEST 2 VIEWS: CPT

## 2020-03-18 PROCEDURE — 85027 COMPLETE CBC AUTOMATED: CPT

## 2020-03-20 ENCOUNTER — LAB REQUISITION (OUTPATIENT)
Dept: LAB | Facility: HOSPITAL | Age: 85
End: 2020-03-20

## 2020-03-20 DIAGNOSIS — J18.9 PNEUMONIA, UNSPECIFIED ORGANISM: ICD-10-CM

## 2020-03-20 DIAGNOSIS — N28.9 DISORDER OF KIDNEY AND URETER, UNSPECIFIED: ICD-10-CM

## 2020-03-20 LAB
ANION GAP SERPL CALCULATED.3IONS-SCNC: 10 MMOL/L (ref 5–15)
BUN BLD-MCNC: 28 MG/DL (ref 8–23)
BUN/CREAT SERPL: 21.4 (ref 7–25)
CALCIUM SPEC-SCNC: 10.2 MG/DL (ref 8.6–10.5)
CHLORIDE SERPL-SCNC: 103 MMOL/L (ref 98–107)
CO2 SERPL-SCNC: 29 MMOL/L (ref 22–29)
CREAT BLD-MCNC: 1.31 MG/DL (ref 0.57–1)
GFR SERPL CREATININE-BSD FRML MDRD: 39 ML/MIN/1.73
GLUCOSE BLD-MCNC: 111 MG/DL (ref 65–99)
POTASSIUM BLD-SCNC: 4.6 MMOL/L (ref 3.5–5.2)
SODIUM BLD-SCNC: 142 MMOL/L (ref 136–145)

## 2020-03-20 PROCEDURE — 80048 BASIC METABOLIC PNL TOTAL CA: CPT

## 2020-03-25 ENCOUNTER — LAB REQUISITION (OUTPATIENT)
Dept: LAB | Facility: HOSPITAL | Age: 85
End: 2020-03-25

## 2020-03-25 ENCOUNTER — HOSPITAL ENCOUNTER (OUTPATIENT)
Dept: GENERAL RADIOLOGY | Facility: HOSPITAL | Age: 85
Discharge: HOME | End: 2020-03-25

## 2020-03-25 DIAGNOSIS — J18.9 PNEUMONIA: ICD-10-CM

## 2020-03-25 DIAGNOSIS — Z00.00 ENCOUNTER FOR GENERAL ADULT MEDICAL EXAMINATION WITHOUT ABNORMAL FINDINGS: ICD-10-CM

## 2020-03-25 LAB
BASOPHILS # BLD AUTO: 0 10*3/MM3 (ref 0–0.2)
BASOPHILS NFR BLD AUTO: 0.7 % (ref 0–1.5)
DEPRECATED RDW RBC AUTO: 53.4 FL (ref 37–54)
EOSINOPHIL # BLD AUTO: 0 10*3/MM3 (ref 0–0.4)
EOSINOPHIL NFR BLD AUTO: 0.6 % (ref 0.3–6.2)
ERYTHROCYTE [DISTWIDTH] IN BLOOD BY AUTOMATED COUNT: 17.4 % (ref 12.3–15.4)
HCT VFR BLD AUTO: 34.4 % (ref 34–46.6)
HGB BLD-MCNC: 11.2 G/DL (ref 12–15.9)
LYMPHOCYTES # BLD AUTO: 1.1 10*3/MM3 (ref 0.7–3.1)
LYMPHOCYTES NFR BLD AUTO: 15.9 % (ref 19.6–45.3)
MCH RBC QN AUTO: 28.5 PG (ref 26.6–33)
MCHC RBC AUTO-ENTMCNC: 32.6 G/DL (ref 31.5–35.7)
MCV RBC AUTO: 87.2 FL (ref 79–97)
MONOCYTES # BLD AUTO: 0.7 10*3/MM3 (ref 0.1–0.9)
MONOCYTES NFR BLD AUTO: 11.2 % (ref 5–12)
NEUTROPHILS # BLD AUTO: 4.8 10*3/MM3 (ref 1.7–7)
NEUTROPHILS NFR BLD AUTO: 71.6 % (ref 42.7–76)
NRBC BLD AUTO-RTO: 0 /100 WBC (ref 0–0.2)
PLATELET # BLD AUTO: 108 10*3/MM3 (ref 140–450)
PMV BLD AUTO: 11.1 FL (ref 6–12)
RBC # BLD AUTO: 3.95 10*6/MM3 (ref 3.77–5.28)
WBC NRBC COR # BLD: 6.7 10*3/MM3 (ref 3.4–10.8)

## 2020-03-25 PROCEDURE — 71046 X-RAY EXAM CHEST 2 VIEWS: CPT

## 2020-03-25 PROCEDURE — 85025 COMPLETE CBC W/AUTO DIFF WBC: CPT

## 2020-03-26 PROCEDURE — 93010 ELECTROCARDIOGRAM REPORT: CPT | Performed by: INTERNAL MEDICINE

## 2020-03-27 RX ORDER — FUROSEMIDE 40 MG/1
TABLET ORAL
Qty: 90 TABLET | Refills: 0 | Status: SHIPPED | OUTPATIENT
Start: 2020-03-27 | End: 2020-06-17

## 2020-03-27 RX ORDER — FUROSEMIDE 40 MG/1
TABLET ORAL
Qty: 90 TABLET | Refills: 0 | Status: SHIPPED | OUTPATIENT
Start: 2020-03-27 | End: 2020-03-27

## 2020-04-13 RX ORDER — POTASSIUM CHLORIDE 750 MG/1
CAPSULE, EXTENDED RELEASE ORAL
Qty: 30 CAPSULE | Refills: 0 | OUTPATIENT
Start: 2020-04-13

## 2020-05-19 ENCOUNTER — TELEPHONE (OUTPATIENT)
Dept: CARDIOLOGY | Facility: CLINIC | Age: 85
End: 2020-05-19

## 2020-05-29 ENCOUNTER — OFFICE VISIT (OUTPATIENT)
Dept: CARDIOLOGY | Facility: CLINIC | Age: 85
End: 2020-05-29

## 2020-05-29 VITALS
HEIGHT: 67 IN | HEART RATE: 88 BPM | SYSTOLIC BLOOD PRESSURE: 114 MMHG | BODY MASS INDEX: 26.84 KG/M2 | WEIGHT: 171 LBS | DIASTOLIC BLOOD PRESSURE: 71 MMHG | OXYGEN SATURATION: 95 %

## 2020-05-29 DIAGNOSIS — I48.91 ATRIAL FIBRILLATION, NEW ONSET (HCC): ICD-10-CM

## 2020-05-29 DIAGNOSIS — I10 ESSENTIAL HYPERTENSION: ICD-10-CM

## 2020-05-29 DIAGNOSIS — E78.2 MIXED HYPERLIPIDEMIA: ICD-10-CM

## 2020-05-29 DIAGNOSIS — I34.0 SEVERE MITRAL REGURGITATION: Primary | ICD-10-CM

## 2020-05-29 DIAGNOSIS — I05.0 MITRAL VALVE STENOSIS, UNSPECIFIED ETIOLOGY: ICD-10-CM

## 2020-05-29 DIAGNOSIS — I27.20 PULMONARY HYPERTENSION (HCC): ICD-10-CM

## 2020-05-29 DIAGNOSIS — I05.0 RHEUMATIC MITRAL STENOSIS: ICD-10-CM

## 2020-05-29 DIAGNOSIS — I25.10 ATHEROSCLEROSIS OF NATIVE CORONARY ARTERY OF NATIVE HEART WITHOUT ANGINA PECTORIS: ICD-10-CM

## 2020-05-29 PROCEDURE — 99213 OFFICE O/P EST LOW 20 MIN: CPT | Performed by: INTERNAL MEDICINE

## 2020-05-29 RX ORDER — POTASSIUM CHLORIDE 750 MG/1
20 CAPSULE, EXTENDED RELEASE ORAL DAILY
COMMUNITY
Start: 2020-04-17 | End: 2021-01-01 | Stop reason: SDUPTHER

## 2020-05-29 NOTE — PROGRESS NOTES
Subjective:     Encounter Date:05/29/2020      Patient ID: Karen Rubio is a 84 y.o. female.    Chief Complaint: Hospital Follow-up, atrial fibrillation  History of Present Illness     84 -year-old white female patient with known history of hypertension hyperlipidemia history of CVA on anticoagulation history of mitral stenosis recently found to have new onset atrial fibrillation      Her stress Myoview showed apical ischemia October 2018     Cardiac catheterization showed up to 60% ostial RCA disease, nov 2018   severe pulmonary hypertension more than 70 mm of Hg   transesophageal echocardiogram showed hyperdynamic LV systolic function EF 65-70% moderate mitral stenosis and insufficiency moderate tricuspid insufficiency mild to moderate aortic stenosis     Pulmonary follow-up regarding pulmonary hypertension   patient was cleared by Pulmonary for surgery  Patient and family decided no surgery and the point  Patient was recently admitted hospital with new onset atrial fibrillation  Transesophageal echocardiogram March 2020 EF 65% biatrial enlargement severely calcified mitral leaflets with at least moderate mitral stenosis moderate severe mitral insufficiency RV dilatation noted severe tricuspid insufficiency noted  Moderate aortic stenosis noted  Patient also have underlying severe pulmonary hypertension  She comes back for follow-up still having some symptoms of shortness of breath continuing on anticoagulation  Heart rates better  We will continue supportive care    The following portions of the patient's history were reviewed and updated as appropriate: Allergies current medications past family history past medical history past social history past surgical history problem list and review of systems  Past Medical History:   Diagnosis Date   • Abnormality of left atrial appendage 05/17/2016    Suspected Clot Noted on ESTEFANI   • Acute renal failure (CMS/HCC) 08/06/2016-[facility]    Front Dehydration   • Arthritis     • Breast density 12/2017   • Carotid stenosis 11/05/2018    R @ 60% and L @ 10-20% Noted on Cardiac Cath & 16-49% on L&R    • Heart murmur    • History of echocardiogram 10/13/2017    Calcified Aortic Leaftlets; Mild Aortic Stenosis Present; Mild Mitral Stenosis; Bilateral Enlargement Noted;  Moderate TR; LV Function of 55-60%   • History of echocardiogram 12/5/14-[facility]    Moderate L Vent Hypertrophy Noted; L Atrial Dilation; Moderate MR; Mild TR; Mild-Moderate Aortic Reg Noted; Normal Root Size/No Effusion   • History of echocardiogram 5/16/16-[facility]    Normal Findings with Mild-Moderate MVS Noted   • History of EKG 2004/2016 & 11/5/18-[facility]    All Sinus Rhythm w/Infarct Ischemnic Changes Noted   • HLD (hyperlipidemia)     Controlled w/Meds   • Hx of hyperglycemia    • Hypertension     Controlled w/Meds   • Hypothyroidism     Levothyroxine   • Joint pain    • Left atrial dilatation 12/05/2014    Mildly Noted on Echo   • Left ventricular hypertrophy 12/05/2014    Noted on Echo w/EF @ 55-60%   • Mild aortic regurgitation 12/05/2014    to Moderate Noted on Echo   • Mild aortic stenosis 10/13/2017    Noted on Echo & ESTEFANI-11/5/18-Moderate    • Mild mitral insufficiency 10/13/2017    Noted on Echo & ESTEFANI-11/5/18   • Mild tricuspid regurgitation 12/05/2014    Noted on Echo   • Moderate mitral regurgitation 12/05/2014    Noted on Echo   • Moderate mitral stenosis 10/13/2017    Noted on Echo   • Moderate tricuspid insufficiency 10/13/2017    Noted on Echo   • Pneumonia involving left lung 08/6/16-[facility]   • Pulmonary hypertension (CMS/HCC) 10/13/2017 & 11/5/18-Cath    Severe Noted on Echo & Cath   • Slurred speech 05/16/2016   • SOB (shortness of breath) 08/2016   • Thickened mitral leaflet 10/13/2017 & ESTEFANI-11/5/18    Severely Calcified Noted on Echo   • Tricuspid valve insufficiency 11/05/2018    Noted on ESTEFANI     Past Surgical History:   Procedure Laterality Date   • CARDIAC CATHETERIZATION Left 11/5/18-[facility]    w/L Coronary  "Angiography--Dr. Hernández-RCA @ 60% with Pulmonary Hypertension Noted   • CHOLECYSTECTOMY     • KNEE ARTHROSCOPY     • ESTEFANI  05/17/2016-[facility]    Dr. Hernández--Severly Calcified Mitral Leaflets w/Mild-Moderate Mitral Stenosis/Insufficiency; Sig Aortic Stenosis Noted; Suspecion of L Atrial Appendage Clot Noted   • ESTEFANI  11/5/18-[facility]    Dr. Hernández--Moderate Mitral Insufficiency Noted; Mild-Moderate AVS; Moderate Tricuspid Insufficiency Noted; EF of 65-70%   • TONSILLECTOMY       /71 (BP Location: Left arm, Patient Position: Sitting, Cuff Size: Adult)   Pulse 88   Ht 170.2 cm (67\")   Wt 77.6 kg (171 lb)   SpO2 95%   BMI 26.78 kg/m²   Family History   Problem Relation Age of Onset   • Heart disease Mother    • Cancer Father        Current Outpatient Medications:   •  acetaminophen (TYLENOL) 325 MG tablet, Take 2 tablets by mouth Every 4 (Four) Hours As Needed for Mild Pain ., Disp: 1 bottle, Rfl: 0  •  albuterol sulfate  (90 Base) MCG/ACT inhaler, Inhale 2 puffs Every 4 (Four) Hours As Needed for Wheezing or Shortness of Air., Disp: , Rfl:   •  apixaban (ELIQUIS) 5 MG tablet tablet, Take 1 tablet by mouth 2 (Two) Times a Day., Disp: 180 tablet, Rfl: 2  •  atorvastatin (LIPITOR) 20 MG tablet, TAKE 1 TABLET EVERY DAY, Disp: , Rfl:   •  calcium carbonate (OS-SHERWIN) 600 MG tablet, Take 600 mg by mouth., Disp: , Rfl:   •  Cholecalciferol (VITAMIN D-3) 1000 units capsule, Take 1 capsule by mouth Daily., Disp: , Rfl:   •  dicyclomine (BENTYL) 10 MG capsule, Take 1 capsule by mouth 4 (Four) Times a Day., Disp: , Rfl:   •  docusate calcium (SURFAK) 240 MG capsule, Take  by mouth Daily., Disp: , Rfl:   •  escitalopram (LEXAPRO) 10 MG tablet, Take 10 mg by mouth Daily., Disp: , Rfl:   •  esomeprazole (nexIUM) 40 MG capsule, TAKE 1 CAPSULE EVERY DAY, Disp: , Rfl:   •  furosemide (LASIX) 40 MG tablet, TAKE ONE TABLET BY MOUTH DAILY, Disp: 90 tablet, Rfl: 0  •  ipratropium-albuterol (DUO-NEB) 0.5-2.5 mg/3 ml nebulizer, , Disp: , Rfl: "   •  levothyroxine (SYNTHROID, LEVOTHROID) 50 MCG tablet, TAKE 1 TABLET EVERY DAY, Disp: , Rfl:   •  Multiple Vitamins-Minerals (ICAPS) capsule, Take  by mouth., Disp: , Rfl:   •  nitroglycerin (NITROSTAT) 0.4 MG SL tablet, Place 1 tablet under the tongue Every 5 (Five) Minutes As Needed for Chest Pain (Only if SBP Greater Than 100). Take no more than 3 doses in 15 minutes., Disp: 25 tablet, Rfl: 0  •  potassium chloride (MICRO-K) 10 MEQ CR capsule, , Disp: , Rfl:   •  raNITIdine (ZANTAC) 300 MG tablet, Take 1 tablet by mouth Daily., Disp: , Rfl:   •  rifAXIMin (XIFAXAN PO), Take  by mouth., Disp: , Rfl:   •  temazepam (RESTORIL) 15 MG capsule, TAKE ONE CAPSULE BY MOUTH EVERY NIGHT AT BEDTIME AS NEEDED FOR SLEEP, Disp: , Rfl:   •  TURMERIC PO, Take  by mouth., Disp: , Rfl:   •  aluminum-magnesium hydroxide-simethicone (MAALOX MAX) 400-400-40 MG/5ML suspension, Take 15 mL by mouth Every 6 (Six) Hours As Needed for Heartburn., Disp: 300 mL, Rfl: 0  •  metoprolol tartrate (LOPRESSOR) 25 MG tablet, Take 1 tablet by mouth Every 12 (Twelve) Hours. Please schedule an appointment, Disp: 60 tablet, Rfl: 0  •  ondansetron (ZOFRAN) 4 MG tablet, Take 1 tablet by mouth Every 6 (Six) Hours As Needed for Nausea or Vomiting., Disp: 30 tablet, Rfl: 0  Social History     Socioeconomic History   • Marital status:      Spouse name: Not on file   • Number of children: Not on file   • Years of education: Not on file   • Highest education level: Not on file   Occupational History   • Occupation: RETIRED-[employer]   Tobacco Use   • Smoking status: Former Smoker     Types: Cigarettes     Last attempt to quit:      Years since quittin.4   • Smokeless tobacco: Never Used   Substance and Sexual Activity   • Alcohol use: No     Frequency: Never   • Drug use: No   • Sexual activity: Defer     Birth control/protection: Post-menopausal     Allergies   Allergen Reactions   • Hydrocodone Shortness Of Breath     Review of Systems    Constitution: Negative for fever and malaise/fatigue.   HENT: Negative for congestion and hearing loss.    Eyes: Negative for double vision and visual disturbance.   Cardiovascular: Negative for chest pain, claudication, dyspnea on exertion, leg swelling and syncope.   Respiratory: Positive for shortness of breath (on O2). Negative for cough.    Endocrine: Negative for cold intolerance.   Skin: Negative for color change and rash.   Musculoskeletal: Negative for arthritis and joint pain.   Gastrointestinal: Negative for abdominal pain and heartburn.   Genitourinary: Negative for hematuria.   Neurological: Negative for excessive daytime sleepiness and dizziness.   Psychiatric/Behavioral: Negative for depression. The patient is not nervous/anxious.    All other systems reviewed and are negative.             Objective:     Physical Exam   Constitutional: She is oriented to person, place, and time. She appears well-developed and well-nourished. She is cooperative.   HENT:   Head: Normocephalic and atraumatic.   Mouth/Throat: Uvula is midline and oropharynx is clear and moist. No oral lesions.   Eyes: Conjunctivae are normal. No scleral icterus.   Neck: Trachea normal. Neck supple. No JVD present. Carotid bruit is not present. No thyromegaly present.   Cardiovascular: Normal rate, regular rhythm, S1 normal, S2 normal, normal heart sounds, intact distal pulses and normal pulses. PMI is not displaced. Exam reveals no gallop and no friction rub.   No murmur heard.  Pulmonary/Chest: Effort normal and breath sounds normal.   Abdominal: Soft. Bowel sounds are normal.   Musculoskeletal: Normal range of motion.   Neurological: She is alert and oriented to person, place, and time. She has normal strength.   No focal deficits   Skin: Skin is warm. No cyanosis.   Psychiatric: She has a normal mood and affect.       Procedures    Lab Review:       Assessment:          Diagnosis Plan   1. Severe mitral regurgitation     2.  Atherosclerosis of native coronary artery of native heart without angina pectoris     3. Rheumatic mitral stenosis     4. Mixed hyperlipidemia     5. Essential hypertension     6. Pulmonary hypertension (CMS/HCC)     7. Mitral valve stenosis, unspecified etiology     8. Atrial fibrillation, new onset (CMS/HCC)            Plan:       Coronary artery disease on medical treatment stable  Severe mitral stenosis severe pulmonary hypertension we will continue conservative care not sure if patient can go through the surgery successfully  Previously patient and family decided no surgery  Pulmonary follow-up  New onset atrial fibrillation continue anticoagulation

## 2020-06-17 ENCOUNTER — DOCUMENTATION (OUTPATIENT)
Dept: PULMONOLOGY | Facility: HOSPITAL | Age: 85
End: 2020-06-17

## 2020-06-17 NOTE — PROGRESS NOTES
PULMONARY/ CRITICAL CARE/ SLEEP MEDICINE OUTPATIENT CONSULT/ FOLLOW UP NOTE        Patient Name:  Karen Rubio    :  1935    Medical Record:  [id number]    PRIMARY CARE PHYSICIAN     Eliza Clayton APRN    REASON FOR CONSULTATION    Karen Rubio is a 84 y.o. female who is seen via telemedicine for follow-up on her shortness of breath.  Last office visit was about 6 months ago.  Patient complains of shortness of breath with activity.  It gets better with resting and use of her supplemental oxygen.  He denies any chest pain or palpitations.  She denies any cough or productive phlegm.  He complains of feeling fatigue.  He is using supplemental oxygen 2 L nasal cannula continuous.  Her typical bedtime is around 8 PM.  She gets up at 7 AM.  She sleeps alone and does not know if she snores.  She is able to do the activities of daily living with some limitation.  She had a ESTEFANI done in 2020.  She was told that she has bad valves but she is not a surgical candidate.  REVIEW OF SYSTEMS    Constitutional:  Denies fever or chills, positive fatigue  Eyes:  Denies change in visual acuity   HENT:  Denies nasal congestion or sore throat   Respiratory:  Denies cough + shortness of breath   Cardiovascular:  Denies chest pain or edema   GI:  Denies abdominal pain, nausea, vomiting, bloody stools or diarrhea   :  Denies dysuria   Musculoskeletal:  Denies back pain + joint pain   Integument:  Denies rash   Neurologic:  Denies headache, focal weakness or sensory changes   Endocrine:  Denies polyuria or polydipsia   Lymphatic:  Denies swollen glands   Psychiatric:  Denies depression or anxiety     MEDICAL HISTORY    Past Medical History:   Diagnosis Date   • Abnormality of left atrial appendage 2016    Suspected Clot Noted on ESTEFANI   • Acute renal failure (CMS/HCC) 2016-[facility]    Front Dehydration   • Arthritis    • Breast density 2017   • Carotid stenosis 2018    R @ 60% and L @ 10-20% Noted  on Cardiac Cath & 16-49% on L&R    • Heart murmur    • History of echocardiogram 10/13/2017    Calcified Aortic Leaftlets; Mild Aortic Stenosis Present; Mild Mitral Stenosis; Bilateral Enlargement Noted;  Moderate TR; LV Function of 55-60%   • History of echocardiogram 12/5/14-[facility]    Moderate L Vent Hypertrophy Noted; L Atrial Dilation; Moderate MR; Mild TR; Mild-Moderate Aortic Reg Noted; Normal Root Size/No Effusion   • History of echocardiogram 5/16/16-[facility]    Normal Findings with Mild-Moderate MVS Noted   • History of EKG 2004/2016 & 11/5/18-[facility]    All Sinus Rhythm w/Infarct Ischemnic Changes Noted   • HLD (hyperlipidemia)     Controlled w/Meds   • Hx of hyperglycemia    • Hypertension     Controlled w/Meds   • Hypothyroidism     Levothyroxine   • Joint pain    • Left atrial dilatation 12/05/2014    Mildly Noted on Echo   • Left ventricular hypertrophy 12/05/2014    Noted on Echo w/EF @ 55-60%   • Mild aortic regurgitation 12/05/2014    to Moderate Noted on Echo   • Mild aortic stenosis 10/13/2017    Noted on Echo & ESTEFANI-11/5/18-Moderate    • Mild mitral insufficiency 10/13/2017    Noted on Echo & ESTEFANI-11/5/18   • Mild tricuspid regurgitation 12/05/2014    Noted on Echo   • Moderate mitral regurgitation 12/05/2014    Noted on Echo   • Moderate mitral stenosis 10/13/2017    Noted on Echo   • Moderate tricuspid insufficiency 10/13/2017    Noted on Echo   • Pneumonia involving left lung 08/6/16-[facility]   • Pulmonary hypertension (CMS/HCC) 10/13/2017 & 11/5/18-Cath    Severe Noted on Echo & Cath   • Slurred speech 05/16/2016   • SOB (shortness of breath) 08/2016   • Thickened mitral leaflet 10/13/2017 & ESTEFANI-11/5/18    Severely Calcified Noted on Echo   • Tricuspid valve insufficiency 11/05/2018    Noted on ESTEFANI        SURGICAL HISTORY    Past Surgical History:   Procedure Laterality Date   • CARDIAC CATHETERIZATION Left 11/5/18-[facility]    w/L Coronary Angiography--Dr. Hernández-RCA @ 60% with Pulmonary Hypertension Noted   •  CHOLECYSTECTOMY     • KNEE ARTHROSCOPY     • ESTEFANI  2016-[facility]    Dr. Hernández--Severly Calcified Mitral Leaflets w/Mild-Moderate Mitral Stenosis/Insufficiency; Sig Aortic Stenosis Noted; Suspecion of L Atrial Appendage Clot Noted   • ESTEFANI  18-[facility]    Dr. Hernández--Moderate Mitral Insufficiency Noted; Mild-Moderate AVS; Moderate Tricuspid Insufficiency Noted; EF of 65-70%   • TONSILLECTOMY          FAMILY HISTORY    Family History   Problem Relation Age of Onset   • Heart disease Mother    • Cancer Father        SOCIAL HISTORY    Social History     Tobacco Use   • Smoking status: Former Smoker     Types: Cigarettes     Last attempt to quit: 2002     Years since quittin.4   • Smokeless tobacco: Never Used   Substance Use Topics   • Alcohol use: No     Frequency: Never        ALLERGIES    Allergies   Allergen Reactions   • Hydrocodone Shortness Of Breath         MEDICATIONS    Current Outpatient Medications on File Prior to Visit   Medication Sig Dispense Refill   • acetaminophen (TYLENOL) 325 MG tablet Take 2 tablets by mouth Every 4 (Four) Hours As Needed for Mild Pain . 1 bottle 0   • albuterol sulfate  (90 Base) MCG/ACT inhaler Inhale 2 puffs Every 4 (Four) Hours As Needed for Wheezing or Shortness of Air.     • apixaban (ELIQUIS) 5 MG tablet tablet Take 1 tablet by mouth 2 (Two) Times a Day. 180 tablet 2   • atorvastatin (LIPITOR) 20 MG tablet TAKE 1 TABLET EVERY DAY     • Cholecalciferol (VITAMIN D-3) 1000 units capsule Take 1 capsule by mouth Daily.     • docusate calcium (SURFAK) 240 MG capsule Take 240 mg by mouth 2 (Two) Times a Day As Needed.     • escitalopram (LEXAPRO) 10 MG tablet Take 10 mg by mouth Daily.     • esomeprazole (nexIUM) 40 MG capsule TAKE 1 CAPSULE EVERY DAY     • ipratropium-albuterol (DUO-NEB) 0.5-2.5 mg/3 ml nebulizer 3 mL 4 (Four) Times a Day.     • levothyroxine (SYNTHROID, LEVOTHROID) 50 MCG tablet TAKE 1 TABLET EVERY DAY     • metoprolol tartrate (LOPRESSOR) 25 MG tablet Take  1 tablet by mouth Every 12 (Twelve) Hours. Please schedule an appointment 60 tablet 0   • nitroglycerin (NITROSTAT) 0.4 MG SL tablet Place 1 tablet under the tongue Every 5 (Five) Minutes As Needed for Chest Pain (Only if SBP Greater Than 100). Take no more than 3 doses in 15 minutes. 25 tablet 0   • potassium chloride (MICRO-K) 10 MEQ CR capsule      • temazepam (RESTORIL) 15 MG capsule TAKE ONE CAPSULE BY MOUTH EVERY NIGHT AT BEDTIME AS NEEDED FOR SLEEP     • [DISCONTINUED] aluminum-magnesium hydroxide-simethicone (MAALOX MAX) 400-400-40 MG/5ML suspension Take 15 mL by mouth Every 6 (Six) Hours As Needed for Heartburn. 300 mL 0   • [DISCONTINUED] calcium carbonate (OS-SHERWIN) 600 MG tablet Take 600 mg by mouth.     • [DISCONTINUED] dicyclomine (BENTYL) 10 MG capsule Take 1 capsule by mouth 4 (Four) Times a Day.     • [DISCONTINUED] furosemide (LASIX) 40 MG tablet TAKE ONE TABLET BY MOUTH DAILY 90 tablet 0   • [DISCONTINUED] Multiple Vitamins-Minerals (ICAPS) capsule Take  by mouth.     • [DISCONTINUED] ondansetron (ZOFRAN) 4 MG tablet Take 1 tablet by mouth Every 6 (Six) Hours As Needed for Nausea or Vomiting. 30 tablet 0   • [DISCONTINUED] raNITIdine (ZANTAC) 300 MG tablet Take 1 tablet by mouth Daily.     • [DISCONTINUED] rifAXIMin (XIFAXAN PO) Take  by mouth.     • [DISCONTINUED] TURMERIC PO Take  by mouth.       No current facility-administered medications on file prior to visit.        PHYSICAL EXAM    There were no vitals filed for this visit.   Vital signs and physical exam could not be done because this being a telehealth visit due to COVID 19 pandemic emergency      Xr Chest 2 View    Result Date: 3/18/2020   1. Limited study demonstrating small to moderate bilateral pleural effusions with bilateral perihilar and bibasilar atelectasis and/or pneumonia and possible superimposed mild pulmonary edema. Atypical pneumonia could have a similar appearance. 2. Diffuse osteopenia with age-indeterminate compression  fracture deformity of the and upper lumbar vertebral body.  Electronically Signed By-Edilberto Main On:3/18/2020 11:37 AM This report was finalized on [id number] by  Edilberto Main, .     Results for orders placed during the hospital encounter of 03/09/20   Adult Transesophageal Echo (ESTEFANI) W/ Cont if Necessary Per Protocol (Cardiology Department)    Narrative · Estimated EF = 65%.  · Left ventricular systolic function is normal.  · Moderate to severe mitral insufficiency at least moderate mitral   stenosis severe tricuspid insufficiency moderate aortic stenosis noted     Normal LV systolic function EF 65%  Biatrial enlargement  Severely calcified mitral leaflets with at least moderate mitral stenosis   moderate to severe mitral insufficiency  Severe mitral annular calcification  Tricuspid leaflets mildly thickened and sclerosed  There is significant right atrial dilation also RV dilatation  Severe tricuspid insufficiency  Calcified aortic leaflets with decreased opening at least moderate aortic   stenosis  Intra-atrial septum is intact bubble study negative for shunt  Left atrial appendage showed spontaneous echo contrast         ASSESSMENT & PLAN:    There are no diagnoses linked to this encounter.  Dyspnea  -Likely multifactorial from a combination of her valvular heart disease, congestive heart failure, pulmonary hypertension, deconditioning and advanced age.  -Patient believes her symptoms are fairly stable.  We will continue to monitor with supportive therapy.  -She has a as needed albuterol inhaler which she rarely uses.  -Continue supplemental oxygen 2 L nasal cannula continuous.  Patient is benefiting from use.    Pulmonary hypertension  -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    Hypoxemia  -Patient is on chronic supplemental oxygen 2 L continuous.  Benefiting from use.  Will consider getting a 6-minute walk at her next office visit to recertify her supplemental  oxygen needs.    Valvular heart disease.  -ESTEFANI 3/20: EF 65%.  Moderate mitral stenosis and moderate to severe mitral regurgitation was noted.  -2D echo 10/17: EF 55 to 60%.  RVSP 60 mmHg.  Moderate mitral stenosis and mild aortic stenosis was noted..  -Being followed by cardiology.  Not a surgical candidate.    We will continue with current plan of care.  Patient was advised to call me if he has any worsening of her symptoms.  Doubt that an aggressive work-up for her pulmonary hypertension is necessary at this time given her advanced age and comorbidities.  We will continue to monitor    This was a phone conversation in lieu of in-person visit.  The patient provided verbal consent for an over the phone visit.  I spent  8 minutes on the call conducting an interview, performing a limited exam by phone and educating the patient on my assessment and plan.  Additional 15 minutes were spent on documentation and review of data for this visit.  Patient did not have the ability to do a video visit for this encounter.      This document has been electronically signed by  Guille Irving MD  9:44 AM

## 2020-06-26 ENCOUNTER — TELEPHONE (OUTPATIENT)
Dept: CARDIOLOGY | Facility: CLINIC | Age: 85
End: 2020-06-26

## 2020-06-26 NOTE — TELEPHONE ENCOUNTER
No history of diabetes not sure regarding kidney protection  Patient and family can check with PCP

## 2020-06-26 NOTE — TELEPHONE ENCOUNTER
Pharmacy has told patient she need to be on a blood pressure medication that helps protect her kidneys. Was supposed to be discussed at May apt, but daughter was unable to come into apt.

## 2020-09-02 ENCOUNTER — TRANSCRIBE ORDERS (OUTPATIENT)
Dept: ADMINISTRATIVE | Facility: HOSPITAL | Age: 85
End: 2020-09-02

## 2020-09-02 DIAGNOSIS — I62.9 INTRACRANIAL BLEED (HCC): Primary | ICD-10-CM

## 2020-09-09 ENCOUNTER — HOSPITAL ENCOUNTER (OUTPATIENT)
Dept: CT IMAGING | Facility: HOSPITAL | Age: 85
Discharge: HOME | End: 2020-09-09
Admitting: PHYSICIAN ASSISTANT

## 2020-09-09 DIAGNOSIS — I62.9 INTRACRANIAL BLEED (HCC): ICD-10-CM

## 2020-09-09 PROCEDURE — 70450 CT HEAD/BRAIN W/O DYE: CPT

## 2020-11-30 ENCOUNTER — OFFICE VISIT (OUTPATIENT)
Dept: CARDIOLOGY | Facility: CLINIC | Age: 85
End: 2020-11-30

## 2020-11-30 VITALS
OXYGEN SATURATION: 86 % | BODY MASS INDEX: 24.96 KG/M2 | DIASTOLIC BLOOD PRESSURE: 78 MMHG | HEIGHT: 67 IN | TEMPERATURE: 96.9 F | HEART RATE: 68 BPM | WEIGHT: 159 LBS | SYSTOLIC BLOOD PRESSURE: 148 MMHG

## 2020-11-30 DIAGNOSIS — I05.0 RHEUMATIC MITRAL STENOSIS: Primary | ICD-10-CM

## 2020-11-30 DIAGNOSIS — I10 ESSENTIAL HYPERTENSION: ICD-10-CM

## 2020-11-30 DIAGNOSIS — Z86.73 HISTORY OF CVA (CEREBROVASCULAR ACCIDENT): Chronic | ICD-10-CM

## 2020-11-30 DIAGNOSIS — R01.1 HEART MURMUR: ICD-10-CM

## 2020-11-30 DIAGNOSIS — E78.2 MIXED HYPERLIPIDEMIA: ICD-10-CM
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
## 2020-11-30 DIAGNOSIS — I48.11 LONGSTANDING PERSISTENT ATRIAL FIBRILLATION (HCC): ICD-10-CM

## 2020-11-30 PROCEDURE — 93000 ELECTROCARDIOGRAM COMPLETE: CPT | Performed by: INTERNAL MEDICINE

## 2020-11-30 PROCEDURE — 99214 OFFICE O/P EST MOD 30 MIN: CPT | Performed by: INTERNAL MEDICINE

## 2020-11-30 RX ORDER — FUROSEMIDE 40 MG/1
40 TABLET ORAL DAILY
COMMUNITY
Start: 2020-10-13 | End: 2021-01-01 | Stop reason: SDUPTHER

## 2020-11-30 RX ORDER — LEVOTHYROXINE SODIUM 0.07 MG/1
75 TABLET ORAL DAILY
COMMUNITY
Start: 2020-10-13 | End: 2022-01-01

## 2020-11-30 NOTE — PROGRESS NOTES
Subjective:     Encounter Date:11/30/2020      Patient ID: Karen Rubio is a 85 y.o. female.    Chief Complaint: Atrial Fibrillation, Hypertension, & Hyperlipidemia  History of Present Illness     85-year-old white female patient with known history of hypertension hyperlipidemia history of CVA on anticoagulation history of mitral stenosis and atrial fibrillation comes back for follow-up     Her stress Myoview showed apical ischemia October 2018     Cardiac catheterization showed up to 60% ostial RCA disease, nov 2018   severe pulmonary hypertension more than 70 mm of Hg   transesophageal echocardiogram showed hyperdynamic LV systolic function EF 65-70% moderate mitral stenosis and insufficiency moderate tricuspid insufficiency mild to moderate aortic stenosis      Pulmonary follow-up regarding pulmonary hypertension   patient was cleared by Pulmonary for surgery  Patient and family decided no surgery and the point    Transesophageal echocardiogram March 2020 EF 65% biatrial enlargement severely calcified mitral leaflets with at least moderate mitral stenosis moderate severe mitral insufficiency RV dilatation noted severe tricuspid insufficiency noted  Moderate aortic stenosis noted  Patient also have underlying severe pulmonary hypertension  Patient was admitted recently in [place] due to fall severe bruising head injury  Likely no intracerebral bleed patient was seen by neurosurgery advised patient to stop Eliquis she is currently not on any anticoagulation  So I discussed with the patient and family to consider watchman device  Strongly suggest restarting anticoagulation if okay with primary team  But obvious risks discussed with the patient and family  Patient and family will decide regarding anticoagulation  EKG today showed atrial fibrillation old anteroseptal MI right bundle branch block right axis deviation no significant changes noted    Her CHADS2 score is at least 6  Has bled score at least  4    The following portions of the patient's history were reviewed and updated as appropriate: Allergies current medications past family history past medical history past social history past surgical history problem list and review of systems  Past Medical History:   Diagnosis Date   • Abnormality of left atrial appendage 05/17/2016    Suspected Clot Noted on ESTEFANI   • Acute renal failure (CMS/HCC) 08/06/2016-[facility]    Front Dehydration   • Arthritis    • Breast density 12/2017   • Carotid stenosis 11/05/2018    R @ 60% and L @ 10-20% Noted on Cardiac Cath & 16-49% on L&R    • Heart murmur    • History of echocardiogram 10/13/2017    Calcified Aortic Leaftlets; Mild Aortic Stenosis Present; Mild Mitral Stenosis; Bilateral Enlargement Noted;  Moderate TR; LV Function of 55-60%   • History of echocardiogram 12/5/14-[facility]    Moderate L Vent Hypertrophy Noted; L Atrial Dilation; Moderate MR; Mild TR; Mild-Moderate Aortic Reg Noted; Normal Root Size/No Effusion   • History of echocardiogram 5/16/16-[facility]    Normal Findings with Mild-Moderate MVS Noted   • History of EKG 2004/2016 & 11/5/18-[facility]    All Sinus Rhythm w/Infarct Ischemnic Changes Noted   • HLD (hyperlipidemia)     Controlled w/Meds   • Hx of hyperglycemia    • Hypertension     Controlled w/Meds   • Hypothyroidism     Levothyroxine   • Joint pain    • Left atrial dilatation 12/05/2014    Mildly Noted on Echo   • Left ventricular hypertrophy 12/05/2014    Noted on Echo w/EF @ 55-60%   • Mild aortic regurgitation 12/05/2014    to Moderate Noted on Echo   • Mild aortic stenosis 10/13/2017    Noted on Echo & ESTEFANI-11/5/18-Moderate    • Mild mitral insufficiency 10/13/2017    Noted on Echo & ESTEFANI-11/5/18   • Mild tricuspid regurgitation 12/05/2014    Noted on Echo   • Moderate mitral regurgitation 12/05/2014    Noted on Echo   • Moderate mitral stenosis 10/13/2017    Noted on Echo   • Moderate tricuspid insufficiency 10/13/2017    Noted on Echo   • Pneumonia involving left lung  "08/6/16-[facility]   • Pulmonary hypertension (CMS/HCC) 10/13/2017 & 11/5/18-Cath    Severe Noted on Echo & Cath   • Slurred speech 05/16/2016   • SOB (shortness of breath) 08/2016   • Thickened mitral leaflet 10/13/2017 & ESTEFANI-11/5/18    Severely Calcified Noted on Echo   • Tricuspid valve insufficiency 11/05/2018    Noted on ESTEFANI     Past Surgical History:   Procedure Laterality Date   • CARDIAC CATHETERIZATION Left 11/5/18-[facility]    w/L Coronary Angiography--Dr. Hernández-RCA @ 60% with Pulmonary Hypertension Noted   • CHOLECYSTECTOMY     • KNEE ARTHROSCOPY     • ESTEFANI  05/17/2016-[facility]    Dr. Hernández--Severly Calcified Mitral Leaflets w/Mild-Moderate Mitral Stenosis/Insufficiency; Sig Aortic Stenosis Noted; Suspecion of L Atrial Appendage Clot Noted   • ESTEFANI  11/5/18-[facility]    Dr. Hernández--Moderate Mitral Insufficiency Noted; Mild-Moderate AVS; Moderate Tricuspid Insufficiency Noted; EF of 65-70%   • TONSILLECTOMY       /78 (BP Location: Right arm, Patient Position: Sitting, Cuff Size: Adult)   Pulse 68   Temp 96.9 °F (36.1 °C) (Infrared)   Ht 170.2 cm (67\")   Wt 72.1 kg (159 lb)   SpO2 (!) 86%   Breastfeeding No   BMI 24.90 kg/m²   Family History   Problem Relation Age of Onset   • Heart disease Mother    • Cancer Father    • No Known Problems Sister    • No Known Problems Brother    • No Known Problems Maternal Aunt    • No Known Problems Maternal Uncle    • No Known Problems Paternal Aunt    • No Known Problems Paternal Uncle    • No Known Problems Maternal Grandmother    • No Known Problems Maternal Grandfather    • No Known Problems Paternal Grandmother    • No Known Problems Paternal Grandfather    • No Known Problems Other    • Anemia Neg Hx    • Arrhythmia Neg Hx    • Asthma Neg Hx    • Clotting disorder Neg Hx    • Fainting Neg Hx    • Heart attack Neg Hx    • Heart failure Neg Hx    • Hyperlipidemia Neg Hx    • Hypertension Neg Hx        Current Outpatient Medications:   •  acetaminophen (TYLENOL) 325 MG tablet, Take 2 " tablets by mouth Every 4 (Four) Hours As Needed for Mild Pain ., Disp: 1 bottle, Rfl: 0  •  albuterol sulfate  (90 Base) MCG/ACT inhaler, Inhale 2 puffs Every 4 (Four) Hours As Needed for Wheezing or Shortness of Air., Disp: , Rfl:   •  atorvastatin (LIPITOR) 20 MG tablet, TAKE 1 TABLET EVERY DAY, Disp: , Rfl:   •  Cholecalciferol (VITAMIN D-3) 1000 units capsule, Take 1 capsule by mouth Daily., Disp: , Rfl:   •  docusate calcium (SURFAK) 240 MG capsule, Take 240 mg by mouth 2 (Two) Times a Day As Needed., Disp: , Rfl:   •  escitalopram (LEXAPRO) 10 MG tablet, Take 5 mg by mouth Daily., Disp: , Rfl:   •  esomeprazole (nexIUM) 40 MG capsule, TAKE 1 CAPSULE EVERY DAY, Disp: , Rfl:   •  furosemide (LASIX) 40 MG tablet, Take 1 tablet by mouth Daily., Disp: , Rfl:   •  ipratropium-albuterol (DUO-NEB) 0.5-2.5 mg/3 ml nebulizer, 3 mL 4 (Four) Times a Day., Disp: , Rfl:   •  levothyroxine (SYNTHROID, LEVOTHROID) 75 MCG tablet, Take 1 tablet by mouth Daily., Disp: , Rfl:   •  metoprolol tartrate (LOPRESSOR) 25 MG tablet, TAKE ONE TABLET BY MOUTH TWICE A DAY, Disp: 180 tablet, Rfl: 0  •  nitroglycerin (NITROSTAT) 0.4 MG SL tablet, Place 1 tablet under the tongue Every 5 (Five) Minutes As Needed for Chest Pain (Only if SBP Greater Than 100). Take no more than 3 doses in 15 minutes., Disp: 25 tablet, Rfl: 0  •  potassium chloride (MICRO-K) 10 MEQ CR capsule, , Disp: , Rfl:   •  temazepam (RESTORIL) 15 MG capsule, TAKE ONE CAPSULE BY MOUTH EVERY NIGHT AT BEDTIME AS NEEDED FOR SLEEP, Disp: , Rfl:   •  apixaban (ELIQUIS) 5 MG tablet tablet, Take 1 tablet by mouth 2 (Two) Times a Day., Disp: 180 tablet, Rfl: 2  Social History     Socioeconomic History   • Marital status:      Spouse name: Not on file   • Number of children: Not on file   • Years of education: Not on file   • Highest education level: Not on file   Occupational History   • Occupation: RETIRED-[employer]   Tobacco Use   • Smoking status: Former Smoker      Types: Cigarettes     Quit date:      Years since quittin.9   • Smokeless tobacco: Never Used   Substance and Sexual Activity   • Alcohol use: No     Frequency: Never   • Drug use: No   • Sexual activity: Defer     Birth control/protection: Post-menopausal     Allergies   Allergen Reactions   • Hydrocodone Shortness Of Breath     Review of Systems   Constitution: Negative for fever and malaise/fatigue.   HENT: Negative for congestion and hearing loss.    Eyes: Negative for double vision and visual disturbance.   Cardiovascular: Negative for chest pain, claudication, dyspnea on exertion, leg swelling and syncope.   Respiratory: Negative for cough and shortness of breath.    Endocrine: Negative for cold intolerance.   Skin: Negative for color change and rash.   Musculoskeletal: Negative for arthritis and joint pain.   Gastrointestinal: Negative for abdominal pain and heartburn.   Genitourinary: Negative for hematuria.   Neurological: Positive for numbness (fingers). Negative for excessive daytime sleepiness and dizziness.   Psychiatric/Behavioral: Negative for depression. The patient is not nervous/anxious.    All other systems reviewed and are negative.             Objective:     Constitutional:       Appearance: Well-developed and not in distress.   Eyes:      General: No scleral icterus.     Conjunctiva/sclera: Conjunctivae normal.   HENT:      Head: Normocephalic and atraumatic.    Mouth/Throat:      Mouth: No oral lesions.      Pharynx: Oropharynx is clear. Uvula midline.   Neck:      Musculoskeletal: Neck supple.      Thyroid: No thyromegaly.      Vascular: No carotid bruit or JVD.      Trachea: Trachea normal.   Pulmonary:      Effort: Pulmonary effort is normal.      Breath sounds: Normal breath sounds.   Cardiovascular:      Normal rate. Irregularly irregular rhythm.      Murmurs: There is a systolic murmur.      No gallop.   Pulses:     Intact distal pulses.   Abdominal:      General: Bowel  sounds are normal.      Palpations: Abdomen is soft.   Musculoskeletal:         General: Tenderness present.   Skin:     General: Skin is warm. There is no cyanosis.   Neurological:      Mental Status: Alert and oriented to person, place, and time.      Comments: No focal deficits   Psychiatric:         Behavior: Behavior is cooperative.           ECG 12 Lead    Date/Time: 11/30/2020 12:15 PM  Performed by: Demond Valiente MD  Authorized by: Demond Valiente MD   Comments: EKG today showed atrial fibrillation old anteroseptal MI right bundle branch block right axis deviation no significant changes noted            Lab Review:       Assessment:          Diagnosis Plan   1. Rheumatic mitral stenosis     2. Heart murmur     3. Mixed hyperlipidemia     4. Essential hypertension     5. Longstanding persistent atrial fibrillation (CMS/HCC)     6. History of CVA (cerebrovascular accident)     7. Fall, initial encounter            Plan:       Problems with valvular heart disease and atrial fibrillation  History of CVA  Patient very high risk for stroke  I strongly advised patient and family to consider watchman device if she cannot take anticoagulation  Would like to restart anticoagulation if okay with neurosurgery  But family notified about the obvious risks of fall and intracranial bleed

## 2020-12-03 ENCOUNTER — OFFICE VISIT (OUTPATIENT)
Dept: PULMONOLOGY | Facility: HOSPITAL | Age: 85
End: 2020-12-03

## 2020-12-03 VITALS
SYSTOLIC BLOOD PRESSURE: 112 MMHG | TEMPERATURE: 97.9 F | WEIGHT: 156 LBS | RESPIRATION RATE: 12 BRPM | HEART RATE: 67 BPM | OXYGEN SATURATION: 93 % | BODY MASS INDEX: 26.63 KG/M2 | DIASTOLIC BLOOD PRESSURE: 68 MMHG | HEIGHT: 64 IN

## 2020-12-03 DIAGNOSIS — I27.20 PULMONARY HYPERTENSION (HCC): Primary | ICD-10-CM

## 2020-12-03 PROCEDURE — G0463 HOSPITAL OUTPT CLINIC VISIT: HCPCS

## 2020-12-03 RX ORDER — SILDENAFIL CITRATE 20 MG/1
20 TABLET ORAL 3 TIMES DAILY
Qty: 90 TABLET | Refills: 5 | Status: SHIPPED | OUTPATIENT
Start: 2020-12-03 | End: 2021-01-02

## 2020-12-03 NOTE — PROGRESS NOTES
PULMONARY/ CRITICAL CARE/ SLEEP MEDICINE OUTPATIENT CONSULT/ FOLLOW UP NOTE        Patient Name:  Karen Rubio    :  1935    Medical Record:  [id number]    PRIMARY CARE PHYSICIAN     Eliza Clayton APRN    REASON FOR CONSULTATION    Karen Rubio is a 85 y.o. female who is referred for consultation for dyspnea and hypoxia due to pulmonary hypertension  REVIEW OF SYSTEMS    Constitutional:  Denies fever or chills   Eyes:  Denies change in visual acuity   HENT:  Denies nasal congestion or sore throat   Respiratory:  Denies cough or shortness of breath   Cardiovascular:  Denies chest pain or edema   GI:  Denies abdominal pain, nausea, vomiting, bloody stools or diarrhea   :  Denies dysuria   Musculoskeletal:  Denies back pain or joint pain   Integument:  Denies rash   Neurologic:  Denies headache, focal weakness or sensory changes   Endocrine:  Denies polyuria or polydipsia   Lymphatic:  Denies swollen glands   Psychiatric:  Denies depression or anxiety     MEDICAL HISTORY    Past Medical History:   Diagnosis Date   • Abnormality of left atrial appendage 2016    Suspected Clot Noted on ESTEFANI   • Acute renal failure (CMS/HCC) 2016-[facility]    Front Dehydration   • Arthritis    • Breast density 2017   • Carotid stenosis 2018    R @ 60% and L @ 10-20% Noted on Cardiac Cath & 16-49% on L&R    • Heart murmur    • History of echocardiogram 10/13/2017    Calcified Aortic Leaftlets; Mild Aortic Stenosis Present; Mild Mitral Stenosis; Bilateral Enlargement Noted;  Moderate TR; LV Function of 55-60%   • History of echocardiogram 14-[facility]    Moderate L Vent Hypertrophy Noted; L Atrial Dilation; Moderate MR; Mild TR; Mild-Moderate Aortic Reg Noted; Normal Root Size/No Effusion   • History of echocardiogram 16-[facility]    Normal Findings with Mild-Moderate MVS Noted   • History of EKG  & 18-[facility]    All Sinus Rhythm w/Infarct Ischemnic Changes Noted   • HLD (hyperlipidemia)      Controlled w/Meds   • Hx of hyperglycemia    • Hypertension     Controlled w/Meds   • Hypothyroidism     Levothyroxine   • Joint pain    • Left atrial dilatation 12/05/2014    Mildly Noted on Echo   • Left ventricular hypertrophy 12/05/2014    Noted on Echo w/EF @ 55-60%   • Mild aortic regurgitation 12/05/2014    to Moderate Noted on Echo   • Mild aortic stenosis 10/13/2017    Noted on Echo & ESTEFANI-11/5/18-Moderate    • Mild mitral insufficiency 10/13/2017    Noted on Echo & ESTEFANI-11/5/18   • Mild tricuspid regurgitation 12/05/2014    Noted on Echo   • Moderate mitral regurgitation 12/05/2014    Noted on Echo   • Moderate mitral stenosis 10/13/2017    Noted on Echo   • Moderate tricuspid insufficiency 10/13/2017    Noted on Echo   • Pneumonia involving left lung 08/6/16-[facility]   • Pulmonary hypertension (CMS/HCC) 10/13/2017 & 11/5/18-[facility]    Severe Noted on Echo & Cath   • Slurred speech 05/16/2016   • SOB (shortness of breath) 08/2016   • Thickened mitral leaflet 10/13/2017 & ESTEFANI-11/5/18    Severely Calcified Noted on Echo   • Tricuspid valve insufficiency 11/05/2018    Noted on ESTEFANI        SURGICAL HISTORY    Past Surgical History:   Procedure Laterality Date   • CARDIAC CATHETERIZATION Left 11/5/18-[facility]    w/L Coronary Angiography--Dr. Hernández-RCA @ 60% with Pulmonary Hypertension Noted   • CHOLECYSTECTOMY     • KNEE ARTHROSCOPY     • ESTEFANI  05/17/2016-[facility]    Dr. Hernández--Severly Calcified Mitral Leaflets w/Mild-Moderate Mitral Stenosis/Insufficiency; Sig Aortic Stenosis Noted; Suspecion of L Atrial Appendage Clot Noted   • ESTEFANI  11/5/18-[facility]    Dr. Hernández--Moderate Mitral Insufficiency Noted; Mild-Moderate AVS; Moderate Tricuspid Insufficiency Noted; EF of 65-70%   • TONSILLECTOMY          FAMILY HISTORY    Family History   Problem Relation Age of Onset   • Heart disease Mother    • Cancer Father    • No Known Problems Sister    • No Known Problems Brother    • No Known Problems Maternal Aunt    • No Known Problems Maternal Uncle     • No Known Problems Paternal Aunt    • No Known Problems Paternal Uncle    • No Known Problems Maternal Grandmother    • No Known Problems Maternal Grandfather    • No Known Problems Paternal Grandmother    • No Known Problems Paternal Grandfather    • No Known Problems Other    • Anemia Neg Hx    • Arrhythmia Neg Hx    • Asthma Neg Hx    • Clotting disorder Neg Hx    • Fainting Neg Hx    • Heart attack Neg Hx    • Heart failure Neg Hx    • Hyperlipidemia Neg Hx    • Hypertension Neg Hx        SOCIAL HISTORY    Social History     Tobacco Use   • Smoking status: Former Smoker     Types: Cigarettes     Quit date:      Years since quittin.9   • Smokeless tobacco: Never Used   Substance Use Topics   • Alcohol use: No     Frequency: Never        ALLERGIES    Allergies   Allergen Reactions   • Hydrocodone Shortness Of Breath         MEDICATIONS    Current Outpatient Medications on File Prior to Visit   Medication Sig Dispense Refill   • acetaminophen (TYLENOL) 325 MG tablet Take 2 tablets by mouth Every 4 (Four) Hours As Needed for Mild Pain . 1 bottle 0   • albuterol sulfate  (90 Base) MCG/ACT inhaler Inhale 2 puffs Every 4 (Four) Hours As Needed for Wheezing or Shortness of Air.     • apixaban (ELIQUIS) 5 MG tablet tablet Take 1 tablet by mouth 2 (Two) Times a Day. 180 tablet 2   • atorvastatin (LIPITOR) 20 MG tablet TAKE 1 TABLET EVERY DAY     • Cholecalciferol (VITAMIN D-3) 1000 units capsule Take 1 capsule by mouth Daily.     • docusate calcium (SURFAK) 240 MG capsule Take 240 mg by mouth 2 (Two) Times a Day As Needed.     • escitalopram (LEXAPRO) 10 MG tablet Take 5 mg by mouth Daily.     • esomeprazole (nexIUM) 40 MG capsule TAKE 1 CAPSULE EVERY DAY     • furosemide (LASIX) 40 MG tablet Take 1 tablet by mouth Daily.     • ipratropium-albuterol (DUO-NEB) 0.5-2.5 mg/3 ml nebulizer 3 mL 4 (Four) Times a Day.     • levothyroxine (SYNTHROID, LEVOTHROID) 75 MCG tablet Take 1 tablet by mouth Daily.     •  "metoprolol tartrate (LOPRESSOR) 25 MG tablet TAKE ONE TABLET BY MOUTH TWICE A  tablet 0   • nitroglycerin (NITROSTAT) 0.4 MG SL tablet Place 1 tablet under the tongue Every 5 (Five) Minutes As Needed for Chest Pain (Only if SBP Greater Than 100). Take no more than 3 doses in 15 minutes. 25 tablet 0   • potassium chloride (MICRO-K) 10 MEQ CR capsule      • temazepam (RESTORIL) 15 MG capsule TAKE ONE CAPSULE BY MOUTH EVERY NIGHT AT BEDTIME AS NEEDED FOR SLEEP       No current facility-administered medications on file prior to visit.        PHYSICAL EXAM    Vitals:    12/03/20 1457   BP: 112/68   BP Location: Left arm   Patient Position: Sitting   Cuff Size: Adult   Pulse: 67   Resp: 12   Temp: 97.9 °F (36.6 °C)   TempSrc: Oral   SpO2: 93%   Weight: 70.8 kg (156 lb)   Height: 162.6 cm (64\")        Constitutional:  Well developed, well nourished, no acute distress, non-toxic appearance   Eyes:  PERRL, conjunctiva normal   HENT:  Atraumatic, external ears normal, nose normal, oropharynx moist, no pharyngeal exudates. mallampatti   Neck- normal range of motion, no tenderness, supple   Respiratory:  No respiratory distress, normal breath sounds, no rales, no wheezing   Cardiovascular:  Normal rate, normal rhythm, no murmurs, no gallops, no rubs   GI:  Soft, nondistended, normal bowel sounds, nontender, no organomegaly, no mass, no rebound, no guarding   :  No costovertebral angle tenderness   Musculoskeletal:  No edema, no tenderness, no deformities. Back- no tenderness  Integument:  Well hydrated, no rash   Lymphatic:  No lymphadenopathy noted   Neurologic:  Alert & oriented x 3, CN 2-12 normal, normal motor function, normal sensory function, no focal deficits noted   Psychiatric:  Speech and behavior appropriate     Ct Head Without Contrast    Result Date: 9/9/2020   1. No acute intracranial hemorrhage is identified. 2. Moderate atrophy and chronic microvascular disease. 3. Signs of old infarcts within the " bilateral cerebellar hemispheres.  Electronically Signed By-Dr. Ann Sofia MD On:9/9/2020 12:16 PM This report was finalized on [id number] by Dr. Ann Sofia MD.     Results for orders placed during the hospital encounter of 03/09/20   Adult Transesophageal Echo (ESTEFANI) W/ Cont if Necessary Per Protocol (Cardiology Department)    Narrative · Estimated EF = 65%.  · Left ventricular systolic function is normal.  · Moderate to severe mitral insufficiency at least moderate mitral   stenosis severe tricuspid insufficiency moderate aortic stenosis noted     Normal LV systolic function EF 65%  Biatrial enlargement  Severely calcified mitral leaflets with at least moderate mitral stenosis   moderate to severe mitral insufficiency  Severe mitral annular calcification  Tricuspid leaflets mildly thickened and sclerosed  There is significant right atrial dilation also RV dilatation  Severe tricuspid insufficiency  Calcified aortic leaflets with decreased opening at least moderate aortic   stenosis  Intra-atrial septum is intact bubble study negative for shunt  Left atrial appendage showed spontaneous echo contrast         ASSESSMENT & PLAN:        Dyspnea  Secondary to pulmonary hypertension, RVSP around 60, EF 65%, mitral valve disease patient was evaluated by cardiac surgery twice deemed high surgical risk  Will start Revatio discussed with patient and daughter the risk benefit     Pulmonary hypertension  -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     Hypoxemia continue supplemental oxygen 2 L continuous.  Benefiting from use.  W     Valvular heart disease.  -ESTEFANI 3/20: EF 65%.  Moderate mitral stenosis and moderate to severe mitral regurgitation was noted.  -2D echo 10/17: EF 55 to 60%.  RVSP 60 mmHg.  Moderate mitral stenosis and mild aortic stenosis was noted..  -Being followed by cardiology.  Not a surgical candidate.         This document has been electronically signed  by  Rebecca Nino MD  15:17 EST

## 2020-12-08 ENCOUNTER — OFFICE VISIT (OUTPATIENT)
Dept: CARDIOLOGY | Facility: CLINIC | Age: 85
End: 2020-12-08

## 2020-12-08 VITALS
WEIGHT: 158 LBS | HEIGHT: 64 IN | SYSTOLIC BLOOD PRESSURE: 86 MMHG | DIASTOLIC BLOOD PRESSURE: 48 MMHG | TEMPERATURE: 96.9 F | BODY MASS INDEX: 26.98 KG/M2 | OXYGEN SATURATION: 90 % | HEART RATE: 71 BPM

## 2020-12-08 DIAGNOSIS — I25.10 ATHEROSCLEROSIS OF NATIVE CORONARY ARTERY OF NATIVE HEART WITHOUT ANGINA PECTORIS: ICD-10-CM

## 2020-12-08 DIAGNOSIS — I34.2 NONRHEUMATIC MITRAL VALVE STENOSIS: ICD-10-CM

## 2020-12-08 DIAGNOSIS — R29.6 FREQUENT FALLS: ICD-10-CM

## 2020-12-08 DIAGNOSIS — I34.0 NONRHEUMATIC MITRAL VALVE REGURGITATION: ICD-10-CM

## 2020-12-08 DIAGNOSIS — Z86.73 HISTORY OF CVA (CEREBROVASCULAR ACCIDENT): ICD-10-CM

## 2020-12-08 DIAGNOSIS — Z79.01 LONG TERM (CURRENT) USE OF ANTICOAGULANTS: ICD-10-CM

## 2020-12-08 DIAGNOSIS — E78.2 MIXED HYPERLIPIDEMIA: ICD-10-CM

## 2020-12-08 DIAGNOSIS — I48.11 LONGSTANDING PERSISTENT ATRIAL FIBRILLATION (HCC): Primary | ICD-10-CM

## 2020-12-08 PROCEDURE — 99214 OFFICE O/P EST MOD 30 MIN: CPT | Performed by: INTERNAL MEDICINE

## 2020-12-08 NOTE — PROGRESS NOTES
Subjective:     Encounter Date:12/08/2020      Patient ID: Karen Rubio is a 85 y.o. female.    Chief Complaint : Sent here for watchman evaluation.  History of Present Illness      This is an 85-year-old with PMH of    -Longstanding persistent A. Fib  -Intolerant to long-term anticoagulation due to frequent falls  -CAD cath 11/20/2018 showing 60% ostial RCA  -Severe pulmonary hypertension  -Nonrheumatic MR/MS moderate  -Hypertension, hyperlipidemia  -History of hyperglycemia, hypothyroidism  -History of TRACY  -History of CVA  -Cholecystectomy, knee arthroscopy, tonsillectomy  -Positive family history of heart disease in mother and cancer in father  -Former smoker quit 2002  -Allergies/intolerance to hydrocodone causes dyspnea    Here for evaluation for watchman.  Patient has been having frequent falls and had a ruddy fall on 7/20/2020 where there was a question of intracranial bleed and her Eliquis was discontinued.  Patient underwent ESTEFANI 3/11/2020 which revealed EF of 65% with MR and MS and TR and moderate AS.  Patient has no history of rheumatic fever in the past.    Assessment :    -Longstanding persistent atrial fibrillation  -Intolerant to long-term anticoagulation due to frequent falls  -Nonrheumatic mitral valve disease  -Hypertension, hyperlipidemia  -History of CVA      Recommendations and plan :    Given patient's female gender, age over 76 at 85, hypertension, previous history of CVA makes her Dedrick Vascor of 6 and has bled score of 4 patient is not a ideal candidate for long-term anticoagulation due to frequent falls.  Patient's mitral valve disease is nonrheumatic therefore we will proceed with watchman implantation.  Risk benefits alternatives explained.,  Reviewed the patient's ESTEFANI from March had some question of smoke we will reevaluate her ESTEFANI and start her on Eliquis cautioned her on falls and advised her to walk with a walker.  We will schedule for watchman, risk benefits alternatives  explained.  Patient is considering.        Assessment:          Diagnosis Plan   1. Longstanding persistent atrial fibrillation (CMS/HCC)  COVID PRE-OP / PRE-PROCEDURE SCREENING ORDER (NO ISOLATION) - Swab, Nasopharynx    Case Request Cath Lab: Atrial Appendage Occlusion    CBC (No Diff)    Basic Metabolic Panel    Protime-INR    XR Chest 2 View    Adult Transesophageal Echo (ESTEFANI) W/ Cont if Necessary Per Protocol   2. Long term (current) use of anticoagulants  COVID PRE-OP / PRE-PROCEDURE SCREENING ORDER (NO ISOLATION) - Swab, Nasopharynx    Case Request Cath Lab: Atrial Appendage Occlusion    CBC (No Diff)    Basic Metabolic Panel    Protime-INR    XR Chest 2 View    Adult Transesophageal Echo (ESTEFANI) W/ Cont if Necessary Per Protocol   3. Frequent falls  COVID PRE-OP / PRE-PROCEDURE SCREENING ORDER (NO ISOLATION) - Swab, Nasopharynx    Case Request Cath Lab: Atrial Appendage Occlusion    CBC (No Diff)    Basic Metabolic Panel    Protime-INR    XR Chest 2 View    Adult Transesophageal Echo (ESTEFANI) W/ Cont if Necessary Per Protocol   4. Nonrheumatic mitral valve regurgitation  COVID PRE-OP / PRE-PROCEDURE SCREENING ORDER (NO ISOLATION) - Swab, Nasopharynx    Case Request Cath Lab: Atrial Appendage Occlusion    CBC (No Diff)    Basic Metabolic Panel    Protime-INR    XR Chest 2 View    Adult Transesophageal Echo (ESTEFANI) W/ Cont if Necessary Per Protocol   5. Nonrheumatic mitral valve stenosis  COVID PRE-OP / PRE-PROCEDURE SCREENING ORDER (NO ISOLATION) - Swab, Nasopharynx    Case Request Cath Lab: Atrial Appendage Occlusion    CBC (No Diff)    Basic Metabolic Panel    Protime-INR    XR Chest 2 View    Adult Transesophageal Echo (ESTEFANI) W/ Cont if Necessary Per Protocol   6. Atherosclerosis of native coronary artery of native heart without angina pectoris  COVID PRE-OP / PRE-PROCEDURE SCREENING ORDER (NO ISOLATION) - Swab, Nasopharynx    Case Request Cath Lab: Atrial Appendage Occlusion    CBC (No Diff)    Basic Metabolic  Panel    Protime-INR    XR Chest 2 View    Adult Transesophageal Echo (ESTEFANI) W/ Cont if Necessary Per Protocol   7. History of CVA (cerebrovascular accident)  COVID PRE-OP / PRE-PROCEDURE SCREENING ORDER (NO ISOLATION) - Swab, Nasopharynx    Case Request Cath Lab: Atrial Appendage Occlusion    CBC (No Diff)    Basic Metabolic Panel    Protime-INR    XR Chest 2 View    Adult Transesophageal Echo (ESTEFANI) W/ Cont if Necessary Per Protocol   8. Mixed hyperlipidemia  COVID PRE-OP / PRE-PROCEDURE SCREENING ORDER (NO ISOLATION) - Swab, Nasopharynx    Case Request Cath Lab: Atrial Appendage Occlusion    CBC (No Diff)    Basic Metabolic Panel    Protime-INR    XR Chest 2 View    Adult Transesophageal Echo (ESTEFANI) W/ Cont if Necessary Per Protocol          Plan:         Past Medical History:  Past Medical History:   Diagnosis Date   • Abnormality of left atrial appendage 05/17/2016    Suspected Clot Noted on ESTEFANI   • Acute renal failure (CMS/HCC) 08/06/2016-[facility]    Front Dehydration   • Arthritis    • Breast density 12/2017   • Carotid stenosis 11/05/2018    R @ 60% and L @ 10-20% Noted on Cardiac Cath & 16-49% on L&R    • Heart murmur    • History of echocardiogram 10/13/2017    Calcified Aortic Leaftlets; Mild Aortic Stenosis Present; Mild Mitral Stenosis; Bilateral Enlargement Noted;  Moderate TR; LV Function of 55-60%   • History of echocardiogram 12/5/14-[facility]    Moderate L Vent Hypertrophy Noted; L Atrial Dilation; Moderate MR; Mild TR; Mild-Moderate Aortic Reg Noted; Normal Root Size/No Effusion   • History of echocardiogram 5/16/16-[facility]    Normal Findings with Mild-Moderate MVS Noted   • History of EKG 2004/2016 & 11/5/18-[facility]    All Sinus Rhythm w/Infarct Ischemnic Changes Noted   • HLD (hyperlipidemia)     Controlled w/Meds   • Hx of hyperglycemia    • Hypertension     Controlled w/Meds   • Hypothyroidism     Levothyroxine   • Joint pain    • Left atrial dilatation 12/05/2014    Mildly Noted on Echo   • Left ventricular  hypertrophy 12/05/2014    Noted on Echo w/EF @ 55-60%   • Mild aortic regurgitation 12/05/2014    to Moderate Noted on Echo   • Mild aortic stenosis 10/13/2017    Noted on Echo & ESTEFANI-11/5/18-Moderate    • Mild mitral insufficiency 10/13/2017    Noted on Echo & ESTEFANI-11/5/18   • Mild tricuspid regurgitation 12/05/2014    Noted on Echo   • Moderate mitral regurgitation 12/05/2014    Noted on Echo   • Moderate mitral stenosis 10/13/2017    Noted on Echo   • Moderate tricuspid insufficiency 10/13/2017    Noted on Echo   • Pneumonia involving left lung 08/6/16-[facility]   • Pulmonary hypertension (CMS/HCC) 10/13/2017 & 11/5/18-Cath    Severe Noted on Echo & Cath   • Slurred speech 05/16/2016   • SOB (shortness of breath) 08/2016   • Thickened mitral leaflet 10/13/2017 & ESTEFANI-11/5/18    Severely Calcified Noted on Echo   • Tricuspid valve insufficiency 11/05/2018    Noted on ESTEFANI     Past Surgical History:  Past Surgical History:   Procedure Laterality Date   • CARDIAC CATHETERIZATION Left 11/5/18-[facility]    w/L Coronary Angiography--Dr. Hernández-RCA @ 60% with Pulmonary Hypertension Noted   • CHOLECYSTECTOMY     • KNEE ARTHROSCOPY     • ESTEFANI  05/17/2016-[facility]    Dr. Hernández--Severly Calcified Mitral Leaflets w/Mild-Moderate Mitral Stenosis/Insufficiency; Sig Aortic Stenosis Noted; Suspecion of L Atrial Appendage Clot Noted   • ESTEFANI  11/5/18-[facility]    Dr. Hernández--Moderate Mitral Insufficiency Noted; Mild-Moderate AVS; Moderate Tricuspid Insufficiency Noted; EF of 65-70%   • TONSILLECTOMY        Allergies:  Allergies   Allergen Reactions   • Hydrocodone Shortness Of Breath     Home Meds:  Current Meds:     Current Outpatient Medications:   •  acetaminophen (TYLENOL) 325 MG tablet, Take 2 tablets by mouth Every 4 (Four) Hours As Needed for Mild Pain ., Disp: 1 bottle, Rfl: 0  •  albuterol sulfate  (90 Base) MCG/ACT inhaler, Inhale 2 puffs Every 4 (Four) Hours As Needed for Wheezing or Shortness of Air., Disp: , Rfl:   •  atorvastatin (LIPITOR) 20  MG tablet, TAKE 1 TABLET EVERY DAY, Disp: , Rfl:   •  Cholecalciferol (VITAMIN D-3) 1000 units capsule, Take 1 capsule by mouth Daily., Disp: , Rfl:   •  escitalopram (LEXAPRO) 10 MG tablet, Take 5 mg by mouth Daily., Disp: , Rfl:   •  esomeprazole (nexIUM) 40 MG capsule, TAKE 1 CAPSULE EVERY DAY, Disp: , Rfl:   •  furosemide (LASIX) 40 MG tablet, Take 1 tablet by mouth Daily., Disp: , Rfl:   •  ipratropium-albuterol (DUO-NEB) 0.5-2.5 mg/3 ml nebulizer, 3 mL 4 (Four) Times a Day. o2 bellow 90, Disp: , Rfl:   •  levothyroxine (SYNTHROID, LEVOTHROID) 75 MCG tablet, Take 1 tablet by mouth Daily., Disp: , Rfl:   •  metoprolol tartrate (LOPRESSOR) 25 MG tablet, TAKE ONE TABLET BY MOUTH TWICE A DAY, Disp: 180 tablet, Rfl: 0  •  O2 (OXYGEN), Inhale 2 L/min 1 (One) Time., Disp: , Rfl:   •  potassium chloride (MICRO-K) 10 MEQ CR capsule, , Disp: , Rfl:   •  sildenafil (REVATIO) 20 MG tablet, Take 1 tablet by mouth 3 (Three) Times a Day for 30 days., Disp: 90 tablet, Rfl: 5  •  temazepam (RESTORIL) 15 MG capsule, TAKE ONE CAPSULE BY MOUTH EVERY NIGHT AT BEDTIME AS NEEDED FOR SLEEP, Disp: , Rfl:   •  apixaban (ELIQUIS) 5 MG tablet tablet, Take 1 tablet by mouth 2 (Two) Times a Day., Disp: 180 tablet, Rfl: 2  Social History:   Social History     Tobacco Use   • Smoking status: Former Smoker     Types: Cigarettes     Quit date:      Years since quittin.9   • Smokeless tobacco: Never Used   Substance Use Topics   • Alcohol use: No     Frequency: Never      Family History:  Family History   Problem Relation Age of Onset   • Heart disease Mother    • Cancer Father    • No Known Problems Sister    • No Known Problems Brother    • No Known Problems Maternal Aunt    • No Known Problems Maternal Uncle    • No Known Problems Paternal Aunt    • No Known Problems Paternal Uncle    • No Known Problems Maternal Grandmother    • No Known Problems Maternal Grandfather    • No Known Problems Paternal Grandmother    • No Known Problems  "Paternal Grandfather    • No Known Problems Other    • Anemia Neg Hx    • Arrhythmia Neg Hx    • Asthma Neg Hx    • Clotting disorder Neg Hx    • Fainting Neg Hx    • Heart attack Neg Hx    • Heart failure Neg Hx    • Hyperlipidemia Neg Hx    • Hypertension Neg Hx         The following portions of the patient's history were reviewed and updated as appropriate: allergies, current medications, past family history, past medical history, past social history, past surgical history and problem list.      Review of Systems   Constitution: Negative for malaise/fatigue.   Cardiovascular: Positive for leg swelling. Negative for chest pain and palpitations.   Respiratory: Positive for shortness of breath.    Skin: Positive for rash.   Neurological: Negative for dizziness, light-headedness and numbness.     All other systems are negative    Procedures       Objective:     Physical Exam  BP (!) 86/48   Pulse 71   Temp 96.9 °F (36.1 °C)   Ht 162.6 cm (64\")   Wt 71.7 kg (158 lb)   SpO2 90%   BMI 27.12 kg/m²   General:  Appears in no acute distress  Eyes: Sclera is anicteric,  conjunctiva is clear   HEENT:  No JVD. Thyroid not visibly enlarged. No mucosal pallor or cyanosis  Respiratory: Respirations regular and unlabored at rest.  Clear to auscultation  Cardiovascular: S1,S2 irregular rate and rhythm.  4 6 holosystolic murmur, no  rub or gallop auscultated. No pretibial pitting edema  Gastrointestinal: Abdomen soft, flat, non tender. Bowel sounds present.   Musculoskeletal:  No abnormal movements  Extremities: No digital clubbing or cyanosis  Skin: Color pink. Skin warm and dry to touch. No rashes  No xanthoma  Neuro: Alert and awake, no lateralizing deficits appreciated    Lab Reviewed:                  "

## 2020-12-15 PROBLEM — I34.0 NONRHEUMATIC MITRAL VALVE REGURGITATION: Status: ACTIVE | Noted: 2020-12-15

## 2020-12-15 PROBLEM — Z79.01 LONG TERM (CURRENT) USE OF ANTICOAGULANTS: Status: ACTIVE | Noted: 2020-12-15

## 2020-12-15 PROBLEM — I34.2 NONRHEUMATIC MITRAL VALVE STENOSIS: Status: ACTIVE | Noted: 2020-12-15

## 2020-12-15 PROBLEM — E78.2 MIXED HYPERLIPIDEMIA: Status: ACTIVE | Noted: 2020-12-15

## 2020-12-15 PROBLEM — R29.6 FREQUENT FALLS: Status: ACTIVE | Noted: 2020-12-15

## 2021-01-01 ENCOUNTER — OFFICE VISIT (OUTPATIENT)
Dept: PULMONOLOGY | Facility: HOSPITAL | Age: 86
End: 2021-01-01

## 2021-01-01 ENCOUNTER — HOSPITAL ENCOUNTER (EMERGENCY)
Facility: HOSPITAL | Age: 86
Discharge: HOME | End: 2021-09-15
Attending: EMERGENCY MEDICINE | Admitting: EMERGENCY MEDICINE

## 2021-01-01 ENCOUNTER — TRANSCRIBE ORDERS (OUTPATIENT)
Dept: ADMINISTRATIVE | Facility: HOSPITAL | Age: 86
End: 2021-01-01

## 2021-01-01 ENCOUNTER — TELEPHONE (OUTPATIENT)
Dept: PULMONOLOGY | Facility: HOSPITAL | Age: 86
End: 2021-01-01

## 2021-01-01 ENCOUNTER — APPOINTMENT (OUTPATIENT)
Dept: CT IMAGING | Facility: HOSPITAL | Age: 86
End: 2021-01-01

## 2021-01-01 ENCOUNTER — HOSPITAL ENCOUNTER (EMERGENCY)
Facility: HOSPITAL | Age: 86
Discharge: HOME | End: 2021-09-21
Admitting: EMERGENCY MEDICINE

## 2021-01-01 ENCOUNTER — LAB (OUTPATIENT)
Dept: LAB | Facility: HOSPITAL | Age: 86
End: 2021-01-01

## 2021-01-01 ENCOUNTER — OFFICE VISIT (OUTPATIENT)
Dept: CARDIOLOGY | Facility: CLINIC | Age: 86
End: 2021-01-01

## 2021-01-01 ENCOUNTER — HOSPITAL ENCOUNTER (OUTPATIENT)
Dept: ULTRASOUND IMAGING | Facility: HOSPITAL | Age: 86
Discharge: HOME | End: 2021-06-09

## 2021-01-01 VITALS
OXYGEN SATURATION: 99 % | HEIGHT: 64 IN | SYSTOLIC BLOOD PRESSURE: 96 MMHG | WEIGHT: 150 LBS | BODY MASS INDEX: 25.61 KG/M2 | RESPIRATION RATE: 20 BRPM | HEART RATE: 76 BPM | DIASTOLIC BLOOD PRESSURE: 57 MMHG | TEMPERATURE: 97.7 F

## 2021-01-01 VITALS
SYSTOLIC BLOOD PRESSURE: 107 MMHG | TEMPERATURE: 97.8 F | RESPIRATION RATE: 16 BRPM | BODY MASS INDEX: 25.95 KG/M2 | OXYGEN SATURATION: 96 % | HEART RATE: 101 BPM | DIASTOLIC BLOOD PRESSURE: 62 MMHG | HEIGHT: 64 IN | WEIGHT: 152 LBS

## 2021-01-01 VITALS
HEIGHT: 64 IN | DIASTOLIC BLOOD PRESSURE: 66 MMHG | HEART RATE: 91 BPM | OXYGEN SATURATION: 91 % | BODY MASS INDEX: 26.98 KG/M2 | WEIGHT: 158 LBS | SYSTOLIC BLOOD PRESSURE: 118 MMHG | TEMPERATURE: 96.8 F | RESPIRATION RATE: 15 BRPM

## 2021-01-01 VITALS
OXYGEN SATURATION: 94 % | HEIGHT: 64 IN | BODY MASS INDEX: 25.7 KG/M2 | HEART RATE: 62 BPM | SYSTOLIC BLOOD PRESSURE: 128 MMHG | DIASTOLIC BLOOD PRESSURE: 85 MMHG | WEIGHT: 150.5 LBS

## 2021-01-01 DIAGNOSIS — I10 ESSENTIAL HYPERTENSION: ICD-10-CM

## 2021-01-01 DIAGNOSIS — D63.1 ANEMIA IN END-STAGE RENAL DISEASE (HCC): ICD-10-CM

## 2021-01-01 DIAGNOSIS — S06.0X0A CONCUSSION WITHOUT LOSS OF CONSCIOUSNESS, INITIAL ENCOUNTER: Primary | ICD-10-CM

## 2021-01-01 DIAGNOSIS — I27.20 PULMONARY HYPERTENSION (HCC): ICD-10-CM

## 2021-01-01 DIAGNOSIS — I10 HYPERTENSION, UNSPECIFIED TYPE: ICD-10-CM

## 2021-01-01 DIAGNOSIS — R80.9 PROTEINURIA, UNSPECIFIED TYPE: ICD-10-CM

## 2021-01-01 DIAGNOSIS — I10 HYPERTENSION, UNSPECIFIED TYPE: Primary | ICD-10-CM

## 2021-01-01 DIAGNOSIS — I48.11 LONGSTANDING PERSISTENT ATRIAL FIBRILLATION (HCC): Chronic | ICD-10-CM
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
## 2021-01-01 DIAGNOSIS — N18.6 ANEMIA IN END-STAGE RENAL DISEASE (HCC): ICD-10-CM
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
## 2021-01-01 DIAGNOSIS — D63.1 ANEMIA IN END-STAGE RENAL DISEASE (HCC): Primary | ICD-10-CM

## 2021-01-01 DIAGNOSIS — N18.6 ANEMIA IN END-STAGE RENAL DISEASE (HCC): Primary | ICD-10-CM

## 2021-01-01 DIAGNOSIS — E55.9 VITAMIN D DEFICIENCY: ICD-10-CM

## 2021-01-01 DIAGNOSIS — N18.4 CHRONIC KIDNEY DISEASE (CKD), STAGE IV (SEVERE) (HCC): ICD-10-CM

## 2021-01-01 DIAGNOSIS — E78.2 MIXED HYPERLIPIDEMIA: ICD-10-CM

## 2021-01-01 DIAGNOSIS — Z86.73 HISTORY OF CVA (CEREBROVASCULAR ACCIDENT): Chronic | ICD-10-CM

## 2021-01-01 DIAGNOSIS — N18.2 CKD (CHRONIC KIDNEY DISEASE) STAGE 2, GFR 60-89 ML/MIN: Primary | ICD-10-CM

## 2021-01-01 DIAGNOSIS — S01.01XA LACERATION OF SCALP, INITIAL ENCOUNTER: ICD-10-CM

## 2021-01-01 DIAGNOSIS — S00.03XA HEMATOMA OF SCALP, INITIAL ENCOUNTER: ICD-10-CM

## 2021-01-01 DIAGNOSIS — S09.90XA INJURY OF HEAD, INITIAL ENCOUNTER: ICD-10-CM

## 2021-01-01 DIAGNOSIS — I27.20 PULMONARY HYPERTENSION (HCC): Primary | ICD-10-CM

## 2021-01-01 DIAGNOSIS — Z86.73 HISTORY OF CVA (CEREBROVASCULAR ACCIDENT): Primary | Chronic | ICD-10-CM

## 2021-01-01 DIAGNOSIS — N18.4 CHRONIC KIDNEY DISEASE, STAGE 4 (SEVERE) (HCC): ICD-10-CM

## 2021-01-01 DIAGNOSIS — I34.0 SEVERE MITRAL REGURGITATION: ICD-10-CM

## 2021-01-01 LAB
25(OH)D3 SERPL-MCNC: 56 NG/ML
ALBUMIN SERPL-MCNC: 3.8 G/DL (ref 3.5–5.2)
ALBUMIN SERPL-MCNC: 4 G/DL (ref 3.5–5.2)
ALBUMIN/GLOB SERPL: 1 G/DL
ALBUMIN/GLOB SERPL: 1.1 G/DL
ALP SERPL-CCNC: 131 U/L (ref 39–117)
ALP SERPL-CCNC: 154 U/L (ref 39–117)
ALT SERPL W P-5'-P-CCNC: 9 U/L (ref 1–33)
ALT SERPL W P-5'-P-CCNC: 9 U/L (ref 1–33)
ANA SER QL: NEGATIVE
ANION GAP SERPL CALCULATED.3IONS-SCNC: 10.9 MMOL/L (ref 5–15)
ANION GAP SERPL CALCULATED.3IONS-SCNC: 11.1 MMOL/L (ref 5–15)
AST SERPL-CCNC: 16 U/L (ref 1–32)
AST SERPL-CCNC: 17 U/L (ref 1–32)
BASOPHILS # BLD AUTO: 0.03 10*3/MM3 (ref 0–0.2)
BASOPHILS NFR BLD AUTO: 0.7 % (ref 0–1.5)
BILIRUB SERPL-MCNC: 1.2 MG/DL (ref 0–1.2)
BILIRUB SERPL-MCNC: 1.2 MG/DL (ref 0–1.2)
BUN SERPL-MCNC: 26 MG/DL (ref 8–23)
BUN SERPL-MCNC: 26 MG/DL (ref 8–23)
BUN/CREAT SERPL: 12.9 (ref 7–25)
BUN/CREAT SERPL: 13.2 (ref 7–25)
CALCIUM SPEC-SCNC: 9.3 MG/DL (ref 8.6–10.5)
CALCIUM SPEC-SCNC: 9.6 MG/DL (ref 8.6–10.5)
CHLORIDE SERPL-SCNC: 96 MMOL/L (ref 98–107)
CHLORIDE SERPL-SCNC: 99 MMOL/L (ref 98–107)
CHOLEST SERPL-MCNC: 100 MG/DL (ref 0–200)
CK SERPL-CCNC: 19 U/L (ref 20–180)
CK SERPL-CCNC: 24 U/L (ref 20–180)
CO2 SERPL-SCNC: 26.9 MMOL/L (ref 22–29)
CO2 SERPL-SCNC: 30.1 MMOL/L (ref 22–29)
CREAT SERPL-MCNC: 1.97 MG/DL (ref 0.57–1)
CREAT SERPL-MCNC: 2.02 MG/DL (ref 0.57–1)
DEPRECATED RDW RBC AUTO: 45.9 FL (ref 37–54)
EOSINOPHIL # BLD AUTO: 0.09 10*3/MM3 (ref 0–0.4)
EOSINOPHIL NFR BLD AUTO: 2 % (ref 0.3–6.2)
ERYTHROCYTE [DISTWIDTH] IN BLOOD BY AUTOMATED COUNT: 13.8 % (ref 12.3–15.4)
GFR SERPL CREATININE-BSD FRML MDRD: 23 ML/MIN/1.73
GFR SERPL CREATININE-BSD FRML MDRD: 24 ML/MIN/1.73
GLOBULIN UR ELPH-MCNC: 3.6 GM/DL
GLOBULIN UR ELPH-MCNC: 3.7 GM/DL
GLUCOSE SERPL-MCNC: 115 MG/DL (ref 65–99)
GLUCOSE SERPL-MCNC: 99 MG/DL (ref 65–99)
HCT VFR BLD AUTO: 36.8 % (ref 34–46.6)
HDLC SERPL-MCNC: 54 MG/DL (ref 40–60)
HGB BLD-MCNC: 11.9 G/DL (ref 12–15.9)
IGA SERPL-MCNC: 579 MG/DL (ref 64–422)
IGG SERPL-MCNC: 1670 MG/DL (ref 586–1602)
IGM SERPL-MCNC: 77 MG/DL (ref 26–217)
IMM GRANULOCYTES # BLD AUTO: 0.01 10*3/MM3 (ref 0–0.05)
IMM GRANULOCYTES NFR BLD AUTO: 0.2 % (ref 0–0.5)
IRON 24H UR-MRATE: 59 MCG/DL (ref 37–145)
LDLC SERPL CALC-MCNC: 28 MG/DL (ref 0–100)
LDLC/HDLC SERPL: 0.52 {RATIO}
LYMPHOCYTES # BLD AUTO: 1.05 10*3/MM3 (ref 0.7–3.1)
LYMPHOCYTES NFR BLD AUTO: 23 % (ref 19.6–45.3)
MCH RBC QN AUTO: 29.5 PG (ref 26.6–33)
MCHC RBC AUTO-ENTMCNC: 32.3 G/DL (ref 31.5–35.7)
MCV RBC AUTO: 91.3 FL (ref 79–97)
MONOCYTES # BLD AUTO: 0.44 10*3/MM3 (ref 0.1–0.9)
MONOCYTES NFR BLD AUTO: 9.6 % (ref 5–12)
NEUTROPHILS NFR BLD AUTO: 2.94 10*3/MM3 (ref 1.7–7)
NEUTROPHILS NFR BLD AUTO: 64.5 % (ref 42.7–76)
NRBC BLD AUTO-RTO: 0 /100 WBC (ref 0–0.2)
PHOSPHATE SERPL-MCNC: 3.3 MG/DL (ref 2.5–4.5)
PLATELET # BLD AUTO: 154 10*3/MM3 (ref 140–450)
PMV BLD AUTO: 11.4 FL (ref 6–12)
POTASSIUM SERPL-SCNC: 4.1 MMOL/L (ref 3.5–5.2)
POTASSIUM SERPL-SCNC: 4.4 MMOL/L (ref 3.5–5.2)
PROT PATTERN SERPL IFE-IMP: ABNORMAL
PROT SERPL-MCNC: 7.5 G/DL (ref 6–8.5)
PROT SERPL-MCNC: 7.6 G/DL (ref 6–8.5)
PTH-INTACT SERPL-MCNC: 45.2 PG/ML (ref 15–65)
RBC # BLD AUTO: 4.03 10*6/MM3 (ref 3.77–5.28)
SODIUM SERPL-SCNC: 134 MMOL/L (ref 136–145)
SODIUM SERPL-SCNC: 140 MMOL/L (ref 136–145)
TRIGL SERPL-MCNC: 90 MG/DL (ref 0–150)
TSH SERPL DL<=0.05 MIU/L-ACNC: 3.34 UIU/ML (ref 0.27–4.2)
URATE SERPL-MCNC: 9.1 MG/DL (ref 2.4–5.7)
VLDLC SERPL-MCNC: 18 MG/DL (ref 5–40)
WBC # BLD AUTO: 4.56 10*3/MM3 (ref 3.4–10.8)

## 2021-01-01 PROCEDURE — 83540 ASSAY OF IRON: CPT

## 2021-01-01 PROCEDURE — 84443 ASSAY THYROID STIM HORMONE: CPT

## 2021-01-01 PROCEDURE — 99283 EMERGENCY DEPT VISIT LOW MDM: CPT

## 2021-01-01 PROCEDURE — 84100 ASSAY OF PHOSPHORUS: CPT

## 2021-01-01 PROCEDURE — 82784 ASSAY IGA/IGD/IGG/IGM EACH: CPT

## 2021-01-01 PROCEDURE — 86334 IMMUNOFIX E-PHORESIS SERUM: CPT

## 2021-01-01 PROCEDURE — 84550 ASSAY OF BLOOD/URIC ACID: CPT

## 2021-01-01 PROCEDURE — 80061 LIPID PANEL: CPT

## 2021-01-01 PROCEDURE — 82550 ASSAY OF CK (CPK): CPT

## 2021-01-01 PROCEDURE — 70450 CT HEAD/BRAIN W/O DYE: CPT

## 2021-01-01 PROCEDURE — G0463 HOSPITAL OUTPT CLINIC VISIT: HCPCS

## 2021-01-01 PROCEDURE — 80053 COMPREHEN METABOLIC PANEL: CPT

## 2021-01-01 PROCEDURE — 93000 ELECTROCARDIOGRAM COMPLETE: CPT | Performed by: INTERNAL MEDICINE

## 2021-01-01 PROCEDURE — 86038 ANTINUCLEAR ANTIBODIES: CPT

## 2021-01-01 PROCEDURE — 85025 COMPLETE CBC W/AUTO DIFF WBC: CPT

## 2021-01-01 PROCEDURE — 36415 COLL VENOUS BLD VENIPUNCTURE: CPT

## 2021-01-01 PROCEDURE — 99213 OFFICE O/P EST LOW 20 MIN: CPT | Performed by: INTERNAL MEDICINE

## 2021-01-01 PROCEDURE — 76775 US EXAM ABDO BACK WALL LIM: CPT

## 2021-01-01 PROCEDURE — 82306 VITAMIN D 25 HYDROXY: CPT

## 2021-01-01 PROCEDURE — 83970 ASSAY OF PARATHORMONE: CPT

## 2021-01-01 RX ORDER — ESCITALOPRAM OXALATE 10 MG/1
10 TABLET ORAL DAILY
COMMUNITY
End: 2022-01-01

## 2021-01-01 RX ORDER — ALLOPURINOL 100 MG/1
1 TABLET ORAL DAILY
COMMUNITY
Start: 2021-01-01 | End: 2022-01-01

## 2021-01-01 RX ORDER — POTASSIUM CHLORIDE 750 MG/1
10 CAPSULE, EXTENDED RELEASE ORAL DAILY
COMMUNITY
Start: 2021-01-01 | End: 2022-01-01

## 2021-01-01 RX ORDER — ATORVASTATIN CALCIUM 20 MG/1
TABLET, FILM COATED ORAL
COMMUNITY
Start: 2021-04-01 | End: 2022-01-01

## 2021-01-01 RX ORDER — SILDENAFIL 25 MG/1
25 TABLET, FILM COATED ORAL 3 TIMES DAILY
Qty: 90 TABLET | Refills: 2 | Status: SHIPPED | OUTPATIENT
Start: 2021-01-01 | End: 2021-01-01

## 2021-01-01 RX ORDER — FUROSEMIDE 40 MG/1
40 TABLET ORAL DAILY
COMMUNITY
Start: 2021-03-31 | End: 2022-01-01 | Stop reason: HOSPADM

## 2021-01-01 RX ORDER — SILDENAFIL CITRATE 20 MG/1
20 TABLET ORAL 3 TIMES DAILY
COMMUNITY
End: 2022-01-01 | Stop reason: ALTCHOICE

## 2021-01-01 RX ORDER — FUROSEMIDE 40 MG/1
40 TABLET ORAL DAILY
Status: ON HOLD | COMMUNITY
Start: 2021-03-31 | End: 2022-01-01 | Stop reason: SDUPTHER

## 2021-01-01 RX ORDER — ONDANSETRON 4 MG/1
4 TABLET, FILM COATED ORAL EVERY 8 HOURS PRN
COMMUNITY
End: 2022-01-01

## 2021-01-01 RX ORDER — TEMAZEPAM 15 MG/1
15 CAPSULE ORAL
Status: ON HOLD | COMMUNITY
Start: 2021-04-06 | End: 2022-01-01

## 2021-01-07 ENCOUNTER — ANESTHESIA EVENT (OUTPATIENT)
Dept: CARDIOLOGY | Facility: HOSPITAL | Age: 86
End: 2021-01-07

## 2021-01-07 ENCOUNTER — HOSPITAL ENCOUNTER (OUTPATIENT)
Dept: CARDIOLOGY | Facility: HOSPITAL | Age: 86
Discharge: HOME | End: 2021-01-07

## 2021-01-07 ENCOUNTER — LAB (OUTPATIENT)
Dept: LAB | Facility: HOSPITAL | Age: 86
End: 2021-01-07

## 2021-01-07 ENCOUNTER — HOSPITAL ENCOUNTER (INPATIENT)
Facility: HOSPITAL | Age: 86
LOS: 1 days | Discharge: HOME | End: 2021-01-08
Attending: INTERNAL MEDICINE | Admitting: INTERNAL MEDICINE

## 2021-01-07 ENCOUNTER — ANESTHESIA (OUTPATIENT)
Dept: CARDIOLOGY | Facility: HOSPITAL | Age: 86
End: 2021-01-07

## 2021-01-07 ENCOUNTER — HOSPITAL ENCOUNTER (OUTPATIENT)
Dept: GENERAL RADIOLOGY | Facility: HOSPITAL | Age: 86
Discharge: HOME | End: 2021-01-07

## 2021-01-07 DIAGNOSIS — I48.11 LONGSTANDING PERSISTENT ATRIAL FIBRILLATION (HCC): ICD-10-CM

## 2021-01-07 DIAGNOSIS — Z79.01 LONG TERM (CURRENT) USE OF ANTICOAGULANTS: ICD-10-CM

## 2021-01-07 DIAGNOSIS — R29.6 FREQUENT FALLS: ICD-10-CM

## 2021-01-07 DIAGNOSIS — I34.2 NONRHEUMATIC MITRAL VALVE STENOSIS: ICD-10-CM

## 2021-01-07 DIAGNOSIS — I25.10 ATHEROSCLEROSIS OF NATIVE CORONARY ARTERY OF NATIVE HEART WITHOUT ANGINA PECTORIS: ICD-10-CM

## 2021-01-07 DIAGNOSIS — Z86.73 HISTORY OF CVA (CEREBROVASCULAR ACCIDENT): ICD-10-CM

## 2021-01-07 DIAGNOSIS — E78.2 MIXED HYPERLIPIDEMIA: ICD-10-CM

## 2021-01-07 DIAGNOSIS — I34.0 NONRHEUMATIC MITRAL VALVE REGURGITATION: ICD-10-CM

## 2021-01-07 LAB
ACT BLD: 153 SECONDS (ref 89–137)
ACT BLD: 323 SECONDS (ref 89–137)
ACT BLD: 362 SECONDS (ref 89–137)
ACT BLD: 450 SECONDS (ref 89–137)
ANION GAP SERPL CALCULATED.3IONS-SCNC: 10 MMOL/L (ref 5–15)
ANION GAP SERPL CALCULATED.3IONS-SCNC: 7 MMOL/L (ref 5–15)
BUN SERPL-MCNC: 19 MG/DL (ref 8–23)
BUN SERPL-MCNC: 22 MG/DL (ref 8–23)
BUN/CREAT SERPL: 10.4 (ref 7–25)
BUN/CREAT SERPL: 11.3 (ref 7–25)
CALCIUM SPEC-SCNC: 9.2 MG/DL (ref 8.6–10.5)
CALCIUM SPEC-SCNC: 9.5 MG/DL (ref 8.6–10.5)
CHLORIDE SERPL-SCNC: 100 MMOL/L (ref 98–107)
CHLORIDE SERPL-SCNC: 97 MMOL/L (ref 98–107)
CO2 SERPL-SCNC: 31 MMOL/L (ref 22–29)
CO2 SERPL-SCNC: 31 MMOL/L (ref 22–29)
CREAT SERPL-MCNC: 1.82 MG/DL (ref 0.57–1)
CREAT SERPL-MCNC: 1.94 MG/DL (ref 0.57–1)
DEPRECATED RDW RBC AUTO: 51.6 FL (ref 37–54)
DEPRECATED RDW RBC AUTO: 53.8 FL (ref 37–54)
ERYTHROCYTE [DISTWIDTH] IN BLOOD BY AUTOMATED COUNT: 15.2 % (ref 12.3–15.4)
ERYTHROCYTE [DISTWIDTH] IN BLOOD BY AUTOMATED COUNT: 15.7 % (ref 12.3–15.4)
GFR SERPL CREATININE-BSD FRML MDRD: 25 ML/MIN/1.73
GFR SERPL CREATININE-BSD FRML MDRD: 26 ML/MIN/1.73
GLUCOSE SERPL-MCNC: 101 MG/DL (ref 65–99)
GLUCOSE SERPL-MCNC: 150 MG/DL (ref 65–99)
HCT VFR BLD AUTO: 32.9 % (ref 34–46.6)
HCT VFR BLD AUTO: 34.7 % (ref 34–46.6)
HGB BLD-MCNC: 10.8 G/DL (ref 12–15.9)
HGB BLD-MCNC: 11.5 G/DL (ref 12–15.9)
INR PPP: 1.23 (ref 0.93–1.1)
MCH RBC QN AUTO: 32.3 PG (ref 26.6–33)
MCH RBC QN AUTO: 32.4 PG (ref 26.6–33)
MCHC RBC AUTO-ENTMCNC: 32.9 G/DL (ref 31.5–35.7)
MCHC RBC AUTO-ENTMCNC: 33.2 G/DL (ref 31.5–35.7)
MCV RBC AUTO: 97.5 FL (ref 79–97)
MCV RBC AUTO: 98.1 FL (ref 79–97)
PLATELET # BLD AUTO: 124 10*3/MM3 (ref 140–450)
PLATELET # BLD AUTO: 154 10*3/MM3 (ref 140–450)
PMV BLD AUTO: 8 FL (ref 6–12)
PMV BLD AUTO: 8.3 FL (ref 6–12)
POTASSIUM SERPL-SCNC: 4 MMOL/L (ref 3.5–5.2)
POTASSIUM SERPL-SCNC: 4 MMOL/L (ref 3.5–5.2)
PROTHROMBIN TIME: 13.4 SECONDS (ref 9.6–11.7)
RBC # BLD AUTO: 3.36 10*6/MM3 (ref 3.77–5.28)
RBC # BLD AUTO: 3.56 10*6/MM3 (ref 3.77–5.28)
SARS-COV-2 RNA PNL SPEC NAA+PROBE: NOT DETECTED
SODIUM SERPL-SCNC: 138 MMOL/L (ref 136–145)
SODIUM SERPL-SCNC: 138 MMOL/L (ref 136–145)
WBC # BLD AUTO: 5 10*3/MM3 (ref 3.4–10.8)
WBC # BLD AUTO: 5.5 10*3/MM3 (ref 3.4–10.8)

## 2021-01-07 PROCEDURE — 80048 BASIC METABOLIC PNL TOTAL CA: CPT | Performed by: INTERNAL MEDICINE

## 2021-01-07 PROCEDURE — 85610 PROTHROMBIN TIME: CPT

## 2021-01-07 PROCEDURE — 93355 ECHO TRANSESOPHAGEAL (TEE): CPT | Performed by: INTERNAL MEDICINE

## 2021-01-07 PROCEDURE — 93325 DOPPLER ECHO COLOR FLOW MAPG: CPT

## 2021-01-07 PROCEDURE — 80048 BASIC METABOLIC PNL TOTAL CA: CPT

## 2021-01-07 PROCEDURE — 33340 PERQ CLSR TCAT L ATR APNDGE: CPT | Performed by: INTERNAL MEDICINE

## 2021-01-07 PROCEDURE — 71046 X-RAY EXAM CHEST 2 VIEWS: CPT

## 2021-01-07 PROCEDURE — 25010000002 PROTAMINE SULFATE PER 10 MG: Performed by: ANESTHESIOLOGY

## 2021-01-07 PROCEDURE — 93312 ECHO TRANSESOPHAGEAL: CPT

## 2021-01-07 PROCEDURE — 25010000002 PROPOFOL 10 MG/ML EMULSION: Performed by: ANESTHESIOLOGY

## 2021-01-07 PROCEDURE — 0 IOPAMIDOL PER 1 ML: Performed by: INTERNAL MEDICINE

## 2021-01-07 PROCEDURE — 93320 DOPPLER ECHO COMPLETE: CPT

## 2021-01-07 PROCEDURE — 25010000002 HEPARIN (PORCINE) PER 1000 UNITS: Performed by: ANESTHESIOLOGY

## 2021-01-07 PROCEDURE — 85347 COAGULATION TIME ACTIVATED: CPT

## 2021-01-07 PROCEDURE — C1894 INTRO/SHEATH, NON-LASER: HCPCS | Performed by: INTERNAL MEDICINE

## 2021-01-07 PROCEDURE — 02L73DK OCCLUSION OF LEFT ATRIAL APPENDAGE WITH INTRALUMINAL DEVICE, PERCUTANEOUS APPROACH: ICD-10-PCS | Performed by: INTERNAL MEDICINE

## 2021-01-07 PROCEDURE — C1769 GUIDE WIRE: HCPCS | Performed by: INTERNAL MEDICINE

## 2021-01-07 PROCEDURE — 93355 ECHO TRANSESOPHAGEAL (TEE): CPT

## 2021-01-07 PROCEDURE — 85027 COMPLETE CBC AUTOMATED: CPT

## 2021-01-07 PROCEDURE — 85027 COMPLETE CBC AUTOMATED: CPT | Performed by: INTERNAL MEDICINE

## 2021-01-07 PROCEDURE — 25010000002 DEXAMETHASONE PER 1 MG: Performed by: ANESTHESIOLOGY

## 2021-01-07 PROCEDURE — U0003 INFECTIOUS AGENT DETECTION BY NUCLEIC ACID (DNA OR RNA); SEVERE ACUTE RESPIRATORY SYNDROME CORONAVIRUS 2 (SARS-COV-2) (CORONAVIRUS DISEASE [COVID-19]), AMPLIFIED PROBE TECHNIQUE, MAKING USE OF HIGH THROUGHPUT TECHNOLOGIES AS DESCRIBED BY CMS-2020-01-R: HCPCS

## 2021-01-07 PROCEDURE — C1893 INTRO/SHEATH, FIXED,NON-PEEL: HCPCS | Performed by: INTERNAL MEDICINE

## 2021-01-07 PROCEDURE — 25010000002 MORPHINE PER 10 MG: Performed by: ANESTHESIOLOGY

## 2021-01-07 PROCEDURE — 36415 COLL VENOUS BLD VENIPUNCTURE: CPT

## 2021-01-07 PROCEDURE — B24BZZ4 ULTRASONOGRAPHY OF HEART WITH AORTA, TRANSESOPHAGEAL: ICD-10-PCS | Performed by: INTERNAL MEDICINE

## 2021-01-07 PROCEDURE — 25010000002 FENTANYL CITRATE (PF) 100 MCG/2ML SOLUTION: Performed by: ANESTHESIOLOGY

## 2021-01-07 RX ORDER — FUROSEMIDE 40 MG/1
40 TABLET ORAL DAILY
Status: DISCONTINUED | OUTPATIENT
Start: 2021-01-07 | End: 2021-01-08 | Stop reason: HOSPADM

## 2021-01-07 RX ORDER — SILDENAFIL CITRATE 20 MG/1
20 TABLET ORAL 3 TIMES DAILY
Status: DISCONTINUED | OUTPATIENT
Start: 2021-01-07 | End: 2021-01-08 | Stop reason: HOSPADM

## 2021-01-07 RX ORDER — ATORVASTATIN CALCIUM 20 MG/1
20 TABLET, FILM COATED ORAL NIGHTLY
Status: DISCONTINUED | OUTPATIENT
Start: 2021-01-07 | End: 2021-01-08 | Stop reason: HOSPADM

## 2021-01-07 RX ORDER — PROPOFOL 10 MG/ML
VIAL (ML) INTRAVENOUS AS NEEDED
Status: DISCONTINUED | OUTPATIENT
Start: 2021-01-07 | End: 2021-01-07 | Stop reason: SURG

## 2021-01-07 RX ORDER — PROTAMINE SULFATE 10 MG/ML
INJECTION, SOLUTION INTRAVENOUS AS NEEDED
Status: DISCONTINUED | OUTPATIENT
Start: 2021-01-07 | End: 2021-01-07 | Stop reason: SURG

## 2021-01-07 RX ORDER — MELATONIN
1000 DAILY
Status: DISCONTINUED | OUTPATIENT
Start: 2021-01-07 | End: 2021-01-08 | Stop reason: HOSPADM

## 2021-01-07 RX ORDER — TEMAZEPAM 15 MG/1
15 CAPSULE ORAL NIGHTLY PRN
Status: DISCONTINUED | OUTPATIENT
Start: 2021-01-07 | End: 2021-01-08 | Stop reason: HOSPADM

## 2021-01-07 RX ORDER — LIDOCAINE HYDROCHLORIDE 20 MG/ML
INJECTION, SOLUTION EPIDURAL; INFILTRATION; INTRACAUDAL; PERINEURAL AS NEEDED
Status: DISCONTINUED | OUTPATIENT
Start: 2021-01-07 | End: 2021-01-07 | Stop reason: SURG

## 2021-01-07 RX ORDER — FENTANYL CITRATE 50 UG/ML
INJECTION, SOLUTION INTRAMUSCULAR; INTRAVENOUS AS NEEDED
Status: DISCONTINUED | OUTPATIENT
Start: 2021-01-07 | End: 2021-01-07 | Stop reason: SURG

## 2021-01-07 RX ORDER — LIDOCAINE HYDROCHLORIDE 20 MG/ML
INJECTION, SOLUTION INFILTRATION; PERINEURAL AS NEEDED
Status: DISCONTINUED | OUTPATIENT
Start: 2021-01-07 | End: 2021-01-07 | Stop reason: HOSPADM

## 2021-01-07 RX ORDER — HEPARIN SODIUM 1000 [USP'U]/ML
INJECTION, SOLUTION INTRAVENOUS; SUBCUTANEOUS AS NEEDED
Status: DISCONTINUED | OUTPATIENT
Start: 2021-01-07 | End: 2021-01-07 | Stop reason: SURG

## 2021-01-07 RX ORDER — NEOSTIGMINE METHYLSULFATE 5 MG/5 ML
SYRINGE (ML) INTRAVENOUS AS NEEDED
Status: DISCONTINUED | OUTPATIENT
Start: 2021-01-07 | End: 2021-01-07 | Stop reason: SURG

## 2021-01-07 RX ORDER — DEXAMETHASONE SODIUM PHOSPHATE 4 MG/ML
INJECTION, SOLUTION INTRA-ARTICULAR; INTRALESIONAL; INTRAMUSCULAR; INTRAVENOUS; SOFT TISSUE AS NEEDED
Status: DISCONTINUED | OUTPATIENT
Start: 2021-01-07 | End: 2021-01-07 | Stop reason: SURG

## 2021-01-07 RX ORDER — SODIUM CHLORIDE 0.9 % (FLUSH) 0.9 %
10 SYRINGE (ML) INJECTION AS NEEDED
Status: DISCONTINUED | OUTPATIENT
Start: 2021-01-07 | End: 2021-01-08 | Stop reason: HOSPADM

## 2021-01-07 RX ORDER — SODIUM CHLORIDE 9 MG/ML
30 INJECTION, SOLUTION INTRAVENOUS CONTINUOUS
Status: DISCONTINUED | OUTPATIENT
Start: 2021-01-07 | End: 2021-01-08 | Stop reason: HOSPADM

## 2021-01-07 RX ORDER — ROCURONIUM BROMIDE 10 MG/ML
INJECTION, SOLUTION INTRAVENOUS AS NEEDED
Status: DISCONTINUED | OUTPATIENT
Start: 2021-01-07 | End: 2021-01-07 | Stop reason: SURG

## 2021-01-07 RX ORDER — POTASSIUM CHLORIDE 20 MEQ/1
20 TABLET, EXTENDED RELEASE ORAL DAILY
Status: DISCONTINUED | OUTPATIENT
Start: 2021-01-07 | End: 2021-01-08 | Stop reason: HOSPADM

## 2021-01-07 RX ORDER — GLYCOPYRROLATE 1 MG/5 ML
SYRINGE (ML) INTRAVENOUS AS NEEDED
Status: DISCONTINUED | OUTPATIENT
Start: 2021-01-07 | End: 2021-01-07 | Stop reason: SURG

## 2021-01-07 RX ORDER — MORPHINE SULFATE 4 MG/ML
1 INJECTION, SOLUTION INTRAMUSCULAR; INTRAVENOUS
Status: DISCONTINUED | OUTPATIENT
Start: 2021-01-07 | End: 2021-01-07 | Stop reason: HOSPADM

## 2021-01-07 RX ORDER — OXYCODONE HYDROCHLORIDE 5 MG/1
5 TABLET ORAL ONCE AS NEEDED
Status: DISCONTINUED | OUTPATIENT
Start: 2021-01-07 | End: 2021-01-07 | Stop reason: HOSPADM

## 2021-01-07 RX ORDER — ACETAMINOPHEN 325 MG/1
650 TABLET ORAL EVERY 4 HOURS PRN
Status: DISCONTINUED | OUTPATIENT
Start: 2021-01-07 | End: 2021-01-08 | Stop reason: HOSPADM

## 2021-01-07 RX ORDER — LEVOTHYROXINE SODIUM 0.05 MG/1
75 TABLET ORAL
Status: DISCONTINUED | OUTPATIENT
Start: 2021-01-08 | End: 2021-01-08 | Stop reason: HOSPADM

## 2021-01-07 RX ORDER — HYDROMORPHONE HCL 110MG/55ML
0.5 PATIENT CONTROLLED ANALGESIA SYRINGE INTRAVENOUS
Status: DISCONTINUED | OUTPATIENT
Start: 2021-01-07 | End: 2021-01-07 | Stop reason: HOSPADM

## 2021-01-07 RX ORDER — SODIUM CHLORIDE 0.9 % (FLUSH) 0.9 %
10 SYRINGE (ML) INJECTION EVERY 12 HOURS SCHEDULED
Status: DISCONTINUED | OUTPATIENT
Start: 2021-01-07 | End: 2021-01-08 | Stop reason: HOSPADM

## 2021-01-07 RX ORDER — SILDENAFIL 25 MG/1
25 TABLET, FILM COATED ORAL 3 TIMES DAILY
COMMUNITY
End: 2021-01-01 | Stop reason: SDUPTHER

## 2021-01-07 RX ORDER — IPRATROPIUM BROMIDE AND ALBUTEROL SULFATE 2.5; .5 MG/3ML; MG/3ML
3 SOLUTION RESPIRATORY (INHALATION)
Status: DISCONTINUED | OUTPATIENT
Start: 2021-01-07 | End: 2021-01-08 | Stop reason: HOSPADM

## 2021-01-07 RX ADMIN — PROPOFOL 100 MG: 10 INJECTION, EMULSION INTRAVENOUS at 09:18

## 2021-01-07 RX ADMIN — HEPARIN SODIUM 2000 UNITS: 1000 INJECTION INTRAVENOUS; SUBCUTANEOUS at 10:31

## 2021-01-07 RX ADMIN — SODIUM CHLORIDE 250 ML: 9 INJECTION, SOLUTION INTRAVENOUS at 17:42

## 2021-01-07 RX ADMIN — METOPROLOL TARTRATE 25 MG: 25 TABLET, FILM COATED ORAL at 14:25

## 2021-01-07 RX ADMIN — DEXAMETHASONE SODIUM PHOSPHATE 4 MG: 4 INJECTION, SOLUTION INTRAMUSCULAR; INTRAVENOUS at 09:21

## 2021-01-07 RX ADMIN — ROCURONIUM BROMIDE 30 MG: 10 INJECTION, SOLUTION INTRAVENOUS at 09:18

## 2021-01-07 RX ADMIN — ATORVASTATIN CALCIUM 20 MG: 20 TABLET, FILM COATED ORAL at 20:04

## 2021-01-07 RX ADMIN — APIXABAN 2.5 MG: 2.5 TABLET, FILM COATED ORAL at 20:04

## 2021-01-07 RX ADMIN — HEPARIN SODIUM 5000 UNITS: 1000 INJECTION INTRAVENOUS; SUBCUTANEOUS at 09:39

## 2021-01-07 RX ADMIN — PROTAMINE SULFATE 100 MG: 10 INJECTION, SOLUTION INTRAVENOUS at 10:45

## 2021-01-07 RX ADMIN — MORPHINE SULFATE 2 MG: 4 INJECTION INTRAVENOUS at 12:02

## 2021-01-07 RX ADMIN — SILDENAFIL 20 MG: 20 TABLET, FILM COATED ORAL at 14:52

## 2021-01-07 RX ADMIN — FENTANYL CITRATE 50 MCG: 50 INJECTION, SOLUTION INTRAMUSCULAR; INTRAVENOUS at 09:18

## 2021-01-07 RX ADMIN — FUROSEMIDE 40 MG: 40 TABLET ORAL at 14:25

## 2021-01-07 RX ADMIN — SODIUM CHLORIDE 30 ML/HR: 9 INJECTION, SOLUTION INTRAVENOUS at 08:47

## 2021-01-07 RX ADMIN — Medication 0.4 MG: at 10:48

## 2021-01-07 RX ADMIN — POTASSIUM CHLORIDE 20 MEQ: 1500 TABLET, EXTENDED RELEASE ORAL at 14:53

## 2021-01-07 RX ADMIN — LIDOCAINE HYDROCHLORIDE 100 MG: 20 INJECTION, SOLUTION EPIDURAL; INFILTRATION; INTRACAUDAL; PERINEURAL at 09:18

## 2021-01-07 RX ADMIN — MORPHINE SULFATE 2 MG: 4 INJECTION INTRAVENOUS at 11:52

## 2021-01-07 RX ADMIN — HEPARIN SODIUM 7000 UNITS: 1000 INJECTION INTRAVENOUS; SUBCUTANEOUS at 09:44

## 2021-01-07 RX ADMIN — Medication 10 ML: at 20:05

## 2021-01-07 RX ADMIN — FENTANYL CITRATE 50 MCG: 50 INJECTION, SOLUTION INTRAMUSCULAR; INTRAVENOUS at 10:44

## 2021-01-07 RX ADMIN — Medication 1000 UNITS: at 14:52

## 2021-01-07 RX ADMIN — Medication 3 MG: at 10:48

## 2021-01-07 NOTE — ANESTHESIA PREPROCEDURE EVALUATION
Anesthesia Evaluation     Patient summary reviewed and Nursing notes reviewed   NPO Solid Status: > 8 hours  NPO Liquid Status: > 8 hours           Airway   Mallampati: I  TM distance: >3 FB  Neck ROM: full  No difficulty expected  Dental - normal exam   (+) upper dentures and lower dentures    Pulmonary - normal exam   (+) pneumonia , a smoker Former, shortness of breath,   Cardiovascular - normal exam    (+) hypertension, valvular problems/murmurs, CAD, dysrhythmias, hyperlipidemia,  carotid artery disease      Neuro/Psych  (+) CVA, syncope,     GI/Hepatic/Renal/Endo    (+)   renal disease,     Musculoskeletal     Abdominal  - normal exam    Bowel sounds: normal.   Substance History - negative use     OB/GYN negative ob/gyn ROS         Other   arthritis,                      Anesthesia Plan    ASA 3     general     intravenous induction     Anesthetic plan, all risks, benefits, and alternatives have been provided, discussed and informed consent has been obtained with: patient.

## 2021-01-07 NOTE — ANESTHESIA PROCEDURE NOTES
Airway  Urgency: elective    Date/Time: 1/7/2021 9:20 AM  Airway not difficult    General Information and Staff    Patient location during procedure: OR    Indications and Patient Condition  Indications for airway management: airway protection    Preoxygenated: yes  MILS maintained throughout  Mask difficulty assessment: 1 - vent by mask    Final Airway Details  Final airway type: endotracheal airway      Successful airway: ETT  Cuffed: yes   Successful intubation technique: direct laryngoscopy  Endotracheal tube insertion site: oral  Blade: Zacarias  Blade size: 3  ETT size (mm): 6.5  Cormack-Lehane Classification: grade I - full view of glottis  Placement verified by: chest auscultation and capnometry   Measured from: lips  ETT/EBT  to lips (cm): 22  Number of attempts at approach: 1  Assessment: lips, teeth, and gum same as pre-op and atraumatic intubation

## 2021-01-07 NOTE — ANESTHESIA POSTPROCEDURE EVALUATION
Patient: Karen Rubio    Procedure Summary     Date: 01/07/21 Room / Location: [facility] [address] [place] [address] CATH INVASIVE LOCATION    Anesthesia Start: 0915 Anesthesia Stop: 1104    Procedures:       Atrial Appendage Occlusion (N/A )      Atrial Appendage Occlusion (N/A ) Diagnosis:       Longstanding persistent atrial fibrillation (CMS/HCC)      Long term (current) use of anticoagulants      Frequent falls      Nonrheumatic mitral valve regurgitation      Nonrheumatic mitral valve stenosis      Atherosclerosis of native coronary artery of native heart without angina pectoris      History of CVA (cerebrovascular accident)      Mixed hyperlipidemia      (Attempted watchman device implantation without complications)    Provider: Harjit Berg MD; Zack Zheng MD Provider: Henok Zavaleta DO    Anesthesia Type: general ASA Status: 3          Anesthesia Type: general    Vitals  No vitals data found for the desired time range.          Post Anesthesia Care and Evaluation    Patient location during evaluation: PACU  Patient participation: complete - patient participated  Level of consciousness: awake  Pain scale: See nurse's notes for pain score.  Pain management: adequate  Airway patency: patent  Anesthetic complications: No anesthetic complications  PONV Status: none  Cardiovascular status: acceptable  Respiratory status: acceptable  Hydration status: acceptable    Comments: Patient seen and examined postoperatively; vital signs stable; SpO2 greater than or equal to 90%; cardiopulmonary status stable; nausea/vomiting adequately controlled; pain adequately controlled; no apparent anesthesia complications; patient discharged from anesthesia care when discharge criteria were met

## 2021-01-08 VITALS
OXYGEN SATURATION: 95 % | HEART RATE: 82 BPM | WEIGHT: 156.97 LBS | TEMPERATURE: 97.8 F | HEIGHT: 64 IN | BODY MASS INDEX: 26.8 KG/M2 | SYSTOLIC BLOOD PRESSURE: 127 MMHG | RESPIRATION RATE: 17 BRPM | DIASTOLIC BLOOD PRESSURE: 66 MMHG

## 2021-01-08 PROCEDURE — 94640 AIRWAY INHALATION TREATMENT: CPT

## 2021-01-08 PROCEDURE — 99238 HOSP IP/OBS DSCHRG MGMT 30/<: CPT | Performed by: INTERNAL MEDICINE

## 2021-01-08 PROCEDURE — 94799 UNLISTED PULMONARY SVC/PX: CPT

## 2021-01-08 PROCEDURE — 94760 N-INVAS EAR/PLS OXIMETRY 1: CPT

## 2021-01-08 RX ADMIN — IPRATROPIUM BROMIDE AND ALBUTEROL SULFATE 3 ML: 2.5; .5 SOLUTION RESPIRATORY (INHALATION) at 06:41

## 2021-01-08 RX ADMIN — LEVOTHYROXINE SODIUM 75 MCG: 0.05 TABLET ORAL at 06:32

## 2021-01-08 RX ADMIN — METOPROLOL TARTRATE 25 MG: 25 TABLET, FILM COATED ORAL at 10:26

## 2021-01-08 RX ADMIN — POTASSIUM CHLORIDE 20 MEQ: 1500 TABLET, EXTENDED RELEASE ORAL at 10:27

## 2021-01-08 RX ADMIN — FUROSEMIDE 40 MG: 40 TABLET ORAL at 10:27

## 2021-01-08 RX ADMIN — Medication 1000 UNITS: at 10:26

## 2021-01-08 RX ADMIN — APIXABAN 2.5 MG: 2.5 TABLET, FILM COATED ORAL at 10:26

## 2021-01-08 NOTE — NURSING NOTE
Blood pressure after initial walk was 60's/40's accompanied with dizziness. 250cc bolus given. Dr. Berg was notified via text at 1745. Blood pressure recovered to low 90's/50's and dizziness resolved. MD did not return text but issues no longer pressing. Will continue to monitor BP closely. Rescheduled nightly BP medications for 2300 to assess situation at that time.

## 2021-01-08 NOTE — DISCHARGE SUMMARY
Date of Admission: 1/7/2021    Date of Discharge:  1/8/2021    Length of stay:  LOS: 1 day     Admission Diagnosis:    -Longstanding persistent atrial fibrillation  -Intolerant to long-term anticoagulation due to frequent falls  -Nonrheumatic mitral valve disease  -Hypertension, hyperlipidemia  -History of CVA        Discharge Diagnosis:     -Longstanding persistent atrial fibrillation  -Intolerant to long-term anticoagulation due to frequent falls  -Nonrheumatic mitral valve disease  -Hypertension, hyperlipidemia  -History of CVA    Unsuccessful placement of a watchman left atrial appendage closure device secondary to mismatch of the size of the left atrial appendage and available device          Presenting Problem/History of Present Illness  Active Hospital Problems    Diagnosis  POA   • Long term (current) use of anticoagulants [Z79.01]  Yes   • Frequent falls [R29.6]  Yes   • Nonrheumatic mitral valve regurgitation [I34.0]  Yes   • Nonrheumatic mitral valve stenosis [I34.2]  Yes   • Mixed hyperlipidemia [E78.2]  Yes   • Longstanding persistent atrial fibrillation (CMS/HCC) [I48.11]  Yes   • Atherosclerosis of native coronary artery of native heart without angina pectoris [I25.10]  Yes   • History of CVA (cerebrovascular accident) [Z86.73]  Not Applicable      Resolved Hospital Problems   No resolved problems to display.          Hospital Course  Patient is a 85 y.o. female presented with [facility] for evaluation for watchman.  Patient has been having frequent falls and had a ruddy fall on 7/20/2020 where there was a question of intracranial bleed and her Eliquis was discontinued.Patient underwent ESTEFANI 3/11/2020 which revealed EF of 65% with MR and MS and TR and moderate AS.  Patient has no history of rheumatic fever in the past.        PROCEDURES PERFORMED:     1.  Left atrial appendage cannulation and measurements of left atrial pressures  2.  Contrast venography of left atrial appendage  3.  Deployment of left  atrial appendage occlusion device, which is Watchman device in the left atrial appendage  4.  Fluoroscopy     5.  Transseptal puncture of an intact atrial septum was performed by interventional electrophysiologist Dr. Zheng      A 35 mm watchman flex device was taken manufactured by [employer] and lot number is [id number] and reference number is [id number]. The device was placed in the sheath and advanced to the appendage and a flex ball was formed as per protocol and the device was delivered--the device was placed in the ostial plane which was confirmed on fluoroscopy, this revealed that the appendage had a wide os but no depth.  Then it was elected to do a repeat septal puncture which was lower and use a different posterior sheath and initially a 31 mm watchman flex was attempted but was not successful.  Then a 35 mm watchman flex device was attempted to be placed in the appendage but was having a lot of shoulder and was not sitting well.  Therefore the long sheath was removed and heparin reversed with protamine and procedure completed. No procedure related complications      Past Medical History:     Past Medical History:   Diagnosis Date   • Abnormality of left atrial appendage 05/17/2016    Suspected Clot Noted on ESTEFANI   • Acute renal failure (CMS/HCC) 08/06/2016-[facility]    Front Dehydration   • Arthritis    • Breast density 12/2017   • Carotid stenosis 11/05/2018    R @ 60% and L @ 10-20% Noted on Cardiac Cath & 16-49% on L&R    • Heart murmur    • History of echocardiogram 10/13/2017    Calcified Aortic Leaftlets; Mild Aortic Stenosis Present; Mild Mitral Stenosis; Bilateral Enlargement Noted;  Moderate TR; LV Function of 55-60%   • History of echocardiogram 12/5/14-[facility]    Moderate L Vent Hypertrophy Noted; L Atrial Dilation; Moderate MR; Mild TR; Mild-Moderate Aortic Reg Noted; Normal Root Size/No Effusion   • History of echocardiogram 5/16/16-[facility]    Normal Findings with Mild-Moderate MVS Noted   •  History of EKG 2004/2016 & 11/5/18-[facility]    All Sinus Rhythm w/Infarct Ischemnic Changes Noted   • HLD (hyperlipidemia)     Controlled w/Meds   • Hx of hyperglycemia    • Hypertension     Controlled w/Meds   • Hypothyroidism     Levothyroxine   • Joint pain    • Left atrial dilatation 12/05/2014    Mildly Noted on Echo   • Left ventricular hypertrophy 12/05/2014    Noted on Echo w/EF @ 55-60%   • Mild aortic regurgitation 12/05/2014    to Moderate Noted on Echo   • Mild aortic stenosis 10/13/2017    Noted on Echo & ESTEFANI-11/5/18-Moderate    • Mild mitral insufficiency 10/13/2017    Noted on Echo & ESTEFANI-11/5/18   • Mild tricuspid regurgitation 12/05/2014    Noted on Echo   • Moderate mitral regurgitation 12/05/2014    Noted on Echo   • Moderate mitral stenosis 10/13/2017    Noted on Echo   • Moderate tricuspid insufficiency 10/13/2017    Noted on Echo   • Pneumonia involving left lung 08/6/16-[facility]   • Pulmonary hypertension (CMS/HCC) 10/13/2017 & 11/5/18-Cath    Severe Noted on Echo & Cath   • Slurred speech 05/16/2016   • SOB (shortness of breath) 08/2016   • Stroke (CMS/HCC)    • Thickened mitral leaflet 10/13/2017 & ESTEFANI-11/5/18    Severely Calcified Noted on Echo   • Tricuspid valve insufficiency 11/05/2018    Noted on ESTEFANI       Past Surgical History:     Past Surgical History:   Procedure Laterality Date   • CARDIAC CATHETERIZATION Left 11/5/18-[facility]    w/L Coronary Angiography--Dr. Hernández-RCA @ 60% with Pulmonary Hypertension Noted   • CHOLECYSTECTOMY     • KNEE ARTHROSCOPY     • ESTEFANI  05/17/2016-[facility]    Dr. Hernández--Severly Calcified Mitral Leaflets w/Mild-Moderate Mitral Stenosis/Insufficiency; Sig Aortic Stenosis Noted; Suspecion of L Atrial Appendage Clot Noted   • ESTEFANI  11/5/18-[facility]    Dr. Hernández--Moderate Mitral Insufficiency Noted; Mild-Moderate AVS; Moderate Tricuspid Insufficiency Noted; EF of 65-70%   • TONSILLECTOMY         Social History:   Social History     Socioeconomic History   • Marital status:      Spouse  name: Not on file   • Number of children: Not on file   • Years of education: Not on file   • Highest education level: Not on file   Occupational History   • Occupation: RETIRED-[employer]   Tobacco Use   • Smoking status: Former Smoker     Types: Cigarettes     Quit date:      Years since quittin.0   • Smokeless tobacco: Never Used   Substance and Sexual Activity   • Alcohol use: No     Frequency: Never   • Drug use: No   • Sexual activity: Defer     Birth control/protection: Post-menopausal       Procedures Performed       Consults:   Consulting Physician(s)     Provider Relationship Specialty    Clare Meza MD Consulting Physician Cardiology          Pertinent Test Results:     Lab Results (last 72 hours)     Procedure Component Value Units Date/Time    POC Activated Clotting Time [[id number]]  (Abnormal) Collected: 21 0937    Specimen: Venous Blood Updated: 21 1739     Activated Clotting Time  153 Seconds      Comment: Serial Number: [id number]:  [id number]       POC Activated Clotting Time [[id number]]  (Abnormal) Collected: 21 1030    Specimen: Venous Blood Updated: 21 1738     Activated Clotting Time  323 Seconds      Comment: Serial Number: [id number]:  [id number]       POC Activated Clotting Time [[id number]]  (Abnormal) Collected: 21 1007    Specimen: Venous Blood Updated: 21 1738     Activated Clotting Time  362 Seconds      Comment: Serial Number: [id number]:  [id number]       POC Activated Clotting Time [[id number]]  (Abnormal) Collected: 21 0956    Specimen: Venous Blood Updated: 21 1738     Activated Clotting Time  450 Seconds      Comment: Serial Number: [id number]:  [id number]       Basic Metabolic Panel [[id number]]  (Abnormal) Collected: 21 1320    Specimen: Blood Updated: 21 1350     Glucose 150 mg/dL      BUN 22 mg/dL      Creatinine 1.94 mg/dL      Sodium 138 mmol/L      Potassium 4.0 mmol/L      Chloride 100 mmol/L   "    CO2 31.0 mmol/L      Calcium 9.2 mg/dL      eGFR Non African Amer 25 mL/min/1.73      BUN/Creatinine Ratio 11.3     Anion Gap 7.0 mmol/L     Narrative:      GFR Normal >60  Chronic Kidney Disease <60  Kidney Failure <15      CBC (No Diff) [[id number]]  (Abnormal) Collected: 01/07/21 1320    Specimen: Blood Updated: 01/07/21 1327     WBC 5.50 10*3/mm3      RBC 3.36 10*6/mm3      Hemoglobin 10.8 g/dL      Hematocrit 32.9 %      MCV 98.1 fL      MCH 32.3 pg      MCHC 32.9 g/dL      RDW 15.7 %      RDW-SD 53.8 fl      MPV 8.0 fL      Platelets 124 10*3/mm3           Results for orders placed during the hospital encounter of 03/09/20   Adult Transesophageal Echo (ESTEFANI) W/ Cont if Necessary Per Protocol (Cardiology Department)    Narrative · Estimated EF = 65%.  · Left ventricular systolic function is normal.  · Moderate to severe mitral insufficiency at least moderate mitral   stenosis severe tricuspid insufficiency moderate aortic stenosis noted     Normal LV systolic function EF 65%  Biatrial enlargement  Severely calcified mitral leaflets with at least moderate mitral stenosis   moderate to severe mitral insufficiency  Severe mitral annular calcification  Tricuspid leaflets mildly thickened and sclerosed  There is significant right atrial dilation also RV dilatation  Severe tricuspid insufficiency  Calcified aortic leaflets with decreased opening at least moderate aortic   stenosis  Intra-atrial septum is intact bubble study negative for shunt  Left atrial appendage showed spontaneous echo contrast         Imaging Results (All)     None            Condition on Discharge: Stable    Vital Signs  Visit Vitals  /72 (BP Location: Right arm, Patient Position: Lying)   Pulse 74   Temp 96.8 °F (36 °C) (Temporal)   Resp 18   Ht 162.6 cm (64\")   Wt 71.2 kg (156 lb 15.5 oz)   SpO2 97%   BMI 26.94 kg/m²       Physical Exam:    Constitutional:       Appearance: Well-developed.   Eyes:      Conjunctiva/sclera: Conjunctivae " normal.      Pupils: Pupils are equal, round, and reactive to light.   HENT:      Head: Normocephalic and atraumatic.   Neck:      Musculoskeletal: Normal range of motion and neck supple.      Thyroid: No thyromegaly.   Pulmonary:      Effort: Pulmonary effort is normal.      Breath sounds: Normal breath sounds.   Cardiovascular:      Normal rate. Regular rhythm.   Pulses:     Intact distal pulses.   Abdominal:      General: Bowel sounds are normal.      Palpations: Abdomen is soft.   Musculoskeletal: Normal range of motion.   Skin:     General: Skin is warm.   Neurological:      Mental Status: Alert and oriented to person, place, and time.       Groin site looks good with no hematoma or bleeding      Discharge Disposition  Home or Self Care    Discharge Medications     Discharge Medications      Continue These Medications      Instructions Start Date   acetaminophen 325 MG tablet  Commonly known as: TYLENOL   650 mg, Oral, Every 4 Hours PRN      albuterol sulfate  (90 Base) MCG/ACT inhaler  Commonly known as: PROVENTIL HFA;VENTOLIN HFA;PROAIR HFA   2 puffs, Inhalation, Every 4 Hours PRN      apixaban 5 MG tablet tablet  Commonly known as: Eliquis   5 mg, Oral, 2 Times Daily      atorvastatin 20 MG tablet  Commonly known as: LIPITOR   20 mg, Oral, Nightly      esomeprazole 40 MG capsule  Commonly known as: nexIUM   40 mg, Oral, Every Morning Before Breakfast      furosemide 40 MG tablet  Commonly known as: LASIX   40 mg, Oral, Daily      ipratropium-albuterol 0.5-2.5 mg/3 ml nebulizer  Commonly known as: DUO-NEB   3 mL, 4 Times Daily - RT, O2 bellow 90      levothyroxine 75 MCG tablet  Commonly known as: SYNTHROID, LEVOTHROID   75 mcg, Oral, Daily      metoprolol tartrate 25 MG tablet  Commonly known as: LOPRESSOR   25 mg, Oral, 2 Times Daily      O2  Commonly known as: OXYGEN   2 L/min, Inhalation, Once, At night and PRN      potassium chloride 10 MEQ CR capsule  Commonly known as: MICRO-K   20 mEq, Oral,  Daily      sildenafil 25 MG tablet  Commonly known as: VIAGRA   25 mg, Oral, 3 Times Daily, To help with breathing       temazepam 15 MG capsule  Commonly known as: RESTORIL   15 mg, Oral, Nightly PRN      Vitamin D-3 25 MCG (1000 UT) capsule   1 capsule, Oral, Daily             Discharge Diet:     Activity at Discharge:     Follow-up Appointments  Future Appointments   Date Time Provider Department Center   1/15/2021  9:50 AM Demond Valiente MD [facility]   3/11/2021 12:40 PM MD, [facility] None         Test Results Pending at Discharge       Risk for Readmission (LACE) Score: 6 (1/8/2021  6:00 AM)          Clare Meza MD  01/08/21  09:42 EST

## 2021-01-08 NOTE — PLAN OF CARE
Problem: Adult Inpatient Plan of Care  Goal: Plan of Care Review  Outcome: Ongoing, Progressing  Flowsheets (Taken 1/7/2021 2230)  Plan of Care Reviewed With: patient  Goal: Patient-Specific Goal (Individualized)  Outcome: Ongoing, Progressing  Goal: Absence of Hospital-Acquired Illness or Injury  Outcome: Ongoing, Progressing  Intervention: Identify and Manage Fall Risk  Recent Flowsheet Documentation  Taken 1/7/2021 2230 by Michaela Noel RN  Safety Promotion/Fall Prevention:   safety round/check completed   room organization consistent   nonskid shoes/slippers when out of bed   lighting adjusted   assistive device/personal items within reach   activity supervised   clutter free environment maintained   fall prevention program maintained  Intervention: Prevent Skin Injury  Recent Flowsheet Documentation  Taken 1/7/2021 2230 by Michaela Noel RN  Body Position: position changed independently  Intervention: Prevent and Manage VTE (venous thromboembolism) Risk  Recent Flowsheet Documentation  Taken 1/7/2021 2230 by Michaela Noel RN  VTE Prevention/Management: dorsiflexion/plantar flexion performed  Intervention: Prevent Infection  Recent Flowsheet Documentation  Taken 1/7/2021 2230 by Michaela Noel RN  Infection Prevention: personal protective equipment utilized  Goal: Optimal Comfort and Wellbeing  Outcome: Ongoing, Progressing  Intervention: Provide Person-Centered Care  Recent Flowsheet Documentation  Taken 1/7/2021 2230 by Michaela Noel RN  Trust Relationship/Rapport:   care explained   choices provided   empathic listening provided   thoughts/feelings acknowledged   questions answered  Goal: Readiness for Transition of Care  Outcome: Ongoing, Progressing     Problem: Skin Injury Risk Increased  Goal: Skin Health and Integrity  Outcome: Ongoing, Progressing  Intervention: Optimize Skin Protection  Recent Flowsheet Documentation  Taken 1/7/2021 2230 by Michaela Noel RN  Pressure Reduction  Techniques: (pt turns/repositions self frequently, rn to assist as needed)   frequent weight shift encouraged   rest period provided between sit times     Problem: Fall Injury Risk  Goal: Absence of Fall and Fall-Related Injury  Outcome: Ongoing, Progressing  Intervention: Promote Injury-Free Environment  Recent Flowsheet Documentation  Taken 1/7/2021 2230 by Michaela Noel, RN  Safety Promotion/Fall Prevention:   safety round/check completed   room organization consistent   nonskid shoes/slippers when out of bed   lighting adjusted   assistive device/personal items within reach   activity supervised   clutter free environment maintained   fall prevention program maintained   Goal Outcome Evaluation:  Plan of Care Reviewed With: patient

## 2021-01-08 NOTE — DISCHARGE INSTRUCTIONS
Post Cath Instructions     Call Dr. Berg’s office to schedule a follow up appointment in ____  days/weeks at [phone].  Specific Physician Instructions:     1) Drink plenty of fluids for the next 24 hours.  This helps to eliminate the dye used in your procedure through urination.  You may resume a normal diet; however, try to avoid foods that would cause gas or constipation.    2) Sedative medication given to you during your catheterization may decrease your judgement and reaction time for up to 24-48 hours.  Therefore:  a. DO NOT drive or operate hazardous machinery (48 hours)  b. DO NOT consume alcoholic beverages  c. DO NOT make any important/legal decisions  d. Have someone stay with you for at least 24 hours    3) To allow proper healing and prevent bleeding, the following activities are to be strictly avoided for the next 24-48 hours:  a. Excessive bending at wound site  b. Straining (anything that would tense up muscles around the affected puncture site)  c. Lifting objects greater than 5 pounds, pushing, or pulling for 5 days  i. For Arm Cases:  1. No flexing at the puncture site, such as hammering, golfing, bowling, or swinging any objects  ii. For Groin Cases:  1. Refrain from sexual activity  2. Refrain from running or vigorous walking  3. No prolonged sitting or standing  4. Limit stair climbing as much as possible    4) Keep the puncture site clean and dry.  You may remove the dressing tomorrow and replace it with a band-aid for at least one additional day.  Gently clean the site with mild soap and water.  No scrubbing/rubbing and lightly pat the area dry.  Showers are acceptable; however, avoid submerging in water (tub baths, hot tubs, swimming pools, dishwater, etc…) for at least one week.  The site should be completely healed before resuming these activities to reduce the risk of infection.  Check the site often.  Watch for signs and symptoms of infection and notify your physician if any of  the following occur:  a. Bleeding or an increase in swelling at the puncture site  b. Fever  c. Increased soreness around puncture site  d. Foul odor or significant drainage from the puncture site  e. Swelling, redness, or warmth at the puncture site    **A bruise or small “pea sized” lump under the skin at the puncture site is not unusual.  This should disappear within 3-4 weeks.**  5) CONTACT YOUR PHYSICIAN OR CALL 911 IF YOU EXPERIENCE ANY OF THE FOLLOWING:  a. Increased angina (chest pain) or frequent sensations of pressure, burning, pain, or other discomfort in the chest, arm, jaws, or stomach  b. Lightheadedness, dizziness, faint feeling, sweating, or difficulty breathing  c. Odd sensation changes like numbness, tingling, coldness, or pain in the arm or leg in which the catheter was inserted  d. Limb in which the catheter was inserted becomes pale/bluish in color    IMPORTANT:  Although this occurs very rarely, if you should develop bright red or excessive bleeding, feel a “pop” inside at the insertion site, or notice a sudden increase in swelling larger than a walnut, you should call 911.  Hold continuous firm pressure to the access site until emergency personnel arrive.  It is best if someone else can do this for you.

## 2021-01-10 LAB
BH CV ECHO MEAS - MV MAX PG: 11.3 MMHG
BH CV ECHO MEAS - MV MEAN PG: 6.1 MMHG
BH CV ECHO MEAS - MV V2 MAX: 168.2 CM/SEC
BH CV ECHO MEAS - MV V2 MEAN: 114.9 CM/SEC
BH CV ECHO MEAS - MV V2 VTI: 37.8 CM
BH CV ECHO MEAS - TR MAX VEL: 414.8 CM/SEC

## 2021-01-15 ENCOUNTER — OFFICE VISIT (OUTPATIENT)
Dept: CARDIOLOGY | Facility: CLINIC | Age: 86
End: 2021-01-15

## 2021-01-15 VITALS
DIASTOLIC BLOOD PRESSURE: 74 MMHG | HEIGHT: 64 IN | BODY MASS INDEX: 26.8 KG/M2 | HEART RATE: 104 BPM | SYSTOLIC BLOOD PRESSURE: 111 MMHG | OXYGEN SATURATION: 90 % | TEMPERATURE: 97.5 F | WEIGHT: 157 LBS

## 2021-01-15 DIAGNOSIS — I34.0 SEVERE MITRAL REGURGITATION: ICD-10-CM

## 2021-01-15 DIAGNOSIS — I48.11 LONGSTANDING PERSISTENT ATRIAL FIBRILLATION (HCC): Chronic | ICD-10-CM

## 2021-01-15 DIAGNOSIS — I10 ESSENTIAL HYPERTENSION: ICD-10-CM

## 2021-01-15 DIAGNOSIS — E78.2 MIXED HYPERLIPIDEMIA: ICD-10-CM

## 2021-01-15 DIAGNOSIS — Z86.73 HISTORY OF CVA (CEREBROVASCULAR ACCIDENT): Primary | Chronic | ICD-10-CM

## 2021-01-15 PROCEDURE — 99214 OFFICE O/P EST MOD 30 MIN: CPT | Performed by: INTERNAL MEDICINE

## 2021-01-15 NOTE — PROGRESS NOTES
Subjective:     Encounter Date:01/15/2021      Patient ID: Karen Rubio is a 85 y.o. female.    Chief Complaint: Status Post Watchman procedure  History of Present Illness     85-year-old white female patient with known history of hypertension hyperlipidemia history of CVA on anticoagulation history of mitral stenosis and atrial fibrillation comes back for follow-up     Her stress Myoview showed apical ischemia October 2018     Cardiac catheterization showed up to 60% ostial RCA disease, nov 2018   severe pulmonary hypertension more than 70 mm of Hg   transesophageal echocardiogram showed hyperdynamic LV systolic function EF 65-70% moderate mitral stenosis and insufficiency moderate tricuspid insufficiency mild to moderate aortic stenosis      Pulmonary follow-up regarding pulmonary hypertension   patient was cleared by Pulmonary for surgery  Patient and family decided no surgery and the point    Transesophageal echocardiogram March 2020 EF 65% biatrial enlargement severely calcified mitral leaflets with at least moderate mitral stenosis moderate severe mitral insufficiency RV dilatation noted severe tricuspid insufficiency noted  Moderate aortic stenosis noted  Patient also have underlying severe pulmonary hypertension  Patient was admitted recently in [place] due to fall severe bruising head injury      Unfortunately patient watchman device placement was not successful  So patient restarted the anticoagulation so far stable I advised fall precautions      The following portions of the patient's history were reviewed and updated as appropriate: Allergies current medications past family history past medical history past social history past surgical history problem list and review of systems  Past Medical History:   Diagnosis Date   • Abnormality of left atrial appendage 05/17/2016    Suspected Clot Noted on ESTEFANI   • Acute renal failure (CMS/HCC) 08/06/2016-[facility]    Front Dehydration   • Arthritis    • Breast  density 12/2017   • Carotid stenosis 11/05/2018    R @ 60% and L @ 10-20% Noted on Cardiac Cath & 16-49% on L&R    • Heart murmur    • History of echocardiogram 10/13/2017    Calcified Aortic Leaftlets; Mild Aortic Stenosis Present; Mild Mitral Stenosis; Bilateral Enlargement Noted;  Moderate TR; LV Function of 55-60%   • History of echocardiogram 12/5/14-[facility]    Moderate L Vent Hypertrophy Noted; L Atrial Dilation; Moderate MR; Mild TR; Mild-Moderate Aortic Reg Noted; Normal Root Size/No Effusion   • History of echocardiogram 5/16/16-[facility]    Normal Findings with Mild-Moderate MVS Noted   • History of EKG 2004/2016 & 11/5/18-[facility]    All Sinus Rhythm w/Infarct Ischemnic Changes Noted   • HLD (hyperlipidemia)     Controlled w/Meds   • Hx of hyperglycemia    • Hypertension     Controlled w/Meds   • Hypothyroidism     Levothyroxine   • Joint pain    • Left atrial dilatation 12/05/2014    Mildly Noted on Echo   • Left ventricular hypertrophy 12/05/2014    Noted on Echo w/EF @ 55-60%   • Mild aortic regurgitation 12/05/2014    to Moderate Noted on Echo   • Mild aortic stenosis 10/13/2017    Noted on Echo & ESTEFANI-11/5/18-Moderate    • Mild mitral insufficiency 10/13/2017    Noted on Echo & ESTEFANI-11/5/18   • Mild tricuspid regurgitation 12/05/2014    Noted on Echo   • Moderate mitral regurgitation 12/05/2014    Noted on Echo   • Moderate mitral stenosis 10/13/2017    Noted on Echo   • Moderate tricuspid insufficiency 10/13/2017    Noted on Echo   • Pneumonia involving left lung 08/6/16-[facility] ER   • Pulmonary hypertension (CMS/HCC) 10/13/2017 & 11/5/18-Cath    Severe Noted on Echo & Cath   • Slurred speech 05/16/2016   • SOB (shortness of breath) 08/2016   • Stroke (CMS/HCC)    • Thickened mitral leaflet 10/13/2017 & ESTEFANI-11/5/18    Severely Calcified Noted on Echo   • Tricuspid valve insufficiency 11/05/2018    Noted on ESTEFANI     Past Surgical History:   Procedure Laterality Date   • ATRIAL APPENDAGE EXCLUSION LEFT WITH TRANSESOPHAGEAL  "ECHOCARDIOGRAM N/A 1/7/2021    Procedure: Atrial Appendage Occlusion;  Surgeon: Harjit Berg MD;  Location: [facility] CATH INVASIVE LOCATION;  Service: Cardiovascular;  Laterality: N/A;   • ATRIAL APPENDAGE EXCLUSION LEFT WITH TRANSESOPHAGEAL ECHOCARDIOGRAM N/A 1/7/2021    Procedure: Atrial Appendage Occlusion;  Surgeon: Zack Zheng MD;  Location: [facility] CATH INVASIVE LOCATION;  Service: Cardiology;  Laterality: N/A;   • CARDIAC CATHETERIZATION Left 11/5/18-[facility]    w/L Coronary Angiography--Dr. Hernández-RCA @ 60% with Pulmonary Hypertension Noted   • CHOLECYSTECTOMY     • KNEE ARTHROSCOPY     • ESTEFANI  05/17/2016-[facility]    Dr. Hernández--Severly Calcified Mitral Leaflets w/Mild-Moderate Mitral Stenosis/Insufficiency; Sig Aortic Stenosis Noted; Suspecion of L Atrial Appendage Clot Noted   • ESTEFANI  11/5/18-[facility]    Dr. Hernández--Moderate Mitral Insufficiency Noted; Mild-Moderate AVS; Moderate Tricuspid Insufficiency Noted; EF of 65-70%   • TONSILLECTOMY       /74 (BP Location: Left arm, Patient Position: Sitting, Cuff Size: Adult)   Pulse 104   Temp 97.5 °F (36.4 °C) (Infrared)   Ht 162.6 cm (64\")   Wt 71.2 kg (157 lb)   SpO2 90%   BMI 26.95 kg/m²   Family History   Problem Relation Age of Onset   • Heart disease Mother    • Cancer Father    • No Known Problems Sister    • No Known Problems Brother    • No Known Problems Maternal Aunt    • No Known Problems Maternal Uncle    • No Known Problems Paternal Aunt    • No Known Problems Paternal Uncle    • No Known Problems Maternal Grandmother    • No Known Problems Maternal Grandfather    • No Known Problems Paternal Grandmother    • No Known Problems Paternal Grandfather    • No Known Problems Other    • Anemia Neg Hx    • Arrhythmia Neg Hx    • Asthma Neg Hx    • Clotting disorder Neg Hx    • Fainting Neg Hx    • Heart attack Neg Hx    • Heart failure Neg Hx    • Hyperlipidemia Neg Hx    • Hypertension Neg Hx        Current Outpatient Medications:   •  acetaminophen " (TYLENOL) 325 MG tablet, Take 2 tablets by mouth Every 4 (Four) Hours As Needed for Mild Pain ., Disp: 1 bottle, Rfl: 0  •  albuterol sulfate  (90 Base) MCG/ACT inhaler, Inhale 2 puffs Every 4 (Four) Hours As Needed for Wheezing or Shortness of Air., Disp: , Rfl:   •  apixaban (Eliquis) 5 MG tablet tablet, Take 1 tablet by mouth 2 (Two) Times a Day., Disp: 180 tablet, Rfl: 2  •  atorvastatin (LIPITOR) 20 MG tablet, Take 20 mg by mouth Every Night., Disp: , Rfl:   •  Cholecalciferol (VITAMIN D-3) 1000 units capsule, Take 1 capsule by mouth Daily., Disp: , Rfl:   •  esomeprazole (nexIUM) 40 MG capsule, Take 40 mg by mouth Every Morning Before Breakfast., Disp: , Rfl:   •  furosemide (LASIX) 40 MG tablet, Take 40 mg by mouth Daily., Disp: , Rfl:   •  ipratropium-albuterol (DUO-NEB) 0.5-2.5 mg/3 ml nebulizer, 3 mL 4 (Four) Times a Day. O2 bellow 90, Disp: , Rfl:   •  levothyroxine (SYNTHROID, LEVOTHROID) 75 MCG tablet, Take 75 mcg by mouth Daily., Disp: , Rfl:   •  metoprolol tartrate (LOPRESSOR) 25 MG tablet, Take 1 tablet by mouth 2 (Two) Times a Day., Disp: 180 tablet, Rfl: 2  •  O2 (OXYGEN), Inhale 2 L/min 1 (One) Time. At night and PRN, Disp: , Rfl:   •  O2 (OXYGEN), Inhale 2 L/min., Disp: , Rfl:   •  potassium chloride (MICRO-K) 10 MEQ CR capsule, Take 20 mEq by mouth Daily., Disp: , Rfl:   •  sildenafil (VIAGRA) 25 MG tablet, Take 25 mg by mouth 3 (Three) Times a Day. To help with breathing, Disp: , Rfl:   •  temazepam (RESTORIL) 15 MG capsule, Take 15 mg by mouth At Night As Needed., Disp: , Rfl:   Social History     Socioeconomic History   • Marital status:      Spouse name: Not on file   • Number of children: Not on file   • Years of education: Not on file   • Highest education level: Not on file   Occupational History   • Occupation: RETIRED-[employer]   Tobacco Use   • Smoking status: Former Smoker     Types: Cigarettes     Quit date:      Years since quittin.0   • Smokeless  tobacco: Never Used   Substance and Sexual Activity   • Alcohol use: No     Frequency: Never   • Drug use: No   • Sexual activity: Defer     Birth control/protection: Post-menopausal     Allergies   Allergen Reactions   • Hydrocodone Shortness Of Breath     Review of Systems   Constitution: Negative for chills, fever and malaise/fatigue.   Cardiovascular: Positive for dyspnea on exertion. Negative for chest pain, leg swelling, palpitations and syncope.   Respiratory: Positive for shortness of breath.    Skin: Negative for rash.   Neurological: Positive for numbness. Negative for dizziness and light-headedness.              Objective:     Physical Exam  Vital stable alert not in any acute distress neck no JVP elevation lungs bilateral entry few rhonchi heart sounds S1-S2 irregularly regular extremities trace edema bilateral pulses present equal  Procedures    Lab Review:       Assessment:          Diagnosis Plan   1. History of CVA (cerebrovascular accident)  Comprehensive Metabolic Panel    Lipid Panel    CK   2. Longstanding persistent atrial fibrillation (CMS/HCC)  Comprehensive Metabolic Panel    Lipid Panel    CK   3. Severe mitral regurgitation  Comprehensive Metabolic Panel    Lipid Panel    CK   4. Essential hypertension  Comprehensive Metabolic Panel    Lipid Panel    CK   5. Mixed hyperlipidemia  Comprehensive Metabolic Panel    Lipid Panel    CK          Plan:       MDM  Number of Diagnoses or Management Options  Essential hypertension: established, improving  History of CVA (cerebrovascular accident): established, improving  Longstanding persistent atrial fibrillation (CMS/HCC): established, improving  Mixed hyperlipidemia: established, improving  Severe mitral regurgitation: established, improving     Amount and/or Complexity of Data Reviewed  Clinical lab tests: ordered  Review and summarize past medical records: yes    Risk of Complications, Morbidity, and/or Mortality  Presenting problems:  moderate  Management options: moderate    Patient Progress  Patient progress: stable      Fall precautions discussed with the patient she is back on anticoagulation  Unable to undergo watchman device  Continue current medical treatment

## 2021-02-01 ENCOUNTER — APPOINTMENT (OUTPATIENT)
Dept: CT IMAGING | Facility: HOSPITAL | Age: 86
End: 2021-02-01

## 2021-02-01 ENCOUNTER — HOSPITAL ENCOUNTER (EMERGENCY)
Facility: HOSPITAL | Age: 86
Discharge: HOME | End: 2021-02-01
Admitting: EMERGENCY MEDICINE

## 2021-02-01 ENCOUNTER — APPOINTMENT (OUTPATIENT)
Dept: GENERAL RADIOLOGY | Facility: HOSPITAL | Age: 86
End: 2021-02-01

## 2021-02-01 VITALS
DIASTOLIC BLOOD PRESSURE: 40 MMHG | RESPIRATION RATE: 16 BRPM | TEMPERATURE: 97.7 F | OXYGEN SATURATION: 99 % | HEART RATE: 64 BPM | BODY MASS INDEX: 26.8 KG/M2 | WEIGHT: 157 LBS | SYSTOLIC BLOOD PRESSURE: 103 MMHG | HEIGHT: 64 IN
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
## 2021-02-01 DIAGNOSIS — S06.0X0A CONCUSSION WITHOUT LOSS OF CONSCIOUSNESS, INITIAL ENCOUNTER: ICD-10-CM

## 2021-02-01 LAB
ANION GAP SERPL CALCULATED.3IONS-SCNC: 13 MMOL/L (ref 5–15)
APTT PPP: 31.6 SECONDS (ref 24–31)
BASOPHILS # BLD AUTO: 0 10*3/MM3 (ref 0–0.2)
BASOPHILS NFR BLD AUTO: 0.7 % (ref 0–1.5)
BILIRUB UR QL STRIP: NEGATIVE
BUN SERPL-MCNC: 22 MG/DL (ref 8–23)
BUN/CREAT SERPL: 12.4 (ref 7–25)
CALCIUM SPEC-SCNC: 9.3 MG/DL (ref 8.6–10.5)
CHLORIDE SERPL-SCNC: 99 MMOL/L (ref 98–107)
CLARITY UR: CLEAR
CO2 SERPL-SCNC: 24 MMOL/L (ref 22–29)
COLOR UR: YELLOW
CREAT SERPL-MCNC: 1.78 MG/DL (ref 0.57–1)
DEPRECATED RDW RBC AUTO: 52.1 FL (ref 37–54)
EOSINOPHIL # BLD AUTO: 0.1 10*3/MM3 (ref 0–0.4)
EOSINOPHIL NFR BLD AUTO: 1.4 % (ref 0.3–6.2)
ERYTHROCYTE [DISTWIDTH] IN BLOOD BY AUTOMATED COUNT: 15.9 % (ref 12.3–15.4)
GFR SERPL CREATININE-BSD FRML MDRD: 27 ML/MIN/1.73
GLUCOSE SERPL-MCNC: 109 MG/DL (ref 65–99)
GLUCOSE UR STRIP-MCNC: NEGATIVE MG/DL
HCT VFR BLD AUTO: 31.2 % (ref 34–46.6)
HGB BLD-MCNC: 10.2 G/DL (ref 12–15.9)
HGB UR QL STRIP.AUTO: NEGATIVE
INR PPP: 1.13 (ref 0.93–1.1)
KETONES UR QL STRIP: NEGATIVE
LEUKOCYTE ESTERASE UR QL STRIP.AUTO: NEGATIVE
LYMPHOCYTES # BLD AUTO: 1.4 10*3/MM3 (ref 0.7–3.1)
LYMPHOCYTES NFR BLD AUTO: 29.1 % (ref 19.6–45.3)
MCH RBC QN AUTO: 30.7 PG (ref 26.6–33)
MCHC RBC AUTO-ENTMCNC: 32.7 G/DL (ref 31.5–35.7)
MCV RBC AUTO: 93.8 FL (ref 79–97)
MONOCYTES # BLD AUTO: 0.7 10*3/MM3 (ref 0.1–0.9)
MONOCYTES NFR BLD AUTO: 13.3 % (ref 5–12)
NEUTROPHILS NFR BLD AUTO: 2.8 10*3/MM3 (ref 1.7–7)
NEUTROPHILS NFR BLD AUTO: 55.5 % (ref 42.7–76)
NITRITE UR QL STRIP: NEGATIVE
NRBC BLD AUTO-RTO: 0 /100 WBC (ref 0–0.2)
PH UR STRIP.AUTO: 6 [PH] (ref 5–8)
PLATELET # BLD AUTO: 170 10*3/MM3 (ref 140–450)
PMV BLD AUTO: 9 FL (ref 6–12)
POTASSIUM SERPL-SCNC: 3.6 MMOL/L (ref 3.5–5.2)
PROT UR QL STRIP: NEGATIVE
PROTHROMBIN TIME: 12.4 SECONDS (ref 9.6–11.7)
RBC # BLD AUTO: 3.32 10*6/MM3 (ref 3.77–5.28)
SODIUM SERPL-SCNC: 136 MMOL/L (ref 136–145)
SP GR UR STRIP: 1.01 (ref 1–1.03)
TROPONIN T SERPL-MCNC: <0.01 NG/ML (ref 0–0.03)
UROBILINOGEN UR QL STRIP: NORMAL
WBC # BLD AUTO: 5 10*3/MM3 (ref 3.4–10.8)

## 2021-02-01 PROCEDURE — 85610 PROTHROMBIN TIME: CPT | Performed by: NURSE PRACTITIONER

## 2021-02-01 PROCEDURE — 80048 BASIC METABOLIC PNL TOTAL CA: CPT | Performed by: NURSE PRACTITIONER

## 2021-02-01 PROCEDURE — 85730 THROMBOPLASTIN TIME PARTIAL: CPT | Performed by: NURSE PRACTITIONER

## 2021-02-01 PROCEDURE — 99284 EMERGENCY DEPT VISIT MOD MDM: CPT

## 2021-02-01 PROCEDURE — 93005 ELECTROCARDIOGRAM TRACING: CPT | Performed by: NURSE PRACTITIONER

## 2021-02-01 PROCEDURE — 85025 COMPLETE CBC W/AUTO DIFF WBC: CPT | Performed by: NURSE PRACTITIONER

## 2021-02-01 PROCEDURE — 81003 URINALYSIS AUTO W/O SCOPE: CPT | Performed by: NURSE PRACTITIONER

## 2021-02-01 PROCEDURE — P9612 CATHETERIZE FOR URINE SPEC: HCPCS

## 2021-02-01 PROCEDURE — 84484 ASSAY OF TROPONIN QUANT: CPT | Performed by: NURSE PRACTITIONER

## 2021-02-01 PROCEDURE — 70450 CT HEAD/BRAIN W/O DYE: CPT

## 2021-02-01 PROCEDURE — 71045 X-RAY EXAM CHEST 1 VIEW: CPT

## 2021-02-01 RX ORDER — SODIUM CHLORIDE 0.9 % (FLUSH) 0.9 %
10 SYRINGE (ML) INJECTION AS NEEDED
Status: DISCONTINUED | OUTPATIENT
Start: 2021-02-01 | End: 2021-02-01 | Stop reason: HOSPADM

## 2021-02-02 LAB — QT INTERVAL: 460 MS

## 2021-02-02 NOTE — ED PROVIDER NOTES
"Subjective   Patient is an 85-year-old white female with history of hypertension, CAD, high cholesterol, thyroid disorder who lives at home alone presents today by EMS with reports of a fall.  Patient is history of frequent falls and wears a life alert button.  Patient states she thinks she fell again today while walking to the kitchen and pushed her life alert button for help.  She states she does not remember all of the events surrounding this but states this is not unusual for her.  She states states that she does not think she hit her head.  It is reported that she is on Eliquis.  Patient states EMS showed up to help her off the floor and encouraged her to come to ED for evaluation due to blood thinner use.  Patient denies any headache dizziness or visual changes.  She denies any neck pain back pain chest pain abdominal pain or other complaint.  She states she is had no vomiting since the incident.  Patient states \"I feel fine.\"          Review of Systems   Eyes: Negative for visual disturbance.   Respiratory: Negative for shortness of breath.    Cardiovascular: Negative for chest pain.   Gastrointestinal: Negative for abdominal pain, nausea and vomiting.   Genitourinary: Negative for dysuria.   Musculoskeletal: Negative for back pain and neck pain.   Skin: Negative for wound.   Neurological: Negative for dizziness, facial asymmetry, speech difficulty, weakness, light-headedness, numbness and headaches.       Past Medical History:   Diagnosis Date   • Abnormality of left atrial appendage 05/17/2016    Suspected Clot Noted on ESTEFANI   • Acute renal failure (CMS/[facility]) 08/06/2016-[facility]    Front Dehydration   • Arthritis    • Breast density 12/2017   • Carotid stenosis 11/05/2018    R @ 60% and L @ 10-20% Noted on Cardiac Cath & 16-49% on L&R    • Heart murmur    • History of echocardiogram 10/13/2017    Calcified Aortic Leaftlets; Mild Aortic Stenosis Present; Mild Mitral Stenosis; Bilateral Enlargement Noted;  Moderate " TR; LV Function of 55-60%   • History of echocardiogram 12/5/14-[facility]    Moderate L Vent Hypertrophy Noted; L Atrial Dilation; Moderate MR; Mild TR; Mild-Moderate Aortic Reg Noted; Normal Root Size/No Effusion   • History of echocardiogram 5/16/16-[facility]    Normal Findings with Mild-Moderate MVS Noted   • History of EKG 2004/2016 & 11/5/18-[facility]    All Sinus Rhythm w/Infarct Ischemnic Changes Noted   • HLD (hyperlipidemia)     Controlled w/Meds   • Hx of hyperglycemia    • Hypertension     Controlled w/Meds   • Hypothyroidism     Levothyroxine   • Joint pain    • Left atrial dilatation 12/05/2014    Mildly Noted on Echo   • Left ventricular hypertrophy 12/05/2014    Noted on Echo w/EF @ 55-60%   • Mild aortic regurgitation 12/05/2014    to Moderate Noted on Echo   • Mild aortic stenosis 10/13/2017    Noted on Echo & ESTEFANI-11/5/18-Moderate    • Mild mitral insufficiency 10/13/2017    Noted on Echo & ESTEFANI-11/5/18   • Mild tricuspid regurgitation 12/05/2014    Noted on Echo   • Moderate mitral regurgitation 12/05/2014    Noted on Echo   • Moderate mitral stenosis 10/13/2017    Noted on Echo   • Moderate tricuspid insufficiency 10/13/2017    Noted on Echo   • Pneumonia involving left lung 08/6/16-[facility] ER   • Pulmonary hypertension (CMS/HCC) 10/13/2017 & 11/5/18-[facility]    Severe Noted on Echo & Cath   • Slurred speech 05/16/2016   • SOB (shortness of breath) 08/2016   • Stroke (CMS/HCC)    • Thickened mitral leaflet 10/13/2017 & ESTEFANI-11/5/18    Severely Calcified Noted on Echo   • Tricuspid valve insufficiency 11/05/2018    Noted on ESTEFANI       Allergies   Allergen Reactions   • Hydrocodone Shortness Of Breath       Past Surgical History:   Procedure Laterality Date   • ATRIAL APPENDAGE EXCLUSION LEFT WITH TRANSESOPHAGEAL ECHOCARDIOGRAM N/A 1/7/2021    Procedure: Atrial Appendage Occlusion;  Surgeon: Harjit Berg MD;  Location: [facility] INVASIVE LOCATION;  Service: Cardiovascular;  Laterality: N/A;   • ATRIAL APPENDAGE  EXCLUSION LEFT WITH TRANSESOPHAGEAL ECHOCARDIOGRAM N/A 2021    Procedure: Atrial Appendage Occlusion;  Surgeon: Zack Zheng MD;  Location: [facility] CATH INVASIVE LOCATION;  Service: Cardiology;  Laterality: N/A;   • CARDIAC CATHETERIZATION Left 18-[facility]    w/L Coronary Angiography--Dr. Hernández-RCA @ 60% with Pulmonary Hypertension Noted   • CHOLECYSTECTOMY     • KNEE ARTHROSCOPY     • ESTEFANI  2016-[facility]    Dr. Hernández--Severly Calcified Mitral Leaflets w/Mild-Moderate Mitral Stenosis/Insufficiency; Sig Aortic Stenosis Noted; Suspecion of L Atrial Appendage Clot Noted   • ESTEFANI  18-[facility]    Dr. Hernández--Moderate Mitral Insufficiency Noted; Mild-Moderate AVS; Moderate Tricuspid Insufficiency Noted; EF of 65-70%   • TONSILLECTOMY         Family History   Problem Relation Age of Onset   • Heart disease Mother    • Cancer Father    • No Known Problems Sister    • No Known Problems Brother    • No Known Problems Maternal Aunt    • No Known Problems Maternal Uncle    • No Known Problems Paternal Aunt    • No Known Problems Paternal Uncle    • No Known Problems Maternal Grandmother    • No Known Problems Maternal Grandfather    • No Known Problems Paternal Grandmother    • No Known Problems Paternal Grandfather    • No Known Problems Other    • Anemia Neg Hx    • Arrhythmia Neg Hx    • Asthma Neg Hx    • Clotting disorder Neg Hx    • Fainting Neg Hx    • Heart attack Neg Hx    • Heart failure Neg Hx    • Hyperlipidemia Neg Hx    • Hypertension Neg Hx        Social History     Socioeconomic History   • Marital status:      Spouse name: Not on file   • Number of children: Not on file   • Years of education: Not on file   • Highest education level: Not on file   Occupational History   • Occupation: RETIRED-[employer]   Tobacco Use   • Smoking status: Former Smoker     Types: Cigarettes     Quit date:      Years since quittin.0   • Smokeless tobacco: Never Used   Substance and Sexual Activity   • Alcohol  use: No     Frequency: Never   • Drug use: No   • Sexual activity: Defer     Birth control/protection: Post-menopausal           Objective   Physical Exam  Vital signs and triage nurse note reviewed.  Constitutional: Awake, alert; well-developed and well-nourished. No acute distress is noted.  HEENT: Normocephalic, atraumatic; pupils are PERRL with intact EOM; oropharynx is pink and moist without exudate or erythema.  No drooling or pooling of oral secretions.  Neck: Supple, full range of motion without pain; no cervical lymphadenopathy. Normal phonation.  Cardiovascular: Regular rate and rhythm, normal S1-S2.  Systolic murmur noted.  Pulmonary: Respiratory effort regular nonlabored, breath sounds clear to auscultation all fields.  Abdomen: Soft, nontender, nondistended with normoactive bowel sounds; no rebound or guarding.  Musculoskeletal: Independent range of motion of all extremities with no palpable tenderness or edema.  Neuro: Alert oriented x3, speech is clear and appropriate, GCS 15.  No focal sensorimotor deficits noted. No facial asymmetry. Muscle strength 5/5 bilateral.  Cranial nerves 2-12 grossly intact. Normal coordination.  No gaze, deviation, or nystagmus.  Follows commands.  Skin: Flesh tone, warm, dry, intact; no erythematous or petechial rash or lesion.    Procedures           ED Course      Labs Reviewed   BASIC METABOLIC PANEL - Abnormal; Notable for the following components:       Result Value    Glucose 109 (*)     Creatinine 1.78 (*)     eGFR Non  Amer 27 (*)     All other components within normal limits    Narrative:     GFR Normal >60  Chronic Kidney Disease <60  Kidney Failure <15     PROTIME-INR - Abnormal; Notable for the following components:    Protime 12.4 (*)     INR 1.13 (*)     All other components within normal limits   APTT - Abnormal; Notable for the following components:    PTT 31.6 (*)     All other components within normal limits   CBC WITH AUTO DIFFERENTIAL -  Abnormal; Notable for the following components:    RBC 3.32 (*)     Hemoglobin 10.2 (*)     Hematocrit 31.2 (*)     RDW 15.9 (*)     Monocyte % 13.3 (*)     All other components within normal limits   URINALYSIS W/ CULTURE IF INDICATED - Normal    Narrative:     Urine microscopic not indicated.   TROPONIN (IN-HOUSE) - Normal    Narrative:     Troponin T Reference Range:  <= 0.03 ng/mL-   Negative for AMI  >0.03 ng/mL-     Abnormal for myocardial necrosis.  Clinicians would have to utilize clinical acumen, EKG, Troponin and serial changes to determine if it is an Acute Myocardial Infarction or myocardial injury due to an underlying chronic condition.       Results may be falsely decreased if patient taking Biotin.     CBC AND DIFFERENTIAL    Narrative:     The following orders were created for panel order CBC & Differential.  Procedure                               Abnormality         Status                     ---------                               -----------         ------                     CBC Auto Differential[[id number]]        Abnormal            Final result                 Please view results for these tests on the individual orders.     Ct Head Without Contrast    Result Date: 2/1/2021  1. Stable exam, with no acute process demonstrated. 2. Stable cerebral atrophy and chronic white matter disease.  Electronically Signed By-Kiarra Burrell MD On:2/1/2021 8:43 PM This report was finalized on [id number] by  Kiarra Burrell MD.    Xr Chest 1 View    Result Date: 2/1/2021  1. No evidence of active disease.  Electronically Signed By-Kiarra Burrell MD On:2/1/2021 8:15 PM This report was finalized on [id number] by  Kiarra Burrell MD.    Medications   sodium chloride 0.9 % flush 10 mL (has no administration in time range)                                          MDM  Number of Diagnoses or Management Options  Concussion without loss of consciousness, initial encounter:   Fall, initial encounter:   Diagnosis  management comments: Comorbidities: CAD on Eliquis, hypertension, thyroid disorder, high cholesterol  Differentials: Fracture, contusion, ICH;this list is not all inclusive and does not constitute the entirety of considered causes  Discussion with provider:  Radiology interpretation: X-rays reviewed by me and interpreted by radiologist: As above  Lab interpretation: Labs viewed by me significant for: As above    Patient had IV established.  She was placed in continuous cardiac monitor.  She had labs, EKG chest x-ray CT head obtained.    Patient has a grossly unremarkable ED work-up today.  CT of her head shows no acute abnormality.  Chest x-ray shows no acute abnormality.  No signs of infection on her urinalysis.  Blood work is grossly unremarkable.    Patient has remained well-appearing throughout her ED stay.  She is in no acute distress.  She has stable vital signs.  She is awake alert and oriented to person place and time.  She denies any complaint and states she feels at baseline.    Diagnosis and treatment plan discussed with patient.  Patient agreeable to plan.   I discussed findings with patient who voices understanding of discharge instructions, signs and symptoms requiring return to ED; discharged improved and in stable condition with follow up for re-evaluation.           Amount and/or Complexity of Data Reviewed  Clinical lab tests: reviewed and ordered  Tests in the radiology section of CPT®: reviewed and ordered  Tests in the medicine section of CPT®: reviewed  Decide to obtain previous medical records or to obtain history from someone other than the patient: yes    Patient Progress  Patient progress: stable      Final diagnoses:   Fall, initial encounter   Concussion without loss of consciousness, initial encounter            Priya Corrales, ZACHARIAH  02/01/21 [id number]

## 2021-02-02 NOTE — DISCHARGE INSTRUCTIONS
May continue current home medications.  Head injury precautions.  Follow-up department care provider as needed.  Return for new or worsening symptoms.

## 2021-02-13 ENCOUNTER — HOSPITAL ENCOUNTER (EMERGENCY)
Facility: HOSPITAL | Age: 86
Discharge: HOME | End: 2021-02-14
Attending: EMERGENCY MEDICINE | Admitting: EMERGENCY MEDICINE

## 2021-02-13 ENCOUNTER — APPOINTMENT (OUTPATIENT)
Dept: CT IMAGING | Facility: HOSPITAL | Age: 86
End: 2021-02-13

## 2021-02-13 DIAGNOSIS — N39.0 ACUTE UTI: ICD-10-CM

## 2021-02-13 DIAGNOSIS — S30.1XXA ABDOMINAL WALL HEMATOMA, INITIAL ENCOUNTER: Primary | ICD-10-CM

## 2021-02-13 LAB
ALBUMIN SERPL-MCNC: 3.6 G/DL (ref 3.5–5.2)
ALBUMIN/GLOB SERPL: 1.1 G/DL
ALP SERPL-CCNC: 129 U/L (ref 39–117)
ALT SERPL W P-5'-P-CCNC: 12 U/L (ref 1–33)
ANION GAP SERPL CALCULATED.3IONS-SCNC: 15 MMOL/L (ref 5–15)
ANISOCYTOSIS BLD QL: NORMAL
APTT PPP: 31.3 SECONDS (ref 24–31)
AST SERPL-CCNC: 24 U/L (ref 1–32)
BACTERIA UR QL AUTO: ABNORMAL /HPF
BILIRUB SERPL-MCNC: 0.8 MG/DL (ref 0–1.2)
BILIRUB UR QL STRIP: NEGATIVE
BUN SERPL-MCNC: 27 MG/DL (ref 8–23)
BUN/CREAT SERPL: 12.3 (ref 7–25)
CALCIUM SPEC-SCNC: 9.2 MG/DL (ref 8.6–10.5)
CHLORIDE SERPL-SCNC: 93 MMOL/L (ref 98–107)
CLARITY UR: CLEAR
CO2 SERPL-SCNC: 23 MMOL/L (ref 22–29)
COLOR UR: YELLOW
CREAT SERPL-MCNC: 2.19 MG/DL (ref 0.57–1)
DEPRECATED RDW RBC AUTO: 60.8 FL (ref 37–54)
EOSINOPHIL # BLD MANUAL: 0.11 10*3/MM3 (ref 0–0.4)
EOSINOPHIL NFR BLD MANUAL: 3 % (ref 0.3–6.2)
ERYTHROCYTE [DISTWIDTH] IN BLOOD BY AUTOMATED COUNT: 18.4 % (ref 12.3–15.4)
GFR SERPL CREATININE-BSD FRML MDRD: 21 ML/MIN/1.73
GLOBULIN UR ELPH-MCNC: 3.3 GM/DL
GLUCOSE SERPL-MCNC: 104 MG/DL (ref 65–99)
GLUCOSE UR STRIP-MCNC: NEGATIVE MG/DL
HCT VFR BLD AUTO: 30.7 % (ref 34–46.6)
HGB BLD-MCNC: 10.4 G/DL (ref 12–15.9)
HGB UR QL STRIP.AUTO: NEGATIVE
HYALINE CASTS UR QL AUTO: ABNORMAL /LPF
INR PPP: 1.28 (ref 0.93–1.1)
KETONES UR QL STRIP: NEGATIVE
LARGE PLATELETS: NORMAL
LEUKOCYTE ESTERASE UR QL STRIP.AUTO: ABNORMAL
LIPASE SERPL-CCNC: 51 U/L (ref 13–60)
LYMPHOCYTES # BLD MANUAL: 0.91 10*3/MM3 (ref 0.7–3.1)
LYMPHOCYTES NFR BLD MANUAL: 12 % (ref 5–12)
LYMPHOCYTES NFR BLD MANUAL: 26 % (ref 19.6–45.3)
MCH RBC QN AUTO: 32.3 PG (ref 26.6–33)
MCHC RBC AUTO-ENTMCNC: 33.8 G/DL (ref 31.5–35.7)
MCV RBC AUTO: 95.4 FL (ref 79–97)
MONOCYTES # BLD AUTO: 0.42 10*3/MM3 (ref 0.1–0.9)
NEUTROPHILS # BLD AUTO: 2.07 10*3/MM3 (ref 1.7–7)
NEUTROPHILS NFR BLD MANUAL: 56 % (ref 42.7–76)
NEUTS BAND NFR BLD MANUAL: 3 % (ref 0–5)
NITRITE UR QL STRIP: NEGATIVE
PH UR STRIP.AUTO: 5.5 [PH] (ref 5–8)
PLATELET # BLD AUTO: 190 10*3/MM3 (ref 140–450)
PMV BLD AUTO: 10.6 FL (ref 6–12)
POTASSIUM SERPL-SCNC: 3.6 MMOL/L (ref 3.5–5.2)
PROT SERPL-MCNC: 6.9 G/DL (ref 6–8.5)
PROT UR QL STRIP: NEGATIVE
PROTHROMBIN TIME: 13.9 SECONDS (ref 9.6–11.7)
RBC # BLD AUTO: 3.22 10*6/MM3 (ref 3.77–5.28)
RBC # UR: ABNORMAL /HPF
REF LAB TEST METHOD: ABNORMAL
SCAN SLIDE: NORMAL
SODIUM SERPL-SCNC: 131 MMOL/L (ref 136–145)
SP GR UR STRIP: 1.01 (ref 1–1.03)
SQUAMOUS #/AREA URNS HPF: ABNORMAL /HPF
TOXIC GRANULATION: NORMAL
UROBILINOGEN UR QL STRIP: ABNORMAL
WBC # BLD AUTO: 3.5 10*3/MM3 (ref 3.4–10.8)
WBC UR QL AUTO: ABNORMAL /HPF

## 2021-02-13 PROCEDURE — 83690 ASSAY OF LIPASE: CPT | Performed by: EMERGENCY MEDICINE

## 2021-02-13 PROCEDURE — 85610 PROTHROMBIN TIME: CPT | Performed by: EMERGENCY MEDICINE

## 2021-02-13 PROCEDURE — 70450 CT HEAD/BRAIN W/O DYE: CPT

## 2021-02-13 PROCEDURE — 25010000002 TDAP 5-2.5-18.5 LF-MCG/0.5 SUSPENSION: Performed by: EMERGENCY MEDICINE

## 2021-02-13 PROCEDURE — 85025 COMPLETE CBC W/AUTO DIFF WBC: CPT | Performed by: EMERGENCY MEDICINE

## 2021-02-13 PROCEDURE — 80053 COMPREHEN METABOLIC PANEL: CPT | Performed by: EMERGENCY MEDICINE

## 2021-02-13 PROCEDURE — 81001 URINALYSIS AUTO W/SCOPE: CPT | Performed by: EMERGENCY MEDICINE

## 2021-02-13 PROCEDURE — 90715 TDAP VACCINE 7 YRS/> IM: CPT | Performed by: EMERGENCY MEDICINE

## 2021-02-13 PROCEDURE — 99284 EMERGENCY DEPT VISIT MOD MDM: CPT

## 2021-02-13 PROCEDURE — 87077 CULTURE AEROBIC IDENTIFY: CPT | Performed by: EMERGENCY MEDICINE

## 2021-02-13 PROCEDURE — 90471 IMMUNIZATION ADMIN: CPT | Performed by: EMERGENCY MEDICINE

## 2021-02-13 PROCEDURE — 87086 URINE CULTURE/COLONY COUNT: CPT | Performed by: EMERGENCY MEDICINE

## 2021-02-13 PROCEDURE — 87186 SC STD MICRODIL/AGAR DIL: CPT | Performed by: EMERGENCY MEDICINE

## 2021-02-13 PROCEDURE — 85007 BL SMEAR W/DIFF WBC COUNT: CPT | Performed by: EMERGENCY MEDICINE

## 2021-02-13 PROCEDURE — 85730 THROMBOPLASTIN TIME PARTIAL: CPT | Performed by: EMERGENCY MEDICINE

## 2021-02-13 PROCEDURE — 74176 CT ABD & PELVIS W/O CONTRAST: CPT

## 2021-02-13 RX ADMIN — TETANUS TOXOID, REDUCED DIPHTHERIA TOXOID AND ACELLULAR PERTUSSIS VACCINE, ADSORBED 0.5 ML: 5; 2.5; 8; 8; 2.5 SUSPENSION INTRAMUSCULAR at 23:42

## 2021-02-14 VITALS
OXYGEN SATURATION: 100 % | BODY MASS INDEX: 26.98 KG/M2 | TEMPERATURE: 97.8 F | RESPIRATION RATE: 16 BRPM | WEIGHT: 158 LBS | HEART RATE: 75 BPM | DIASTOLIC BLOOD PRESSURE: 59 MMHG | SYSTOLIC BLOOD PRESSURE: 112 MMHG | HEIGHT: 64 IN

## 2021-02-14 RX ORDER — ACETAMINOPHEN 500 MG
500 TABLET ORAL ONCE
Status: COMPLETED | OUTPATIENT
Start: 2021-02-14 | End: 2021-02-14

## 2021-02-14 RX ORDER — CEPHALEXIN 500 MG/1
500 CAPSULE ORAL 2 TIMES DAILY
Qty: 14 CAPSULE | Refills: 0 | Status: SHIPPED | OUTPATIENT
Start: 2021-02-14 | End: 2021-01-01

## 2021-02-14 RX ORDER — CEPHALEXIN 500 MG/1
500 CAPSULE ORAL ONCE
Status: COMPLETED | OUTPATIENT
Start: 2021-02-14 | End: 2021-02-14

## 2021-02-14 RX ADMIN — CEPHALEXIN 500 MG: 500 CAPSULE ORAL at 00:27

## 2021-02-14 RX ADMIN — ACETAMINOPHEN 500 MG: 500 TABLET, FILM COATED ORAL at 00:27

## 2021-02-14 NOTE — ED PROVIDER NOTES
Subjective   85-year-old female with history of frequent falls reports losing her balance between the bed and the nightstand and falling injuring her lower abdomen.  Patient denies any prodromal weakness or dizziness.  Patient denies any head injury or concussive symptoms.  Patient states she felt well the day prior to the fall.  Pain is moderate, worse with movement.  No associated vomiting.          Review of Systems   Gastrointestinal: Positive for abdominal pain.   All other systems reviewed and are negative.      Past Medical History:   Diagnosis Date   • Abnormality of left atrial appendage 05/17/2016    Suspected Clot Noted on ESTEFANI   • Acute renal failure (CMS/HCC) 08/06/2016-[facility]    Front Dehydration   • Arthritis    • Breast density 12/2017   • Carotid stenosis 11/05/2018    R @ 60% and L @ 10-20% Noted on Cardiac Cath & 16-49% on L&R    • Heart murmur    • History of echocardiogram 10/13/2017    Calcified Aortic Leaftlets; Mild Aortic Stenosis Present; Mild Mitral Stenosis; Bilateral Enlargement Noted;  Moderate TR; LV Function of 55-60%   • History of echocardiogram 12/5/14-[facility]    Moderate L Vent Hypertrophy Noted; L Atrial Dilation; Moderate MR; Mild TR; Mild-Moderate Aortic Reg Noted; Normal Root Size/No Effusion   • History of echocardiogram 5/16/16-[facility]    Normal Findings with Mild-Moderate MVS Noted   • History of EKG 2004/2016 & 11/5/18-[facility]    All Sinus Rhythm w/Infarct Ischemnic Changes Noted   • HLD (hyperlipidemia)     Controlled w/Meds   • Hx of hyperglycemia    • Hypertension     Controlled w/Meds   • Hypothyroidism     Levothyroxine   • Joint pain    • Left atrial dilatation 12/05/2014    Mildly Noted on Echo   • Left ventricular hypertrophy 12/05/2014    Noted on Echo w/EF @ 55-60%   • Mild aortic regurgitation 12/05/2014    to Moderate Noted on Echo   • Mild aortic stenosis 10/13/2017    Noted on Echo & ESTEFANI-11/5/18-Moderate    • Mild mitral insufficiency 10/13/2017    Noted on Echo &  ESTEFANI-11/5/18   • Mild tricuspid regurgitation 12/05/2014    Noted on Echo   • Moderate mitral regurgitation 12/05/2014    Noted on Echo   • Moderate mitral stenosis 10/13/2017    Noted on Echo   • Moderate tricuspid insufficiency 10/13/2017    Noted on Echo   • Pneumonia involving left lung 08/6/16-[facility]   • Pulmonary hypertension (CMS/HCC) 10/13/2017 & 11/5/18-Cath    Severe Noted on Echo & Cath   • Slurred speech 05/16/2016   • SOB (shortness of breath) 08/2016   • Stroke (CMS/HCC)    • Thickened mitral leaflet 10/13/2017 & ESTEFANI-11/5/18    Severely Calcified Noted on Echo   • Tricuspid valve insufficiency 11/05/2018    Noted on ESTEFANI       Allergies   Allergen Reactions   • Hydrocodone Shortness Of Breath       Past Surgical History:   Procedure Laterality Date   • ATRIAL APPENDAGE EXCLUSION LEFT WITH TRANSESOPHAGEAL ECHOCARDIOGRAM N/A 1/7/2021    Procedure: Atrial Appendage Occlusion;  Surgeon: Harjit Berg MD;  Location: [facility] CATH INVASIVE LOCATION;  Service: Cardiovascular;  Laterality: N/A;   • ATRIAL APPENDAGE EXCLUSION LEFT WITH TRANSESOPHAGEAL ECHOCARDIOGRAM N/A 1/7/2021    Procedure: Atrial Appendage Occlusion;  Surgeon: Zack Zheng MD;  Location: [facility] CATH INVASIVE LOCATION;  Service: Cardiology;  Laterality: N/A;   • CARDIAC CATHETERIZATION Left 11/5/18-[facility]    w/L Coronary Angiography--Dr. Hernández-RCA @ 60% with Pulmonary Hypertension Noted   • CHOLECYSTECTOMY     • KNEE ARTHROSCOPY     • ESTEFANI  05/17/2016-[facility]    Dr. Hernández--Severly Calcified Mitral Leaflets w/Mild-Moderate Mitral Stenosis/Insufficiency; Sig Aortic Stenosis Noted; Suspecion of L Atrial Appendage Clot Noted   • ESTEFANI  11/5/18-[facility]    Dr. Hernández--Moderate Mitral Insufficiency Noted; Mild-Moderate AVS; Moderate Tricuspid Insufficiency Noted; EF of 65-70%   • TONSILLECTOMY         Family History   Problem Relation Age of Onset   • Heart disease Mother    • Cancer Father    • No Known Problems Sister    • No Known Problems Brother    •  No Known Problems Maternal Aunt    • No Known Problems Maternal Uncle    • No Known Problems Paternal Aunt    • No Known Problems Paternal Uncle    • No Known Problems Maternal Grandmother    • No Known Problems Maternal Grandfather    • No Known Problems Paternal Grandmother    • No Known Problems Paternal Grandfather    • No Known Problems Other    • Anemia Neg Hx    • Arrhythmia Neg Hx    • Asthma Neg Hx    • Clotting disorder Neg Hx    • Fainting Neg Hx    • Heart attack Neg Hx    • Heart failure Neg Hx    • Hyperlipidemia Neg Hx    • Hypertension Neg Hx        Social History     Socioeconomic History   • Marital status:      Spouse name: Not on file   • Number of children: Not on file   • Years of education: Not on file   • Highest education level: Not on file   Occupational History   • Occupation: RETIRED-"[employer]"   Tobacco Use   • Smoking status: Former Smoker     Types: Cigarettes     Quit date:      Years since quittin.   • Smokeless tobacco: Never Used   Substance and Sexual Activity   • Alcohol use: No     Frequency: Never   • Drug use: No   • Sexual activity: Defer     Birth control/protection: Post-menopausal           Objective   Physical Exam  Constitutional:       Comments: Elderly female in no acute distress   HENT:      Head:      Comments: No new injury seen to face or scalp but there is a healing very faint yellowing ecchymosis left frontal scalp.  No facial bone tenderness.     Mouth/Throat:      Mouth: Mucous membranes are moist.      Pharynx: Oropharynx is clear.   Eyes:      Extraocular Movements: Extraocular movements intact.      Conjunctiva/sclera: Conjunctivae normal.      Pupils: Pupils are equal, round, and reactive to light.   Neck:      Musculoskeletal: Normal range of motion and neck supple. No muscular tenderness.   Cardiovascular:      Rate and Rhythm: Rhythm irregular.      Comments: 3/6 murmur  Pulmonary:      Effort: Pulmonary effort is normal.      Breath  sounds: Normal breath sounds.   Abdominal:      Comments: There is a 3 x 3 cm ecchymosis/abrasion lower to right abdomen with tenderness.  Remainder of the abdomen is nontender and soft with normal bowel sounds.   Musculoskeletal:      Comments: FROM all extremities without pain, T/L spine nontender   Skin:     General: Skin is warm and dry.      Capillary Refill: Capillary refill takes less than 2 seconds.   Neurological:      Mental Status: She is oriented to person, place, and time.      Cranial Nerves: No cranial nerve deficit.      Sensory: No sensory deficit.      Motor: No weakness.   Psychiatric:         Mood and Affect: Mood normal.         Behavior: Behavior normal.         Procedures           ED Course                                           MDM  Number of Diagnoses or Management Options  Abdominal wall hematoma, initial encounter:   Acute UTI:   Diagnosis management comments: Results for orders placed or performed during the hospital encounter of 02/13/21  -Comprehensive Metabolic Panel  Specimen: Blood       Result                      Value             Ref Range           Glucose                     104 (H)           65 - 99 mg/dL       BUN                         27 (H)            8 - 23 mg/dL        Creatinine                  2.19 (H)          0.57 - 1.00 *       Sodium                      131 (L)           136 - 145 mm*       Potassium                   3.6               3.5 - 5.2 mm*       Chloride                    93 (L)            98 - 107 mmo*       CO2                         23.0              22.0 - 29.0 *       Calcium                     9.2               8.6 - 10.5 m*       Total Protein               6.9               6.0 - 8.5 g/*       Albumin                     3.60              3.50 - 5.20 *       ALT (SGPT)                  12                1 - 33 U/L          AST (SGOT)                  24                1 - 32 U/L          Alkaline Phosphatase        129 (H)           39 -  117 U/L        Total Bilirubin             0.8               0.0 - 1.2 mg*       eGFR Non  Amer       21 (L)            >60 mL/min/1*       Globulin                    3.3               gm/dL               A/G Ratio                   1.1               g/dL                BUN/Creatinine Ratio        12.3              7.0 - 25.0          Anion Gap                   15.0              5.0 - 15.0 m*  -Protime-INR  Specimen: Blood       Result                      Value             Ref Range           Protime                     13.9 (H)          9.6 - 11.7 S*       INR                         1.28 (H)          0.93 - 1.10    -aPTT  Specimen: Blood       Result                      Value             Ref Range           PTT                         31.3 (H)          24.0 - 31.0 *  -Lipase  Specimen: Blood       Result                      Value             Ref Range           Lipase                      51                13 - 60 U/L    -Urinalysis With Culture If Indicated - Urine, Catheter In/Out  Specimen: Urine, Catheter In/Out       Result                      Value             Ref Range           Color, UA                   Yellow            Yellow, Straw       Appearance, UA              Clear             Clear               pH, UA                      5.5               5.0 - 8.0           Specific Fellows, UA        1.009             1.005 - 1.030       Glucose, UA                 Negative          Negative            Ketones, UA                 Negative          Negative            Bilirubin, UA               Negative          Negative            Blood, UA                   Negative          Negative            Protein, UA                 Negative          Negative            Leuk Esterase, UA           Trace (A)         Negative            Nitrite, UA                 Negative          Negative            Urobilinogen, UA            1.0 E.U./dL       0.2 - 1.0 E.*  -CBC Auto Differential  Specimen: Blood        Result                      Value             Ref Range           WBC                         3.50              3.40 - 10.80*       RBC                         3.22 (L)          3.77 - 5.28 *       Hemoglobin                  10.4 (L)          12.0 - 15.9 *       Hematocrit                  30.7 (L)          34.0 - 46.6 %       MCV                         95.4              79.0 - 97.0 *       MCH                         32.3              26.6 - 33.0 *       MCHC                        33.8              31.5 - 35.7 *       RDW                         18.4 (H)          12.3 - 15.4 %       RDW-SD                      60.8 (H)          37.0 - 54.0 *       MPV                         10.6              6.0 - 12.0 fL       Platelets                   190               140 - 450 10*  -Scan Slide  Specimen: Blood       Result                      Value             Ref Range           Scan Slide                                                   -Urinalysis, Microscopic Only - Urine, Catheter In/Out  Specimen: Urine, Catheter In/Out       Result                      Value             Ref Range           RBC, UA                     None Seen         None Seen /H*       WBC, UA                     13-20 (A)         None Seen /H*       Bacteria, UA                Trace (A)         None Seen /H*       Squamous Epithelial Ce*     None Seen         None Seen, 0*       Hyaline Casts, UA           0-2               None Seen /L*       Methodology                                                   Automated Microscopy  -Manual Differential  Specimen: Blood       Result                      Value             Ref Range           Neutrophil %                56.0              42.7 - 76.0 %       Lymphocyte %                26.0              19.6 - 45.3 %       Monocyte %                  12.0              5.0 - 12.0 %        Eosinophil %                3.0               0.3 - 6.2 %         Bands %                     3.0               0.0  - 5.0 %         Neutrophils Absolute        2.07              1.70 - 7.00 *       Lymphocytes Absolute        0.91              0.70 - 3.10 *       Monocytes Absolute          0.42              0.10 - 0.90 *       Eosinophils Absolute        0.11              0.00 - 0.40 *       Anisocytosis                Mod/2+            None Seen           Toxic Granulation           Slight/1+         None Seen           Large Platelets             Slight/1+         None Seen        Patient well, vital signs stable, small abdominal wall hematoma without any deep extension.  Incidental UTI, mild worsening renal function with suspected dehydration.  Patient politely declines any IV fluids and wants to go home.  I did encourage her to orally hydrate.  Incidental compression fractures lumbar spine felt to be chronic as patient has no back pain.  I did make patient aware of these and to have her family doctor review all studies done today at her next appointment.       Amount and/or Complexity of Data Reviewed  Clinical lab tests: ordered and reviewed  Tests in the radiology section of CPT®: ordered and reviewed  Decide to obtain previous medical records or to obtain history from someone other than the patient: yes        Final diagnoses:   Abdominal wall hematoma, initial encounter   Acute UTI            Chang Calderon MD  02/14/21 0021

## 2021-02-15 LAB — BACTERIA SPEC AEROBE CULT: ABNORMAL

## 2021-03-23 RX ORDER — APIXABAN 5 MG/1
TABLET, FILM COATED ORAL
Qty: 180 TABLET | Refills: 2 | Status: SHIPPED | OUTPATIENT
Start: 2021-03-23 | End: 2022-01-01

## 2021-04-22 NOTE — PROGRESS NOTES
PULMONARY/ CRITICAL CARE/ SLEEP MEDICINE OUTPATIENT CONSULT/ FOLLOW UP NOTE        Patient Name:  Karen Rubio    :  1935    Medical Record:  [id number]    PRIMARY CARE PHYSICIAN     Eliza Clayton APRN    REASON FOR CONSULTATION    Karen Rubio is a 85 y.o. female who is referred for consultation for pulmonary hypertension  REVIEW OF SYSTEMS    Constitutional:  Denies fever or chills   Eyes:  Denies change in visual acuity   HENT:  Denies nasal congestion or sore throat   Respiratory:  Denies cough or shortness of breath   Cardiovascular:  Denies chest pain or edema   GI:  Denies abdominal pain, nausea, vomiting, bloody stools or diarrhea   :  Denies dysuria   Musculoskeletal:  Denies back pain or joint pain   Integument:  Denies rash   Neurologic:  Denies headache, focal weakness or sensory changes   Endocrine:  Denies polyuria or polydipsia   Lymphatic:  Denies swollen glands   Psychiatric:  Denies depression or anxiety     MEDICAL HISTORY    Past Medical History:   Diagnosis Date   • Abnormality of left atrial appendage 2016    Suspected Clot Noted on ESTEFANI   • Acute renal failure (CMS/HCC) 2016-[facility]    Front Dehydration   • Arthritis    • Breast density 2017   • Carotid stenosis 2018    R @ 60% and L @ 10-20% Noted on Cardiac Cath & 16-49% on L&R    • Heart murmur    • History of echocardiogram 10/13/2017    Calcified Aortic Leaftlets; Mild Aortic Stenosis Present; Mild Mitral Stenosis; Bilateral Enlargement Noted;  Moderate TR; LV Function of 55-60%   • History of echocardiogram 14-[facility]    Moderate L Vent Hypertrophy Noted; L Atrial Dilation; Moderate MR; Mild TR; Mild-Moderate Aortic Reg Noted; Normal Root Size/No Effusion   • History of echocardiogram 16-[facility]    Normal Findings with Mild-Moderate MVS Noted   • History of EKG  & 18-[facility]    All Sinus Rhythm w/Infarct Ischemnic Changes Noted   • HLD (hyperlipidemia)     Controlled w/Meds   • Hx of  hyperglycemia    • Hypertension     Controlled w/Meds   • Hypothyroidism     Levothyroxine   • Joint pain    • Left atrial dilatation 12/05/2014    Mildly Noted on Echo   • Left ventricular hypertrophy 12/05/2014    Noted on Echo w/EF @ 55-60%   • Mild aortic regurgitation 12/05/2014    to Moderate Noted on Echo   • Mild aortic stenosis 10/13/2017    Noted on Echo & ESTEFANI-11/5/18-Moderate    • Mild mitral insufficiency 10/13/2017    Noted on Echo & ESTEFANI-11/5/18   • Mild tricuspid regurgitation 12/05/2014    Noted on Echo   • Moderate mitral regurgitation 12/05/2014    Noted on Echo   • Moderate mitral stenosis 10/13/2017    Noted on Echo   • Moderate tricuspid insufficiency 10/13/2017    Noted on Echo   • Pneumonia involving left lung 08/6/16-[facility]   • Pulmonary hypertension (CMS/HCC) 10/13/2017 & 11/5/18-Cath    Severe Noted on Echo & Cath   • Slurred speech 05/16/2016   • SOB (shortness of breath) 08/2016   • Stroke (CMS/HCC)    • Thickened mitral leaflet 10/13/2017 & ESTEFANI-11/5/18    Severely Calcified Noted on Echo   • Tricuspid valve insufficiency 11/05/2018    Noted on ESTEFANI        SURGICAL HISTORY    Past Surgical History:   Procedure Laterality Date   • ATRIAL APPENDAGE EXCLUSION LEFT WITH TRANSESOPHAGEAL ECHOCARDIOGRAM N/A 1/7/2021    Procedure: Atrial Appendage Occlusion;  Surgeon: Harjit Berg MD;  Location: [facility] CATH INVASIVE LOCATION;  Service: Cardiovascular;  Laterality: N/A;   • ATRIAL APPENDAGE EXCLUSION LEFT WITH TRANSESOPHAGEAL ECHOCARDIOGRAM N/A 1/7/2021    Procedure: Atrial Appendage Occlusion;  Surgeon: Zack Zheng MD;  Location: [facility] CATH INVASIVE LOCATION;  Service: Cardiology;  Laterality: N/A;   • CARDIAC CATHETERIZATION Left 11/5/18-[facility]    w/L Coronary Angiography--Dr. Hernández-RCA @ 60% with Pulmonary Hypertension Noted   • CHOLECYSTECTOMY     • KNEE ARTHROSCOPY     • ESTEFANI  05/17/2016-[facility]    Dr. Hernández--Severly Calcified Mitral Leaflets w/Mild-Moderate Mitral  Stenosis/Insufficiency; Sig Aortic Stenosis Noted; Suspecion of L Atrial Appendage Clot Noted   • ESTEFANI  18-[facility]    Dr. Hernández--Moderate Mitral Insufficiency Noted; Mild-Moderate AVS; Moderate Tricuspid Insufficiency Noted; EF of 65-70%   • TONSILLECTOMY          FAMILY HISTORY    Family History   Problem Relation Age of Onset   • Heart disease Mother    • Cancer Father    • No Known Problems Sister    • No Known Problems Brother    • No Known Problems Maternal Aunt    • No Known Problems Maternal Uncle    • No Known Problems Paternal Aunt    • No Known Problems Paternal Uncle    • No Known Problems Maternal Grandmother    • No Known Problems Maternal Grandfather    • No Known Problems Paternal Grandmother    • No Known Problems Paternal Grandfather    • No Known Problems Other    • Anemia Neg Hx    • Arrhythmia Neg Hx    • Asthma Neg Hx    • Clotting disorder Neg Hx    • Fainting Neg Hx    • Heart attack Neg Hx    • Heart failure Neg Hx    • Hyperlipidemia Neg Hx    • Hypertension Neg Hx        SOCIAL HISTORY    Social History     Tobacco Use   • Smoking status: Former Smoker     Types: Cigarettes     Quit date:      Years since quittin.3   • Smokeless tobacco: Never Used   Substance Use Topics   • Alcohol use: No        ALLERGIES    Allergies   Allergen Reactions   • Hydrocodone Shortness Of Breath         MEDICATIONS    Current Outpatient Medications on File Prior to Visit   Medication Sig Dispense Refill   • acetaminophen (TYLENOL) 325 MG tablet Take 2 tablets by mouth Every 4 (Four) Hours As Needed for Mild Pain . 1 bottle 0   • albuterol sulfate  (90 Base) MCG/ACT inhaler Inhale 2 puffs Every 4 (Four) Hours As Needed for Wheezing or Shortness of Air.     • atorvastatin (LIPITOR) 20 MG tablet TAKE 1 TABLET EVERY DAY     • Calcium Carbonate-Vitamin D 600-200 MG-UNIT tablet      • Cholecalciferol (VITAMIN D-3) 1000 units capsule Take 1 capsule by mouth Daily.     • Eliquis 5 MG tablet tablet TAKE 1  "TABLET TWICE DAILY 180 tablet 2   • esomeprazole (nexIUM) 40 MG capsule Take 40 mg by mouth Every Morning Before Breakfast.     • furosemide (LASIX) 40 MG tablet Take 40 mg by mouth Daily.     • ipratropium-albuterol (DUO-NEB) 0.5-2.5 mg/3 ml nebulizer 3 mL 4 (Four) Times a Day. O2 bellow 90     • levothyroxine (SYNTHROID, LEVOTHROID) 75 MCG tablet Take 75 mcg by mouth Daily.     • metoprolol tartrate (LOPRESSOR) 25 MG tablet Take 1 tablet by mouth 2 (Two) Times a Day. 180 tablet 2   • O2 (OXYGEN) Inhale 2 L/min 1 (One) Time. At night and PRN     • O2 (OXYGEN) Inhale 2 L/min.     • potassium chloride (MICRO-K) 10 MEQ CR capsule Take 10 mEq by mouth Daily.     • sildenafil (VIAGRA) 25 MG tablet Take 25 mg by mouth 3 (Three) Times a Day. To help with breathing     • temazepam (RESTORIL) 15 MG capsule Take 15 mg by mouth.     • cephalexin (KEFLEX) 500 MG capsule Take 1 capsule by mouth 2 (Two) Times a Day. 14 capsule 0   • [DISCONTINUED] atorvastatin (LIPITOR) 20 MG tablet Take 20 mg by mouth Every Night.     • [DISCONTINUED] furosemide (LASIX) 40 MG tablet Take 40 mg by mouth Daily.     • [DISCONTINUED] potassium chloride (MICRO-K) 10 MEQ CR capsule Take 20 mEq by mouth Daily.     • [DISCONTINUED] temazepam (RESTORIL) 15 MG capsule Take 15 mg by mouth At Night As Needed.       No current facility-administered medications on file prior to visit.       PHYSICAL EXAM    Vitals:    04/22/21 1427   BP: 118/66   BP Location: Left arm   Patient Position: Sitting   Cuff Size: Adult   Pulse: 91   Resp: 15   Temp: 96.8 °F (36 °C)   TempSrc: Temporal   SpO2: 91%   Weight: 71.7 kg (158 lb)   Height: 162.6 cm (64\")        Constitutional:  Well developed, well nourished, no acute distress, non-toxic appearance   Eyes:  PERRL, conjunctiva normal   HENT:  Atraumatic, external ears normal, nose normal, oropharynx moist, no pharyngeal exudates. mallampatti   Neck- normal range of motion, no tenderness, supple   Respiratory:  No " respiratory distress, normal breath sounds, no rales, no wheezing   Cardiovascular:  Normal rate, normal rhythm, no murmurs, no gallops, no rubs   GI:  Soft, nondistended, normal bowel sounds, nontender, no organomegaly, no mass, no rebound, no guarding   :  No costovertebral angle tenderness   Musculoskeletal:  No edema, no tenderness, no deformities. Back- no tenderness  Integument:  Well hydrated, no rash   Lymphatic:  No lymphadenopathy noted   Neurologic:  Alert & oriented x 3, CN 2-12 normal, normal motor function, normal sensory function, no focal deficits noted   Psychiatric:  Speech and behavior appropriate     CT Abdomen Pelvis Without Contrast    Result Date: 2/14/2021  Impression: 1. Findings compatible with contusion and small subcutaneous hematoma in the lower ventral abdominal body wall. 2. Age-indeterminate partial L1 and L3 vertebral body compression fractures. Electronically signed by:  Blair Jeffery M.D.  2/13/2021 10:02 PM    CT Head Without Contrast    Result Date: 2/13/2021  1. No acute intracranial abnormality evident by noncontrast CT head examination. 2. Generalized volume loss and chronic microvascular ischemic changes. Small remote lacunar infarcts in the right basal ganglia and cerebellum bilaterally. Electronically signed by:  Emmett Haley M.D.  2/13/2021 9:50 PM    CT Head Without Contrast    Result Date: 2/1/2021  1. Stable exam, with no acute process demonstrated. 2. Stable cerebral atrophy and chronic white matter disease.  Electronically Signed By-Kiarra Burrell MD On:2/1/2021 8:43 PM This report was finalized on [id number] by  Kiarra Burrell MD.    XR Chest 1 View    Result Date: 2/1/2021  1. No evidence of active disease.  Electronically Signed By-Kiarra Burrell MD On:2/1/2021 8:15 PM This report was finalized on [id number] by  Kiarra Burrell MD.     Results for orders placed during the hospital encounter of 01/07/21    Adult Transesophageal Echo (ESTEFANI) W/ Cont if  Necessary Per Protocol    Interpretation Summary  · Left ventricular systolic function is normal.  · Left ventricle ejection fraction is 55 to 60%  · Left ventricular wall thickness is consistent with concentric hypertrophy.  · Mild to moderate aortic valve stenosis is present.  · Moderate mitral valve regurgitation is present.  · Moderate to severe tricuspid valve regurgitation is present.  · Unsuccessful deployment of watchman device in left atrial appendage due to the anatomy.      ASSESSMENT & PLAN:          Dyspnea  Secondary to pulmonary hypertension, RVSP around 60, EF 65%, mitral valve disease patient was evaluated by cardiac surgery twice deemed high surgical risk   Revatio started last visit with some improvement however skin rash developed seen by dermatology  less likely related to Revatio the skin rash is getting better we will continue to monitor     Pulmonary hypertension  -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     Hypoxemia continue supplemental oxygen 2 L continuous.  Benefiting from use.  W     Valvular heart disease.  -ESTEFANI 3/20: EF 65%.  Moderate mitral stenosis and moderate to severe mitral regurgitation was noted.  -2D echo 10/17: EF 55 to 60%.  RVSP 60 mmHg.  Moderate mitral stenosis and mild aortic stenosis was noted..  -Being followed by cardiology.  Not a surgical candidate.       Noted elevated creatinine and significant lower extremity edema we will refer to nephrology          This document has been electronically signed by  Rebecca Nino MD  15:08 EDT

## 2021-07-06 NOTE — TELEPHONE ENCOUNTER
Pts daughter requesting refill on Sildenafil. Dr. Nino not in clinic and due to pt being out I gave verbal request to pharmacy over the phone.

## 2021-07-20 NOTE — PROGRESS NOTES
Subjective:     Encounter Date:07/20/2021      Patient ID: Karen Rubio is a 86 y.o. female.    Chief Complaint: Atrial Fibrillation & History of CVA  History of Present Illness  86-year-old white female patient with known history of hypertension hyperlipidemia history of CVA on anticoagulation history of mitral stenosis and atrial fibrillation comes back for follow-up     Her stress Myoview showed apical ischemia October 2018     Cardiac catheterization showed up to 60% ostial RCA disease, nov 2018   severe pulmonary hypertension more than 70 mm of Hg   transesophageal echocardiogram showed hyperdynamic LV systolic function EF 65-70% moderate mitral stenosis and insufficiency moderate tricuspid insufficiency mild to moderate aortic stenosis      Pulmonary follow-up regarding pulmonary hypertension   patient was cleared by Pulmonary for surgery  Patient and family decided no surgery and the point    Transesophageal echocardiogram March 2020 EF 65% biatrial enlargement severely calcified mitral leaflets with at least moderate mitral stenosis moderate severe mitral insufficiency RV dilatation noted severe tricuspid insufficiency noted  Moderate aortic stenosis noted  Patient also have underlying severe pulmonary hypertension  Patient was admitted recently in [place] due to fall severe bruising head injury      Unfortunately patient watchman device placement was not successful  So patient restarted the anticoagulation so far stable I advised fall precautions    Patient comes back for follow-up tolerating the anticoagulation  EKG atrial fibrillation right bundle branch block LPFB  No changes compared to the last EKG      The following portions of the patient's history were reviewed and updated as appropriate: Allergies current medications past family history past medical history past social history past surgical history problem list and review of systems  Past Medical History:   Diagnosis Date   • Abnormality  of left atrial appendage 05/17/2016    Suspected Clot Noted on ESTEFANI   • Acute renal failure (CMS/HCC) 08/06/2016-[facility]    Front Dehydration   • Arthritis    • Breast density 12/2017   • Carotid stenosis 11/05/2018    R @ 60% and L @ 10-20% Noted on Cardiac Cath & 16-49% on L&R    • Heart murmur    • History of echocardiogram 10/13/2017    Calcified Aortic Leaftlets; Mild Aortic Stenosis Present; Mild Mitral Stenosis; Bilateral Enlargement Noted;  Moderate TR; LV Function of 55-60%   • History of echocardiogram 12/5/14-[facility]    Moderate L Vent Hypertrophy Noted; L Atrial Dilation; Moderate MR; Mild TR; Mild-Moderate Aortic Reg Noted; Normal Root Size/No Effusion   • History of echocardiogram 5/16/16-[facility]    Normal Findings with Mild-Moderate MVS Noted   • History of EKG 2004/2016 & 11/5/18-[facility]    All Sinus Rhythm w/Infarct Ischemnic Changes Noted   • HLD (hyperlipidemia)     Controlled w/Meds   • Hx of hyperglycemia    • Hypertension     Controlled w/Meds   • Hypothyroidism     Levothyroxine   • Joint pain    • Left atrial dilatation 12/05/2014    Mildly Noted on Echo   • Left ventricular hypertrophy 12/05/2014    Noted on Echo w/EF @ 55-60%   • Mild aortic regurgitation 12/05/2014    to Moderate Noted on Echo   • Mild aortic stenosis 10/13/2017    Noted on Echo & ESTEFANI-11/5/18-Moderate    • Mild mitral insufficiency 10/13/2017    Noted on Echo & ESTEFANI-11/5/18   • Mild tricuspid regurgitation 12/05/2014    Noted on Echo   • Moderate mitral regurgitation 12/05/2014    Noted on Echo   • Moderate mitral stenosis 10/13/2017    Noted on Echo   • Moderate tricuspid insufficiency 10/13/2017    Noted on Echo   • Pneumonia involving left lung 08/6/16-[facility] ER   • Pulmonary hypertension (CMS/HCC) 10/13/2017 & 11/5/18-Cath    Severe Noted on Echo & Cath   • Slurred speech 05/16/2016   • SOB (shortness of breath) 08/2016   • Stroke (CMS/HCC)    • Thickened mitral leaflet 10/13/2017 & ESTEFANI-11/5/18    Severely Calcified Noted on Echo   •  "Tricuspid valve insufficiency 11/05/2018    Noted on ESTEFANI     Past Surgical History:   Procedure Laterality Date   • ATRIAL APPENDAGE EXCLUSION LEFT WITH TRANSESOPHAGEAL ECHOCARDIOGRAM N/A 1/7/2021    Procedure: Atrial Appendage Occlusion;  Surgeon: Harjit Berg MD;  Location: [facility] CATH INVASIVE LOCATION;  Service: Cardiovascular;  Laterality: N/A;   • ATRIAL APPENDAGE EXCLUSION LEFT WITH TRANSESOPHAGEAL ECHOCARDIOGRAM N/A 1/7/2021    Procedure: Atrial Appendage Occlusion;  Surgeon: Zack Zheng MD;  Location: [facility] CATH INVASIVE LOCATION;  Service: Cardiology;  Laterality: N/A;   • CARDIAC CATHETERIZATION Left 11/5/18-[facility]    w/L Coronary Angiography--Dr. Hernández-RCA @ 60% with Pulmonary Hypertension Noted   • CHOLECYSTECTOMY     • KNEE ARTHROSCOPY     • ESTEFANI  05/17/2016-[facility]    Dr. Hernández--Severly Calcified Mitral Leaflets w/Mild-Moderate Mitral Stenosis/Insufficiency; Sig Aortic Stenosis Noted; Suspecion of L Atrial Appendage Clot Noted   • ESTEFANI  11/5/18-[facility]    Dr. Hernández--Moderate Mitral Insufficiency Noted; Mild-Moderate AVS; Moderate Tricuspid Insufficiency Noted; EF of 65-70%   • TONSILLECTOMY       /85 (BP Location: Left arm, Patient Position: Sitting, Cuff Size: Adult)   Pulse 62   Ht 162.6 cm (64\")   Wt 68.3 kg (150 lb 8 oz)   SpO2 94%   BMI 25.83 kg/m²   Family History   Problem Relation Age of Onset   • Heart disease Mother    • Cancer Father    • No Known Problems Sister    • No Known Problems Brother    • No Known Problems Maternal Aunt    • No Known Problems Maternal Uncle    • No Known Problems Paternal Aunt    • No Known Problems Paternal Uncle    • No Known Problems Maternal Grandmother    • No Known Problems Maternal Grandfather    • No Known Problems Paternal Grandmother    • No Known Problems Paternal Grandfather    • No Known Problems Other    • Anemia Neg Hx    • Arrhythmia Neg Hx    • Asthma Neg Hx    • Clotting disorder Neg Hx    • Fainting Neg Hx    • Heart attack Neg Hx  "   • Heart failure Neg Hx    • Hyperlipidemia Neg Hx    • Hypertension Neg Hx        Current Outpatient Medications:   •  acetaminophen (TYLENOL) 325 MG tablet, Take 2 tablets by mouth Every 4 (Four) Hours As Needed for Mild Pain ., Disp: 1 bottle, Rfl: 0  •  albuterol sulfate  (90 Base) MCG/ACT inhaler, Inhale 2 puffs Every 4 (Four) Hours As Needed for Wheezing or Shortness of Air., Disp: , Rfl:   •  allopurinol (ZYLOPRIM) 100 MG tablet, Take 1 tablet by mouth Daily., Disp: , Rfl:   •  atorvastatin (LIPITOR) 20 MG tablet, TAKE 1 TABLET EVERY DAY, Disp: , Rfl:   •  Cholecalciferol (VITAMIN D-3) 1000 units capsule, Take 1 capsule by mouth Daily., Disp: , Rfl:   •  Eliquis 5 MG tablet tablet, TAKE 1 TABLET TWICE DAILY, Disp: 180 tablet, Rfl: 2  •  escitalopram (LEXAPRO) 10 MG tablet, Take 10 mg by mouth Daily., Disp: , Rfl:   •  esomeprazole (nexIUM) 40 MG capsule, Take 40 mg by mouth Every Morning Before Breakfast., Disp: , Rfl:   •  furosemide (LASIX) 40 MG tablet, Take 40 mg by mouth Daily., Disp: , Rfl:   •  ipratropium-albuterol (DUO-NEB) 0.5-2.5 mg/3 ml nebulizer, 3 mL 4 (Four) Times a Day. O2 bellow 90, Disp: , Rfl:   •  levothyroxine (SYNTHROID, LEVOTHROID) 75 MCG tablet, Take 75 mcg by mouth Daily., Disp: , Rfl:   •  metoprolol tartrate (LOPRESSOR) 25 MG tablet, Take 1 tablet by mouth 2 (Two) Times a Day., Disp: 180 tablet, Rfl: 2  •  O2 (OXYGEN), Inhale 2 L/min 1 (One) Time. At night and PRN, Disp: , Rfl:   •  ondansetron (ZOFRAN) 4 MG tablet, Take 4 mg by mouth Every 8 (Eight) Hours As Needed for Nausea or Vomiting., Disp: , Rfl:   •  potassium chloride (MICRO-K) 10 MEQ CR capsule, Take 10 mEq by mouth Daily., Disp: , Rfl:   •  sildenafil (VIAGRA) 25 MG tablet, Take 25 mg by mouth 3 (Three) Times a Day. To help with breathing, Disp: , Rfl:   •  temazepam (RESTORIL) 15 MG capsule, Take 15 mg by mouth., Disp: , Rfl:   Social History     Socioeconomic History   • Marital status:      Spouse name:  Not on file   • Number of children: Not on file   • Years of education: Not on file   • Highest education level: Not on file   Tobacco Use   • Smoking status: Former Smoker     Types: Cigarettes     Quit date:      Years since quittin.5   • Smokeless tobacco: Never Used   Vaping Use   • Vaping Use: Never used   Substance and Sexual Activity   • Alcohol use: No   • Drug use: No   • Sexual activity: Defer     Birth control/protection: Post-menopausal     Allergies   Allergen Reactions   • Hydrocodone Shortness Of Breath     Review of Systems   Constitutional: Positive for malaise/fatigue. Negative for fever.   HENT: Negative for congestion and hearing loss.    Eyes: Negative for double vision and visual disturbance.   Cardiovascular: Positive for leg swelling. Negative for chest pain, claudication, dyspnea on exertion and syncope.   Respiratory: Positive for shortness of breath. Negative for cough.    Endocrine: Negative for cold intolerance.   Skin: Negative for color change and rash.   Musculoskeletal: Negative for arthritis and joint pain.   Gastrointestinal: Negative for abdominal pain and heartburn.   Genitourinary: Negative for hematuria.   Neurological: Positive for dizziness. Negative for excessive daytime sleepiness.   Psychiatric/Behavioral: Negative for depression. The patient is not nervous/anxious.    All other systems reviewed and are negative.             Objective:     Physical Exam  Vitals stable not in any acute distress alert neck no JVP elevation lungs bilateral entry few rhonchi heart sounds S1-S2 regular extremities trace edema bilateral pulses present    ECG 12 Lead    Date/Time: 2021 3:14 PM  Performed by: Demond Valiente MD  Authorized by: Demond Valiente MD   Comments: EKG atrial fibrillation right bundle branch block LPFB  No changes compared to the last EKG            Lab Review:       Assessment:          Diagnosis Plan   1. History of CVA  (cerebrovascular accident)     2. Longstanding persistent atrial fibrillation (CMS/HCC)     3. Mixed hyperlipidemia     4. Pulmonary hypertension (CMS/HCC)            Plan:       MDM  Number of Diagnoses or Management Options  History of CVA (cerebrovascular accident): established, improving  Longstanding persistent atrial fibrillation (CMS/HCC): established, improving  Mixed hyperlipidemia: established, improving  Pulmonary hypertension (CMS/HCC): established, improving     Amount and/or Complexity of Data Reviewed  Clinical lab tests: reviewed  Review and summarize past medical records: yes    Risk of Complications, Morbidity, and/or Mortality  Presenting problems: moderate  Management options: moderate    Patient Progress  Patient progress: stable

## 2021-09-15 NOTE — ED NOTES
Pt reports losing balance and falling while walking dog. Pt states she hit her head and has lac to head.      Becky Cedeño RN  09/15/21 1400

## 2021-09-16 NOTE — ED PROVIDER NOTES
Subjective   Patient is a an 86-year-old female who is slipped and fallen.  She struck her head.  She had no loss of consciousness dizziness or vomiting.  She complains of a scalp laceration other than head pain.          Review of Systems  For loss of consciousness dizziness vomiting neck pain chest pain shortness of breath abdominal pain back pain extremity pain or other complaint.  Past Medical History:   Diagnosis Date   • Abnormality of left atrial appendage 05/17/2016    Suspected Clot Noted on ESTEFANI   • Acute renal failure (CMS/HCC) 08/06/2016-[facility]    Front Dehydration   • Arthritis    • Breast density 12/2017   • Carotid stenosis 11/05/2018    R @ 60% and L @ 10-20% Noted on Cardiac Cath & 16-49% on L&R    • Heart murmur    • History of echocardiogram 10/13/2017    Calcified Aortic Leaftlets; Mild Aortic Stenosis Present; Mild Mitral Stenosis; Bilateral Enlargement Noted;  Moderate TR; LV Function of 55-60%   • History of echocardiogram 12/5/14-[facility]    Moderate L Vent Hypertrophy Noted; L Atrial Dilation; Moderate MR; Mild TR; Mild-Moderate Aortic Reg Noted; Normal Root Size/No Effusion   • History of echocardiogram 5/16/16-[facility]    Normal Findings with Mild-Moderate MVS Noted   • History of EKG 2004/2016 & 11/5/18-[facility]    All Sinus Rhythm w/Infarct Ischemnic Changes Noted   • HLD (hyperlipidemia)     Controlled w/Meds   • Hx of hyperglycemia    • Hypertension     Controlled w/Meds   • Hypothyroidism     Levothyroxine   • Joint pain    • Left atrial dilatation 12/05/2014    Mildly Noted on Echo   • Left ventricular hypertrophy 12/05/2014    Noted on Echo w/EF @ 55-60%   • Mild aortic regurgitation 12/05/2014    to Moderate Noted on Echo   • Mild aortic stenosis 10/13/2017    Noted on Echo & ESTEFANI-11/5/18-Moderate    • Mild mitral insufficiency 10/13/2017    Noted on Echo & ESTEFANI-11/5/18   • Mild tricuspid regurgitation 12/05/2014    Noted on Echo   • Moderate mitral regurgitation 12/05/2014    Noted on Echo   •  Moderate mitral stenosis 10/13/2017    Noted on Echo   • Moderate tricuspid insufficiency 10/13/2017    Noted on Echo   • Pneumonia involving left lung 08/6/16-[facility] ER   • Pulmonary hypertension (CMS/HCC) 10/13/2017 & 11/5/18-[facility]    Severe Noted on Echo & Cath   • Slurred speech 05/16/2016   • SOB (shortness of breath) 08/2016   • Stroke (CMS/HCC)    • Thickened mitral leaflet 10/13/2017 & ESTEFANI-11/5/18    Severely Calcified Noted on Echo   • Tricuspid valve insufficiency 11/05/2018    Noted on ESTEFANI       Allergies   Allergen Reactions   • Hydrocodone Shortness Of Breath       Past Surgical History:   Procedure Laterality Date   • ATRIAL APPENDAGE EXCLUSION LEFT WITH TRANSESOPHAGEAL ECHOCARDIOGRAM N/A 1/7/2021    Procedure: Atrial Appendage Occlusion;  Surgeon: Harjit Berg MD;  Location: [facility] CATH INVASIVE LOCATION;  Service: Cardiovascular;  Laterality: N/A;   • ATRIAL APPENDAGE EXCLUSION LEFT WITH TRANSESOPHAGEAL ECHOCARDIOGRAM N/A 1/7/2021    Procedure: Atrial Appendage Occlusion;  Surgeon: Zack Zheng MD;  Location: [facility] CATH INVASIVE LOCATION;  Service: Cardiology;  Laterality: N/A;   • CARDIAC CATHETERIZATION Left 11/5/18-[facility]    w/L Coronary Angiography--Dr. Hernández-RCA @ 60% with Pulmonary Hypertension Noted   • CHOLECYSTECTOMY     • KNEE ARTHROSCOPY     • ESTEFANI  05/17/2016-[facility]    Dr. Hernández--Severly Calcified Mitral Leaflets w/Mild-Moderate Mitral Stenosis/Insufficiency; Sig Aortic Stenosis Noted; Suspecion of L Atrial Appendage Clot Noted   • ESTEFANI  11/5/18-[facility]    Dr. Hernández--Moderate Mitral Insufficiency Noted; Mild-Moderate AVS; Moderate Tricuspid Insufficiency Noted; EF of 65-70%   • TONSILLECTOMY         Family History   Problem Relation Age of Onset   • Heart disease Mother    • Cancer Father    • No Known Problems Sister    • No Known Problems Brother    • No Known Problems Maternal Aunt    • No Known Problems Maternal Uncle    • No Known Problems Paternal Aunt    • No Known Problems Paternal  Uncle    • No Known Problems Maternal Grandmother    • No Known Problems Maternal Grandfather    • No Known Problems Paternal Grandmother    • No Known Problems Paternal Grandfather    • No Known Problems Other    • Anemia Neg Hx    • Arrhythmia Neg Hx    • Asthma Neg Hx    • Clotting disorder Neg Hx    • Fainting Neg Hx    • Heart attack Neg Hx    • Heart failure Neg Hx    • Hyperlipidemia Neg Hx    • Hypertension Neg Hx        Social History     Socioeconomic History   • Marital status:      Spouse name: Not on file   • Number of children: Not on file   • Years of education: Not on file   • Highest education level: Not on file   Tobacco Use   • Smoking status: Former Smoker     Types: Cigarettes     Quit date:      Years since quittin.7   • Smokeless tobacco: Never Used   Vaping Use   • Vaping Use: Never used   Substance and Sexual Activity   • Alcohol use: No   • Drug use: No   • Sexual activity: Defer     Birth control/protection: Post-menopausal           Objective   Physical Exam  EENT exam shows the patient have a 10 cm scalp laceration over the vertex of her scalp.  TMs clear.  Oropharynx clear.  Neck is nontender.  Neurologic exam is nonfocal.  Mental exam is unremarkable.  Procedures     Patient had a wound anesthetized with 1% lidocaine with epinephrine.  The wound was cleaned and closed_appropriate being staples.  Bacitracin was applied.  No foreign body was noted.      ED Course          CT Head Without Contrast    Result Date: 9/15/2021  1.No definite CT evidence of acute intracranial abnormality. 2.Volume loss, remote lacunar infarcts, and suspected chronic small vessel ischemic changes, as before. 3.Left scalp hematoma/laceration.  Electronically Signed By-Blair Garsia MD On:9/15/2021 7:58 PM This report was finalized on [id number] by  Blair Garsia MD.                                      MDM  Number of Diagnoses or Management Options  Diagnosis management comments: Patient has  findings consistent with concussion without loss of consciousness as well as centimeters scalp laceration.  She will be discharged to follow with MD in 10 days for staple removal.       Amount and/or Complexity of Data Reviewed  Tests in the radiology section of CPT®: reviewed    Risk of Complications, Morbidity, and/or Mortality  Presenting problems: high  Diagnostic procedures: high  Management options: high    Patient Progress  Patient progress: stable      Final diagnoses:   Concussion without loss of consciousness, initial encounter   Laceration of scalp, initial encounter   Fall, initial encounter       ED Disposition  ED Disposition     ED Disposition Condition Comment    Discharge Stable           No follow-up provider specified.       Medication List      No changes were made to your prescriptions during this visit.          Neal Ward MD  09/15/21 2048

## 2021-09-21 NOTE — ED PROVIDER NOTES
Subjective    Chief Complaint   Patient presents with   • Fall     Eliza Clayton A, APRN  No LMP recorded. Patient is postmenopausal.  Allergies   Allergen Reactions   • Hydrocodone Shortness Of Breath       History of Present Illness  Patient is an 86-year-old female presents to the emergency department after a fall tonight.  Patient reports she was awake the entire time, she not complain of any preceding symptoms.  She denies LOC.  The patient does take Eliquis.  Denies any further pain.  No neck pain or back pain.  Review of Systems   Constitutional: Negative for chills and fever.   Eyes: Negative for photophobia and visual disturbance.   Respiratory: Negative for cough, chest tightness and shortness of breath.    Cardiovascular: Negative for chest pain, palpitations and leg swelling.   Gastrointestinal: Positive for vomiting. Negative for abdominal pain, diarrhea and nausea.   Genitourinary: Negative for dysuria.   Musculoskeletal: Negative for back pain and neck pain.   Skin: Negative for color change and rash.   Neurological: Negative for dizziness, syncope, weakness, light-headedness and headaches.       Past Medical History:   Diagnosis Date   • Abnormality of left atrial appendage 05/17/2016    Suspected Clot Noted on ESTEFANI   • Acute renal failure (CMS/HCC) 08/06/2016-[facility]    Front Dehydration   • Arthritis    • Breast density 12/2017   • Carotid stenosis 11/05/2018    R @ 60% and L @ 10-20% Noted on Cardiac Cath & 16-49% on L&R    • Heart murmur    • History of echocardiogram 10/13/2017    Calcified Aortic Leaftlets; Mild Aortic Stenosis Present; Mild Mitral Stenosis; Bilateral Enlargement Noted;  Moderate TR; LV Function of 55-60%   • History of echocardiogram 12/5/14-[facility]    Moderate L Vent Hypertrophy Noted; L Atrial Dilation; Moderate MR; Mild TR; Mild-Moderate Aortic Reg Noted; Normal Root Size/No Effusion   • History of echocardiogram 5/16/16-[facility]    Normal Findings with Mild-Moderate MVS Noted   •  History of EKG 2004/2016 & 11/5/18-[facility]    All Sinus Rhythm w/Infarct Ischemnic Changes Noted   • HLD (hyperlipidemia)     Controlled w/Meds   • Hx of hyperglycemia    • Hypertension     Controlled w/Meds   • Hypothyroidism     Levothyroxine   • Joint pain    • Left atrial dilatation 12/05/2014    Mildly Noted on Echo   • Left ventricular hypertrophy 12/05/2014    Noted on Echo w/EF @ 55-60%   • Mild aortic regurgitation 12/05/2014    to Moderate Noted on Echo   • Mild aortic stenosis 10/13/2017    Noted on Echo & ESTEFANI-11/5/18-Moderate    • Mild mitral insufficiency 10/13/2017    Noted on Echo & ESTEFANI-11/5/18   • Mild tricuspid regurgitation 12/05/2014    Noted on Echo   • Moderate mitral regurgitation 12/05/2014    Noted on Echo   • Moderate mitral stenosis 10/13/2017    Noted on Echo   • Moderate tricuspid insufficiency 10/13/2017    Noted on Echo   • Pneumonia involving left lung 08/6/16-[facility] ER   • Pulmonary hypertension (CMS/HCC) 10/13/2017 & 11/5/18-[facility]    Severe Noted on Echo & Cath   • Slurred speech 05/16/2016   • SOB (shortness of breath) 08/2016   • Stroke (CMS/HCC)    • Thickened mitral leaflet 10/13/2017 & ESTEFANI-11/5/18    Severely Calcified Noted on Echo   • Tricuspid valve insufficiency 11/05/2018    Noted on ESTEFANI       Allergies   Allergen Reactions   • Hydrocodone Shortness Of Breath       Past Surgical History:   Procedure Laterality Date   • ATRIAL APPENDAGE EXCLUSION LEFT WITH TRANSESOPHAGEAL ECHOCARDIOGRAM N/A 1/7/2021    Procedure: Atrial Appendage Occlusion;  Surgeon: Harjit Berg MD;  Location: [facility] CATH INVASIVE LOCATION;  Service: Cardiovascular;  Laterality: N/A;   • ATRIAL APPENDAGE EXCLUSION LEFT WITH TRANSESOPHAGEAL ECHOCARDIOGRAM N/A 1/7/2021    Procedure: Atrial Appendage Occlusion;  Surgeon: Zack Zheng MD;  Location: [facility] CATH INVASIVE LOCATION;  Service: Cardiology;  Laterality: N/A;   • CARDIAC CATHETERIZATION Left 11/5/18-[facility]    w/L Coronary Angiography--  Dev-RCA @ 60% with Pulmonary Hypertension Noted   • CHOLECYSTECTOMY     • KNEE ARTHROSCOPY     • ESTEFANI  2016-[facility]    Dr. Hernández--Severly Calcified Mitral Leaflets w/Mild-Moderate Mitral Stenosis/Insufficiency; Sig Aortic Stenosis Noted; Suspecion of L Atrial Appendage Clot Noted   • ESTEFANI  18-[facility]    Dr. Hernández--Moderate Mitral Insufficiency Noted; Mild-Moderate AVS; Moderate Tricuspid Insufficiency Noted; EF of 65-70%   • TONSILLECTOMY         Family History   Problem Relation Age of Onset   • Heart disease Mother    • Cancer Father    • No Known Problems Sister    • No Known Problems Brother    • No Known Problems Maternal Aunt    • No Known Problems Maternal Uncle    • No Known Problems Paternal Aunt    • No Known Problems Paternal Uncle    • No Known Problems Maternal Grandmother    • No Known Problems Maternal Grandfather    • No Known Problems Paternal Grandmother    • No Known Problems Paternal Grandfather    • No Known Problems Other    • Anemia Neg Hx    • Arrhythmia Neg Hx    • Asthma Neg Hx    • Clotting disorder Neg Hx    • Fainting Neg Hx    • Heart attack Neg Hx    • Heart failure Neg Hx    • Hyperlipidemia Neg Hx    • Hypertension Neg Hx        Social History     Socioeconomic History   • Marital status:      Spouse name: Not on file   • Number of children: Not on file   • Years of education: Not on file   • Highest education level: Not on file   Tobacco Use   • Smoking status: Former Smoker     Types: Cigarettes     Quit date:      Years since quittin.7   • Smokeless tobacco: Never Used   Vaping Use   • Vaping Use: Never used   Substance and Sexual Activity   • Alcohol use: No   • Drug use: No   • Sexual activity: Defer     Birth control/protection: Post-menopausal           Objective   Physical Exam  Vitals and nursing note reviewed.   Constitutional:       General: She is not in acute distress.     Appearance: Normal appearance. She is not ill-appearing, toxic-appearing or  diaphoretic.   HENT:      Head: Normocephalic.      Comments: Older bruising noted to bilateral periorbital areas.  Staples in place from previous scalp laceration.  Appears to be healing well, no evidence for infection.     Nose: Nose normal.      Mouth/Throat:      Mouth: Mucous membranes are moist.      Pharynx: Oropharynx is clear.   Eyes:      Extraocular Movements: Extraocular movements intact.      Conjunctiva/sclera: Conjunctivae normal.      Pupils: Pupils are equal, round, and reactive to light.   Neck:      Trachea: Trachea and phonation normal.      Comments: C-spine cleared per Nexus criteria.  Cardiovascular:      Rate and Rhythm: Normal rate and regular rhythm.      Heart sounds: Normal heart sounds. No murmur heard.   No friction rub. No gallop.    Pulmonary:      Effort: Pulmonary effort is normal.      Breath sounds: Normal breath sounds.   Abdominal:      General: Bowel sounds are normal.      Palpations: Abdomen is soft.   Musculoskeletal:         General: Normal range of motion.      Cervical back: Full passive range of motion without pain, normal range of motion and neck supple. No rigidity. No pain with movement, spinous process tenderness or muscular tenderness.      Right lower leg: No edema.      Left lower leg: No edema.   Skin:     General: Skin is warm and dry.      Capillary Refill: Capillary refill takes less than 2 seconds.      Findings: No erythema.   Neurological:      General: No focal deficit present.      Mental Status: She is alert and oriented to person, place, and time.   Psychiatric:         Mood and Affect: Mood normal.         Behavior: Behavior normal.         Procedures           ED Course  ED Course as of Sep 21 0242   Tue Sep 21, 2021   0209 As I went in to remove the patient staples, daughter was adamant that the staples should not be removed yet, she reports they have an appointment on Monday to have these removed.  I explained that there is some crusting over the  "staples, and the wound appears to be healing well, she continued to be adamant upon staples not being removed and she will follow-up with primary care with her mother.    [LB]      ED Course User Index  [LB] Sirena Melendrez, APRN      /69   Pulse 74   Resp 16   Ht 162.6 cm (64\")   Wt 68 kg (150 lb)   SpO2 94%   BMI 25.75 kg/m²   Labs Reviewed - No data to display  Medications - No data to display  No radiology results for the last day                                       MDM  Patient 86-year-old female presents emergency department after a fall.  Patient reports she fell getting into bed.  She is awake the entire time.  Denies any syncope, dizziness or lightheadedness.  C-spine was cleared per Nexus criteria.  Patient also had another recent fall, she has bruising noted to her face, staples in place in her scalp.  Patient's letter is the bedside, and they are following up with her primary care provider to have staples removed, she was adamant upon not removing these today.  Patient CT head was negative.    I spoke with the patient at the bedside regarding their plan of care, discharge instruction, home care, prescriptions, and importance follow-up.  We discussed test results at the bedside, including incidental abnormal labs, radiological findings, understands need for follow-up with primary care or specialist if indicated.      Patient was made aware of indications to return to the emergency department.  Patient agrees with the current plan of care for discharge, verbalized understanding of all instructions    Pt is aware that discharge does not mean that nothing is wrong but it indicates no emergency is present and they must continue care with follow-up as given below or physician of their choice  Final diagnoses:   Fall, initial encounter   Hematoma of scalp, initial encounter   Injury of head, initial encounter       ED Disposition  ED Disposition     ED Disposition Condition Comment    Discharge " Eliza Jewell, APRN  [address] [facility] [place] [id number] [phone]    Schedule an appointment as soon as possible for a visit       [facility] EMERGENCY DEPARTMENT  [address] [place] [id number] [phone]    As needed, If symptoms worsen         Medication List      No changes were made to your prescriptions during this visit.          Sirena Melendrez, APRN  09/21/21 0243

## 2021-09-21 NOTE — DISCHARGE INSTRUCTIONS
Please follow-up with your primary care provider, call tomorrow for an appointment, if you do not have a primary care provider, please use the patient connection above to us to help establish care, please call as soon as possible.    Follow-up with your doctor as scheduled for staple removal    Return the emergency department for new or worsening symptoms: Change in mental status, severe headache, vision changes or any further concerns    Take medications as prescribed, any changes will be noted on your discharge instruction    Ice to area 3-4 times per day, 15-20 minutes at a time, do not use while sleeping or for extended periods of time.

## 2021-11-24 NOTE — TELEPHONE ENCOUNTER
[facility] pharmacy called to clarify a prescription request that was sent in. It was sent in as 25mg but daughter states it should be 20mg.  After looking into it, sildenafil (Viagra) 25mg was called in and it should have been sildenafil (Revatio) 20mg. She accepted the correction over the phone but it will need to be corrected in the chart.

## 2021-11-26 NOTE — TELEPHONE ENCOUNTER
S/W daughter and let her know that the refill request stated that it was the viagra 25mg. I updated med list in epic.

## 2022-01-01 ENCOUNTER — APPOINTMENT (OUTPATIENT)
Dept: GENERAL RADIOLOGY | Facility: HOSPITAL | Age: 87
End: 2022-01-01

## 2022-01-01 ENCOUNTER — APPOINTMENT (OUTPATIENT)
Dept: CT IMAGING | Facility: HOSPITAL | Age: 87
End: 2022-01-01

## 2022-01-01 ENCOUNTER — APPOINTMENT (OUTPATIENT)
Dept: ULTRASOUND IMAGING | Facility: HOSPITAL | Age: 87
End: 2022-01-01

## 2022-01-01 ENCOUNTER — OFFICE VISIT (OUTPATIENT)
Dept: PULMONOLOGY | Facility: HOSPITAL | Age: 87
End: 2022-01-01

## 2022-01-01 ENCOUNTER — HOSPITAL ENCOUNTER (OUTPATIENT)
Facility: HOSPITAL | Age: 87
Setting detail: OBSERVATION
LOS: 1 days | Discharge: HOSPICE | End: 2022-03-14
Attending: EMERGENCY MEDICINE | Admitting: FAMILY MEDICINE

## 2022-01-01 ENCOUNTER — HOSPITAL ENCOUNTER (EMERGENCY)
Facility: HOSPITAL | Age: 87
Discharge: HOME | End: 2022-04-02
Attending: EMERGENCY MEDICINE | Admitting: EMERGENCY MEDICINE

## 2022-01-01 ENCOUNTER — APPOINTMENT (OUTPATIENT)
Dept: CARDIOLOGY | Facility: HOSPITAL | Age: 87
End: 2022-01-01

## 2022-01-01 ENCOUNTER — ON CAMPUS - OUTPATIENT (OUTPATIENT)
Dept: URBAN - METROPOLITAN AREA HOSPITAL 85 | Facility: HOSPITAL | Age: 87
End: 2022-01-01

## 2022-01-01 VITALS
BODY MASS INDEX: 28.53 KG/M2 | HEART RATE: 84 BPM | RESPIRATION RATE: 20 BRPM | WEIGHT: 167.11 LBS | TEMPERATURE: 97.6 F | HEIGHT: 64 IN | OXYGEN SATURATION: 98 % | DIASTOLIC BLOOD PRESSURE: 64 MMHG | SYSTOLIC BLOOD PRESSURE: 99 MMHG

## 2022-01-01 VITALS
HEART RATE: 83 BPM | HEIGHT: 64 IN | SYSTOLIC BLOOD PRESSURE: 105 MMHG | OXYGEN SATURATION: 91 % | BODY MASS INDEX: 25.78 KG/M2 | WEIGHT: 151 LBS | RESPIRATION RATE: 10 BRPM | DIASTOLIC BLOOD PRESSURE: 69 MMHG

## 2022-01-01 VITALS
TEMPERATURE: 97.7 F | HEART RATE: 88 BPM | BODY MASS INDEX: 26.66 KG/M2 | SYSTOLIC BLOOD PRESSURE: 100 MMHG | DIASTOLIC BLOOD PRESSURE: 58 MMHG | HEIGHT: 65 IN | RESPIRATION RATE: 16 BRPM | OXYGEN SATURATION: 97 % | WEIGHT: 160 LBS

## 2022-01-01 DIAGNOSIS — I48.20 ATRIAL FIBRILLATION, CHRONIC: ICD-10-CM

## 2022-01-01 DIAGNOSIS — R17 UNSPECIFIED JAUNDICE: ICD-10-CM

## 2022-01-01 DIAGNOSIS — S00.83XA CONTUSION OF FACE, INITIAL ENCOUNTER: ICD-10-CM

## 2022-01-01 DIAGNOSIS — R06.00 DYSPNEA, UNSPECIFIED: ICD-10-CM

## 2022-01-01 DIAGNOSIS — R10.11 RIGHT UPPER QUADRANT ABDOMINAL PAIN: Primary | ICD-10-CM
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
## 2022-01-01 DIAGNOSIS — J90 PLEURAL EFFUSION: ICD-10-CM

## 2022-01-01 DIAGNOSIS — D50.9 IRON DEFICIENCY ANEMIA, UNSPECIFIED: ICD-10-CM

## 2022-01-01 DIAGNOSIS — R10.84 GENERALIZED ABDOMINAL PAIN: ICD-10-CM

## 2022-01-01 DIAGNOSIS — S06.0X9A CONCUSSION WITH LOSS OF CONSCIOUSNESS, INITIAL ENCOUNTER: ICD-10-CM

## 2022-01-01 DIAGNOSIS — I27.20 PULMONARY HYPERTENSION: Primary | ICD-10-CM

## 2022-01-01 DIAGNOSIS — E86.0 DEHYDRATION: Primary | ICD-10-CM

## 2022-01-01 DIAGNOSIS — R63.0 ANOREXIA: ICD-10-CM

## 2022-01-01 DIAGNOSIS — K59.00 CONSTIPATION, UNSPECIFIED: ICD-10-CM

## 2022-01-01 LAB
ALBUMIN SERPL-MCNC: 3.2 G/DL (ref 3.5–5.2)
ALBUMIN SERPL-MCNC: 3.4 G/DL (ref 3.5–5.2)
ALBUMIN SERPL-MCNC: 3.5 G/DL (ref 3.5–5.2)
ALBUMIN SERPL-MCNC: 3.8 G/DL (ref 3.5–5.2)
ALBUMIN/GLOB SERPL: 1 G/DL
ALBUMIN/GLOB SERPL: 1 G/DL
ALBUMIN/GLOB SERPL: 1.1 G/DL
ALBUMIN/GLOB SERPL: 1.1 G/DL
ALP SERPL-CCNC: 103 U/L (ref 39–117)
ALP SERPL-CCNC: 114 U/L (ref 39–117)
ALP SERPL-CCNC: 116 U/L (ref 39–117)
ALP SERPL-CCNC: 132 U/L (ref 39–117)
ALT SERPL W P-5'-P-CCNC: 7 U/L (ref 1–33)
ALT SERPL W P-5'-P-CCNC: 9 U/L (ref 1–33)
AMPHET+METHAMPHET UR QL: NEGATIVE
ANION GAP SERPL CALCULATED.3IONS-SCNC: 12 MMOL/L (ref 5–15)
ANION GAP SERPL CALCULATED.3IONS-SCNC: 12 MMOL/L (ref 5–15)
ANION GAP SERPL CALCULATED.3IONS-SCNC: 13 MMOL/L (ref 5–15)
ANION GAP SERPL CALCULATED.3IONS-SCNC: 14 MMOL/L (ref 5–15)
AST SERPL-CCNC: 14 U/L (ref 1–32)
AST SERPL-CCNC: 15 U/L (ref 1–32)
AST SERPL-CCNC: 16 U/L (ref 1–32)
AST SERPL-CCNC: 18 U/L (ref 1–32)
BACTERIA SPEC AEROBE CULT: NORMAL
BACTERIA SPEC AEROBE CULT: NORMAL
BACTERIA UR QL AUTO: ABNORMAL /HPF
BARBITURATES UR QL SCN: NEGATIVE
BASOPHILS # BLD AUTO: 0 10*3/MM3 (ref 0–0.2)
BASOPHILS # BLD AUTO: 0 10*3/MM3 (ref 0–0.2)
BASOPHILS # BLD AUTO: 0.1 10*3/MM3 (ref 0–0.2)
BASOPHILS NFR BLD AUTO: 0.4 % (ref 0–1.5)
BASOPHILS NFR BLD AUTO: 0.6 % (ref 0–1.5)
BASOPHILS NFR BLD AUTO: 1.9 % (ref 0–1.5)
BENZODIAZ UR QL SCN: NEGATIVE
BH CV ECHO MEAS - AO MAX PG: 56.6 MMHG
BH CV ECHO MEAS - AO MEAN PG: 29.9 MMHG
BH CV ECHO MEAS - AO ROOT DIAM: 2.6 CM
BH CV ECHO MEAS - AO V2 MAX: 375.4 CM/SEC
BH CV ECHO MEAS - AO V2 VTI: 57.5 CM
BH CV ECHO MEAS - AVA(I,D): 0.5 CM2
BH CV ECHO MEAS - EDV(CUBED): 27.1 ML
BH CV ECHO MEAS - EDV(MOD-SP4): 27.2 ML
BH CV ECHO MEAS - EF(MOD-BP): 78 %
BH CV ECHO MEAS - EF(MOD-SP4): 78.4 %
BH CV ECHO MEAS - ESV(CUBED): 8.6 ML
BH CV ECHO MEAS - ESV(MOD-SP4): 5.9 ML
BH CV ECHO MEAS - FS: 31.7 %
BH CV ECHO MEAS - IVS/LVPW: 1.12 CM
BH CV ECHO MEAS - IVSD: 1.53 CM
BH CV ECHO MEAS - LA DIMENSION(2D): 4.6 CM
BH CV ECHO MEAS - LV DIASTOLIC VOL/BSA (35-75): 14.9 CM2
BH CV ECHO MEAS - LV MASS(C)D: 148.8 GRAMS
BH CV ECHO MEAS - LV MAX PG: 1.88 MMHG
BH CV ECHO MEAS - LV MEAN PG: 1.21 MMHG
BH CV ECHO MEAS - LV SYSTOLIC VOL/BSA (12-30): 3.2 CM2
BH CV ECHO MEAS - LV V1 MAX: 68.6 CM/SEC
BH CV ECHO MEAS - LV V1 VTI: 15.5 CM
BH CV ECHO MEAS - LVIDD: 3 CM
BH CV ECHO MEAS - LVIDS: 2.05 CM
BH CV ECHO MEAS - LVOT AREA: 1.85 CM2
BH CV ECHO MEAS - LVOT DIAM: 1.53 CM
BH CV ECHO MEAS - LVPWD: 1.36 CM
BH CV ECHO MEAS - MR MAX PG: 136.1 MMHG
BH CV ECHO MEAS - MR MAX VEL: 582.2 CM/SEC
BH CV ECHO MEAS - MV DEC TIME: 0.2 MSEC
BH CV ECHO MEAS - MV E MAX VEL: 207.6 CM/SEC
BH CV ECHO MEAS - MV MAX PG: 26.3 MMHG
BH CV ECHO MEAS - MV MEAN PG: 8.8 MMHG
BH CV ECHO MEAS - MV V2 VTI: 50.7 CM
BH CV ECHO MEAS - MVA(VTI): 0.57 CM2
BH CV ECHO MEAS - PA V2 MAX: 188 CM/SEC
BH CV ECHO MEAS - RAP SYSTOLE: 15 MMHG
BH CV ECHO MEAS - RV MAX PG: 0.96 MMHG
BH CV ECHO MEAS - RV V1 MAX: 48.9 CM/SEC
BH CV ECHO MEAS - RV V1 VTI: 7.7 CM
BH CV ECHO MEAS - RVDD: 3.2 CM
BH CV ECHO MEAS - RVSP: 74.1 MMHG
BH CV ECHO MEAS - SI(MOD-SP4): 11.7 ML/M2
BH CV ECHO MEAS - SV(LVOT): 28.7 ML
BH CV ECHO MEAS - SV(MOD-SP4): 21.3 ML
BH CV ECHO MEAS - TR MAX PG: 59.1 MMHG
BH CV ECHO MEAS - TR MAX VEL: 381.7 CM/SEC
BH CV LOW VAS LEFT COMMON FEMORAL SPONT: 1
BH CV LOW VAS RIGHT COMMON FEMORAL SPONT: 1
BH CV LOWER VASCULAR LEFT COMMON FEMORAL AUGMENT: NORMAL
BH CV LOWER VASCULAR LEFT COMMON FEMORAL COMPETENT: NORMAL
BH CV LOWER VASCULAR LEFT COMMON FEMORAL COMPRESS: NORMAL
BH CV LOWER VASCULAR LEFT COMMON FEMORAL PHASIC: NORMAL
BH CV LOWER VASCULAR LEFT COMMON FEMORAL SPONT: NORMAL
BH CV LOWER VASCULAR LEFT DISTAL FEMORAL COMPRESS: NORMAL
BH CV LOWER VASCULAR LEFT GASTRONEMIUS COMPRESS: NORMAL
BH CV LOWER VASCULAR LEFT GREATER SAPH AK COMPRESS: NORMAL
BH CV LOWER VASCULAR LEFT GREATER SAPH BK COMPRESS: NORMAL
BH CV LOWER VASCULAR LEFT LESSER SAPH COMPRESS: NORMAL
BH CV LOWER VASCULAR LEFT MID FEMORAL AUGMENT: NORMAL
BH CV LOWER VASCULAR LEFT MID FEMORAL COMPETENT: NORMAL
BH CV LOWER VASCULAR LEFT MID FEMORAL COMPRESS: NORMAL
BH CV LOWER VASCULAR LEFT MID FEMORAL PHASIC: NORMAL
BH CV LOWER VASCULAR LEFT MID FEMORAL SPONT: NORMAL
BH CV LOWER VASCULAR LEFT PERONEAL COMPRESS: NORMAL
BH CV LOWER VASCULAR LEFT POPLITEAL AUGMENT: NORMAL
BH CV LOWER VASCULAR LEFT POPLITEAL COMPETENT: NORMAL
BH CV LOWER VASCULAR LEFT POPLITEAL COMPRESS: NORMAL
BH CV LOWER VASCULAR LEFT POPLITEAL PHASIC: NORMAL
BH CV LOWER VASCULAR LEFT POPLITEAL SPONT: NORMAL
BH CV LOWER VASCULAR LEFT POSTERIOR TIBIAL COMPRESS: NORMAL
BH CV LOWER VASCULAR LEFT PROXIMAL FEMORAL COMPRESS: NORMAL
BH CV LOWER VASCULAR LEFT SAPHENOFEMORAL JUNCTION COMPRESS: NORMAL
BH CV LOWER VASCULAR RIGHT COMMON FEMORAL AUGMENT: NORMAL
BH CV LOWER VASCULAR RIGHT COMMON FEMORAL COMPETENT: NORMAL
BH CV LOWER VASCULAR RIGHT COMMON FEMORAL COMPRESS: NORMAL
BH CV LOWER VASCULAR RIGHT COMMON FEMORAL PHASIC: NORMAL
BH CV LOWER VASCULAR RIGHT COMMON FEMORAL SPONT: NORMAL
BH CV LOWER VASCULAR RIGHT DISTAL FEMORAL COMPRESS: NORMAL
BH CV LOWER VASCULAR RIGHT GASTRONEMIUS COMPRESS: NORMAL
BH CV LOWER VASCULAR RIGHT GREATER SAPH AK COMPRESS: NORMAL
BH CV LOWER VASCULAR RIGHT GREATER SAPH BK COMPRESS: NORMAL
BH CV LOWER VASCULAR RIGHT LESSER SAPH COMPRESS: NORMAL
BH CV LOWER VASCULAR RIGHT MID FEMORAL AUGMENT: NORMAL
BH CV LOWER VASCULAR RIGHT MID FEMORAL COMPETENT: NORMAL
BH CV LOWER VASCULAR RIGHT MID FEMORAL COMPRESS: NORMAL
BH CV LOWER VASCULAR RIGHT MID FEMORAL PHASIC: NORMAL
BH CV LOWER VASCULAR RIGHT MID FEMORAL SPONT: NORMAL
BH CV LOWER VASCULAR RIGHT PERONEAL COMPRESS: NORMAL
BH CV LOWER VASCULAR RIGHT POPLITEAL AUGMENT: NORMAL
BH CV LOWER VASCULAR RIGHT POPLITEAL COMPETENT: NORMAL
BH CV LOWER VASCULAR RIGHT POPLITEAL COMPRESS: NORMAL
BH CV LOWER VASCULAR RIGHT POPLITEAL PHASIC: NORMAL
BH CV LOWER VASCULAR RIGHT POPLITEAL SPONT: NORMAL
BH CV LOWER VASCULAR RIGHT POSTERIOR TIBIAL COMPRESS: NORMAL
BH CV LOWER VASCULAR RIGHT PROXIMAL FEMORAL COMPRESS: NORMAL
BH CV LOWER VASCULAR RIGHT SAPHENOFEMORAL JUNCTION COMPRESS: NORMAL
BH CV POP FLUID COLLECT LEFT: 1
BILIRUB CONJ SERPL-MCNC: 0.9 MG/DL (ref 0–0.3)
BILIRUB INDIRECT SERPL-MCNC: 1.1 MG/DL
BILIRUB SERPL-MCNC: 2 MG/DL (ref 0–1.2)
BILIRUB SERPL-MCNC: 2.1 MG/DL (ref 0–1.2)
BILIRUB SERPL-MCNC: 2.4 MG/DL (ref 0–1.2)
BILIRUB UR QL STRIP: NEGATIVE
BUN SERPL-MCNC: 29 MG/DL (ref 8–23)
BUN SERPL-MCNC: 31 MG/DL (ref 8–23)
BUN SERPL-MCNC: 32 MG/DL (ref 8–23)
BUN SERPL-MCNC: 50 MG/DL (ref 8–23)
BUN/CREAT SERPL: 13.9 (ref 7–25)
BUN/CREAT SERPL: 14.6 (ref 7–25)
BUN/CREAT SERPL: 14.8 (ref 7–25)
BUN/CREAT SERPL: 20.3 (ref 7–25)
CA-I SERPL ISE-MCNC: 1.15 MMOL/L (ref 1.2–1.3)
CALCIUM SPEC-SCNC: 10.8 MG/DL (ref 8.6–10.5)
CALCIUM SPEC-SCNC: 8.9 MG/DL (ref 8.6–10.5)
CALCIUM SPEC-SCNC: 9 MG/DL (ref 8.6–10.5)
CALCIUM SPEC-SCNC: 9.1 MG/DL (ref 8.6–10.5)
CANNABINOIDS SERPL QL: NEGATIVE
CHLORIDE SERPL-SCNC: 92 MMOL/L (ref 98–107)
CHLORIDE SERPL-SCNC: 97 MMOL/L (ref 98–107)
CHLORIDE SERPL-SCNC: 98 MMOL/L (ref 98–107)
CHLORIDE SERPL-SCNC: 98 MMOL/L (ref 98–107)
CK SERPL-CCNC: 41 U/L (ref 20–180)
CLARITY UR: CLEAR
CO2 SERPL-SCNC: 23 MMOL/L (ref 22–29)
CO2 SERPL-SCNC: 25 MMOL/L (ref 22–29)
CO2 SERPL-SCNC: 27 MMOL/L (ref 22–29)
CO2 SERPL-SCNC: 30 MMOL/L (ref 22–29)
COCAINE UR QL: NEGATIVE
COLOR UR: YELLOW
CREAT SERPL-MCNC: 2.08 MG/DL (ref 0.57–1)
CREAT SERPL-MCNC: 2.13 MG/DL (ref 0.57–1)
CREAT SERPL-MCNC: 2.16 MG/DL (ref 0.57–1)
CREAT SERPL-MCNC: 2.46 MG/DL (ref 0.57–1)
D-LACTATE SERPL-SCNC: 1.1 MMOL/L (ref 0.5–2)
D-LACTATE SERPL-SCNC: 1.4 MMOL/L (ref 0.5–2)
D-LACTATE SERPL-SCNC: 1.6 MMOL/L (ref 0.5–2)
D-LACTATE SERPL-SCNC: 1.7 MMOL/L (ref 0.5–2)
D-LACTATE SERPL-SCNC: 2.3 MMOL/L (ref 0.5–2)
DEPRECATED RDW RBC AUTO: 51.2 FL (ref 37–54)
DEPRECATED RDW RBC AUTO: 52.1 FL (ref 37–54)
DEPRECATED RDW RBC AUTO: 52.9 FL (ref 37–54)
DEPRECATED RDW RBC AUTO: 66.5 FL (ref 37–54)
EGFRCR SERPLBLD CKD-EPI 2021: 18.8 ML/MIN/1.73
EGFRCR SERPLBLD CKD-EPI 2021: 22 ML/MIN/1.73
EGFRCR SERPLBLD CKD-EPI 2021: 22.3 ML/MIN/1.73
EGFRCR SERPLBLD CKD-EPI 2021: 23 ML/MIN/1.73
EOSINOPHIL # BLD AUTO: 0 10*3/MM3 (ref 0–0.4)
EOSINOPHIL # BLD AUTO: 0.1 10*3/MM3 (ref 0–0.4)
EOSINOPHIL # BLD AUTO: 0.1 10*3/MM3 (ref 0–0.4)
EOSINOPHIL NFR BLD AUTO: 0.3 % (ref 0.3–6.2)
EOSINOPHIL NFR BLD AUTO: 1.6 % (ref 0.3–6.2)
EOSINOPHIL NFR BLD AUTO: 1.9 % (ref 0.3–6.2)
ERYTHROCYTE [DISTWIDTH] IN BLOOD BY AUTOMATED COUNT: 19.2 % (ref 12.3–15.4)
ERYTHROCYTE [DISTWIDTH] IN BLOOD BY AUTOMATED COUNT: 19.2 % (ref 12.3–15.4)
ERYTHROCYTE [DISTWIDTH] IN BLOOD BY AUTOMATED COUNT: 19.5 % (ref 12.3–15.4)
ERYTHROCYTE [DISTWIDTH] IN BLOOD BY AUTOMATED COUNT: 24.7 % (ref 12.3–15.4)
FERRITIN SERPL-MCNC: 47.18 NG/ML (ref 13–150)
GLOBULIN UR ELPH-MCNC: 3 GM/DL
GLOBULIN UR ELPH-MCNC: 3.2 GM/DL
GLOBULIN UR ELPH-MCNC: 3.3 GM/DL
GLOBULIN UR ELPH-MCNC: 3.7 GM/DL
GLUCOSE SERPL-MCNC: 103 MG/DL (ref 65–99)
GLUCOSE SERPL-MCNC: 110 MG/DL (ref 65–99)
GLUCOSE SERPL-MCNC: 136 MG/DL (ref 65–99)
GLUCOSE SERPL-MCNC: 96 MG/DL (ref 65–99)
GLUCOSE UR STRIP-MCNC: NEGATIVE MG/DL
HAV IGM SERPL QL IA: NORMAL
HBV CORE IGM SERPL QL IA: NORMAL
HBV SURFACE AG SERPL QL IA: NORMAL
HCT VFR BLD AUTO: 27.4 % (ref 34–46.6)
HCT VFR BLD AUTO: 28.4 % (ref 34–46.6)
HCT VFR BLD AUTO: 29.4 % (ref 34–46.6)
HCT VFR BLD AUTO: 30.9 % (ref 34–46.6)
HCV AB SER DONR QL: NORMAL
HGB BLD-MCNC: 10.2 G/DL (ref 12–15.9)
HGB BLD-MCNC: 8.9 G/DL (ref 12–15.9)
HGB BLD-MCNC: 9.3 G/DL (ref 12–15.9)
HGB BLD-MCNC: 9.5 G/DL (ref 12–15.9)
HGB UR QL STRIP.AUTO: NEGATIVE
HYALINE CASTS UR QL AUTO: ABNORMAL /LPF
INR PPP: 1.68 (ref 0.93–1.1)
IRON 24H UR-MRATE: 27 MCG/DL (ref 37–145)
IRON SATN MFR SERPL: 6 % (ref 20–50)
KETONES UR QL STRIP: NEGATIVE
LEUKOCYTE ESTERASE UR QL STRIP.AUTO: ABNORMAL
LEUKOCYTE ESTERASE UR QL STRIP.AUTO: NEGATIVE
LEUKOCYTE ESTERASE UR QL STRIP.AUTO: NEGATIVE
LIPASE SERPL-CCNC: 30 U/L (ref 13–60)
LYMPHOCYTES # BLD AUTO: 0.7 10*3/MM3 (ref 0.7–3.1)
LYMPHOCYTES # BLD AUTO: 0.8 10*3/MM3 (ref 0.7–3.1)
LYMPHOCYTES # BLD AUTO: 0.9 10*3/MM3 (ref 0.7–3.1)
LYMPHOCYTES NFR BLD AUTO: 10.3 % (ref 19.6–45.3)
LYMPHOCYTES NFR BLD AUTO: 12.2 % (ref 19.6–45.3)
LYMPHOCYTES NFR BLD AUTO: 13.5 % (ref 19.6–45.3)
MAGNESIUM SERPL-MCNC: 1.8 MG/DL (ref 1.6–2.4)
MAGNESIUM SERPL-MCNC: 1.9 MG/DL (ref 1.6–2.4)
MAGNESIUM SERPL-MCNC: 1.9 MG/DL (ref 1.6–2.4)
MAXIMAL PREDICTED HEART RATE: 135 BPM
MAXIMAL PREDICTED HEART RATE: 135 BPM
MCH RBC QN AUTO: 24.9 PG (ref 26.6–33)
MCH RBC QN AUTO: 25 PG (ref 26.6–33)
MCH RBC QN AUTO: 25.4 PG (ref 26.6–33)
MCH RBC QN AUTO: 25.8 PG (ref 26.6–33)
MCHC RBC AUTO-ENTMCNC: 32.3 G/DL (ref 31.5–35.7)
MCHC RBC AUTO-ENTMCNC: 32.5 G/DL (ref 31.5–35.7)
MCHC RBC AUTO-ENTMCNC: 32.8 G/DL (ref 31.5–35.7)
MCHC RBC AUTO-ENTMCNC: 32.8 G/DL (ref 31.5–35.7)
MCV RBC AUTO: 76.6 FL (ref 79–97)
MCV RBC AUTO: 77.4 FL (ref 79–97)
MCV RBC AUTO: 77.4 FL (ref 79–97)
MCV RBC AUTO: 78.4 FL (ref 79–97)
METHADONE UR QL SCN: NEGATIVE
MONOCYTES # BLD AUTO: 0.7 10*3/MM3 (ref 0.1–0.9)
MONOCYTES # BLD AUTO: 0.8 10*3/MM3 (ref 0.1–0.9)
MONOCYTES # BLD AUTO: 0.8 10*3/MM3 (ref 0.1–0.9)
MONOCYTES NFR BLD AUTO: 10.5 % (ref 5–12)
MONOCYTES NFR BLD AUTO: 11.3 % (ref 5–12)
MONOCYTES NFR BLD AUTO: 12.2 % (ref 5–12)
NEUTROPHILS NFR BLD AUTO: 4.7 10*3/MM3 (ref 1.7–7)
NEUTROPHILS NFR BLD AUTO: 5.1 10*3/MM3 (ref 1.7–7)
NEUTROPHILS NFR BLD AUTO: 5.2 10*3/MM3 (ref 1.7–7)
NEUTROPHILS NFR BLD AUTO: 73.1 % (ref 42.7–76)
NEUTROPHILS NFR BLD AUTO: 73.2 % (ref 42.7–76)
NEUTROPHILS NFR BLD AUTO: 77 % (ref 42.7–76)
NITRITE UR QL STRIP: NEGATIVE
NRBC BLD AUTO-RTO: 0 /100 WBC (ref 0–0.2)
NRBC BLD AUTO-RTO: 0.1 /100 WBC (ref 0–0.2)
NRBC BLD AUTO-RTO: 0.1 /100 WBC (ref 0–0.2)
NT-PROBNP SERPL-MCNC: 4738 PG/ML (ref 0–1800)
OPIATES UR QL: NEGATIVE
OXYCODONE UR QL SCN: NEGATIVE
PH UR STRIP.AUTO: 5.5 [PH] (ref 5–8)
PH UR STRIP.AUTO: 6 [PH] (ref 5–8)
PH UR STRIP.AUTO: 6 [PH] (ref 5–8)
PHOSPHATE SERPL-MCNC: 2.6 MG/DL (ref 2.5–4.5)
PHOSPHATE SERPL-MCNC: 3.1 MG/DL (ref 2.5–4.5)
PLATELET # BLD AUTO: 132 10*3/MM3 (ref 140–450)
PLATELET # BLD AUTO: 134 10*3/MM3 (ref 140–450)
PLATELET # BLD AUTO: 139 10*3/MM3 (ref 140–450)
PLATELET # BLD AUTO: 146 10*3/MM3 (ref 140–450)
PMV BLD AUTO: 7.4 FL (ref 6–12)
PMV BLD AUTO: 8 FL (ref 6–12)
PMV BLD AUTO: 8.1 FL (ref 6–12)
PMV BLD AUTO: 8.7 FL (ref 6–12)
POTASSIUM SERPL-SCNC: 2.8 MMOL/L (ref 3.5–5.2)
POTASSIUM SERPL-SCNC: 2.9 MMOL/L (ref 3.5–5.2)
POTASSIUM SERPL-SCNC: 3 MMOL/L (ref 3.5–5.2)
POTASSIUM SERPL-SCNC: 3.2 MMOL/L (ref 3.5–5.2)
POTASSIUM SERPL-SCNC: 3.8 MMOL/L (ref 3.5–5.2)
PROCALCITONIN SERPL-MCNC: 0.11 NG/ML (ref 0–0.25)
PROT SERPL-MCNC: 6.4 G/DL (ref 6–8.5)
PROT SERPL-MCNC: 6.5 G/DL (ref 6–8.5)
PROT SERPL-MCNC: 6.7 G/DL (ref 6–8.5)
PROT SERPL-MCNC: 7.5 G/DL (ref 6–8.5)
PROT UR QL STRIP: ABNORMAL
PROT UR QL STRIP: NEGATIVE
PROT UR QL STRIP: NEGATIVE
PROTHROMBIN TIME: 17.9 SECONDS (ref 9.6–11.7)
QT INTERVAL: 412 MS
RBC # BLD AUTO: 3.58 10*6/MM3 (ref 3.77–5.28)
RBC # BLD AUTO: 3.67 10*6/MM3 (ref 3.77–5.28)
RBC # BLD AUTO: 3.8 10*6/MM3 (ref 3.77–5.28)
RBC # BLD AUTO: 3.94 10*6/MM3 (ref 3.77–5.28)
RBC # UR STRIP: ABNORMAL /HPF
REF LAB TEST METHOD: ABNORMAL
SARS-COV-2 RNA PNL SPEC NAA+PROBE: NOT DETECTED
SODIUM SERPL-SCNC: 134 MMOL/L (ref 136–145)
SODIUM SERPL-SCNC: 135 MMOL/L (ref 136–145)
SODIUM SERPL-SCNC: 135 MMOL/L (ref 136–145)
SODIUM SERPL-SCNC: 137 MMOL/L (ref 136–145)
SP GR UR STRIP: 1.01 (ref 1–1.03)
SQUAMOUS #/AREA URNS HPF: ABNORMAL /HPF
STRESS TARGET HR: 115 BPM
STRESS TARGET HR: 115 BPM
TIBC SERPL-MCNC: 444 MCG/DL (ref 298–536)
TRANSFERRIN SERPL-MCNC: 298 MG/DL (ref 200–360)
TROPONIN T SERPL-MCNC: 0.01 NG/ML (ref 0–0.03)
TROPONIN T SERPL-MCNC: 0.01 NG/ML (ref 0–0.03)
TROPONIN T SERPL-MCNC: <0.01 NG/ML (ref 0–0.03)
TSH SERPL DL<=0.05 MIU/L-ACNC: 5.45 UIU/ML (ref 0.27–4.2)
URATE SERPL-MCNC: 5.1 MG/DL (ref 2.4–5.7)
UROBILINOGEN UR QL STRIP: ABNORMAL
UROBILINOGEN UR QL STRIP: NORMAL
UROBILINOGEN UR QL STRIP: NORMAL
WBC # UR STRIP: ABNORMAL /HPF
WBC NRBC COR # BLD: 6.4 10*3/MM3 (ref 3.4–10.8)
WBC NRBC COR # BLD: 6.4 10*3/MM3 (ref 3.4–10.8)
WBC NRBC COR # BLD: 6.7 10*3/MM3 (ref 3.4–10.8)
WBC NRBC COR # BLD: 6.9 10*3/MM3 (ref 3.4–10.8)

## 2022-01-01 PROCEDURE — 83735 ASSAY OF MAGNESIUM: CPT | Performed by: EMERGENCY MEDICINE

## 2022-01-01 PROCEDURE — 84145 PROCALCITONIN (PCT): CPT | Performed by: NURSE PRACTITIONER

## 2022-01-01 PROCEDURE — 76942 ECHO GUIDE FOR BIOPSY: CPT

## 2022-01-01 PROCEDURE — 80053 COMPREHEN METABOLIC PANEL: CPT | Performed by: NURSE PRACTITIONER

## 2022-01-01 PROCEDURE — 0 POTASSIUM CHLORIDE 10 MEQ/100ML SOLUTION: Performed by: NURSE PRACTITIONER

## 2022-01-01 PROCEDURE — G0378 HOSPITAL OBSERVATION PER HR: HCPCS

## 2022-01-01 PROCEDURE — 93005 ELECTROCARDIOGRAM TRACING: CPT | Performed by: EMERGENCY MEDICINE

## 2022-01-01 PROCEDURE — 83735 ASSAY OF MAGNESIUM: CPT | Performed by: INTERNAL MEDICINE

## 2022-01-01 PROCEDURE — 99285 EMERGENCY DEPT VISIT HI MDM: CPT | Performed by: STUDENT IN AN ORGANIZED HEALTH CARE EDUCATION/TRAINING PROGRAM

## 2022-01-01 PROCEDURE — 93306 TTE W/DOPPLER COMPLETE: CPT

## 2022-01-01 PROCEDURE — 25010000002 NA FERRIC GLUC CPLX PER 12.5 MG: Performed by: NURSE PRACTITIONER

## 2022-01-01 PROCEDURE — 82248 BILIRUBIN DIRECT: CPT | Performed by: NURSE PRACTITIONER

## 2022-01-01 PROCEDURE — 85027 COMPLETE CBC AUTOMATED: CPT | Performed by: INTERNAL MEDICINE

## 2022-01-01 PROCEDURE — 96376 TX/PRO/DX INJ SAME DRUG ADON: CPT

## 2022-01-01 PROCEDURE — 49083 ABD PARACENTESIS W/IMAGING: CPT | Performed by: FAMILY MEDICINE

## 2022-01-01 PROCEDURE — 81001 URINALYSIS AUTO W/SCOPE: CPT | Performed by: EMERGENCY MEDICINE

## 2022-01-01 PROCEDURE — 36415 COLL VENOUS BLD VENIPUNCTURE: CPT | Performed by: NURSE PRACTITIONER

## 2022-01-01 PROCEDURE — 99219 PR INITIAL OBSERVATION CARE/DAY 50 MINUTES: CPT | Performed by: NURSE PRACTITIONER

## 2022-01-01 PROCEDURE — 99283 EMERGENCY DEPT VISIT LOW MDM: CPT

## 2022-01-01 PROCEDURE — 25010000002 EPOETIN ALFA-EPBX 10000 UNIT/ML SOLUTION: Performed by: INTERNAL MEDICINE

## 2022-01-01 PROCEDURE — 82728 ASSAY OF FERRITIN: CPT | Performed by: NURSE PRACTITIONER

## 2022-01-01 PROCEDURE — 82330 ASSAY OF CALCIUM: CPT | Performed by: INTERNAL MEDICINE

## 2022-01-01 PROCEDURE — 83605 ASSAY OF LACTIC ACID: CPT

## 2022-01-01 PROCEDURE — P9612 CATHETERIZE FOR URINE SPEC: HCPCS

## 2022-01-01 PROCEDURE — 99284 EMERGENCY DEPT VISIT MOD MDM: CPT

## 2022-01-01 PROCEDURE — P9047 ALBUMIN (HUMAN), 25%, 50ML: HCPCS | Performed by: INTERNAL MEDICINE

## 2022-01-01 PROCEDURE — 81003 URINALYSIS AUTO W/O SCOPE: CPT | Performed by: NURSE PRACTITIONER

## 2022-01-01 PROCEDURE — 93306 TTE W/DOPPLER COMPLETE: CPT | Performed by: INTERNAL MEDICINE

## 2022-01-01 PROCEDURE — 96365 THER/PROPH/DIAG IV INF INIT: CPT

## 2022-01-01 PROCEDURE — 83735 ASSAY OF MAGNESIUM: CPT | Performed by: NURSE PRACTITIONER

## 2022-01-01 PROCEDURE — C9803 HOPD COVID-19 SPEC COLLECT: HCPCS

## 2022-01-01 PROCEDURE — 84443 ASSAY THYROID STIM HORMONE: CPT | Performed by: NURSE PRACTITIONER

## 2022-01-01 PROCEDURE — 74176 CT ABD & PELVIS W/O CONTRAST: CPT

## 2022-01-01 PROCEDURE — 85025 COMPLETE CBC W/AUTO DIFF WBC: CPT | Performed by: EMERGENCY MEDICINE

## 2022-01-01 PROCEDURE — 96375 TX/PRO/DX INJ NEW DRUG ADDON: CPT

## 2022-01-01 PROCEDURE — 70450 CT HEAD/BRAIN W/O DYE: CPT

## 2022-01-01 PROCEDURE — 80307 DRUG TEST PRSMV CHEM ANLYZR: CPT | Performed by: NURSE PRACTITIONER

## 2022-01-01 PROCEDURE — 25010000002 FUROSEMIDE PER 20 MG: Performed by: INTERNAL MEDICINE

## 2022-01-01 PROCEDURE — 76705 ECHO EXAM OF ABDOMEN: CPT

## 2022-01-01 PROCEDURE — 87635 SARS-COV-2 COVID-19 AMP PRB: CPT | Performed by: EMERGENCY MEDICINE

## 2022-01-01 PROCEDURE — 74018 RADEX ABDOMEN 1 VIEW: CPT

## 2022-01-01 PROCEDURE — 84132 ASSAY OF SERUM POTASSIUM: CPT | Performed by: INTERNAL MEDICINE

## 2022-01-01 PROCEDURE — 83880 ASSAY OF NATRIURETIC PEPTIDE: CPT | Performed by: NURSE PRACTITIONER

## 2022-01-01 PROCEDURE — 84484 ASSAY OF TROPONIN QUANT: CPT | Performed by: EMERGENCY MEDICINE

## 2022-01-01 PROCEDURE — 84550 ASSAY OF BLOOD/URIC ACID: CPT | Performed by: INTERNAL MEDICINE

## 2022-01-01 PROCEDURE — 93970 EXTREMITY STUDY: CPT

## 2022-01-01 PROCEDURE — 80074 ACUTE HEPATITIS PANEL: CPT | Performed by: NURSE PRACTITIONER

## 2022-01-01 PROCEDURE — 83605 ASSAY OF LACTIC ACID: CPT | Performed by: INTERNAL MEDICINE

## 2022-01-01 PROCEDURE — 85610 PROTHROMBIN TIME: CPT | Performed by: NURSE PRACTITIONER

## 2022-01-01 PROCEDURE — 25010000002 PIPERACILLIN SOD-TAZOBACTAM PER 1 G: Performed by: NURSE PRACTITIONER

## 2022-01-01 PROCEDURE — 84100 ASSAY OF PHOSPHORUS: CPT | Performed by: NURSE PRACTITIONER

## 2022-01-01 PROCEDURE — 73090 X-RAY EXAM OF FOREARM: CPT

## 2022-01-01 PROCEDURE — 85025 COMPLETE CBC W/AUTO DIFF WBC: CPT | Performed by: NURSE PRACTITIONER

## 2022-01-01 PROCEDURE — G0463 HOSPITAL OUTPT CLINIC VISIT: HCPCS

## 2022-01-01 PROCEDURE — 84466 ASSAY OF TRANSFERRIN: CPT | Performed by: NURSE PRACTITIONER

## 2022-01-01 PROCEDURE — 25010000002 CHLOROTHIAZIDE PER 500 MG: Performed by: INTERNAL MEDICINE

## 2022-01-01 PROCEDURE — 81003 URINALYSIS AUTO W/O SCOPE: CPT | Performed by: EMERGENCY MEDICINE

## 2022-01-01 PROCEDURE — 83605 ASSAY OF LACTIC ACID: CPT | Performed by: NURSE PRACTITIONER

## 2022-01-01 PROCEDURE — 82247 BILIRUBIN TOTAL: CPT | Performed by: NURSE PRACTITIONER

## 2022-01-01 PROCEDURE — 36415 COLL VENOUS BLD VENIPUNCTURE: CPT

## 2022-01-01 PROCEDURE — 25010000002 ALBUMIN HUMAN 25% PER 50 ML: Performed by: INTERNAL MEDICINE

## 2022-01-01 PROCEDURE — 99226 PR SBSQ OBSERVATION CARE/DAY 35 MINUTES: CPT | Performed by: INTERNAL MEDICINE

## 2022-01-01 PROCEDURE — 83690 ASSAY OF LIPASE: CPT | Performed by: EMERGENCY MEDICINE

## 2022-01-01 PROCEDURE — 99213 OFFICE O/P EST LOW 20 MIN: CPT | Performed by: INTERNAL MEDICINE

## 2022-01-01 PROCEDURE — 82550 ASSAY OF CK (CPK): CPT | Performed by: INTERNAL MEDICINE

## 2022-01-01 PROCEDURE — 96372 THER/PROPH/DIAG INJ SC/IM: CPT

## 2022-01-01 PROCEDURE — 99217 PR OBSERVATION CARE DISCHARGE MANAGEMENT: CPT | Performed by: INTERNAL MEDICINE

## 2022-01-01 PROCEDURE — 80053 COMPREHEN METABOLIC PANEL: CPT | Performed by: EMERGENCY MEDICINE

## 2022-01-01 PROCEDURE — 87040 BLOOD CULTURE FOR BACTERIA: CPT | Performed by: INTERNAL MEDICINE

## 2022-01-01 PROCEDURE — 99203 OFFICE O/P NEW LOW 30 MIN: CPT | Performed by: NURSE PRACTITIONER

## 2022-01-01 PROCEDURE — 84100 ASSAY OF PHOSPHORUS: CPT | Performed by: INTERNAL MEDICINE

## 2022-01-01 PROCEDURE — 99223 1ST HOSP IP/OBS HIGH 75: CPT | Performed by: INTERNAL MEDICINE

## 2022-01-01 PROCEDURE — 84484 ASSAY OF TROPONIN QUANT: CPT | Performed by: NURSE PRACTITIONER

## 2022-01-01 PROCEDURE — 83540 ASSAY OF IRON: CPT | Performed by: NURSE PRACTITIONER

## 2022-01-01 PROCEDURE — 70486 CT MAXILLOFACIAL W/O DYE: CPT

## 2022-01-01 RX ORDER — POTASSIUM CHLORIDE 20 MEQ/1
20 TABLET, EXTENDED RELEASE ORAL
Status: DISCONTINUED | OUTPATIENT
Start: 2022-01-01 | End: 2022-01-01 | Stop reason: HOSPADM

## 2022-01-01 RX ORDER — POTASSIUM CHLORIDE 20 MEQ/1
40 TABLET, EXTENDED RELEASE ORAL AS NEEDED
Status: DISCONTINUED | OUTPATIENT
Start: 2022-01-01 | End: 2022-01-01 | Stop reason: HOSPADM

## 2022-01-01 RX ORDER — ALBUMIN (HUMAN) 12.5 G/50ML
37.5 SOLUTION INTRAVENOUS ONCE
Status: DISCONTINUED | OUTPATIENT
Start: 2022-01-01 | End: 2022-01-01 | Stop reason: HOSPADM

## 2022-01-01 RX ORDER — PANTOPRAZOLE SODIUM 40 MG/1
40 TABLET, DELAYED RELEASE ORAL
Status: DISCONTINUED | OUTPATIENT
Start: 2022-01-01 | End: 2022-01-01 | Stop reason: HOSPADM

## 2022-01-01 RX ORDER — CHOLECALCIFEROL (VITAMIN D3) 125 MCG
5 CAPSULE ORAL NIGHTLY PRN
Status: DISCONTINUED | OUTPATIENT
Start: 2022-01-01 | End: 2022-01-01 | Stop reason: HOSPADM

## 2022-01-01 RX ORDER — ALBUMIN (HUMAN) 12.5 G/50ML
50 SOLUTION INTRAVENOUS ONCE
Status: DISCONTINUED | OUTPATIENT
Start: 2022-01-01 | End: 2022-01-01 | Stop reason: HOSPADM

## 2022-01-01 RX ORDER — ACETAMINOPHEN 650 MG/1
650 SUPPOSITORY RECTAL EVERY 4 HOURS PRN
Status: DISCONTINUED | OUTPATIENT
Start: 2022-01-01 | End: 2022-01-01

## 2022-01-01 RX ORDER — POLYETHYLENE GLYCOL 3350 17 G/17G
17 POWDER, FOR SOLUTION ORAL DAILY
Status: DISCONTINUED | OUTPATIENT
Start: 2022-01-01 | End: 2022-01-01 | Stop reason: HOSPADM

## 2022-01-01 RX ORDER — ALBUMIN (HUMAN) 12.5 G/50ML
75 SOLUTION INTRAVENOUS ONCE
Status: DISCONTINUED | OUTPATIENT
Start: 2022-01-01 | End: 2022-01-01 | Stop reason: HOSPADM

## 2022-01-01 RX ORDER — ONDANSETRON 2 MG/ML
4 INJECTION INTRAMUSCULAR; INTRAVENOUS EVERY 6 HOURS PRN
Status: DISCONTINUED | OUTPATIENT
Start: 2022-01-01 | End: 2022-01-01 | Stop reason: HOSPADM

## 2022-01-01 RX ORDER — MIDODRINE HYDROCHLORIDE 5 MG/1
5 TABLET ORAL
Status: DISCONTINUED | OUTPATIENT
Start: 2022-01-01 | End: 2022-01-01 | Stop reason: HOSPADM

## 2022-01-01 RX ORDER — POTASSIUM CHLORIDE 1.5 G/1.77G
40 POWDER, FOR SOLUTION ORAL AS NEEDED
Status: DISCONTINUED | OUTPATIENT
Start: 2022-01-01 | End: 2022-01-01 | Stop reason: HOSPADM

## 2022-01-01 RX ORDER — IPRATROPIUM BROMIDE AND ALBUTEROL SULFATE 2.5; .5 MG/3ML; MG/3ML
3 SOLUTION RESPIRATORY (INHALATION) EVERY 4 HOURS PRN
Status: DISCONTINUED | OUTPATIENT
Start: 2022-01-01 | End: 2022-01-01 | Stop reason: HOSPADM

## 2022-01-01 RX ORDER — SODIUM CHLORIDE 0.9 % (FLUSH) 0.9 %
10 SYRINGE (ML) INJECTION AS NEEDED
Status: DISCONTINUED | OUTPATIENT
Start: 2022-01-01 | End: 2022-01-01 | Stop reason: HOSPADM

## 2022-01-01 RX ORDER — IPRATROPIUM BROMIDE AND ALBUTEROL SULFATE 2.5; .5 MG/3ML; MG/3ML
3 SOLUTION RESPIRATORY (INHALATION)
Status: DISCONTINUED | OUTPATIENT
Start: 2022-01-01 | End: 2022-01-01

## 2022-01-01 RX ORDER — MAGNESIUM SULFATE HEPTAHYDRATE 40 MG/ML
2 INJECTION, SOLUTION INTRAVENOUS AS NEEDED
Status: DISCONTINUED | OUTPATIENT
Start: 2022-01-01 | End: 2022-01-01 | Stop reason: HOSPADM

## 2022-01-01 RX ORDER — ESCITALOPRAM OXALATE 10 MG/1
10 TABLET ORAL DAILY
Status: DISCONTINUED | OUTPATIENT
Start: 2022-01-01 | End: 2022-01-01 | Stop reason: HOSPADM

## 2022-01-01 RX ORDER — MAGNESIUM SULFATE HEPTAHYDRATE 40 MG/ML
4 INJECTION, SOLUTION INTRAVENOUS AS NEEDED
Status: DISCONTINUED | OUTPATIENT
Start: 2022-01-01 | End: 2022-01-01 | Stop reason: HOSPADM

## 2022-01-01 RX ORDER — FUROSEMIDE 40 MG/1
40 TABLET ORAL DAILY
Status: DISCONTINUED | OUTPATIENT
Start: 2022-01-01 | End: 2022-01-01

## 2022-01-01 RX ORDER — SILDENAFIL 25 MG/1
25 TABLET, FILM COATED ORAL 2 TIMES DAILY
Qty: 60 TABLET | Refills: 11 | Status: SHIPPED | OUTPATIENT
Start: 2022-01-01 | End: 2022-01-01

## 2022-01-01 RX ORDER — LEVOTHYROXINE SODIUM 0.1 MG/1
100 TABLET ORAL
Status: DISCONTINUED | OUTPATIENT
Start: 2022-01-01 | End: 2022-01-01 | Stop reason: HOSPADM

## 2022-01-01 RX ORDER — FUROSEMIDE 10 MG/ML
40 INJECTION INTRAMUSCULAR; INTRAVENOUS EVERY 12 HOURS
Status: DISCONTINUED | OUTPATIENT
Start: 2022-01-01 | End: 2022-01-01 | Stop reason: HOSPADM

## 2022-01-01 RX ORDER — ALBUMIN (HUMAN) 12.5 G/50ML
62.5 SOLUTION INTRAVENOUS ONCE
Status: DISCONTINUED | OUTPATIENT
Start: 2022-01-01 | End: 2022-01-01 | Stop reason: HOSPADM

## 2022-01-01 RX ORDER — POTASSIUM CHLORIDE 20 MEQ/1
20 TABLET, EXTENDED RELEASE ORAL 2 TIMES DAILY WITH MEALS
Status: DISCONTINUED | OUTPATIENT
Start: 2022-01-01 | End: 2022-01-01

## 2022-01-01 RX ORDER — ONDANSETRON 4 MG/1
4 TABLET, FILM COATED ORAL EVERY 6 HOURS PRN
Status: DISCONTINUED | OUTPATIENT
Start: 2022-01-01 | End: 2022-01-01 | Stop reason: HOSPADM

## 2022-01-01 RX ORDER — CALCIUM GLUCONATE 20 MG/ML
2 INJECTION, SOLUTION INTRAVENOUS AS NEEDED
Status: DISCONTINUED | OUTPATIENT
Start: 2022-01-01 | End: 2022-01-01 | Stop reason: HOSPADM

## 2022-01-01 RX ORDER — ALBUMIN (HUMAN) 12.5 G/50ML
87.5 SOLUTION INTRAVENOUS ONCE
Status: DISCONTINUED | OUTPATIENT
Start: 2022-01-01 | End: 2022-01-01 | Stop reason: HOSPADM

## 2022-01-01 RX ORDER — ALLOPURINOL 100 MG/1
100 TABLET ORAL DAILY
Status: DISCONTINUED | OUTPATIENT
Start: 2022-01-01 | End: 2022-01-01 | Stop reason: HOSPADM

## 2022-01-01 RX ORDER — URSODIOL 300 MG/1
300 CAPSULE ORAL 2 TIMES DAILY
Status: DISCONTINUED | OUTPATIENT
Start: 2022-01-01 | End: 2022-01-01

## 2022-01-01 RX ORDER — PANTOPRAZOLE SODIUM 40 MG/10ML
40 INJECTION, POWDER, LYOPHILIZED, FOR SOLUTION INTRAVENOUS
Status: DISCONTINUED | OUTPATIENT
Start: 2022-01-01 | End: 2022-01-01

## 2022-01-01 RX ORDER — NITROGLYCERIN 0.4 MG/1
0.4 TABLET SUBLINGUAL
Status: DISCONTINUED | OUTPATIENT
Start: 2022-01-01 | End: 2022-01-01

## 2022-01-01 RX ORDER — SODIUM CHLORIDE 0.9 % (FLUSH) 0.9 %
10 SYRINGE (ML) INJECTION EVERY 12 HOURS SCHEDULED
Status: DISCONTINUED | OUTPATIENT
Start: 2022-01-01 | End: 2022-01-01 | Stop reason: HOSPADM

## 2022-01-01 RX ORDER — ALBUMIN (HUMAN) 12.5 G/50ML
25 SOLUTION INTRAVENOUS EVERY 8 HOURS
Status: DISCONTINUED | OUTPATIENT
Start: 2022-01-01 | End: 2022-01-01 | Stop reason: HOSPADM

## 2022-01-01 RX ORDER — CALCIUM GLUCONATE 20 MG/ML
1 INJECTION, SOLUTION INTRAVENOUS AS NEEDED
Status: DISCONTINUED | OUTPATIENT
Start: 2022-01-01 | End: 2022-01-01 | Stop reason: HOSPADM

## 2022-01-01 RX ORDER — METOLAZONE 2.5 MG/1
1 TABLET ORAL DAILY PRN
COMMUNITY
Start: 2022-01-01 | End: 2022-01-01

## 2022-01-01 RX ORDER — FUROSEMIDE 40 MG/1
40 TABLET ORAL 2 TIMES DAILY
Qty: 60 TABLET | Refills: 0 | OUTPATIENT
Start: 2022-01-01 | End: 2022-01-01

## 2022-01-01 RX ORDER — FUROSEMIDE 10 MG/ML
20 INJECTION INTRAMUSCULAR; INTRAVENOUS EVERY 8 HOURS
Status: DISCONTINUED | OUTPATIENT
Start: 2022-01-01 | End: 2022-01-01

## 2022-01-01 RX ORDER — POTASSIUM CHLORIDE 7.45 MG/ML
10 INJECTION INTRAVENOUS
Status: DISPENSED | OUTPATIENT
Start: 2022-01-01 | End: 2022-01-01

## 2022-01-01 RX ORDER — URSODIOL 300 MG/1
300 CAPSULE ORAL 3 TIMES DAILY
Status: DISCONTINUED | OUTPATIENT
Start: 2022-01-01 | End: 2022-01-01 | Stop reason: HOSPADM

## 2022-01-01 RX ORDER — ALBUMIN (HUMAN) 12.5 G/50ML
112.5 SOLUTION INTRAVENOUS ONCE
Status: DISCONTINUED | OUTPATIENT
Start: 2022-01-01 | End: 2022-01-01 | Stop reason: HOSPADM

## 2022-01-01 RX ORDER — FUROSEMIDE 10 MG/ML
40 INJECTION INTRAMUSCULAR; INTRAVENOUS EVERY 8 HOURS
Status: DISCONTINUED | OUTPATIENT
Start: 2022-01-01 | End: 2022-01-01

## 2022-01-01 RX ORDER — LEVOTHYROXINE SODIUM 0.07 MG/1
75 TABLET ORAL
Status: DISCONTINUED | OUTPATIENT
Start: 2022-01-01 | End: 2022-01-01

## 2022-01-01 RX ORDER — ACETAMINOPHEN 325 MG/1
650 TABLET ORAL EVERY 4 HOURS PRN
Status: DISCONTINUED | OUTPATIENT
Start: 2022-01-01 | End: 2022-01-01 | Stop reason: HOSPADM

## 2022-01-01 RX ORDER — ALBUMIN (HUMAN) 12.5 G/50ML
100 SOLUTION INTRAVENOUS ONCE
Status: DISCONTINUED | OUTPATIENT
Start: 2022-01-01 | End: 2022-01-01 | Stop reason: HOSPADM

## 2022-01-01 RX ORDER — ACETAMINOPHEN 160 MG/5ML
650 SOLUTION ORAL EVERY 4 HOURS PRN
Status: DISCONTINUED | OUTPATIENT
Start: 2022-01-01 | End: 2022-01-01

## 2022-01-01 RX ORDER — SUCRALFATE 1 G/1
1 TABLET ORAL
Status: DISCONTINUED | OUTPATIENT
Start: 2022-01-01 | End: 2022-01-01 | Stop reason: HOSPADM

## 2022-01-01 RX ORDER — SILDENAFIL CITRATE 20 MG/1
20 TABLET ORAL EVERY 8 HOURS SCHEDULED
Status: DISCONTINUED | OUTPATIENT
Start: 2022-01-01 | End: 2022-01-01 | Stop reason: HOSPADM

## 2022-01-01 RX ADMIN — URSODIOL 300 MG: 300 CAPSULE ORAL at 16:10

## 2022-01-01 RX ADMIN — PANTOPRAZOLE SODIUM 40 MG: 40 INJECTION, POWDER, LYOPHILIZED, FOR SOLUTION INTRAVENOUS at 11:08

## 2022-01-01 RX ADMIN — POTASSIUM CHLORIDE 10 MEQ: 10 INJECTION, SOLUTION INTRAVENOUS at 07:55

## 2022-01-01 RX ADMIN — SODIUM CHLORIDE 250 MG: 9 INJECTION, SOLUTION INTRAVENOUS at 09:23

## 2022-01-01 RX ADMIN — PIPERACILLIN SODIUM AND TAZOBACTAM SODIUM 3.38 G: 3; .375 INJECTION, POWDER, LYOPHILIZED, FOR SOLUTION INTRAVENOUS at 13:04

## 2022-01-01 RX ADMIN — SILDENAFIL 20 MG: 20 TABLET, FILM COATED ORAL at 21:05

## 2022-01-01 RX ADMIN — SUCRALFATE 1 G: 1 TABLET ORAL at 09:21

## 2022-01-01 RX ADMIN — ESCITALOPRAM OXALATE 10 MG: 10 TABLET ORAL at 21:02

## 2022-01-01 RX ADMIN — PANTOPRAZOLE SODIUM 40 MG: 40 INJECTION, POWDER, LYOPHILIZED, FOR SOLUTION INTRAVENOUS at 05:10

## 2022-01-01 RX ADMIN — SUCRALFATE 1 G: 1 TABLET ORAL at 11:08

## 2022-01-01 RX ADMIN — METOPROLOL TARTRATE 12.5 MG: 25 TABLET, FILM COATED ORAL at 09:21

## 2022-01-01 RX ADMIN — POTASSIUM CHLORIDE 20 MEQ: 1500 TABLET, EXTENDED RELEASE ORAL at 09:22

## 2022-01-01 RX ADMIN — SILDENAFIL 20 MG: 20 TABLET, FILM COATED ORAL at 14:48

## 2022-01-01 RX ADMIN — PIPERACILLIN SODIUM AND TAZOBACTAM SODIUM 3.38 G: 3; .375 INJECTION, POWDER, LYOPHILIZED, FOR SOLUTION INTRAVENOUS at 14:48

## 2022-01-01 RX ADMIN — METOPROLOL TARTRATE 25 MG: 25 TABLET, FILM COATED ORAL at 16:10

## 2022-01-01 RX ADMIN — ALBUMIN (HUMAN) 25 G: 0.25 INJECTION, SOLUTION INTRAVENOUS at 09:22

## 2022-01-01 RX ADMIN — POLYETHYLENE GLYCOL 3350 17 G: 17 POWDER, FOR SOLUTION ORAL at 11:09

## 2022-01-01 RX ADMIN — PIPERACILLIN SODIUM AND TAZOBACTAM SODIUM 3.38 G: 3; .375 INJECTION, POWDER, LYOPHILIZED, FOR SOLUTION INTRAVENOUS at 00:43

## 2022-01-01 RX ADMIN — SILDENAFIL 20 MG: 20 TABLET, FILM COATED ORAL at 05:10

## 2022-01-01 RX ADMIN — Medication 10 ML: at 09:23

## 2022-01-01 RX ADMIN — ESCITALOPRAM OXALATE 10 MG: 10 TABLET ORAL at 09:22

## 2022-01-01 RX ADMIN — SUCRALFATE 1 G: 1 TABLET ORAL at 21:05

## 2022-01-01 RX ADMIN — POTASSIUM CHLORIDE 10 MEQ: 10 INJECTION, SOLUTION INTRAVENOUS at 06:01

## 2022-01-01 RX ADMIN — URSODIOL 300 MG: 300 CAPSULE ORAL at 09:22

## 2022-01-01 RX ADMIN — POTASSIUM CHLORIDE 40 MEQ: 1500 TABLET, EXTENDED RELEASE ORAL at 05:10

## 2022-01-01 RX ADMIN — URSODIOL 300 MG: 300 CAPSULE ORAL at 11:08

## 2022-01-01 RX ADMIN — URSODIOL 300 MG: 300 CAPSULE ORAL at 21:05

## 2022-01-01 RX ADMIN — POTASSIUM CHLORIDE 40 MEQ: 1500 TABLET, EXTENDED RELEASE ORAL at 00:43

## 2022-01-01 RX ADMIN — ALLOPURINOL 100 MG: 100 TABLET ORAL at 09:21

## 2022-01-01 RX ADMIN — SODIUM CHLORIDE 500 ML: 9 INJECTION, SOLUTION INTRAVENOUS at 21:19

## 2022-01-01 RX ADMIN — Medication 10 ML: at 08:00

## 2022-01-01 RX ADMIN — Medication 10 ML: at 21:06

## 2022-01-01 RX ADMIN — SODIUM CHLORIDE 250 MG: 9 INJECTION, SOLUTION INTRAVENOUS at 11:08

## 2022-01-01 RX ADMIN — SILDENAFIL 20 MG: 20 TABLET, FILM COATED ORAL at 16:10

## 2022-01-01 RX ADMIN — POTASSIUM CHLORIDE 20 MEQ: 1500 TABLET, EXTENDED RELEASE ORAL at 11:23

## 2022-01-01 RX ADMIN — LEVOTHYROXINE SODIUM 100 MCG: 0.1 TABLET ORAL at 05:10

## 2022-01-01 RX ADMIN — EPOETIN ALFA-EPBX 20000 UNITS: 10000 INJECTION, SOLUTION INTRAVENOUS; SUBCUTANEOUS at 09:21

## 2022-01-01 RX ADMIN — SUCRALFATE 1 G: 1 TABLET ORAL at 16:10

## 2022-01-01 RX ADMIN — CHLOROTHIAZIDE SODIUM 250 MG: 500 INJECTION, POWDER, LYOPHILIZED, FOR SOLUTION INTRAVENOUS at 21:04

## 2022-01-01 RX ADMIN — FUROSEMIDE 20 MG: 10 INJECTION, SOLUTION INTRAMUSCULAR; INTRAVENOUS at 09:21

## 2022-01-01 RX ADMIN — ALLOPURINOL 100 MG: 100 TABLET ORAL at 21:03

## 2022-01-01 RX ADMIN — PIPERACILLIN SODIUM AND TAZOBACTAM SODIUM 3.38 G: 3; .375 INJECTION, POWDER, LYOPHILIZED, FOR SOLUTION INTRAVENOUS at 05:28

## 2022-01-31 NOTE — PROGRESS NOTES
PULMONARY/ CRITICAL CARE/ SLEEP MEDICINE OUTPATIENT CONSULT/ FOLLOW UP NOTE        Patient Name:  Karen Rubio    :  1935    Medical Record:  [id number]    PRIMARY CARE PHYSICIAN     Eliza Clayton APRN    REASON FOR CONSULTATION    Karen Rubio is a 86 y.o. female who is referred for consultation for PAH  REVIEW OF SYSTEMS    Constitutional:  Denies fever or chills   Eyes:  Denies change in visual acuity   HENT:  Denies nasal congestion or sore throat   Respiratory:  Denies cough or shortness of breath   Cardiovascular:  Denies chest pain or edema   GI:  Denies abdominal pain, nausea, vomiting, bloody stools or diarrhea   :  Denies dysuria   Musculoskeletal:  Denies back pain or joint pain   Integument:  Denies rash   Neurologic:  Denies headache, focal weakness or sensory changes   Endocrine:  Denies polyuria or polydipsia   Lymphatic:  Denies swollen glands   Psychiatric:  Denies depression or anxiety     MEDICAL HISTORY    Past Medical History:   Diagnosis Date   • Abnormality of left atrial appendage 2016    Suspected Clot Noted on ESTEFANI   • Acute renal failure (HCC) 2016-[facility]    Front Dehydration   • Arthritis    • Breast density 2017   • Carotid stenosis 2018    R @ 60% and L @ 10-20% Noted on Cardiac Cath & 16-49% on L&R    • Heart murmur    • History of echocardiogram 10/13/2017    Calcified Aortic Leaftlets; Mild Aortic Stenosis Present; Mild Mitral Stenosis; Bilateral Enlargement Noted;  Moderate TR; LV Function of 55-60%   • History of echocardiogram 14-[facility]    Moderate L Vent Hypertrophy Noted; L Atrial Dilation; Moderate MR; Mild TR; Mild-Moderate Aortic Reg Noted; Normal Root Size/No Effusion   • History of echocardiogram 16-[facility]    Normal Findings with Mild-Moderate MVS Noted   • History of EKG  & 18-[facility]    All Sinus Rhythm w/Infarct Ischemnic Changes Noted   • HLD (hyperlipidemia)     Controlled w/Meds   • Hx of hyperglycemia    •  Hypertension     Controlled w/Meds   • Hypothyroidism     Levothyroxine   • Joint pain    • Left atrial dilatation 12/05/2014    Mildly Noted on Echo   • Left ventricular hypertrophy 12/05/2014    Noted on Echo w/EF @ 55-60%   • Mild aortic regurgitation 12/05/2014    to Moderate Noted on Echo   • Mild aortic stenosis 10/13/2017    Noted on Echo & ESTEFANI-11/5/18-Moderate    • Mild mitral insufficiency 10/13/2017    Noted on Echo & ESTEFANI-11/5/18   • Mild tricuspid regurgitation 12/05/2014    Noted on Echo   • Moderate mitral regurgitation 12/05/2014    Noted on Echo   • Moderate mitral stenosis 10/13/2017    Noted on Echo   • Moderate tricuspid insufficiency 10/13/2017    Noted on Echo   • Pneumonia involving left lung 08/6/16-[facility] ER   • Pulmonary hypertension (HCC) 10/13/2017 & 11/5/18-Cath    Severe Noted on Echo & Cath   • Slurred speech 05/16/2016   • SOB (shortness of breath) 08/2016   • Stroke (HCC)    • Thickened mitral leaflet 10/13/2017 & ESTEFANI-11/5/18    Severely Calcified Noted on Echo   • Tricuspid valve insufficiency 11/05/2018    Noted on ESTEFANI        SURGICAL HISTORY    Past Surgical History:   Procedure Laterality Date   • ATRIAL APPENDAGE EXCLUSION LEFT WITH TRANSESOPHAGEAL ECHOCARDIOGRAM N/A 1/7/2021    Procedure: Atrial Appendage Occlusion;  Surgeon: Harjit Berg MD;  Location: [facility] CATH INVASIVE LOCATION;  Service: Cardiovascular;  Laterality: N/A;   • ATRIAL APPENDAGE EXCLUSION LEFT WITH TRANSESOPHAGEAL ECHOCARDIOGRAM N/A 1/7/2021    Procedure: Atrial Appendage Occlusion;  Surgeon: Zack Zheng MD;  Location: [facility] CATH INVASIVE LOCATION;  Service: Cardiology;  Laterality: N/A;   • CARDIAC CATHETERIZATION Left 11/5/18-[facility]    w/L Coronary Angiography--Dr. Hernández-RCA @ 60% with Pulmonary Hypertension Noted   • CHOLECYSTECTOMY     • KNEE ARTHROSCOPY     • ESTEFANI  05/17/2016-[facility]    Dr. Hernández--Severly Calcified Mitral Leaflets w/Mild-Moderate Mitral Stenosis/Insufficiency; Sig Aortic Stenosis  Noted; Suspecion of L Atrial Appendage Clot Noted   • ESTEFANI  18-[facility]    Dr. Hernández--Moderate Mitral Insufficiency Noted; Mild-Moderate AVS; Moderate Tricuspid Insufficiency Noted; EF of 65-70%   • TONSILLECTOMY          FAMILY HISTORY    Family History   Problem Relation Age of Onset   • Heart disease Mother    • Cancer Father    • No Known Problems Sister    • No Known Problems Brother    • No Known Problems Maternal Aunt    • No Known Problems Maternal Uncle    • No Known Problems Paternal Aunt    • No Known Problems Paternal Uncle    • No Known Problems Maternal Grandmother    • No Known Problems Maternal Grandfather    • No Known Problems Paternal Grandmother    • No Known Problems Paternal Grandfather    • No Known Problems Other    • Anemia Neg Hx    • Arrhythmia Neg Hx    • Asthma Neg Hx    • Clotting disorder Neg Hx    • Fainting Neg Hx    • Heart attack Neg Hx    • Heart failure Neg Hx    • Hyperlipidemia Neg Hx    • Hypertension Neg Hx        SOCIAL HISTORY    Social History     Tobacco Use   • Smoking status: Former Smoker     Types: Cigarettes     Quit date:      Years since quittin.0   • Smokeless tobacco: Never Used   Substance Use Topics   • Alcohol use: No        ALLERGIES    Allergies   Allergen Reactions   • Hydrocodone Shortness Of Breath         MEDICATIONS    Current Outpatient Medications on File Prior to Visit   Medication Sig Dispense Refill   • acetaminophen (TYLENOL) 325 MG tablet Take 2 tablets by mouth Every 4 (Four) Hours As Needed for Mild Pain . 1 bottle 0   • albuterol sulfate  (90 Base) MCG/ACT inhaler Inhale 2 puffs Every 4 (Four) Hours As Needed for Wheezing or Shortness of Air.     • allopurinol (ZYLOPRIM) 100 MG tablet Take 1 tablet by mouth Daily.     • atorvastatin (LIPITOR) 20 MG tablet TAKE 1 TABLET EVERY DAY     • Cholecalciferol (VITAMIN D-3) 1000 units capsule Take 1 capsule by mouth Daily.     • Eliquis 5 MG tablet tablet TAKE 1 TABLET TWICE DAILY 180  "tablet 2   • escitalopram (LEXAPRO) 10 MG tablet Take 10 mg by mouth Daily.     • furosemide (LASIX) 40 MG tablet Take 40 mg by mouth Daily.     • ipratropium-albuterol (DUO-NEB) 0.5-2.5 mg/3 ml nebulizer 3 mL 4 (Four) Times a Day. O2 bellow 90     • levothyroxine (SYNTHROID, LEVOTHROID) 75 MCG tablet Take 75 mcg by mouth Daily.     • metOLazone (ZAROXOLYN) 2.5 MG tablet Take 1 tablet by mouth Daily As Needed.     • metoprolol tartrate (LOPRESSOR) 25 MG tablet Take 1 tablet by mouth 2 (Two) Times a Day. (Patient taking differently: Take 25 mg by mouth 2 (Two) Times a Day. 12.5mg BID) 180 tablet 2   • O2 (OXYGEN) Inhale 2 L/min 1 (One) Time. At night and PRN     • ondansetron (ZOFRAN) 4 MG tablet Take 4 mg by mouth Every 8 (Eight) Hours As Needed for Nausea or Vomiting.     • potassium chloride (MICRO-K) 10 MEQ CR capsule Take 10 mEq by mouth Daily.     • sildenafil (REVATIO) 20 MG tablet Take 20 mg by mouth 3 (Three) Times a Day.     • temazepam (RESTORIL) 15 MG capsule Take 15 mg by mouth.     • [DISCONTINUED] esomeprazole (nexIUM) 40 MG capsule Take 40 mg by mouth Every Morning Before Breakfast.       No current facility-administered medications on file prior to visit.       PHYSICAL EXAM    Vitals:    01/31/22 1355   BP: 105/69   BP Location: Left arm   Patient Position: Sitting   Cuff Size: Adult   Pulse: 83   Resp: 10   SpO2: 91%   Weight: 68.5 kg (151 lb)   Height: 162.6 cm (64\")        Constitutional:  Well developed, well nourished, no acute distress, non-toxic appearance   Eyes:  PERRL, conjunctiva normal   HENT:  Atraumatic, external ears normal, nose normal, oropharynx moist, no pharyngeal exudates. mallampatti   Neck- normal range of motion, no tenderness, supple   Respiratory:  No respiratory distress, normal breath sounds, no rales, no wheezing   Cardiovascular:  Normal rate, normal rhythm, no murmurs, no gallops, no rubs   GI:  Soft, nondistended, normal bowel sounds, nontender, no organomegaly, no " mass, no rebound, no guarding   :  No costovertebral angle tenderness   Musculoskeletal:  No edema, no tenderness, no deformities. Back- no tenderness  Integument:  Well hydrated, no rash   Lymphatic:  No lymphadenopathy noted   Neurologic:  Alert & oriented x 3, CN 2-12 normal, normal motor function, normal sensory function, no focal deficits noted   Psychiatric:  Speech and behavior appropriate     No radiology results for the last 90 days.   Results for orders placed during the hospital encounter of 01/07/21    Adult Transesophageal Echo (ESTEFANI) W/ Cont if Necessary Per Protocol    Interpretation Summary  · Left ventricular systolic function is normal.  · Left ventricle ejection fraction is 55 to 60%  · Left ventricular wall thickness is consistent with concentric hypertrophy.  · Mild to moderate aortic valve stenosis is present.  · Moderate mitral valve regurgitation is present.  · Moderate to severe tricuspid valve regurgitation is present.  · Unsuccessful deployment of watchman device in left atrial appendage due to the anatomy.      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     Hypoxemia continue supplemental oxygen 2 L continuous.  Benefiting from use.  W     Valvular heart disease.  -ESTEFANI 3/20: EF 65%.  Moderate mitral stenosis and moderate to severe mitral regurgitation was noted.  -2D echo 10/17: EF 55 to 60%.  RVSP 60 mmHg.  Moderate mitral stenosis and mild aortic stenosis was noted..  -Being followed by cardiology.  Not a surgical candidate.     Noted elevated  creatinine and significant lower extremity edema we will refer to nephrology          This document has been electronically signed by  Rebecca Nino MD  15:32 EST

## 2022-03-13 PROBLEM — M19.90 ARTHRITIS: Status: ACTIVE | Noted: 2022-01-01

## 2022-03-13 PROBLEM — R10.84 GENERALIZED ABDOMINAL PAIN: Status: ACTIVE | Noted: 2022-01-01

## 2022-03-13 PROBLEM — R10.9 ABDOMINAL PAIN: Status: ACTIVE | Noted: 2022-01-01

## 2022-03-13 PROBLEM — J90 PLEURAL EFFUSION ON RIGHT: Status: ACTIVE | Noted: 2022-01-01

## 2022-03-13 PROBLEM — E80.6 HYPERBILIRUBINEMIA: Status: ACTIVE | Noted: 2022-01-01

## 2022-03-13 PROBLEM — Z99.81 OXYGEN DEPENDENT: Status: ACTIVE | Noted: 2022-01-01

## 2022-03-13 PROBLEM — K59.09 CHRONIC CONSTIPATION: Chronic | Status: ACTIVE | Noted: 2022-01-01

## 2022-03-13 PROBLEM — I62.9 INTRACRANIAL BLEED (HCC): Status: ACTIVE | Noted: 2020-07-21

## 2022-03-13 PROBLEM — I50.9 CONGESTIVE HEART FAILURE (HCC): Status: ACTIVE | Noted: 2021-01-01

## 2022-03-13 PROBLEM — R60.1 ANASARCA: Status: ACTIVE | Noted: 2022-01-01

## 2022-03-13 PROBLEM — N18.4 CHRONIC KIDNEY DISEASE (CKD), STAGE IV (SEVERE) (HCC): Chronic | Status: ACTIVE | Noted: 2022-01-01

## 2022-03-13 PROBLEM — R18.8 ABDOMINAL ASCITES: Status: ACTIVE | Noted: 2022-01-01

## 2022-03-13 PROBLEM — J44.9 COPD (CHRONIC OBSTRUCTIVE PULMONARY DISEASE) (HCC): Status: ACTIVE | Noted: 2022-01-01

## 2022-03-13 NOTE — CONSULTS
Group: [facility]         CONSULT NOTE    Patient Identification:  Karen Rubio  86 y.o.  female  1935  [id number]            Requesting physician: Attending physician    Reason for Consultation: Shortness of breath      History of Present Illness:  86-year-old female who presented to ED 3/12/2022 with progressive shortness of breath and decompensated heart failure and abdominal pain.  Patient is a poor historian.  She is currently requiring 2 L of oxygen per nasal cannula and saturating 96%.  CT scan of the abdomen showed emphysema and right-sided pleural effusion, small to moderate ascites, diverticulosis without acute diverticulitis.    Assessment:  Generalized abdominal pain  Hypoxemia  Moderate right-sided pleural effusion  History of pulmonary artery hypertension WHO group 2 with RVSP 60 mitral valve disease  Echo 1/7/2021   Results for orders placed during the hospital encounter of 01/07/21    Adult Transesophageal Echo (ESTEFANI) W/ Cont if Necessary Per Protocol    Interpretation Summary  · Left ventricular systolic function is normal.  · Left ventricle ejection fraction is 55 to 60%  · Left ventricular wall thickness is consistent with concentric hypertrophy.  · Mild to moderate aortic valve stenosis is present.  · Moderate mitral valve regurgitation is present.  · Moderate to severe tricuspid valve regurgitation is present.  · Unsuccessful deployment of watchman device in left atrial appendage due to the anatomy.    ascites  Liver cirrhosis  CKD  CHF: Pro BNP on admission 4738  On chronic anticoagulation due to A. fib  Abdominal pain related to ascites  Anemia of chronic illness  Chronic A. fib  Hypothyroidism    Recommendations:  Oxygen supplement and titration to maintain saturation above 91%  Diuresis  A.m. chest x-ray  Bronchodilators  Abdominal pain work-up and management as per hospitalist  DVT/GI prophylaxis  Check daily labs and correct electrolytes as needed          Review of  Sytems:  Review of Systems  Constitutional: Negative for chills, fever and positive for malaise/fatigue.   HENT: Negative.    Eyes: Negative.    Cardiovascular: Negative.    Respiratory: Positive for cough and shortness of breath.    Skin: Negative.    Musculoskeletal: Negative.    Gastrointestinal: Abdominal discomfort  Genitourinary: Negative.    Neurological: Negative.    Psychiatric/Behavioral: Negative.  Past Medical History:  Past Medical History:   Diagnosis Date   • Abnormality of left atrial appendage 05/17/2016    Suspected Clot Noted on ESTEFANI   • Acute renal failure (HCC) 08/06/2016-[facility]    Front Dehydration   • Arthritis    • Breast density 12/2017   • Carotid stenosis 11/05/2018    R @ 60% and L @ 10-20% Noted on Cardiac Cath & 16-49% on L&R    • Heart murmur    • History of echocardiogram 10/13/2017    Calcified Aortic Leaftlets; Mild Aortic Stenosis Present; Mild Mitral Stenosis; Bilateral Enlargement Noted;  Moderate TR; LV Function of 55-60%   • History of echocardiogram 12/5/14-[facility]    Moderate L Vent Hypertrophy Noted; L Atrial Dilation; Moderate MR; Mild TR; Mild-Moderate Aortic Reg Noted; Normal Root Size/No Effusion   • History of echocardiogram 5/16/16-[facility]    Normal Findings with Mild-Moderate MVS Noted   • History of EKG 2004/2016 & 11/5/18-[facility]    All Sinus Rhythm w/Infarct Ischemnic Changes Noted   • HLD (hyperlipidemia)     Controlled w/Meds   • Hx of hyperglycemia    • Hypertension     Controlled w/Meds   • Hypothyroidism     Levothyroxine   • Joint pain    • Left atrial dilatation 12/05/2014    Mildly Noted on Echo   • Left ventricular hypertrophy 12/05/2014    Noted on Echo w/EF @ 55-60%   • Mild aortic regurgitation 12/05/2014    to Moderate Noted on Echo   • Mild aortic stenosis 10/13/2017    Noted on Echo & ESTEFANI-11/5/18-Moderate    • Mild mitral insufficiency 10/13/2017    Noted on Echo & ESTEFANI-11/5/18   • Mild tricuspid regurgitation 12/05/2014    Noted on Echo   • Moderate mitral  regurgitation 12/05/2014    Noted on Echo   • Moderate mitral stenosis 10/13/2017    Noted on Echo   • Moderate tricuspid insufficiency 10/13/2017    Noted on Echo   • Pneumonia involving left lung 08/6/16-[facility]   • Pulmonary hypertension (HCC) 10/13/2017 & 11/5/18-Cath    Severe Noted on Echo & Cath   • Slurred speech 05/16/2016   • SOB (shortness of breath) 08/2016   • Stroke (HCC)    • Thickened mitral leaflet 10/13/2017 & ESTEFANI-11/5/18    Severely Calcified Noted on Echo   • Tricuspid valve insufficiency 11/05/2018    Noted on ESTEFANI       Past Surgical History:  Past Surgical History:   Procedure Laterality Date   • ATRIAL APPENDAGE EXCLUSION LEFT WITH TRANSESOPHAGEAL ECHOCARDIOGRAM N/A 1/7/2021    Procedure: Atrial Appendage Occlusion;  Surgeon: Harjit Berg MD;  Location: [facility] CATH INVASIVE LOCATION;  Service: Cardiovascular;  Laterality: N/A;   • ATRIAL APPENDAGE EXCLUSION LEFT WITH TRANSESOPHAGEAL ECHOCARDIOGRAM N/A 1/7/2021    Procedure: Atrial Appendage Occlusion;  Surgeon: Zack Zheng MD;  Location: [facility] CATH INVASIVE LOCATION;  Service: Cardiology;  Laterality: N/A;   • CARDIAC CATHETERIZATION Left 11/5/18-[facility]    w/L Coronary Angiography--Dr. Hernández-RCA @ 60% with Pulmonary Hypertension Noted   • CHOLECYSTECTOMY     • KNEE ARTHROSCOPY     • ESTEFANI  05/17/2016-[facility]    Dr. Hernández--Severly Calcified Mitral Leaflets w/Mild-Moderate Mitral Stenosis/Insufficiency; Sig Aortic Stenosis Noted; Suspecion of L Atrial Appendage Clot Noted   • ESTEFANI  11/5/18-[facility]    Dr. Hernández--Moderate Mitral Insufficiency Noted; Mild-Moderate AVS; Moderate Tricuspid Insufficiency Noted; EF of 65-70%   • TONSILLECTOMY          Home Meds:  Medications Prior to Admission   Medication Sig Dispense Refill Last Dose   • allopurinol (ZYLOPRIM) 100 MG tablet Take 1 tablet by mouth Daily.   3/12/2022 at Unknown time   • Eliquis 5 MG tablet tablet TAKE 1 TABLET TWICE DAILY 180 tablet 2 3/12/2022 at Unknown time   • escitalopram  (LEXAPRO) 10 MG tablet Take 10 mg by mouth Daily.   3/12/2022 at Unknown time   • furosemide (LASIX) 40 MG tablet Take 40 mg by mouth Daily.   3/12/2022 at Unknown time   • levothyroxine (SYNTHROID, LEVOTHROID) 75 MCG tablet Take 75 mcg by mouth Daily.   3/12/2022 at Unknown time   • metOLazone (ZAROXOLYN) 2.5 MG tablet Take 1 tablet by mouth Daily As Needed.   3/12/2022 at Unknown time   • metoprolol tartrate (LOPRESSOR) 25 MG tablet Take 1 tablet by mouth 2 (Two) Times a Day. (Patient taking differently: Take 25 mg by mouth 2 (Two) Times a Day. 12.5mg BID) 180 tablet 2 3/12/2022 at Unknown time   • O2 (OXYGEN) Inhale 2 L/min 1 (One) Time. At night and PRN   3/13/2022 at Unknown time   • potassium chloride (MICRO-K) 10 MEQ CR capsule Take 10 mEq by mouth Daily.   3/12/2022 at Unknown time   • sildenafil (VIAGRA) 25 MG tablet Take 1 tablet by mouth 2 (Two) Times a Day. 60 tablet 11 3/12/2022 at Unknown time   • acetaminophen (TYLENOL) 325 MG tablet Take 2 tablets by mouth Every 4 (Four) Hours As Needed for Mild Pain . 1 bottle 0 Unknown at Unknown time   • albuterol sulfate  (90 Base) MCG/ACT inhaler Inhale 2 puffs Every 4 (Four) Hours As Needed for Wheezing or Shortness of Air.   Unknown at Unknown time   • atorvastatin (LIPITOR) 20 MG tablet TAKE 1 TABLET EVERY DAY   Unknown at Unknown time   • Cholecalciferol (VITAMIN D-3) 1000 units capsule Take 1 capsule by mouth Daily.   3/11/2022   • ipratropium-albuterol (DUO-NEB) 0.5-2.5 mg/3 ml nebulizer 3 mL 4 (Four) Times a Day. O2 bellow 90   Unknown at Unknown time   • ondansetron (ZOFRAN) 4 MG tablet Take 4 mg by mouth Every 8 (Eight) Hours As Needed for Nausea or Vomiting.   Unknown at Unknown time   • temazepam (RESTORIL) 15 MG capsule Take 15 mg by mouth.          Allergies:  Allergies   Allergen Reactions   • Hydrocodone Shortness Of Breath       Social History:   Social History     Socioeconomic History   • Marital status:    Tobacco Use   • Smoking  "status: Former Smoker     Types: Cigarettes     Quit date:      Years since quittin.2   • Smokeless tobacco: Never Used   Vaping Use   • Vaping Use: Never used   Substance and Sexual Activity   • Alcohol use: No   • Drug use: No   • Sexual activity: Defer     Birth control/protection: Post-menopausal       Family History:  Family History   Problem Relation Age of Onset   • Heart disease Mother    • Cancer Father    • No Known Problems Sister    • No Known Problems Brother    • No Known Problems Maternal Aunt    • No Known Problems Maternal Uncle    • No Known Problems Paternal Aunt    • No Known Problems Paternal Uncle    • No Known Problems Maternal Grandmother    • No Known Problems Maternal Grandfather    • No Known Problems Paternal Grandmother    • No Known Problems Paternal Grandfather    • No Known Problems Other    • Anemia Neg Hx    • Arrhythmia Neg Hx    • Asthma Neg Hx    • Clotting disorder Neg Hx    • Fainting Neg Hx    • Heart attack Neg Hx    • Heart failure Neg Hx    • Hyperlipidemia Neg Hx    • Hypertension Neg Hx        Physical Exam:  /69 (BP Location: Left arm, Patient Position: Lying)   Pulse 82   Temp 97.6 °F (36.4 °C) (Oral)   Resp 17   Ht 162.6 cm (64\")   Wt 76.7 kg (169 lb)   SpO2 96%   BMI 29.01 kg/m²  Body mass index is 29.01 kg/m². 96% 76.7 kg (169 lb)  Physical Exam  General Appearance:  Alert   HEENT:  Normocephalic, without obvious abnormality, Conjunctiva/corneas clear,.  Normal external ear canals, Nares normal, no drainage     Neck:  Supple, symmetrical, trachea midline. No JVD.  Lungs /Chest wall:   Decreased breath sounds in right base with few crackles, respirations unlabored, symmetrical wall movement.     Heart:  Regular rate and rhythm, S1 S2 normal  Abdomen: Soft, non-tender, no masses, no organomegaly.    Extremities: Positive edema, no clubbing or cyanosis     LABS:  Lab Results   Component Value Date    CALCIUM 9.1 2022    PHOS 3.1 2022 "     Results from last 7 days   Lab Units 03/13/22  0550 03/12/22  2105 03/12/22  2105 03/10/22  1532   MAGNESIUM mg/dL 1.9  --  1.9  --    SODIUM mmol/L 137  --  135*  --    POTASSIUM mmol/L 2.8*  --  3.0*  --    CHLORIDE mmol/L 98  --  97*  --    CO2 mmol/L 27.0  --  25.0  --    BUN mg/dL 31*  --  32*  --    CREATININE mg/dL 2.13*  --  2.16*  --    GLUCOSE mg/dL 96   < > 136*  --    CALCIUM mg/dL 9.1  --  9.0  --    WBC 10*3/mm3 6.90  --  6.40  --    HEMOGLOBIN g/dL 8.9*  --  9.5*  --    PLATELETS 10*3/mm3 134*  --  146  --    ALT (SGPT) U/L 7  --  7 5*   AST (SGOT) U/L 14  --  16 18   PROBNP pg/mL 4,738.0*  --   --   --    PROCALCITONIN ng/mL 0.11  --   --   --     < > = values in this interval not displayed.     Lab Results   Component Value Date    CKTOTAL 24 07/09/2021    TROPONINT 0.013 03/13/2022     Results from last 7 days   Lab Units 03/13/22  0550 03/12/22 2105   TROPONIN T ng/mL 0.013 0.011         Results from last 7 days   Lab Units 03/13/22  0550 03/12/22 2108   PROCALCITONIN ng/mL 0.11  --    LACTATE mmol/L 1.1 1.7             Results from last 7 days   Lab Units 03/13/22  0550   INR  1.68*         Lab Results   Component Value Date    TSH 5.450 (H) 03/13/2022     Estimated Creatinine Clearance: 19.4 mL/min (A) (by C-G formula based on SCr of 2.13 mg/dL (H)).  Results from last 7 days   Lab Units 03/12/22 2112   NITRITE UA  Negative        Imaging:  Imaging Results (Last 24 Hours)     Procedure Component Value Units Date/Time    CT Abdomen Pelvis Without Contrast [[id number]] Collected: 03/12/22 2346     Updated: 03/12/22 2351    Narrative:      CT OF THE ABDOMEN AND PELVIS WITHOUT CONTRAST    Clinical indication: Abdominal pain.    Comparison: 2/13/2021.    Procedure: Noncontrast CT images of the abdomen and pelvis were obtained.  CT dose lowering techniques were used, to include: automated exposure control, adjustment for patient size, and or use of iterative reconstruction.    Findings:     Lung  Bases: Lung bases are clear. Moderate right pleural effusion. Heart size is normal without pericardial effusion.    Liver and biliary system: The liver is normal in size and configuration without focal lesion. No intra or extrahepatic biliary ductal dilatation is noted. The gallbladder is absent.     Pancreas: The pancreas is unremarkable.     Spleen: The spleen is normal.    Adrenals: The adrenal glands are within normal limits.     Kidneys: The kidneys are symmetric in size and without hydronephrosis.    Gastrointestinal: The stomach and scattered loops of small and large bowel have a nonobstructive pattern. Diverticulosis. The appendix is normal in the right lower quadrant.     Mesentery and retroperitoneum: No mesenteric or retroperitoneal adenopathy. No free air. Small to moderate on diffuse peritoneal ascites fluid. Atherosclerosis.    Pelvis: The urinary bladder is moderately distended with a smooth contour. Rectum is normal. No free fluid. No deep pelvic or inguinal adenopathy.     Reproductive: No acute abnormality.    Body wall: Anasarca.    Bones: Stable multilevel degenerative changes in the spine and stable T12 vertebral body compression.      Impression:      Impression:   1. Limited evaluation without IV contrast.  2. Small to moderate diffuse peritoneal ascites fluid. No organized abscess seen.  3. Diverticulosis without acute diverticulitis.  4. Anasarca.  5. Atherosclerosis.  6. Stable multilevel degenerative changes in the thoracolumbar spine and stable T12 vertebral body compression.  6. Moderate right pleural effusion.    Electronically signed by:  Blair Jeffery M.D.    3/12/2022 9:49 PM            Current Meds:   SCHEDULE  albumin human, 37.5 g, Intravenous, Once   Or  albumin human, 50 g, Intravenous, Once   Or  albumin human, 62.5 g, Intravenous, Once   Or  albumin human, 75 g, Intravenous, Once   Or  albumin human, 87.5 g, Intravenous, Once   Or  albumin human, 100 g, Intravenous, Once    Or  albumin human, 112.5 g, Intravenous, Once  piperacillin-tazobactam, 3.375 g, Intravenous, Q12H  potassium chloride, 10 mEq, Intravenous, Q1H  sodium chloride, 10 mL, Intravenous, Q12H      Infusions     PRNs  •  acetaminophen **OR** acetaminophen **OR** acetaminophen  •  melatonin  •  nitroglycerin  •  ondansetron **OR** ondansetron  •  [COMPLETED] Insert peripheral IV **AND** sodium chloride  •  sodium chloride        Stevie Daley MD  3/13/2022  08:34 EDT      Much of this encounter note is an electronic transcription/translation of spoken language to printed text using Dragon Software.

## 2022-03-13 NOTE — CONSULTS
NEPHROLOGY CONSULTATION-----[facility]    [facility]  [phone]  Micaela Edgar MD    Patient Care Team:  Eliza Clayton APRN as PCP - General (Nurse Practitioner)  Eliza Clayton APRN as PCP - Family Medicine  Demond Valiente MD (Cardiology)  Gibson Medina MD as Surgeon (Cardiothoracic Surgery)  Ruthie Edgar MD as Consulting Physician (Nephrology)    CC/REASON FOR CONSULTATION: RENAL FAILURE/ELEVATED SERUM CREATININE    PHYSICIAN REQUESTING CONSULTATION:     History of Present Illness    HPI    Patient is a 86 y.o. WF, very well known to me as I actively follow her as an outpatient, whom I was asked to see in consultation for evaluation and management of renal failure/elevated serum creatinine. Patient was admitted with low grade fever, generalized malaise, and increased edema over the past one week. Known CRF/CKD STG 4.  No NSAIDs or recent IV dye exposure. No known h/o hepatitis, TB, rheumatic fever, jaundice, SLE. Does bleed/bruise easily as she is chronically anticoagulated for her atrial fibrillation. Reports some urinary hesitancy and decreased urine output. +Compliance with home meds. Was on diuretics in the form of Lasix and Metolazone prior to admission. Was not on ACE-I/ARB prior to admission. No herbal med use.    Review of Systems   Constitutional: Positive for activity change, appetite change and fatigue. Negative for chills, diaphoresis, fever and unexpected weight change.   HENT: Negative for congestion, dental problem, drooling, ear discharge, ear pain, facial swelling, hearing loss, mouth sores, nosebleeds, postnasal drip, rhinorrhea, sinus pressure, sinus pain, sneezing, sore throat, tinnitus, trouble swallowing and voice change.    Eyes: Negative for photophobia, pain, discharge, redness, itching and visual disturbance.   Respiratory: Negative for apnea, cough, choking, chest  tightness, shortness of breath, wheezing and stridor.    Cardiovascular: Positive for palpitations and leg swelling. Negative for chest pain.   Gastrointestinal: Positive for abdominal distention. Negative for abdominal pain, anal bleeding, blood in stool, constipation, diarrhea, nausea, rectal pain and vomiting.   Endocrine: Negative for cold intolerance, heat intolerance, polydipsia, polyphagia and polyuria.   Genitourinary: Positive for frequency. Negative for decreased urine volume, difficulty urinating, dysuria, enuresis, flank pain, genital sores, hematuria and urgency.   Musculoskeletal: Positive for arthralgias and back pain. Negative for gait problem, joint swelling, myalgias, neck pain and neck stiffness.   Skin: Negative for color change, pallor, rash and wound.   Allergic/Immunologic: Negative for environmental allergies, food allergies and immunocompromised state.   Neurological: Positive for weakness. Negative for dizziness, tremors, seizures, syncope, facial asymmetry, speech difficulty, light-headedness, numbness and headaches.   Hematological: Negative for adenopathy. Does not bruise/bleed easily.   Psychiatric/Behavioral: Negative for agitation, behavioral problems, confusion, decreased concentration, dysphoric mood, hallucinations, self-injury, sleep disturbance and suicidal ideas. The patient is not nervous/anxious and is not hyperactive.           Past Medical History:   Diagnosis Date   • Abnormality of left atrial appendage 05/17/2016    Suspected Clot Noted on ESTEFANI   • Acute renal failure ([facility]) 08/06/2016-[facility]    Front Dehydration   • Arthritis    • Breast density 12/2017   • Carotid stenosis 11/05/2018    R @ 60% and L @ 10-20% Noted on Cardiac Cath & 16-49% on L&R    • COPD (chronic obstructive pulmonary disease) ([facility]) 3/13/2022   • Heart murmur    • History of echocardiogram 10/13/2017    Calcified Aortic Leaftlets; Mild Aortic Stenosis Present; Mild Mitral Stenosis; Bilateral Enlargement  Noted;  Moderate TR; LV Function of 55-60%   • History of echocardiogram 12/5/14-[facility]    Moderate L Vent Hypertrophy Noted; L Atrial Dilation; Moderate MR; Mild TR; Mild-Moderate Aortic Reg Noted; Normal Root Size/No Effusion   • History of echocardiogram 5/16/16-[facility]    Normal Findings with Mild-Moderate MVS Noted   • History of EKG 2004/2016 & 11/5/18-[facility]    All Sinus Rhythm w/Infarct Ischemnic Changes Noted   • HLD (hyperlipidemia)     Controlled w/Meds   • Hx of hyperglycemia    • Hypertension     Controlled w/Meds   • Hypothyroidism     Levothyroxine   • Joint pain    • Left atrial dilatation 12/05/2014    Mildly Noted on Echo   • Left ventricular hypertrophy 12/05/2014    Noted on Echo w/EF @ 55-60%   • Mild aortic regurgitation 12/05/2014    to Moderate Noted on Echo   • Mild aortic stenosis 10/13/2017    Noted on Echo & ESTEFANI-11/5/18-Moderate    • Mild mitral insufficiency 10/13/2017    Noted on Echo & ESTEFANI-11/5/18   • Mild tricuspid regurgitation 12/05/2014    Noted on Echo   • Moderate mitral regurgitation 12/05/2014    Noted on Echo   • Moderate mitral stenosis 10/13/2017    Noted on Echo   • Moderate tricuspid insufficiency 10/13/2017    Noted on Echo   • Pneumonia involving left lung 08/6/16-[facility] ER   • Pulmonary hypertension (HCC) 10/13/2017 & 11/5/18-Cath    Severe Noted on Echo & Cath   • Slurred speech 05/16/2016   • SOB (shortness of breath) 08/2016   • Stroke ([facility])    • Thickened mitral leaflet 10/13/2017 & ESTEFANI-11/5/18    Severely Calcified Noted on Echo   • Tricuspid valve insufficiency 11/05/2018    Noted on ESTEFANI       Past Surgical History:   Procedure Laterality Date   • ATRIAL APPENDAGE EXCLUSION LEFT WITH TRANSESOPHAGEAL ECHOCARDIOGRAM N/A 1/7/2021    Procedure: Atrial Appendage Occlusion;  Surgeon: Harjit Berg MD;  Location: [facility] INVASIVE LOCATION;  Service: Cardiovascular;  Laterality: N/A;   • ATRIAL APPENDAGE EXCLUSION LEFT WITH TRANSESOPHAGEAL ECHOCARDIOGRAM N/A 1/7/2021     Procedure: Atrial Appendage Occlusion;  Surgeon: Zack Zheng MD;  Location: [facility] CATH INVASIVE LOCATION;  Service: Cardiology;  Laterality: N/A;   • CARDIAC CATHETERIZATION Left 18-[facility]    w/L Coronary Angiography--Dr. Hernández-RCA @ 60% with Pulmonary Hypertension Noted   • CHOLECYSTECTOMY     • KNEE ARTHROSCOPY     • ESTEFANI  2016-[facility]    Dr. Hernández--Severly Calcified Mitral Leaflets w/Mild-Moderate Mitral Stenosis/Insufficiency; Sig Aortic Stenosis Noted; Suspecion of L Atrial Appendage Clot Noted   • ESTEFANI  18-[facility]    Dr. Hernández--Moderate Mitral Insufficiency Noted; Mild-Moderate AVS; Moderate Tricuspid Insufficiency Noted; EF of 65-70%   • TONSILLECTOMY         Family History   Problem Relation Age of Onset   • Heart disease Mother    • Cancer Father    • No Known Problems Sister    • No Known Problems Brother    • No Known Problems Maternal Aunt    • No Known Problems Maternal Uncle    • No Known Problems Paternal Aunt    • No Known Problems Paternal Uncle    • No Known Problems Maternal Grandmother    • No Known Problems Maternal Grandfather    • No Known Problems Paternal Grandmother    • No Known Problems Paternal Grandfather    • No Known Problems Other    • Anemia Neg Hx    • Arrhythmia Neg Hx    • Asthma Neg Hx    • Clotting disorder Neg Hx    • Fainting Neg Hx    • Heart attack Neg Hx    • Heart failure Neg Hx    • Hyperlipidemia Neg Hx    • Hypertension Neg Hx        Social History     Tobacco Use   • Smoking status: Former Smoker     Types: Cigarettes     Quit date:      Years since quittin.2   • Smokeless tobacco: Never Used   Vaping Use   • Vaping Use: Never used   Substance Use Topics   • Alcohol use: No   • Drug use: No       Home Meds:   Medications Prior to Admission   Medication Sig Dispense Refill Last Dose   • acetaminophen (TYLENOL) 325 MG tablet Take 2 tablets by mouth Every 4 (Four) Hours As Needed for Mild Pain . 1 bottle 0    • albuterol sulfate  (90 Base)  MCG/ACT inhaler Inhale 2 puffs Every 4 (Four) Hours As Needed for Wheezing or Shortness of Air.      • allopurinol (ZYLOPRIM) 100 MG tablet Take 1 tablet by mouth Daily.   3/12/2022 at Unknown time   • atorvastatin (LIPITOR) 20 MG tablet TAKE 1 TABLET EVERY DAY      • Cholecalciferol (VITAMIN D-3) 1000 units capsule Take 1 capsule by mouth Every Other Day.      • Eliquis 5 MG tablet tablet TAKE 1 TABLET TWICE DAILY 180 tablet 2 3/12/2022 at Unknown time   • escitalopram (LEXAPRO) 10 MG tablet Take 10 mg by mouth Daily.   3/12/2022 at Unknown time   • furosemide (LASIX) 40 MG tablet Take 40 mg by mouth Daily.   3/12/2022 at Unknown time   • ipratropium-albuterol (DUO-NEB) 0.5-2.5 mg/3 ml nebulizer Take 3 mL by nebulization 4 (Four) Times a Day As Needed. O2 bellow 90      • levothyroxine (SYNTHROID, LEVOTHROID) 75 MCG tablet Take 75 mcg by mouth Daily.   3/12/2022 at Unknown time   • metOLazone (ZAROXOLYN) 2.5 MG tablet Take 1 tablet by mouth Daily As Needed.   3/12/2022 at Unknown time   • metoprolol tartrate (LOPRESSOR) 25 MG tablet Take 12.5 mg by mouth 2 (Two) Times a Day.      • ondansetron (ZOFRAN) 4 MG tablet Take 4 mg by mouth Every 8 (Eight) Hours As Needed for Nausea or Vomiting.      • potassium chloride (MICRO-K) 10 MEQ CR capsule Take 10 mEq by mouth Daily.   3/12/2022 at Unknown time   • sildenafil (VIAGRA) 25 MG tablet Take 1 tablet by mouth 2 (Two) Times a Day. 60 tablet 11 3/12/2022 at Unknown time       Scheduled Meds:  albumin human, 37.5 g, Intravenous, Once   Or  albumin human, 50 g, Intravenous, Once   Or  albumin human, 62.5 g, Intravenous, Once   Or  albumin human, 75 g, Intravenous, Once   Or  albumin human, 87.5 g, Intravenous, Once   Or  albumin human, 100 g, Intravenous, Once   Or  albumin human, 112.5 g, Intravenous, Once  ferric gluconate, 250 mg, Intravenous, Daily  ipratropium-albuterol, 3 mL, Nebulization, 4x Daily - RT  metoprolol tartrate, 25 mg, Oral, Q12H  pantoprazole, 40 mg,  Intravenous, Q AM  piperacillin-tazobactam, 3.375 g, Intravenous, Q12H  polyethylene glycol, 17 g, Oral, Daily  sildenafil, 20 mg, Oral, Q8H  sodium chloride, 10 mL, Intravenous, Q12H  sucralfate, 1 g, Oral, 4x Daily AC & at Bedtime  ursodiol, 300 mg, Oral, TID        Continuous Infusions:       PRN Meds:  •  acetaminophen **OR** [DISCONTINUED] acetaminophen **OR** [DISCONTINUED] acetaminophen  •  Calcium Gluconate-NaCl **AND** calcium gluconate **AND** Calcium, Ionized  •  magnesium sulfate **OR** magnesium sulfate **OR** magnesium sulfate  •  melatonin  •  ondansetron **OR** ondansetron  •  potassium chloride  •  potassium chloride  •  [COMPLETED] Insert peripheral IV **AND** sodium chloride  •  sodium chloride    Allergies:  Hydrocodone    OBJECTIVE    Vital Signs  Temp:  [97.6 °F (36.4 °C)-98.6 °F (37 °C)] 97.6 °F (36.4 °C)  Heart Rate:  [] 80  Resp:  [17-22] 17  BP: (103-126)/(59-69) 126/64    I/O this shift:  In: 480 [P.O.:480]  Out: -   I/O last 3 completed shifts:  In: 100 [IV Piggyback:100]  Out: -     Physical Exam:  General Appearance: alert, appears stated age and cooperative  Head: normocephalic, without obvious abnormality and atraumatic  Eyes: conjunctivae and sclerae normal and no icterus  Neck: supple +JVD  Lungs: DECREASED BS BIBASILAR AND +RIGHT BASILAR CRACKLES  Heart: IRREG IRREG +ANATOLIY  Chest Wall: no abnormalities observed  Abdomen: normal bowel sounds and soft +ASCITES AND SOME DISTENSION  Extremities: moves extremities well, 2+ BILAT LE EDEMA/ANASARCA, no cyanosis  Skin: +FEW SMALL SCATTERED ECCHYMOSIS  Neurologic: Alert, and oriented. No focal deficits    Results Review:    I reviewed the patient's new clinical results.    WBC WBC   Date Value Ref Range Status   03/13/2022 6.90 3.40 - 10.80 10*3/mm3 Final   03/12/2022 6.40 3.40 - 10.80 10*3/mm3 Final      HGB Hemoglobin   Date Value Ref Range Status   03/13/2022 8.9 (L) 12.0 - 15.9 g/dL Final   03/12/2022 9.5 (L) 12.0 - 15.9 g/dL Final       HCT Hematocrit   Date Value Ref Range Status   03/13/2022 27.4 (L) 34.0 - 46.6 % Final   03/12/2022 29.4 (L) 34.0 - 46.6 % Final      Platlets No results found for: LABPLAT   MCV MCV   Date Value Ref Range Status   03/13/2022 76.6 (L) 79.0 - 97.0 fL Final   03/12/2022 77.4 (L) 79.0 - 97.0 fL Final          Sodium Sodium   Date Value Ref Range Status   03/13/2022 137 136 - 145 mmol/L Final   03/12/2022 135 (L) 136 - 145 mmol/L Final      Potassium Potassium   Date Value Ref Range Status   03/13/2022 2.8 (L) 3.5 - 5.2 mmol/L Final   03/12/2022 3.0 (L) 3.5 - 5.2 mmol/L Final      Chloride Chloride   Date Value Ref Range Status   03/13/2022 98 98 - 107 mmol/L Final   03/12/2022 97 (L) 98 - 107 mmol/L Final      CO2 CO2   Date Value Ref Range Status   03/13/2022 27.0 22.0 - 29.0 mmol/L Final   03/12/2022 25.0 22.0 - 29.0 mmol/L Final      BUN BUN   Date Value Ref Range Status   03/13/2022 31 (H) 8 - 23 mg/dL Final   03/12/2022 32 (H) 8 - 23 mg/dL Final      Creatinine Creatinine   Date Value Ref Range Status   03/13/2022 2.13 (H) 0.57 - 1.00 mg/dL Final   03/12/2022 2.16 (H) 0.57 - 1.00 mg/dL Final      Calcium Calcium   Date Value Ref Range Status   03/13/2022 9.1 8.6 - 10.5 mg/dL Final   03/12/2022 9.0 8.6 - 10.5 mg/dL Final      PO4 No results found for: CAPO4   Albumin Albumin   Date Value Ref Range Status   03/13/2022 3.40 (L) 3.50 - 5.20 g/dL Final   03/12/2022 3.50 3.50 - 5.20 g/dL Final      Magnesium Magnesium   Date Value Ref Range Status   03/13/2022 1.9 1.6 - 2.4 mg/dL Final   03/12/2022 1.9 1.6 - 2.4 mg/dL Final      Uric Acid No results found for: URICACID       Imaging Results (Last 72 Hours)     Procedure Component Value Units Date/Time    US Abdomen Limited [[id number]] Collected: 03/13/22 1253     Updated: 03/13/22 1258    Narrative:      US ABDOMEN LIMITED-     Date of Exam: 3/13/2022 12:10 PM     Indication: Abdominal pain; R10.11-Right upper quadrant pain;  H88-Gerrlgr effusion, not elsewhere  classified; I48.20-Chronic atrial  fibrillation, unspecified.     Comparison: None available.     Technique: Grayscale and color doppler ultrasound images of the abdomen  were obtained.      FINDINGS:  Liver is heterogeneous and somewhat nodular in contour compatible with  cirrhosis. No focal lesion or intrahepatic biliary ductal dilation  noted. The hepatic and portal vein are patent. Evaluation direction of  flow somewhat limited by the patient's inability to breath-hold.     Patient is status post cholecystectomy.  The common bile duct measures  maximally 5.3 mm     The visualized portions of the head and body of the pancreas are normal.   The pancreatic tail is not seen due to bowel gas.     There is no hydronephrosis.  The renal cortex has normal thickness and  echogenicity.  No gross renal masses.       Small to moderate amount of right upper quadrant ascites               Impression:      Findings compatible with cirrhosis     Status post cholecystectomy     Right upper quadrant ascites           Electronically Signed By-Ole Mojica On:3/13/2022 12:56 PM  This report was finalized on [id number] by  Ole Mojica, .    CT Abdomen Pelvis Without Contrast [[id number]] Collected: 03/12/22 2346     Updated: 03/12/22 2351    Narrative:      CT OF THE ABDOMEN AND PELVIS WITHOUT CONTRAST    Clinical indication: Abdominal pain.    Comparison: 2/13/2021.    Procedure: Noncontrast CT images of the abdomen and pelvis were obtained.  CT dose lowering techniques were used, to include: automated exposure control, adjustment for patient size, and or use of iterative reconstruction.    Findings:     Lung Bases: Lung bases are clear. Moderate right pleural effusion. Heart size is normal without pericardial effusion.    Liver and biliary system: The liver is normal in size and configuration without focal lesion. No intra or extrahepatic biliary ductal dilatation is noted. The gallbladder is absent.     Pancreas: The  pancreas is unremarkable.     Spleen: The spleen is normal.    Adrenals: The adrenal glands are within normal limits.     Kidneys: The kidneys are symmetric in size and without hydronephrosis.    Gastrointestinal: The stomach and scattered loops of small and large bowel have a nonobstructive pattern. Diverticulosis. The appendix is normal in the right lower quadrant.     Mesentery and retroperitoneum: No mesenteric or retroperitoneal adenopathy. No free air. Small to moderate on diffuse peritoneal ascites fluid. Atherosclerosis.    Pelvis: The urinary bladder is moderately distended with a smooth contour. Rectum is normal. No free fluid. No deep pelvic or inguinal adenopathy.     Reproductive: No acute abnormality.    Body wall: Anasarca.    Bones: Stable multilevel degenerative changes in the spine and stable T12 vertebral body compression.      Impression:      Impression:   1. Limited evaluation without IV contrast.  2. Small to moderate diffuse peritoneal ascites fluid. No organized abscess seen.  3. Diverticulosis without acute diverticulitis.  4. Anasarca.  5. Atherosclerosis.  6. Stable multilevel degenerative changes in the thoracolumbar spine and stable T12 vertebral body compression.  6. Moderate right pleural effusion.    Electronically signed by:  Blair Jeffery M.D.    3/12/2022 9:49 PM            Results for orders placed during the hospital encounter of 02/01/21    XR Chest 1 View    Narrative  Examination: XR CHEST 1 VW-    Date of Exam: 2/1/2021 8:08 PM    Indication: ?syncope.    Comparison: 01/07/2021    Technique: 1 view of the chest    FINDINGS:  The heart size is upper limits of normal. There are no definite areas of  airspace opacity. There is no septal thickening. No pleural effusion or  pneumothorax. There is probably mitral annulus calcification. No  significant bone abnormality.    Impression  1. No evidence of active disease.    Electronically Signed By-Kiarra Burrell MD On:2/1/2021 8:15  PM  This report was finalized on [id number] by  Kiarra Burrell MD.      Results for orders placed during the hospital encounter of 01/07/21    XR Chest 2 View    Narrative  DATE OF EXAM:  1/7/2021 7:57 AM    PROCEDURE:  XR CHEST 2 VW-    INDICATIONS:  Repeated falls, shortness of breath, coronary artery atherosclerotic  vascular disease, past tobacco abuse.    COMPARISON:  3/18/2020.    TECHNIQUE:  Two radiologic views of the chest.    FINDINGS:  The heart size is normal. The pulmonary vascular markings are normal.  The lungs and pleural spaces are clear of active disease.  There are  chronic age-related changes involving the bony thorax and thoracic  aorta.    Impression  No active disease.    Electronically Signed By-Александр Eden MD On:1/7/2021 8:03 AM  This report was finalized on [id number] by  Александр Eden MD.      Results for orders placed during the hospital encounter of 03/18/20    XR Chest 2 View    Narrative  DATE OF EXAM:  3/18/2020 10:55 AM    PROCEDURE:  XR CHEST 2 VW-    INDICATIONS:  eval pna progression, hypoxia; J18.9-Pneumonia, unspecified organism    COMPARISON:  Chest radiographs 03/11/2020, 30 09/20/2020, and 08/04/2016.    TECHNIQUE:  Two radiologic views of the chest , PA and lateral were obtained.    FINDINGS:  Overlying artifacts. Low lung volumes with small to moderate bilateral  pleural effusions and bilateral perihilar and bibasilar opacities.  Indistinct pulmonary vasculature. No pneumothorax. Cardiac contours are  partially obscured. Diffuse osteopenia. Partially visualized thoracic  spondylosis. Age-indeterminate upper lumbar compression fracture  deformity.    Impression  1. Limited study demonstrating small to moderate bilateral pleural  effusions with bilateral perihilar and bibasilar atelectasis and/or  pneumonia and possible superimposed mild pulmonary edema. Atypical  pneumonia could have a similar appearance.  2. Diffuse osteopenia with age-indeterminate compression  fracture  deformity of the and upper lumbar vertebral body.    Electronically Signed By-Edilberto aMin On:3/18/2020 11:37 AM  This report was finalized on [id number] by  Edilberto Main, .        Results for orders placed in visit on 05/16/16    SCANNED VASCULAR STUDIES      ASSESSMENT / PLAN      Acute on chronic congestive heart failure (HCC)    Coronary artery disease involving native coronary artery of native heart without angina pectoris    History of CVA (cerebrovascular accident)    Primary hypertension    Pulmonary hypertension (HCC)    Hypokalemia    Chronic respiratory failure (HCC)    Longstanding persistent atrial fibrillation (HCC)    Long term (current) use of anticoagulants    Mixed hyperlipidemia    GERD without esophagitis    Oxygen dependent    Generalized abdominal pain    Chronic kidney disease (CKD), stage IV (severe) (HCC)    Chronic constipation    Pleural effusion on right    Hyperbilirubinemia    Anasarca    Abdominal ascites    COPD (chronic obstructive pulmonary disease) (HCC)    1. RENAL FAILURE-------Nonoliguric. Known CRF/CKD STG 4 secondary to HTN NS. Current serum creatinine is near baseline. Follow with diuresis. No obstructive uropathy on CT. Check PVR. No uremia. No acute indication for dialysis yet. No NSAIDs or IV dye. Dose meds for CrCl 15-30 cc/min    2. HYPOKALEMIA------Replace IV and po and check Mg    3. ACUTE EXACERBATION OF CHRONIC DIASTOLIC CHF/VALVULAR HEART DISEASE WITH MVR AND TVR/PULMONARY HTN--------Cautious diuresis with Lasix and Diuril. Increase Synthroid. ECHO reviewed. Check bilateral LE venous dopplers. On Revatio    4. HYPOTHYROIDISM------TSH elevated. Increase Synthroid and follow    5. ANEMIA OF CKD------IV iron for TALIB and start EPO    6. OA/DJD-----No NSAIDs    7. HYPERURICEMIA------Uric acid okay on Allopurinol    8. ATRIAL FIBRILLATION------Rate controlled and anticoagulated    9. HTN WITH CKD-----BP okay. Avoid hypotension. No ACE-I/ARB/DRI    10. RIGHT  PLEURAL EFFUSION-----May need thoracentesis    11. ASCITES/HYPERBILIRUBINEMIA-----IV Albumin and Lasix/Diuril. Paracentesis pending. On Ursodiol    12. HYPOALBUMINEMIA------IV Albumin to temporize    13. GERD/PUD PROPHYLAXIS-------PPI and Carafate. Benefits of chronic PPI use outweigh risks despite CKD    14. DVT PROPHYLAXIS------Anticoagulated    15. DEPRESSION------On Lexapro    16. LACTIC ACIDOSIS--------Mild. Unclear etiology. Support hemodynamics and avoid hypotension. Cx pending. On empiric renal dose adjusted Zosyn    17. MILD THROMBOCYTOPENIA    18. VITAMIN D DEFICIENCY-----On Supplementation    19. SECONDARY HYPERPARATHYROIDISM------Intact PTH historically borderline for treatment threshold. Follow Phos      I discussed the patients findings and my recommendations with patient, family and nursing staff    Will follow along closely. Thank you for allowing us to see this patient in renal consultation.    [facility]  [phone]  MD Micaela Iqbal MD  03/13/22  17:53 EDT

## 2022-03-13 NOTE — CONSULTS
GI CONSULT  NOTE:    Referring Provider:   Dr Escoto     Chief complaint:  Hyperbilirubinemia    Subjective    Abdominal pain and shortness of breath    History of present illness:    Patient is a 86-year-old female with a history of atrial fibrillation on Eliquis, CHF, hypertension, stroke/TIA/chronic renal failure, pancreatitis, valvular heart disease, and cholecystectomy who was brought into the hospital on 3/12/2021 with a complaint of shortness of breath and abdominal pain.  Patient states she has had generalized abdominal pain for a couple weeks.  She is having burning in her upper abdomen.  She denies any heartburn or reflux.  She denies any nausea or vomiting.  States her appetite has been decreased.  States that her last bowel movement was Friday, 3/11/2022 and was normal soft and brown.  Patient underwent CT of the abdomen and pelvis without contrast that showed diffuse ascites as well as anasarca.  GI was consulted for hyperbilirubinemia.  Patient's bilirubin was 2.0 upon admission.  It was 1.9 on 3/10/2022.  Prior to that I have a total bilirubin on 7/9/2021 that was 1.2.  Her LFTs teas are normal as well as amylase and lipase.  Patient has had her gallbladder removed.  Patient's hemoglobin was 8.9 with MCV of 76.  Serum iron is 27, ferritin 47, iron saturation 6% so she is iron deficient.     Endo History:  6/9/2009 colonoscopy (Dr. Garcia) diverticulosis of the sigmoid colon.  History of colon polyps but none on this exam.  11/2/2005 colonoscopy (Dr. Garcia) multiple diverticula seen in the sigmoid colon.      Past Medical History:  Past Medical History:   Diagnosis Date   • Abnormality of left atrial appendage 05/17/2016    Suspected Clot Noted on ESTEFANI   • Acute renal failure (HCC) 08/06/2016-[facility]    Front Dehydration   • Arthritis    • Breast density 12/2017   • Carotid stenosis 11/05/2018    R @ 60% and L @ 10-20% Noted on Cardiac Cath & 16-49% on L&R    • Heart murmur    • History of echocardiogram  10/13/2017    Calcified Aortic Leaftlets; Mild Aortic Stenosis Present; Mild Mitral Stenosis; Bilateral Enlargement Noted;  Moderate TR; LV Function of 55-60%   • History of echocardiogram 12/5/14-[facility]    Moderate L Vent Hypertrophy Noted; L Atrial Dilation; Moderate MR; Mild TR; Mild-Moderate Aortic Reg Noted; Normal Root Size/No Effusion   • History of echocardiogram 5/16/16-[facility]    Normal Findings with Mild-Moderate MVS Noted   • History of EKG 2004/2016 & 11/5/18-[facility]    All Sinus Rhythm w/Infarct Ischemnic Changes Noted   • HLD (hyperlipidemia)     Controlled w/Meds   • Hx of hyperglycemia    • Hypertension     Controlled w/Meds   • Hypothyroidism     Levothyroxine   • Joint pain    • Left atrial dilatation 12/05/2014    Mildly Noted on Echo   • Left ventricular hypertrophy 12/05/2014    Noted on Echo w/EF @ 55-60%   • Mild aortic regurgitation 12/05/2014    to Moderate Noted on Echo   • Mild aortic stenosis 10/13/2017    Noted on Echo & ESTEFANI-11/5/18-Moderate    • Mild mitral insufficiency 10/13/2017    Noted on Echo & ESTEFANI-11/5/18   • Mild tricuspid regurgitation 12/05/2014    Noted on Echo   • Moderate mitral regurgitation 12/05/2014    Noted on Echo   • Moderate mitral stenosis 10/13/2017    Noted on Echo   • Moderate tricuspid insufficiency 10/13/2017    Noted on Echo   • Pneumonia involving left lung 08/6/16-[facility] ER   • Pulmonary hypertension (HCC) 10/13/2017 & 11/5/18-Cath    Severe Noted on Echo & Cath   • Slurred speech 05/16/2016   • SOB (shortness of breath) 08/2016   • Stroke ([facility])    • Thickened mitral leaflet 10/13/2017 & ESTEFANI-11/5/18    Severely Calcified Noted on Echo   • Tricuspid valve insufficiency 11/05/2018    Noted on ESTEFANI       Past Surgical History:  Past Surgical History:   Procedure Laterality Date   • ATRIAL APPENDAGE EXCLUSION LEFT WITH TRANSESOPHAGEAL ECHOCARDIOGRAM N/A 1/7/2021    Procedure: Atrial Appendage Occlusion;  Surgeon: Harjit Berg MD;  Location: [facility] INVASIVE  LOCATION;  Service: Cardiovascular;  Laterality: N/A;   • ATRIAL APPENDAGE EXCLUSION LEFT WITH TRANSESOPHAGEAL ECHOCARDIOGRAM N/A 2021    Procedure: Atrial Appendage Occlusion;  Surgeon: Zack Zheng MD;  Location: [facility] CATH INVASIVE LOCATION;  Service: Cardiology;  Laterality: N/A;   • CARDIAC CATHETERIZATION Left 18-[facility]    w/L Coronary Angiography--Dr. Hernández-RCA @ 60% with Pulmonary Hypertension Noted   • CHOLECYSTECTOMY     • KNEE ARTHROSCOPY     • ESTEFANI  2016-[facility]    Dr. Hernández--Severly Calcified Mitral Leaflets w/Mild-Moderate Mitral Stenosis/Insufficiency; Sig Aortic Stenosis Noted; Suspecion of L Atrial Appendage Clot Noted   • ESTEFANI  18-[facility]    Dr. Hernández--Moderate Mitral Insufficiency Noted; Mild-Moderate AVS; Moderate Tricuspid Insufficiency Noted; EF of 65-70%   • TONSILLECTOMY         Social History:  Social History     Tobacco Use   • Smoking status: Former Smoker     Types: Cigarettes     Quit date:      Years since quittin.2   • Smokeless tobacco: Never Used   Vaping Use   • Vaping Use: Never used   Substance Use Topics   • Alcohol use: No   • Drug use: No       Family History:  Family History   Problem Relation Age of Onset   • Heart disease Mother    • Cancer Father    • No Known Problems Sister    • No Known Problems Brother    • No Known Problems Maternal Aunt    • No Known Problems Maternal Uncle    • No Known Problems Paternal Aunt    • No Known Problems Paternal Uncle    • No Known Problems Maternal Grandmother    • No Known Problems Maternal Grandfather    • No Known Problems Paternal Grandmother    • No Known Problems Paternal Grandfather    • No Known Problems Other    • Anemia Neg Hx    • Arrhythmia Neg Hx    • Asthma Neg Hx    • Clotting disorder Neg Hx    • Fainting Neg Hx    • Heart attack Neg Hx    • Heart failure Neg Hx    • Hyperlipidemia Neg Hx    • Hypertension Neg Hx        Medications:  Medications Prior to Admission   Medication Sig Dispense Refill Last  Dose   • allopurinol (ZYLOPRIM) 100 MG tablet Take 1 tablet by mouth Daily.   3/12/2022 at Unknown time   • Eliquis 5 MG tablet tablet TAKE 1 TABLET TWICE DAILY 180 tablet 2 3/12/2022 at Unknown time   • escitalopram (LEXAPRO) 10 MG tablet Take 10 mg by mouth Daily.   3/12/2022 at Unknown time   • furosemide (LASIX) 40 MG tablet Take 40 mg by mouth Daily.   3/12/2022 at Unknown time   • levothyroxine (SYNTHROID, LEVOTHROID) 75 MCG tablet Take 75 mcg by mouth Daily.   3/12/2022 at Unknown time   • metOLazone (ZAROXOLYN) 2.5 MG tablet Take 1 tablet by mouth Daily As Needed.   3/12/2022 at Unknown time   • metoprolol tartrate (LOPRESSOR) 25 MG tablet Take 1 tablet by mouth 2 (Two) Times a Day. (Patient taking differently: Take 25 mg by mouth 2 (Two) Times a Day. 12.5mg BID) 180 tablet 2 3/12/2022 at Unknown time   • O2 (OXYGEN) Inhale 2 L/min 1 (One) Time. At night and PRN   3/13/2022 at Unknown time   • potassium chloride (MICRO-K) 10 MEQ CR capsule Take 10 mEq by mouth Daily.   3/12/2022 at Unknown time   • sildenafil (VIAGRA) 25 MG tablet Take 1 tablet by mouth 2 (Two) Times a Day. 60 tablet 11 3/12/2022 at Unknown time   • acetaminophen (TYLENOL) 325 MG tablet Take 2 tablets by mouth Every 4 (Four) Hours As Needed for Mild Pain . 1 bottle 0 Unknown at Unknown time   • albuterol sulfate  (90 Base) MCG/ACT inhaler Inhale 2 puffs Every 4 (Four) Hours As Needed for Wheezing or Shortness of Air.   Unknown at Unknown time   • atorvastatin (LIPITOR) 20 MG tablet TAKE 1 TABLET EVERY DAY   Unknown at Unknown time   • Cholecalciferol (VITAMIN D-3) 1000 units capsule Take 1 capsule by mouth Daily.   3/11/2022   • ipratropium-albuterol (DUO-NEB) 0.5-2.5 mg/3 ml nebulizer 3 mL 4 (Four) Times a Day. O2 bellow 90   Unknown at Unknown time   • ondansetron (ZOFRAN) 4 MG tablet Take 4 mg by mouth Every 8 (Eight) Hours As Needed for Nausea or Vomiting.   Unknown at Unknown time   • temazepam (RESTORIL) 15 MG capsule Take 15 mg  by mouth.          Scheduled Meds:albumin human, 37.5 g, Intravenous, Once   Or  albumin human, 50 g, Intravenous, Once   Or  albumin human, 62.5 g, Intravenous, Once   Or  albumin human, 75 g, Intravenous, Once   Or  albumin human, 87.5 g, Intravenous, Once   Or  albumin human, 100 g, Intravenous, Once   Or  albumin human, 112.5 g, Intravenous, Once  ipratropium-albuterol, 3 mL, Nebulization, 4x Daily - RT  pantoprazole, 40 mg, Intravenous, Q AM  piperacillin-tazobactam, 3.375 g, Intravenous, Q12H  sodium chloride, 10 mL, Intravenous, Q12H      Continuous Infusions:   PRN Meds:.•  acetaminophen **OR** acetaminophen **OR** acetaminophen  •  melatonin  •  nitroglycerin  •  ondansetron **OR** ondansetron  •  [COMPLETED] Insert peripheral IV **AND** sodium chloride  •  sodium chloride    ALLERGIES:  Hydrocodone    ROS:  Review of Systems   Constitutional: Positive for appetite change.   Respiratory: Positive for shortness of breath.    Cardiovascular: Positive for leg swelling.   Gastrointestinal: Positive for abdominal pain. Negative for abdominal distention, anal bleeding, blood in stool, constipation, diarrhea, nausea, rectal pain and vomiting.       The following systems were reviewed and negative;     Constitution:  No fevers, chills, no unintentional weight loss  Skin: no rash, no jaundice  Eyes:  No blurry vision, no eye pain  HENT:  No change in hearing or smell  Resp:  No dyspnea or cough  CV:  No chest pain or palpitations  :  No dysuria, hematuria  Musculoskeletal:  No leg cramps or arthralgias  Neuro:  No tremor, no numbness  Psych:  No depression or confusion    Objective Resting in hospital bed.  NAD.  Chronically ill-appearing.  Room 251.  No family present.    Vital Signs:   Vitals:    03/12/22 2210 03/12/22 2326 03/13/22 0348 03/13/22 0702   BP: 117/61 104/65 112/69    BP Location:   Left arm    Patient Position:   Lying    Pulse: 80 81 82    Resp: 22 22 17    Temp:   97.6 °F (36.4 °C)    TempSrc:    "Oral    SpO2: 99% 98% 96%    Weight:   76.9 kg (169 lb 8.5 oz) 76.7 kg (169 lb)   Height:   162.6 cm (64\") 162.6 cm (64\")       Physical Exam:   General Appearance:    Awake and alert, in no acute distress   Head:    Normocephalic, without obvious abnormality, atraumatic   Eyes:            Conjunctivae normal, anicteric sclerae, pupils equal   Ears:    Ears appear intact with no abnormalities noted   Throat:   No oral lesions, no thrush, oral mucosa moist   Neck:   Supple, no JVD   Lungs:     Clear to auscultation bilaterally, respirations regular, even and unlabored    Heart:    Regular rhythm and normal rate, normal S1 and S2, no            Murmur appreciated   Chest Wall:    No abnormalities observed   Abdomen:     Normal bowel sounds, soft, nontender, no rebound or guarding, nondistended, no hepatosplenomegaly   Rectal:     Deferred   Extremities:   Moves all extremities, 2+ edema, no cyanosis   Pulses:   Pulses palpable and equal bilaterally   Skin:   Anasarca. No rash, no jaundice, normal palpation   Lymph nodes:   No cervical, supraclavicular or submandibular palpable adenopathy   Neurologic:   Cranial nerves 2 - 12 grossly intact, no asterixis       Results Review:   I reviewed the patient's labs and imaging.  Lab Results (last 24 hours)     Procedure Component Value Units Date/Time    Ferritin [[id number]]  (Normal) Collected: 03/13/22 0550    Specimen: Blood Updated: 03/13/22 0843     Ferritin 47.18 ng/mL     Narrative:      Results may be falsely decreased if patient taking Biotin.      Bilirubin, Total & Direct [[id number]]  (Abnormal) Collected: 03/13/22 0550    Specimen: Blood Updated: 03/13/22 0836     Total Bilirubin 2.0 mg/dL      Bilirubin, Direct 0.9 mg/dL      Bilirubin, Indirect 1.1 mg/dL     Iron Profile [[id number]]  (Abnormal) Collected: 03/13/22 0550    Specimen: Blood Updated: 03/13/22 0836     Iron 27 mcg/dL      Iron Saturation 6 %      Transferrin 298 mg/dL      TIBC 444 mcg/dL     " "Hepatitis Panel, Acute [[id number]]  (Normal) Collected: 03/13/22 0550    Specimen: Blood Updated: 03/13/22 0751     Hepatitis B Surface Ag Non-Reactive     Hep A IgM Non-Reactive     Hep B C IgM Non-Reactive     Hepatitis C Ab Non-Reactive    Narrative:      Results may be falsely decreased if patient taking Biotin.     Troponin [[id number]]  (Normal) Collected: 03/13/22 0550    Specimen: Blood Updated: 03/13/22 0744     Troponin T 0.013 ng/mL     Narrative:      Troponin T Reference Range:  <= 0.03 ng/mL-   Negative for AMI  >0.03 ng/mL-     Abnormal for myocardial necrosis.  Clinicians would have to utilize clinical acumen, EKG, Troponin and serial changes to determine if it is an Acute Myocardial Infarction or myocardial injury due to an underlying chronic condition.       Results may be falsely decreased if patient taking Biotin.      Procalcitonin [[id number]]  (Normal) Collected: 03/13/22 0550    Specimen: Blood Updated: 03/13/22 0725     Procalcitonin 0.11 ng/mL     Narrative:      As a Marker for Sepsis (Non-Neonates):    1. <0.5 ng/mL represents a low risk of severe sepsis and/or septic shock.  2. >2 ng/mL represents a high risk of severe sepsis and/or septic shock.    As a Marker for Lower Respiratory Tract Infections that require antibiotic therapy:    PCT on Admission    Antibiotic Therapy       6-12 Hrs later    >0.5                Strongly Recommended  >0.25 - <0.5        Recommended   0.1 - 0.25          Discouraged              Remeasure/reassess PCT  <0.1                Strongly Discouraged     Remeasure/reassess PCT    As 28 day mortality risk marker: \"Change in Procalcitonin Result\" (>80% or <=80%) if Day 0 (or Day 1) and Day 4 values are available. Refer to http://www.[employer]-pct-calculator.com    Change in PCT <=80%  A decrease of PCT levels below or equal to 80% defines a positive change in PCT test result representing a higher risk for 28-day all-cause mortality of patients diagnosed with severe " sepsis for septic shock.    Change in PCT >80%  A decrease of PCT levels of more than 80% defines a negative change in PCT result representing a lower risk for 28-day all-cause mortality of patients diagnosed with severe sepsis or septic shock.       BNP [[id number]]  (Abnormal) Collected: 03/13/22 0550    Specimen: Blood Updated: 03/13/22 0725     proBNP 4,738.0 pg/mL     Narrative:      Among patients with dyspnea, NT-proBNP is highly sensitive for the detection of acute congestive heart failure. In addition NT-proBNP of <300 pg/ml effectively rules out acute congestive heart failure with 99% negative predictive value.    Results may be falsely decreased if patient taking Biotin.      TSH [[id number]]  (Abnormal) Collected: 03/13/22 0550    Specimen: Blood Updated: 03/13/22 0725     TSH 5.450 uIU/mL     Comprehensive Metabolic Panel [[id number]]  (Abnormal) Collected: 03/13/22 0550    Specimen: Blood Updated: 03/13/22 0724     Glucose 96 mg/dL      BUN 31 mg/dL      Creatinine 2.13 mg/dL      Sodium 137 mmol/L      Potassium 2.8 mmol/L      Chloride 98 mmol/L      CO2 27.0 mmol/L      Calcium 9.1 mg/dL      Total Protein 6.4 g/dL      Albumin 3.40 g/dL      ALT (SGPT) 7 U/L      AST (SGOT) 14 U/L      Alkaline Phosphatase 114 U/L      Total Bilirubin 2.0 mg/dL      Globulin 3.0 gm/dL      A/G Ratio 1.1 g/dL      BUN/Creatinine Ratio 14.6     Anion Gap 12.0 mmol/L      eGFR 22.3 mL/min/1.73      Comment: National Kidney Foundation and American Society of Nephrology (ASN) Task Force recommended calculation based on the Chronic Kidney Disease Epidemiology Collaboration (CKD-EPI) equation refit without adjustment for race.       Narrative:      GFR Normal >60  Chronic Kidney Disease <60  Kidney Failure <15      Phosphorus [[id number]]  (Normal) Collected: 03/13/22 0550    Specimen: Blood Updated: 03/13/22 0724     Phosphorus 3.1 mg/dL     Magnesium [[id number]]  (Normal) Collected: 03/13/22 0550    Specimen: Blood Updated:  03/13/22 0724     Magnesium 1.9 mg/dL     Lactic Acid, Plasma [[id number]]  (Normal) Collected: 03/13/22 0550    Specimen: Blood Updated: 03/13/22 0710     Lactate 1.1 mmol/L     Protime-INR [[id number]]  (Abnormal) Collected: 03/13/22 0550    Specimen: Blood Updated: 03/13/22 0706     Protime 17.9 Seconds      INR 1.68    CBC Auto Differential [[id number]]  (Abnormal) Collected: 03/13/22 0550    Specimen: Blood Updated: 03/13/22 0652     WBC 6.90 10*3/mm3      RBC 3.58 10*6/mm3      Hemoglobin 8.9 g/dL      Hematocrit 27.4 %      MCV 76.6 fL      MCH 24.9 pg      MCHC 32.5 g/dL      RDW 19.2 %      RDW-SD 51.2 fl      MPV 8.1 fL      Platelets 134 10*3/mm3      Neutrophil % 73.2 %      Lymphocyte % 13.5 %      Monocyte % 11.3 %      Eosinophil % 1.6 %      Basophil % 0.4 %      Neutrophils, Absolute 5.10 10*3/mm3      Lymphocytes, Absolute 0.90 10*3/mm3      Monocytes, Absolute 0.80 10*3/mm3      Eosinophils, Absolute 0.10 10*3/mm3      Basophils, Absolute 0.00 10*3/mm3      nRBC 0.0 /100 WBC     COVID PRE-OP / PRE-PROCEDURE SCREENING ORDER (NO ISOLATION) - Swab, Nasopharynx [[id number]]  (Normal) Collected: 03/12/22 2353    Specimen: Swab from Nasopharynx Updated: 03/13/22 0031    Narrative:      The following orders were created for panel order COVID PRE-OP / PRE-PROCEDURE SCREENING ORDER (NO ISOLATION) - Swab, Nasopharynx.  Procedure                               Abnormality         Status                     ---------                               -----------         ------                     COVID-19,CEPHEID/ADEEL,CO...[[id number]]  Normal              Final result                 Please view results for these tests on the individual orders.    COVID-19,CEPHEID/ADEEL,COR/ORA/PAD/CIRILO IN-HOUSE(OR EMERGENT/ADD-ON),NP SWAB IN TRANSPORT MEDIA 3-4 HR TAT, RT-PCR - Swab, Nasopharynx [[id number]]  (Normal) Collected: 03/12/22 4363    Specimen: Swab from Nasopharynx Updated: 03/13/22 0031     COVID19 Not Detected     Narrative:      Fact sheet for providers: https://www.fda.gov/media/353005/download     Fact sheet for patients: https://www.fda.gov/media/192858/download  Fact sheet for providers: https://www.fda.gov/media/749884/download    Fact sheet for patients: https://www.fda.gov/media/711373/download    Test performed by PCR.    Magnesium [[id number]]  (Normal) Collected: 03/12/22 2105    Specimen: Blood Updated: 03/13/22 0016     Magnesium 1.9 mg/dL     Troponin [[id number]]  (Normal) Collected: 03/12/22 2105    Specimen: Blood Updated: 03/12/22 2219     Troponin T 0.011 ng/mL     Narrative:      Troponin T Reference Range:  <= 0.03 ng/mL-   Negative for AMI  >0.03 ng/mL-     Abnormal for myocardial necrosis.  Clinicians would have to utilize clinical acumen, EKG, Troponin and serial changes to determine if it is an Acute Myocardial Infarction or myocardial injury due to an underlying chronic condition.       Results may be falsely decreased if patient taking Biotin.      Comprehensive Metabolic Panel [[id number]]  (Abnormal) Collected: 03/12/22 2105    Specimen: Blood Updated: 03/12/22 2134     Glucose 136 mg/dL      BUN 32 mg/dL      Creatinine 2.16 mg/dL      Sodium 135 mmol/L      Potassium 3.0 mmol/L      Chloride 97 mmol/L      CO2 25.0 mmol/L      Calcium 9.0 mg/dL      Total Protein 6.7 g/dL      Albumin 3.50 g/dL      ALT (SGPT) 7 U/L      AST (SGOT) 16 U/L      Alkaline Phosphatase 116 U/L      Total Bilirubin 2.0 mg/dL      Globulin 3.2 gm/dL      A/G Ratio 1.1 g/dL      BUN/Creatinine Ratio 14.8     Anion Gap 13.0 mmol/L      eGFR 22.0 mL/min/1.73      Comment: National Kidney Foundation and American Society of Nephrology (ASN) Task Force recommended calculation based on the Chronic Kidney Disease Epidemiology Collaboration (CKD-EPI) equation refit without adjustment for race.       Narrative:      GFR Normal >60  Chronic Kidney Disease <60  Kidney Failure <15      Lipase [[id number]]  (Normal) Collected: 03/12/22  2105    Specimen: Blood Updated: 03/12/22 2134     Lipase 30 U/L     Urinalysis With Microscopic If Indicated (No Culture) - Urine, Catheter [[id number]]  (Normal) Collected: 03/12/22 2112    Specimen: Urine, Catheter Updated: 03/12/22 2121     Color, UA Yellow     Appearance, UA Clear     pH, UA 6.0     Specific Gravity, UA 1.008     Glucose, UA Negative     Ketones, UA Negative     Bilirubin, UA Negative     Blood, UA Negative     Protein, UA Negative     Leuk Esterase, UA Negative     Nitrite, UA Negative     Urobilinogen, UA 0.2 E.U./dL    Narrative:      Urine microscopic not indicated.    CBC & Differential [[id number]]  (Abnormal) Collected: 03/12/22 2105    Specimen: Blood Updated: 03/12/22 2119    Narrative:      The following orders were created for panel order CBC & Differential.  Procedure                               Abnormality         Status                     ---------                               -----------         ------                     CBC Auto Differential[[id number]]        Abnormal            Final result               Scan Slide[[id number]]                                                                    Please view results for these tests on the individual orders.    CBC Auto Differential [[id number]]  (Abnormal) Collected: 03/12/22 2105    Specimen: Blood Updated: 03/12/22 2119     WBC 6.40 10*3/mm3      RBC 3.80 10*6/mm3      Hemoglobin 9.5 g/dL      Hematocrit 29.4 %      MCV 77.4 fL      MCH 25.0 pg      MCHC 32.3 g/dL      RDW 19.5 %      RDW-SD 52.9 fl      MPV 8.0 fL      Platelets 146 10*3/mm3      Neutrophil % 73.1 %      Lymphocyte % 12.2 %      Monocyte % 12.2 %      Eosinophil % 1.9 %      Basophil % 0.6 %      Neutrophils, Absolute 4.70 10*3/mm3      Lymphocytes, Absolute 0.80 10*3/mm3      Monocytes, Absolute 0.80 10*3/mm3      Eosinophils, Absolute 0.10 10*3/mm3      Basophils, Absolute 0.00 10*3/mm3      nRBC 0.1 /100 WBC     POC Lactate [[id number]]  (Normal) Collected:  03/12/22 2108    Specimen: Blood Updated: 03/12/22 2111     Lactate 1.7 mmol/L      Comment: Serial Number: [id number]:  [id number]             Imaging Results (Last 24 Hours)     Procedure Component Value Units Date/Time    CT Abdomen Pelvis Without Contrast [[id number]] Collected: 03/12/22 2346     Updated: 03/12/22 2351    Narrative:      CT OF THE ABDOMEN AND PELVIS WITHOUT CONTRAST    Clinical indication: Abdominal pain.    Comparison: 2/13/2021.    Procedure: Noncontrast CT images of the abdomen and pelvis were obtained.  CT dose lowering techniques were used, to include: automated exposure control, adjustment for patient size, and or use of iterative reconstruction.    Findings:     Lung Bases: Lung bases are clear. Moderate right pleural effusion. Heart size is normal without pericardial effusion.    Liver and biliary system: The liver is normal in size and configuration without focal lesion. No intra or extrahepatic biliary ductal dilatation is noted. The gallbladder is absent.     Pancreas: The pancreas is unremarkable.     Spleen: The spleen is normal.    Adrenals: The adrenal glands are within normal limits.     Kidneys: The kidneys are symmetric in size and without hydronephrosis.    Gastrointestinal: The stomach and scattered loops of small and large bowel have a nonobstructive pattern. Diverticulosis. The appendix is normal in the right lower quadrant.     Mesentery and retroperitoneum: No mesenteric or retroperitoneal adenopathy. No free air. Small to moderate on diffuse peritoneal ascites fluid. Atherosclerosis.    Pelvis: The urinary bladder is moderately distended with a smooth contour. Rectum is normal. No free fluid. No deep pelvic or inguinal adenopathy.     Reproductive: No acute abnormality.    Body wall: Anasarca.    Bones: Stable multilevel degenerative changes in the spine and stable T12 vertebral body compression.      Impression:      Impression:   1. Limited evaluation without IV  contrast.  2. Small to moderate diffuse peritoneal ascites fluid. No organized abscess seen.  3. Diverticulosis without acute diverticulitis.  4. Anasarca.  5. Atherosclerosis.  6. Stable multilevel degenerative changes in the thoracolumbar spine and stable T12 vertebral body compression.  6. Moderate right pleural effusion.    Electronically signed by:  Blair Jeffery M.D.    3/12/2022 9:49 PM             ASSESSMENT AND PLAN:  Isolated hyperbilirubinemia mostly indirect   Generalized abdominal pain consider gastritis, constipation, functional  History of cholecystectomy  Decreased appetite  Iron deficiency anemia  Chronic kidney failure  Atrial fibrillation on Eliquis  CVA  Reported history of pancreatitis  CHF  Hypokalemia    PLAN:   I will give patient IV iron for her iron deficiency anemia.  Start ursodiol for elevated total bilirubin.  I will make sure patient is on a bowel regimen for some constipation.  Patient is on potassium replacement for her hypokalemia.  She is getting albumin for her anasarca.  Continue PPI for her abdominal pain and burning in her stomach.  We will add Carafate.  Okay for diet from GI standpoint.    I discussed the patient's findings and my recommendations with the patient.  Jenni Harrison, ZACHARIAH  03/13/22  09:34 EDT    Time:

## 2022-03-13 NOTE — CONSULTS
Cardiology Consult Note      REQUESTING PHYSICIAN    Keven Escoto MD    PATIENT IDENTIFICATION  Name: Karen Rubio  Age: 86 y.o.  Sex: female  :  1935  MRN: [id number]             REASON FOR CONSULTATION:  86-year-old female who is a patient of Dr. Cirilo Sim-previously patient of Dr. Hernández.  She has known history of paroxysmal atrial fibrillation, valvular heart disease-mitral/aortic, history of prior stroke, chronic heart failure with preserved ejection fraction, chronic kidney disease, dyslipidemia, pulmonary hypertension, chronic respiratory failure on home O2.      CC:  Shortness of breath  Weakness/fatigue  Abdominal pain    HISTORY OF PRESENT ILLNESS:   Patient presented to the emergency department at [facility] 3/12/2022 with complaint of abdominal pain.  Patient is a poor historian and unable to obtain much information.  She also reported some shortness of breath.  Symptoms over the last several days.  Work-up in the ED included abdominal CT which showed small-moderate diffuse peritoneal ascites fluid.  Anasarca.  Moderate right pleural effusion.  She was given 500 mL normal saline bolus in the ED.    Transthoracic echocardiogram with normal LV systolic function EF 70%, moderate concentric hypertrophy, moderate MR, severe TR, elevated right ventricular systolic pressure at 74 mmHg.  Trace to mild aortic valve insufficiency, moderate to severe aortic valve stenosis, moderate mitral valve insufficiency.    REVIEW OF SYSTEMS:  Review of systems could not be obtained due to   Very poor historian, short of breath    OBJECTIVE   proBNP 4738  Troponin negative x2  Creatinine 2.13  TSH 5.45  Iron 27  Hemoglobin 8.9    ASSESSMENT  Ascites  Liver cirrhosis  Acute on chronic heart failure with preserved ejection fraction  Valvular heart disease  Hypokalemia  Pulmonary hypertension  Chronic atrial fibrillation  Weakness  Shortness of breath  Chronic kidney disease  Chronic respiratory failure  "on home O2      PLAN  Abdominal pain work-up and management as per hospitalist  GI has evaluated with no work-up planned-signed off  Blood pressure and heart rate stable, chronic A. fib  Patient received potassium replacement for K of 3.0 on arrival  Nephrology consulted  Home med rec reviewed-patient on sildenafil for pulmonary hypertension, BB for chronic CHF, Eliquis for paroxysmal atrial fibrillation/elevated chads vascular score.  Will restart sildenafil and BB.  Hold Eliquis pending any invasive procedure.    Long ruddy discussion with patient's daughter.  Patient is not a candidate for heart surgery and would be a poor candidate for any other noncardiac surgery other than paracentesis.  Discussed hospice and she will look into this.        Vital Signs  Visit Vitals  /69 (BP Location: Left arm, Patient Position: Lying)   Pulse 82   Temp 97.6 °F (36.4 °C) (Oral)   Resp 17   Ht 162.6 cm (64\")   Wt 76.7 kg (169 lb)   SpO2 96%   BMI 29.01 kg/m²     Oxygen Therapy  SpO2: 96 %  Pulse Oximetry Type: Intermittent  Device (Oxygen Therapy): nasal cannula  Flow (L/min): 2  Flowsheet Rows      Flowsheet Row First Filed Value   Admission Height 162.6 cm (64\") Documented at 03/12/2022 2023   Admission Weight 68.5 kg (151 lb) Documented at 03/12/2022 2023          Intake & Output (last 3 days)         03/10 0701  03/11 0700 03/11 0701  03/12 0700 03/12 0701  03/13 0700 03/13 0701  03/14 0700    P.O.    240    IV Piggyback   100     Total Intake(mL/kg)   100 (1.3) 240 (3.1)    Net   +100 +240            Urine Unmeasured Occurrence    1 x    Stool Unmeasured Occurrence    1 x          Lines, Drains & Airways       Active LDAs       Name Placement date Placement time Site Days    Peripheral IV 03/12/22 2040 Left Antecubital 03/12/22 2040  Antecubital  less than 1    Peripheral IV 03/13/22 0622 Anterior;Right Forearm 03/13/22 0622  Forearm  less than 1                    MEDICAL HISTORY    Past Medical History: "   Diagnosis Date    Abnormality of left atrial appendage 05/17/2016    Suspected Clot Noted on ESTEFANI    Acute renal failure (HCC) 08/06/2016-F    Front Dehydration    Arthritis     Breast density 12/2017    Carotid stenosis 11/05/2018    R @ 60% and L @ 10-20% Noted on Cardiac Cath & 16-49% on L&R     Heart murmur     History of echocardiogram 10/13/2017    Calcified Aortic Leaftlets; Mild Aortic Stenosis Present; Mild Mitral Stenosis; Bilateral Enlargement Noted;  Moderate TR; LV Function of 55-60%    History of echocardiogram 12/5/14-F    Moderate L Vent Hypertrophy Noted; L Atrial Dilation; Moderate MR; Mild TR; Mild-Moderate Aortic Reg Noted; Normal Root Size/No Effusion    History of echocardiogram 5/16/16-F    Normal Findings with Mild-Moderate MVS Noted    History of EKG 2004/2016 & 11/5/18-F    All Sinus Rhythm w/Infarct Ischemnic Changes Noted    HLD (hyperlipidemia)     Controlled w/Meds    Hx of hyperglycemia     Hypertension     Controlled w/Meds    Hypothyroidism     Levothyroxine    Joint pain     Left atrial dilatation 12/05/2014    Mildly Noted on Echo    Left ventricular hypertrophy 12/05/2014    Noted on Echo w/EF @ 55-60%    Mild aortic regurgitation 12/05/2014    to Moderate Noted on Echo    Mild aortic stenosis 10/13/2017    Noted on Echo & ESTEFANI-11/5/18-Moderate     Mild mitral insufficiency 10/13/2017    Noted on Echo & ESTEFANI-11/5/18    Mild tricuspid regurgitation 12/05/2014    Noted on Echo    Moderate mitral regurgitation 12/05/2014    Noted on Echo    Moderate mitral stenosis 10/13/2017    Noted on Echo    Moderate tricuspid insufficiency 10/13/2017    Noted on Echo    Pneumonia involving left lung 08/6/16-F ER    Pulmonary hypertension (HCC) 10/13/2017 & 11/5/18-Cath    Severe Noted on Echo & Cath    Slurred speech 05/16/2016    SOB (shortness of breath) 08/2016    Stroke ([facility])     Thickened mitral leaflet 10/13/2017 & ESTEFANI-11/5/18    Severely Calcified Noted on Echo    Tricuspid valve  insufficiency 11/05/2018    Noted on ESTEFANI        SURGICAL HISTORY    Past Surgical History:   Procedure Laterality Date    ATRIAL APPENDAGE EXCLUSION LEFT WITH TRANSESOPHAGEAL ECHOCARDIOGRAM N/A 1/7/2021    Procedure: Atrial Appendage Occlusion;  Surgeon: Harjit Berg MD;  Location: [facility] CATH INVASIVE LOCATION;  Service: Cardiovascular;  Laterality: N/A;    ATRIAL APPENDAGE EXCLUSION LEFT WITH TRANSESOPHAGEAL ECHOCARDIOGRAM N/A 1/7/2021    Procedure: Atrial Appendage Occlusion;  Surgeon: Zack Zheng MD;  Location: [facility] CATH INVASIVE LOCATION;  Service: Cardiology;  Laterality: N/A;    CARDIAC CATHETERIZATION Left 11/5/18-[facility]    w/L Coronary Angiography--Dr. Hernández-RCA @ 60% with Pulmonary Hypertension Noted    CHOLECYSTECTOMY      KNEE ARTHROSCOPY      ESTEFANI  05/17/2016-[facility]    Dr. Hernández--Severly Calcified Mitral Leaflets w/Mild-Moderate Mitral Stenosis/Insufficiency; Sig Aortic Stenosis Noted; Suspecion of L Atrial Appendage Clot Noted    ESTEFANI  11/5/18-[facility]    Dr. Hernández--Moderate Mitral Insufficiency Noted; Mild-Moderate AVS; Moderate Tricuspid Insufficiency Noted; EF of 65-70%    TONSILLECTOMY          FAMILY HISTORY    Family History   Problem Relation Age of Onset    Heart disease Mother     Cancer Father     No Known Problems Sister     No Known Problems Brother     No Known Problems Maternal Aunt     No Known Problems Maternal Uncle     No Known Problems Paternal Aunt     No Known Problems Paternal Uncle     No Known Problems Maternal Grandmother     No Known Problems Maternal Grandfather     No Known Problems Paternal Grandmother     No Known Problems Paternal Grandfather     No Known Problems Other     Anemia Neg Hx     Arrhythmia Neg Hx     Asthma Neg Hx     Clotting disorder Neg Hx     Fainting Neg Hx     Heart attack Neg Hx     Heart failure Neg Hx     Hyperlipidemia Neg Hx     Hypertension Neg Hx        SOCIAL HISTORY    Social History     Tobacco Use    Smoking status: Former Smoker      "Types: Cigarettes     Quit date:      Years since quittin.2    Smokeless tobacco: Never Used   Substance Use Topics    Alcohol use: No        ALLERGIES    Allergies   Allergen Reactions    Hydrocodone Shortness Of Breath              /69 (BP Location: Left arm, Patient Position: Lying)   Pulse 82   Temp 97.6 °F (36.4 °C) (Oral)   Resp 17   Ht 162.6 cm (64\")   Wt 76.7 kg (169 lb)   SpO2 96%   BMI 29.01 kg/m²   Intake/Output last 3 shifts:  I/O last 3 completed shifts:  In: 100 [IV Piggyback:100]  Out: -   Intake/Output this shift:  I/O this shift:  In: 240 [P.O.:240]  Out: -     PHYSICAL EXAM:    General: Well-developed, obese, chronically ill-appearing 86-year-old female who is alert, cooperative, appears uncomfortable and chronically ill, appears stated age  Head:  Normocephalic, atraumatic, mucous membranes moist  Eyes:  Conjunctiva/corneas clear, EOM's intact     Neck:  Supple,  no adenopathy; no JVD or bruit  Lungs:            Scattered rhonchi with no wheezes  Chest wall: No tenderness  Heart::  Irregularly irregular rate and rhythm, S1 and S2 normal, 2/6 holosystolic murmur  Abdomen: Soft, non-tender, distant bowel sounds  Extremities: Trace edema to lower extremities  Pulses: 2+ and symmetric all extremities  Skin:  No rashes or lesions  Neuro/psych: Awake and alert, will answer simple questions.  Moves all extremities      Scheduled Meds:      albumin human, 37.5 g, Intravenous, Once   Or  albumin human, 50 g, Intravenous, Once   Or  albumin human, 62.5 g, Intravenous, Once   Or  albumin human, 75 g, Intravenous, Once   Or  albumin human, 87.5 g, Intravenous, Once   Or  albumin human, 100 g, Intravenous, Once   Or  albumin human, 112.5 g, Intravenous, Once  ferric gluconate, 250 mg, Intravenous, Daily  ipratropium-albuterol, 3 mL, Nebulization, 4x Daily - RT  pantoprazole, 40 mg, Intravenous, Q AM  piperacillin-tazobactam, 3.375 g, Intravenous, Q12H  polyethylene glycol, 17 g, Oral, " Daily  sodium chloride, 10 mL, Intravenous, Q12H  sucralfate, 1 g, Oral, 4x Daily AC & at Bedtime  ursodiol, 300 mg, Oral, TID        Continuous Infusions:         PRN Meds:      acetaminophen **OR** [DISCONTINUED] acetaminophen **OR** [DISCONTINUED] acetaminophen    melatonin    nitroglycerin    ondansetron **OR** ondansetron    [COMPLETED] Insert peripheral IV **AND** sodium chloride    sodium chloride        Results Review:     I reviewed the patient's new clinical results.    CBC    Results from last 7 days   Lab Units 03/13/22  0550 03/12/22 2105   WBC 10*3/mm3 6.90 6.40   HEMOGLOBIN g/dL 8.9* 9.5*   PLATELETS 10*3/mm3 134* 146     Cr Clearance Estimated Creatinine Clearance: 19.4 mL/min (A) (by C-G formula based on SCr of 2.13 mg/dL (H)).  Coag   Results from last 7 days   Lab Units 03/13/22  0550   INR  1.68*     HbA1C No results found for: HGBA1C  Blood Glucose No results found for: POCGLU  Infection   Results from last 7 days   Lab Units 03/13/22  0550   PROCALCITONIN ng/mL 0.11     CMP   Results from last 7 days   Lab Units 03/13/22  0550 03/12/22  2105 03/10/22  1532   SODIUM mmol/L 137 135*  --    POTASSIUM mmol/L 2.8* 3.0*  --    CHLORIDE mmol/L 98 97*  --    CO2 mmol/L 27.0 25.0  --    BUN mg/dL 31* 32*  --    CREATININE mg/dL 2.13* 2.16*  --    GLUCOSE mg/dL 96 136*  --    ALBUMIN g/dL 3.40* 3.50 3.6   BILIRUBIN mg/dL 2.0*  2.0* 2.0* 1.9*   ALK PHOS U/L 114 116 128   AST (SGOT) U/L 14 16 18   ALT (SGPT) U/L 7 7 5*   AMYLASE U/L  --   --  45   LIPASE U/L  --  30  --      ABG      UA    Results from last 7 days   Lab Units 03/12/22 2112   NITRITE UA  Negative     HARLEY  No results found for: POCMETH, POCAMPHET, POCBARBITUR, POCBENZO, POCCOCAINE, POCOPIATES, POCOXYCODO, POCPHENCYC, POCPROPOXY, POCTHC, POCTRICYC  Lysis Labs   Results from last 7 days   Lab Units 03/13/22  0550 03/12/22  2105   INR  1.68*  --    HEMOGLOBIN g/dL 8.9* 9.5*   PLATELETS 10*3/mm3 134* 146   CREATININE mg/dL 2.13* 2.16*      Radiology(recent) CT Abdomen Pelvis Without Contrast    Result Date: 3/12/2022  Impression: 1. Limited evaluation without IV contrast. 2. Small to moderate diffuse peritoneal ascites fluid. No organized abscess seen. 3. Diverticulosis without acute diverticulitis. 4. Anasarca. 5. Atherosclerosis. 6. Stable multilevel degenerative changes in the thoracolumbar spine and stable T12 vertebral body compression. 6. Moderate right pleural effusion. Electronically signed by:  Blair Jeffery M.D.  3/12/2022 9:49 PM        Results from last 7 days   Lab Units 03/13/22  0550   TROPONIN T ng/mL 0.013       Xrays, labs reviewed personally by physician.    ECG/EMG Results (most recent)       Procedure Component Value Units Date/Time    ECG 12 Lead [[id number]] Collected: 03/12/22 2119     Updated: 03/12/22 2120     QT Interval 412 ms     Narrative:      HEART RATE= 88  bpm  RR Interval= 681  ms  PA Interval=   ms  P Horizontal Axis=   deg  P Front Axis=   deg  QRSD Interval= 115  ms  QT Interval= 412  ms  QRS Axis= 136  deg  T Wave Axis= 26  deg  - ABNORMAL ECG -  Atrial fibrillation  Incomplete right bundle branch block  Anterior infarct, old  Electronically Signed By:   Date and Time of Study: 2022-03-12 21:19:29    Adult Transthoracic Echo Complete w/ Color, Spectral and Contrast if necessary per protocol [[id number]] Resulted: 03/13/22 0912     Updated: 03/13/22 0919     Target HR (85%) 115 bpm      Max. Pred. HR (100%) 135 bpm      Ao root diam 2.6 cm      Ao pk ekaterina 375.4 cm/sec      Ao V2 VTI 57.5 cm      DUNG(I,D) 0.50 cm2      EDV(cubed) 27.1 ml      EDV(MOD-sp4) 27.2 ml      EF(MOD-bp) 78.0 %      EF(MOD-sp4) 78.4 %      ESV(cubed) 8.6 ml      ESV(MOD-sp4) 5.9 ml      IVS/LVPW 1.12 cm      LV mass(C)d 148.8 grams      LV V1 max PG 1.88 mmHg      LV V1 mean PG 1.21 mmHg      LV V1 max 68.6 cm/sec      LVPWd 1.36 cm      MR max .1 mmHg      MV dec time 0.20 msec      MV V2 VTI 50.7 cm      MVA(VTI) 0.57 cm2      PA  V2 max 188.0 cm/sec      RAP systole 15.0 mmHg      RV V1 max PG 0.96 mmHg      RV V1 max 48.9 cm/sec      RV V1 VTI 7.7 cm      RVIDd 3.2 cm      RVSP(TR) 74.1 mmHg      SI(MOD-sp4) 11.7 ml/m2      SV(LVOT) 28.7 ml      SV(MOD-sp4) 21.3 ml      TR max PG 59.1 mmHg      Ao max PG 56.6 mmHg      Ao mean PG 29.9 mmHg      FS 31.7 %      IVSd 1.53 cm      LA dimension(2D) 4.6 cm      LV V1 VTI 15.5 cm      LVIDd 3.0 cm      LVIDs 2.05 cm      LVOT area 1.85 cm2      LVOT diam 1.53 cm      MV max PG 26.3 mmHg      MV mean PG 8.8 mmHg      MR max ekaterina 582.2 cm/sec      MV E max ekaterina 207.6 cm/sec      TR max ekaterina 381.7 cm/sec      LV Restrepo Vol (BSA corrected) 14.9 cm2      LV Sys Vol (BSA corrected) 3.2 cm2     Narrative:      · Estimated left ventricular EF was in agreement with the calculated left   ventricular EF. Left ventricular ejection fraction appears to be greater   than 70%. Left ventricular systolic function is hyperdynamic (EF > 70%).  · Left ventricular wall thickness is consistent with moderate concentric   hypertrophy.  · Moderate mitral valve regurgitation is present.  · There is moderate, bileaflet mitral valve thickening present.  · Severe tricuspid valve regurgitation is present.  · Estimated right ventricular systolic pressure from tricuspid   regurgitation is markedly elevated (>55 mmHg).  · The right ventricular cavity is severely dilated.  · Moderate to severe mitral valve stenosis is present.  · The right atrial cavity is moderately dilated.  · Moderate to severe aortic valve stenosis is present.  · Peak velocity of the flow distal to the aortic valve is 375.4 cm/s.   Aortic valve maximum pressure gradient is 56.6 mmHg. Aortic valve mean   pressure gradient is 29.9 mmHg.                 Medication Review:   I have reviewed the patient's current medication list  Scheduled Meds:albumin human, 37.5 g, Intravenous, Once   Or  albumin human, 50 g, Intravenous, Once   Or  albumin human, 62.5 g, Intravenous,  Once   Or  albumin human, 75 g, Intravenous, Once   Or  albumin human, 87.5 g, Intravenous, Once   Or  albumin human, 100 g, Intravenous, Once   Or  albumin human, 112.5 g, Intravenous, Once  ferric gluconate, 250 mg, Intravenous, Daily  ipratropium-albuterol, 3 mL, Nebulization, 4x Daily - RT  pantoprazole, 40 mg, Intravenous, Q AM  piperacillin-tazobactam, 3.375 g, Intravenous, Q12H  polyethylene glycol, 17 g, Oral, Daily  sodium chloride, 10 mL, Intravenous, Q12H  sucralfate, 1 g, Oral, 4x Daily AC & at Bedtime  ursodiol, 300 mg, Oral, TID      Continuous Infusions:   PRN Meds:.  acetaminophen **OR** [DISCONTINUED] acetaminophen **OR** [DISCONTINUED] acetaminophen    melatonin    nitroglycerin    ondansetron **OR** ondansetron    [COMPLETED] Insert peripheral IV **AND** sodium chloride    sodium chloride    Imaging:  Imaging Results (Last 72 Hours)       Procedure Component Value Units Date/Time    CT Abdomen Pelvis Without Contrast [[id number]] Collected: 03/12/22 2346     Updated: 03/12/22 2351    Narrative:      CT OF THE ABDOMEN AND PELVIS WITHOUT CONTRAST    Clinical indication: Abdominal pain.    Comparison: 2/13/2021.    Procedure: Noncontrast CT images of the abdomen and pelvis were obtained.  CT dose lowering techniques were used, to include: automated exposure control, adjustment for patient size, and or use of iterative reconstruction.    Findings:     Lung Bases: Lung bases are clear. Moderate right pleural effusion. Heart size is normal without pericardial effusion.    Liver and biliary system: The liver is normal in size and configuration without focal lesion. No intra or extrahepatic biliary ductal dilatation is noted. The gallbladder is absent.     Pancreas: The pancreas is unremarkable.     Spleen: The spleen is normal.    Adrenals: The adrenal glands are within normal limits.     Kidneys: The kidneys are symmetric in size and without hydronephrosis.    Gastrointestinal: The stomach and scattered  "loops of small and large bowel have a nonobstructive pattern. Diverticulosis. The appendix is normal in the right lower quadrant.     Mesentery and retroperitoneum: No mesenteric or retroperitoneal adenopathy. No free air. Small to moderate on diffuse peritoneal ascites fluid. Atherosclerosis.    Pelvis: The urinary bladder is moderately distended with a smooth contour. Rectum is normal. No free fluid. No deep pelvic or inguinal adenopathy.     Reproductive: No acute abnormality.    Body wall: Anasarca.    Bones: Stable multilevel degenerative changes in the spine and stable T12 vertebral body compression.      Impression:      Impression:   1. Limited evaluation without IV contrast.  2. Small to moderate diffuse peritoneal ascites fluid. No organized abscess seen.  3. Diverticulosis without acute diverticulitis.  4. Anasarca.  5. Atherosclerosis.  6. Stable multilevel degenerative changes in the thoracolumbar spine and stable T12 vertebral body compression.  6. Moderate right pleural effusion.    Electronically signed by:  Blair Jeffery M.D.    3/12/2022 9:49 PM              ZACHARIAH Edwards  03/13/22  11:57 EDT       EMR Dragon/Transcription:   \"Dictated utilizing Dragon dictation\".                 Electronically signed by ZACHARIAH Edwards, 03/13/22, 11:58 AM EDT.    Cardiology attending:  Seen and examined.  Chart and labs reviewed.  History and exam findings are verified with above changes noted.  Assessment and plan notated by APC after being formulated by attending consultant.  Note that greater than 50% of the time spent in care of the patient was provided by attending consultant.  Patient reports shortness of breath.  Denies chest pain.  Significant ascites and pleural effusion.  Patient to have paracentesis tomorrow.  Long ruddy discussion with patient's daughter.  Patient has significant valvular heart disease and is not a candidate for surgical correction.  She would be high risk for any " surgical procedure other than a paracentesis which she needs.  Patient's daughter will look into hospice.

## 2022-03-13 NOTE — PLAN OF CARE
Problem: COPD (Chronic Obstructive Pulmonary Disease) Comorbidity  Goal: Maintenance of COPD Symptom Control  3/13/2022 0619 by Zulma Pastrana RN  Outcome: Ongoing, Progressing     Problem: Skin Injury Risk Increased  Goal: Skin Health and Integrity  3/13/2022 0619 by Zulma Pastrana RN  Outcome: Ongoing, Progressing     Problem: Fall Injury Risk  Goal: Absence of Fall and Fall-Related Injury  3/13/2022 0619 by Zulma Pastrana RN  Outcome: Ongoing, Progressing     Problem: Breathing Pattern Ineffective  Goal: Effective Breathing Pattern  3/13/2022 0619 by Zulma Pastrana RN  Outcome: Ongoing, Progressing   Goal Outcome Evaluation:   Pt is on 2LO2, no distress noted, electrolytes protocol started. External cath in place, possible paracentesis this morning, house was called for kit, will get it from ICU, daughter is updated on status sates she will be here in the morning. Will cont to monitor.

## 2022-03-13 NOTE — ED PROVIDER NOTES
Subjective   History of Present Illness  86-year-old female from home with a history of congestive heart failure as well as abdominal pain which she states is worse in the past day.  The patient is a very poor historian.  Review of Systems  The patient is a poor historian and is unable to provide a reliable review of system.  Her old chart indicates that she has a history of renal failure heart murmur congestive failure pancreatitis previous TIA  Past Medical History:   Diagnosis Date   • Abnormality of left atrial appendage 05/17/2016    Suspected Clot Noted on ESTEAFNI   • Acute renal failure (HCC) 08/06/2016-[facility]    Front Dehydration   • Arthritis    • Breast density 12/2017   • Carotid stenosis 11/05/2018    R @ 60% and L @ 10-20% Noted on Cardiac Cath & 16-49% on L&R    • Heart murmur    • History of echocardiogram 10/13/2017    Calcified Aortic Leaftlets; Mild Aortic Stenosis Present; Mild Mitral Stenosis; Bilateral Enlargement Noted;  Moderate TR; LV Function of 55-60%   • History of echocardiogram 12/5/14-[facility]    Moderate L Vent Hypertrophy Noted; L Atrial Dilation; Moderate MR; Mild TR; Mild-Moderate Aortic Reg Noted; Normal Root Size/No Effusion   • History of echocardiogram 5/16/16-[facility]    Normal Findings with Mild-Moderate MVS Noted   • History of EKG 2004/2016 & 11/5/18-[facility]    All Sinus Rhythm w/Infarct Ischemnic Changes Noted   • HLD (hyperlipidemia)     Controlled w/Meds   • Hx of hyperglycemia    • Hypertension     Controlled w/Meds   • Hypothyroidism     Levothyroxine   • Joint pain    • Left atrial dilatation 12/05/2014    Mildly Noted on Echo   • Left ventricular hypertrophy 12/05/2014    Noted on Echo w/EF @ 55-60%   • Mild aortic regurgitation 12/05/2014    to Moderate Noted on Echo   • Mild aortic stenosis 10/13/2017    Noted on Echo & ESTEFANI-11/5/18-Moderate    • Mild mitral insufficiency 10/13/2017    Noted on Echo & ESTEFANI-11/5/18   • Mild tricuspid regurgitation 12/05/2014    Noted on Echo   • Moderate  mitral regurgitation 12/05/2014    Noted on Echo   • Moderate mitral stenosis 10/13/2017    Noted on Echo   • Moderate tricuspid insufficiency 10/13/2017    Noted on Echo   • Pneumonia involving left lung 08/6/16-[facility]   • Pulmonary hypertension (HCC) 10/13/2017 & 11/5/18-[facility]    Severe Noted on Echo & Cath   • Slurred speech 05/16/2016   • SOB (shortness of breath) 08/2016   • Stroke (HCC)    • Thickened mitral leaflet 10/13/2017 & ESTEFANI-11/5/18    Severely Calcified Noted on Echo   • Tricuspid valve insufficiency 11/05/2018    Noted on ESTEFANI       Allergies   Allergen Reactions   • Hydrocodone Shortness Of Breath       Past Surgical History:   Procedure Laterality Date   • ATRIAL APPENDAGE EXCLUSION LEFT WITH TRANSESOPHAGEAL ECHOCARDIOGRAM N/A 1/7/2021    Procedure: Atrial Appendage Occlusion;  Surgeon: Harjit Berg MD;  Location: [facility] CATH INVASIVE LOCATION;  Service: Cardiovascular;  Laterality: N/A;   • ATRIAL APPENDAGE EXCLUSION LEFT WITH TRANSESOPHAGEAL ECHOCARDIOGRAM N/A 1/7/2021    Procedure: Atrial Appendage Occlusion;  Surgeon: Zack Zheng MD;  Location: [facility] CATH INVASIVE LOCATION;  Service: Cardiology;  Laterality: N/A;   • CARDIAC CATHETERIZATION Left 11/5/18-[facility]    w/L Coronary Angiography--Dr. Hernández-RCA @ 60% with Pulmonary Hypertension Noted   • CHOLECYSTECTOMY     • KNEE ARTHROSCOPY     • ESTEFANI  05/17/2016-[facility]    Dr. Hernández--Severly Calcified Mitral Leaflets w/Mild-Moderate Mitral Stenosis/Insufficiency; Sig Aortic Stenosis Noted; Suspecion of L Atrial Appendage Clot Noted   • ESTEFANI  11/5/18-[facility]    Dr. Hernández--Moderate Mitral Insufficiency Noted; Mild-Moderate AVS; Moderate Tricuspid Insufficiency Noted; EF of 65-70%   • TONSILLECTOMY         Family History   Problem Relation Age of Onset   • Heart disease Mother    • Cancer Father    • No Known Problems Sister    • No Known Problems Brother    • No Known Problems Maternal Aunt    • No Known Problems Maternal Uncle    • No Known Problems  Paternal Aunt    • No Known Problems Paternal Uncle    • No Known Problems Maternal Grandmother    • No Known Problems Maternal Grandfather    • No Known Problems Paternal Grandmother    • No Known Problems Paternal Grandfather    • No Known Problems Other    • Anemia Neg Hx    • Arrhythmia Neg Hx    • Asthma Neg Hx    • Clotting disorder Neg Hx    • Fainting Neg Hx    • Heart attack Neg Hx    • Heart failure Neg Hx    • Hyperlipidemia Neg Hx    • Hypertension Neg Hx        Social History     Socioeconomic History   • Marital status:    Tobacco Use   • Smoking status: Former Smoker     Types: Cigarettes     Quit date:      Years since quittin.2   • Smokeless tobacco: Never Used   Vaping Use   • Vaping Use: Never used   Substance and Sexual Activity   • Alcohol use: No   • Drug use: No   • Sexual activity: Defer     Birth control/protection: Post-menopausal           Objective   Physical Exam  The patient is awake she is alert but she is confused.  She was afebrile her vital signs were stable with an O2 sat of 99% on room air.  The HEENT exam pupils equal round reactive to light EOMs are full ENT was unremarkable the neck is supple her chest reveals rales in both bases cardiovascular exam reveals a regular rhythm with a 1/6 systolic murmur at the left sternal border the patient has diminished distal pulses.  The abdomen is soft nontender bowel sounds are positive the patient has no cyanosis clubbing or edema neurologic exam shows that she is confused but no focal deficit  Daughter arrived later and stated that she had been complaining of pain as well as shortness of breath.  The pain is in the abdomen.  The patient has had a previous cholecystectomy.  She does have a history of atrial fibrillation with left atrial ligation.  She is in chronic atrial fibrillation.  The patient lives at home and has a caregiver.  She is usually able to ambulate but has not been able to do so over the last few days.   Patient has had a previous cholecystectomy.  Procedures           ED Course                 Results for orders placed or performed during the hospital encounter of 03/12/22   Comprehensive Metabolic Panel    Specimen: Blood   Result Value Ref Range    Glucose 136 (H) 65 - 99 mg/dL    BUN 32 (H) 8 - 23 mg/dL    Creatinine 2.16 (H) 0.57 - 1.00 mg/dL    Sodium 135 (L) 136 - 145 mmol/L    Potassium 3.0 (L) 3.5 - 5.2 mmol/L    Chloride 97 (L) 98 - 107 mmol/L    CO2 25.0 22.0 - 29.0 mmol/L    Calcium 9.0 8.6 - 10.5 mg/dL    Total Protein 6.7 6.0 - 8.5 g/dL    Albumin 3.50 3.50 - 5.20 g/dL    ALT (SGPT) 7 1 - 33 U/L    AST (SGOT) 16 1 - 32 U/L    Alkaline Phosphatase 116 39 - 117 U/L    Total Bilirubin 2.0 (H) 0.0 - 1.2 mg/dL    Globulin 3.2 gm/dL    A/G Ratio 1.1 g/dL    BUN/Creatinine Ratio 14.8 7.0 - 25.0    Anion Gap 13.0 5.0 - 15.0 mmol/L    eGFR 22.0 (L) >60.0 mL/min/1.73   Lipase    Specimen: Blood   Result Value Ref Range    Lipase 30 13 - 60 U/L   Urinalysis With Microscopic If Indicated (No Culture) - Urine, Catheter    Specimen: Urine, Catheter   Result Value Ref Range    Color, UA Yellow Yellow, Straw    Appearance, UA Clear Clear    pH, UA 6.0 5.0 - 8.0    Specific Gravity, UA 1.008 1.005 - 1.030    Glucose, UA Negative Negative    Ketones, UA Negative Negative    Bilirubin, UA Negative Negative    Blood, UA Negative Negative    Protein, UA Negative Negative    Leuk Esterase, UA Negative Negative    Nitrite, UA Negative Negative    Urobilinogen, UA 0.2 E.U./dL 0.2 - 1.0 E.U./dL   Troponin    Specimen: Blood   Result Value Ref Range    Troponin T 0.011 0.000 - 0.030 ng/mL   CBC Auto Differential    Specimen: Blood   Result Value Ref Range    WBC 6.40 3.40 - 10.80 10*3/mm3    RBC 3.80 3.77 - 5.28 10*6/mm3    Hemoglobin 9.5 (L) 12.0 - 15.9 g/dL    Hematocrit 29.4 (L) 34.0 - 46.6 %    MCV 77.4 (L) 79.0 - 97.0 fL    MCH 25.0 (L) 26.6 - 33.0 pg    MCHC 32.3 31.5 - 35.7 g/dL    RDW 19.5 (H) 12.3 - 15.4 %    RDW-SD  52.9 37.0 - 54.0 fl    MPV 8.0 6.0 - 12.0 fL    Platelets 146 140 - 450 10*3/mm3    Neutrophil % 73.1 42.7 - 76.0 %    Lymphocyte % 12.2 (L) 19.6 - 45.3 %    Monocyte % 12.2 (H) 5.0 - 12.0 %    Eosinophil % 1.9 0.3 - 6.2 %    Basophil % 0.6 0.0 - 1.5 %    Neutrophils, Absolute 4.70 1.70 - 7.00 10*3/mm3    Lymphocytes, Absolute 0.80 0.70 - 3.10 10*3/mm3    Monocytes, Absolute 0.80 0.10 - 0.90 10*3/mm3    Eosinophils, Absolute 0.10 0.00 - 0.40 10*3/mm3    Basophils, Absolute 0.00 0.00 - 0.20 10*3/mm3    nRBC 0.1 0.0 - 0.2 /100 WBC   POC Lactate    Specimen: Blood   Result Value Ref Range    Lactate 1.7 0.5 - 2.0 mmol/L   ECG 12 Lead   Result Value Ref Range    QT Interval 412 ms     Medications   sodium chloride 0.9 % flush 10 mL (has no administration in time range)   sodium chloride 0.9 % bolus 500 mL (0 mL Intravenous Stopped 3/12/22 [id number])     No radiology results for the last day                     CT scan reveals a right pleural effusion                MDM  The patient has multiple medical complaints although I am not able to identify the etiology of her MsAbi Elizabeth severe complaints of abdominal pain and shortness of breath at this time she has a pleural effusion.  Liver function test and lipase are normal and she has had a previous cholecystectomy.  She was treated with supplemental O2 and will be admitted for further evaluation  Final diagnoses:   Right upper quadrant abdominal pain   Pleural effusion   Atrial fibrillation, chronic (HCC)       ED Disposition  ED Disposition     ED Disposition   Decision to Admit    Condition   --    Comment   Level of Care: Med/Surg [1]   Admitting Physician: ALONSO WALLS [[id number]]   Attending Physician: ALONSO WALLS [[id number]]               No follow-up provider specified.       Medication List      No changes were made to your prescriptions during this visit.          Paxton Gaston MD  03/12/22 0165

## 2022-03-13 NOTE — PROGRESS NOTES
[facility] Daily Progress Note    Patient Name: Karen Rubio  : 1935  MRN: [id number]  Primary Care Physician:  Eliza Clayton APRN  Date of admission: 3/12/2022      Subjective      Chief Complaint: Abdominal pain.      Patient Reports no overnight events.    Review of Systems   Constitutional: Negative.   HENT: Negative.    Eyes: Negative.    Cardiovascular: Negative.    Respiratory: Negative.    Endocrine: Negative.    Hematologic/Lymphatic: Negative.    Skin: Negative.    Musculoskeletal: Negative.    Gastrointestinal: Positive for abdominal pain.   Genitourinary: Negative.    Neurological: Negative.    Psychiatric/Behavioral: Negative.    Allergic/Immunologic: Negative.             Objective      Vitals:   Temp:  [97.6 °F (36.4 °C)-98.6 °F (37 °C)] 97.6 °F (36.4 °C)  Heart Rate:  [] 80  Resp:  [17-22] 17  BP: (103-126)/(59-69) 126/64  Flow (L/min):  [2-3] 2    Physical Exam  Vitals and nursing note reviewed.   Constitutional:       General: She is not in acute distress.  HENT:      Head: Normocephalic and atraumatic.      Nose: Nose normal. No congestion or rhinorrhea.      Mouth/Throat:      Mouth: Mucous membranes are moist.      Pharynx: Oropharynx is clear. No oropharyngeal exudate or posterior oropharyngeal erythema.   Eyes:      Pupils: Pupils are equal, round, and reactive to light.   Cardiovascular:      Pulses: Normal pulses.      Heart sounds: Normal heart sounds. No murmur heard.    No friction rub. No gallop.      Comments: S1 and S2 present.  No tachycardia.  Pulmonary:      Effort: No respiratory distress.      Breath sounds: No wheezing, rhonchi or rales.   Chest:      Chest wall: No tenderness.   Abdominal:      General: Bowel sounds are normal. There is no distension.      Palpations: Abdomen is soft.      Tenderness: There is abdominal tenderness. There is no right CVA tenderness.   Musculoskeletal:         General: No swelling,  tenderness, deformity or signs of injury.      Cervical back: Neck supple. No tenderness.      Right lower leg: No edema.      Left lower leg: No edema.   Skin:     Capillary Refill: Capillary refill takes less than 2 seconds.      Coloration: Skin is not jaundiced.      Findings: No bruising, lesion or rash.   Neurological:      Mental Status: She is alert.      Comments: No facial asymmetry noted.  Gait and station not tested.   Psychiatric:      Comments: No agitation.               Result Review    Result Review:  I have personally reviewed the results from the time of this admission to 3/13/2022 17:41 EDT and agree with these findings:  [x]  Laboratory  [x]  Microbiology  [x]  Radiology  []  EKG/Telemetry   []  Cardiology/Vascular   []  Pathology  []  Old records  []  Other:  Most notable findings include:          Assessment/Plan    From previous note and with minor updates.  Brief Patient Summary:  Patient is a 86 y.o. female w/PMH of A. fib, CKD, arthritis, CAD, HLD, CHF, HTN, pulmonary HTN, mitral valve stenosis who presents to [facility] ED due to abdominal pain. Patient is an extremely poor historian, unable to complete review of systems at this time.  Family reports her abdominal pain is become severe, she has also been complaining of dyspnea, weakness and fatigue over the last several days.  Patient was seen in the emergency room and upon arrival to the ED vitals temp 98.6, , RR 18, /59, O2 sat 99% on 3 L nasal cannula.  Labs notable for troponin 0.011, glucose 136, , K3.0, CO2 25, creatinine 2.16, BUN 32, GFR 22, total bili 2.0, lactic 1.6, lipase 30, magnesium 1.9, WBC 6.4, Hgb 9.5, platelets 146, neutrophils 73.1.  UA negative.  EKG shows atrial fibrillation rate 88, incomplete right bundle branch block, old anterior infarct.  CT abdomen pelvis W/0 shows small to moderate diffuse peritoneal ascites fluid. No organized abscess seen. Diverticulosis without acute  diverticulitis. Anasarca. Atherosclerosis. Stable multilevel degenerative changes in the thoracolumbar spine and stable T12 vertebral body compression. Moderate right pleural effusion.  Covid test negative.  Patient treated in ED with 500 mL NS bolus.  GI consult was completed.  Cardiology consult was completed.  Nephrology consult order was placed.      albumin human, 37.5 g, Intravenous, Once   Or  albumin human, 50 g, Intravenous, Once   Or  albumin human, 62.5 g, Intravenous, Once   Or  albumin human, 75 g, Intravenous, Once   Or  albumin human, 87.5 g, Intravenous, Once   Or  albumin human, 100 g, Intravenous, Once   Or  albumin human, 112.5 g, Intravenous, Once  ferric gluconate, 250 mg, Intravenous, Daily  ipratropium-albuterol, 3 mL, Nebulization, 4x Daily - RT  metoprolol tartrate, 25 mg, Oral, Q12H  pantoprazole, 40 mg, Intravenous, Q AM  piperacillin-tazobactam, 3.375 g, Intravenous, Q12H  polyethylene glycol, 17 g, Oral, Daily  sildenafil, 20 mg, Oral, Q8H  sodium chloride, 10 mL, Intravenous, Q12H  sucralfate, 1 g, Oral, 4x Daily AC & at Bedtime  ursodiol, 300 mg, Oral, TID             Active Hospital Problems:  Active Hospital Problems    Diagnosis    • **Acute on chronic congestive heart failure (HCC)  Follow cardiology recommendations.      • Pleural effusion on right  Follow pulmonary recommendations.      • Generalized abdominal pain  Treat with pain control.      • Chronic constipation    • Anasarca    • Abdominal ascites  Treat with paracentesis.      • Oxygen dependent    • Chronic kidney disease (CKD), stage IV (severe) ([facility])  Follow nephrology recommendations.      • Hyperbilirubinemia    • COPD (chronic obstructive pulmonary disease) ([facility])  Treat with nebs as needed.      • Mixed hyperlipidemia      • Long term (current) use of anticoagulants    • Longstanding persistent atrial fibrillation (HCC)  Follow cardiology recommendations.      • Chronic respiratory failure ([facility])  Treat with oxygen  therapy.      • Hypokalemia  Replete.      • Coronary artery disease involving native coronary artery of native heart without angina pectoris  Follow cardiology recommendations.      • History of CVA (cerebrovascular accident)    • Pulmonary hypertension (HCC)  Treat with sildenafil.      • Primary hypertension  Stable.      • GERD without esophagitis  Treat with Protonix.        Continue appropriate patient's home medications for other chronic medical conditions.  Continue the present level of care.  Patient and family agreed with the plan of care.      DVT prophylaxis:  Mechanical DVT prophylaxis orders are present.    CODE STATUS:    Medical Intervention Limits: NO intubation (DNI)  Level Of Support Discussed With: Next of Kin (If No Surrogate); Patient  Code Status (Patient has no pulse and is not breathing): No CPR (Do Not Attempt to Resuscitate)  Medical Interventions (Patient has pulse or is breathing): Limited Support      Disposition: Pending patient's clinical improvement..    This patient has been examined wearing appropriate Personal Protective Equipment and discussed with hospital infection control department, [facility], infectious disease specialist and pulmonologist. 03/13/22      Electronically signed by Keven Escoto MD, FACP, 03/13/22, 17:41 EDT.      [facility] Hospitalist Team

## 2022-03-13 NOTE — H&P
[facility] Medicine Services      Patient Name: Karen Rubio  : 1935  MRN: [id number]  Primary Care Physician:  Eliza Clayton APRN  Date of admission: 3/12/2022      Subjective      Chief Complaint: Abdominal pain    History of Present Illness:  Karen Rubio is a 86 y.o. female w/PMH of A. fib, CKD, arthritis, CAD, HLD, CHF, HTN, pulmonary HTN, mitral valve stenosis who presents to [facility] ED due to abdominal pain. Patient is an extremely poor historian, unable to complete review of systems at this time.  Family reports her abdominal pain is become severe, she has also been complaining of dyspnea, weakness and fatigue over the last several days.        Upon arrival to the ED vitals temp 98.6, , RR 18, /59, O2 sat 99% on 3 L nasal cannula.  Labs notable for troponin 0.011, glucose 136, , K3.0, CO2 25, creatinine 2.16, BUN 32, GFR 22, total bili 2.0, lactic 1.6, lipase 30, magnesium 1.9, WBC 6.4, Hgb 9.5, platelets 146, neutrophils 73.1.  UA negative.  EKG shows atrial fibrillation rate 88, incomplete right bundle branch block, old anterior infarct.  CT abdomen pelvis W/0 shows small to moderate diffuse peritoneal ascites fluid. No organized abscess seen. Diverticulosis without acute diverticulitis. Anasarca. Atherosclerosis. Stable multilevel degenerative changes in the thoracolumbar spine and stable T12 vertebral body compression. Moderate right pleural effusion.  Covid test negative.  Patient treated in ED with 500 mL NS bolus.       Review of Systems   Unable to perform ROS: mental status change        Personal History     Past Medical History:   Diagnosis Date   • Abnormality of left atrial appendage 2016    Suspected Clot Noted on ESTEFANI   • Acute renal failure (HCC) 2016-F    Front Dehydration   • Arthritis    • Breast density 2017   • Carotid stenosis 2018    R @ 60% and L @ 10-20% Noted on Cardiac Cath & 16-49% on  L&R    • Heart murmur    • History of echocardiogram 10/13/2017    Calcified Aortic Leaftlets; Mild Aortic Stenosis Present; Mild Mitral Stenosis; Bilateral Enlargement Noted;  Moderate TR; LV Function of 55-60%   • History of echocardiogram 12/5/14-[facility]    Moderate L Vent Hypertrophy Noted; L Atrial Dilation; Moderate MR; Mild TR; Mild-Moderate Aortic Reg Noted; Normal Root Size/No Effusion   • History of echocardiogram 5/16/16-[facility]    Normal Findings with Mild-Moderate MVS Noted   • History of EKG 2004/2016 & 11/5/18-[facility]    All Sinus Rhythm w/Infarct Ischemnic Changes Noted   • HLD (hyperlipidemia)     Controlled w/Meds   • Hx of hyperglycemia    • Hypertension     Controlled w/Meds   • Hypothyroidism     Levothyroxine   • Joint pain    • Left atrial dilatation 12/05/2014    Mildly Noted on Echo   • Left ventricular hypertrophy 12/05/2014    Noted on Echo w/EF @ 55-60%   • Mild aortic regurgitation 12/05/2014    to Moderate Noted on Echo   • Mild aortic stenosis 10/13/2017    Noted on Echo & ESTEFANI-11/5/18-Moderate    • Mild mitral insufficiency 10/13/2017    Noted on Echo & ESTEFANI-11/5/18   • Mild tricuspid regurgitation 12/05/2014    Noted on Echo   • Moderate mitral regurgitation 12/05/2014    Noted on Echo   • Moderate mitral stenosis 10/13/2017    Noted on Echo   • Moderate tricuspid insufficiency 10/13/2017    Noted on Echo   • Pneumonia involving left lung 08/6/16-[facility] ER   • Pulmonary hypertension (HCC) 10/13/2017 & 11/5/18-Cath    Severe Noted on Echo & Cath   • Slurred speech 05/16/2016   • SOB (shortness of breath) 08/2016   • Stroke (HCC)    • Thickened mitral leaflet 10/13/2017 & ESTEFANI-11/5/18    Severely Calcified Noted on Echo   • Tricuspid valve insufficiency 11/05/2018    Noted on ESTEFANI       Past Surgical History:   Procedure Laterality Date   • ATRIAL APPENDAGE EXCLUSION LEFT WITH TRANSESOPHAGEAL ECHOCARDIOGRAM N/A 1/7/2021    Procedure: Atrial Appendage Occlusion;  Surgeon: Harjit Berg MD;   Location: [facility] CATH INVASIVE LOCATION;  Service: Cardiovascular;  Laterality: N/A;   • ATRIAL APPENDAGE EXCLUSION LEFT WITH TRANSESOPHAGEAL ECHOCARDIOGRAM N/A 1/7/2021    Procedure: Atrial Appendage Occlusion;  Surgeon: Zack Zheng MD;  Location: [facility] CATH INVASIVE LOCATION;  Service: Cardiology;  Laterality: N/A;   • CARDIAC CATHETERIZATION Left 11/5/18-[facility]    w/L Coronary Angiography--Dr. Hernández-RCA @ 60% with Pulmonary Hypertension Noted   • CHOLECYSTECTOMY     • KNEE ARTHROSCOPY     • ESTEFANI  05/17/2016-[facility]    Dr. Hernández--Severly Calcified Mitral Leaflets w/Mild-Moderate Mitral Stenosis/Insufficiency; Sig Aortic Stenosis Noted; Suspecion of L Atrial Appendage Clot Noted   • ESTEFANI  11/5/18-[facility]    Dr. Hernández--Moderate Mitral Insufficiency Noted; Mild-Moderate AVS; Moderate Tricuspid Insufficiency Noted; EF of 65-70%   • TONSILLECTOMY         Family History: family history includes Cancer in her father; Heart disease in her mother; No Known Problems in her brother, maternal aunt, maternal grandfather, maternal grandmother, maternal uncle, paternal aunt, paternal grandfather, paternal grandmother, paternal uncle, sister, and another family member. Otherwise pertinent FHx was reviewed and not pertinent to current issue.    Social History:  reports that she quit smoking about 20 years ago. Her smoking use included cigarettes. She has never used smokeless tobacco. She reports that she does not drink alcohol and does not use drugs.    Home Medications:  Prior to Admission Medications     Prescriptions Last Dose Informant Patient Reported? Taking?    acetaminophen (TYLENOL) 325 MG tablet   No No    Take 2 tablets by mouth Every 4 (Four) Hours As Needed for Mild Pain .    albuterol sulfate  (90 Base) MCG/ACT inhaler   Yes No    Inhale 2 puffs Every 4 (Four) Hours As Needed for Wheezing or Shortness of Air.    allopurinol (ZYLOPRIM) 100 MG tablet   Yes No    Take 1 tablet by mouth Daily.    atorvastatin (LIPITOR) 20  MG tablet   Yes No    TAKE 1 TABLET EVERY DAY    Cholecalciferol (VITAMIN D-3) 1000 units capsule   Yes No    Take 1 capsule by mouth Daily.    Eliquis 5 MG tablet tablet   No No    TAKE 1 TABLET TWICE DAILY    escitalopram (LEXAPRO) 10 MG tablet   Yes No    Take 10 mg by mouth Daily.    furosemide (LASIX) 40 MG tablet   Yes No    Take 40 mg by mouth Daily.    ipratropium-albuterol (DUO-NEB) 0.5-2.5 mg/3 ml nebulizer   Yes No    3 mL 4 (Four) Times a Day. O2 bellow 90    levothyroxine (SYNTHROID, LEVOTHROID) 75 MCG tablet   Yes No    Take 75 mcg by mouth Daily.    metOLazone (ZAROXOLYN) 2.5 MG tablet   Yes No    Take 1 tablet by mouth Daily As Needed.    metoprolol tartrate (LOPRESSOR) 25 MG tablet   No No    Take 1 tablet by mouth 2 (Two) Times a Day.    Patient taking differently:  Take 25 mg by mouth 2 (Two) Times a Day. 12.5mg BID    O2 (OXYGEN)   Yes No    Inhale 2 L/min 1 (One) Time. At night and PRN    ondansetron (ZOFRAN) 4 MG tablet   Yes No    Take 4 mg by mouth Every 8 (Eight) Hours As Needed for Nausea or Vomiting.    potassium chloride (MICRO-K) 10 MEQ CR capsule   Yes No    Take 10 mEq by mouth Daily.    sildenafil (VIAGRA) 25 MG tablet   No No    Take 1 tablet by mouth 2 (Two) Times a Day.    temazepam (RESTORIL) 15 MG capsule   Yes No    Take 15 mg by mouth.            Allergies:  Allergies   Allergen Reactions   • Hydrocodone Shortness Of Breath       Objective      Vitals:   Temp:  [97.6 °F (36.4 °C)-98.6 °F (37 °C)] 97.6 °F (36.4 °C)  Heart Rate:  [] 82  Resp:  [17-22] 17  BP: (103-117)/(59-69) 112/69  Flow (L/min):  [2-3] 2    Physical Exam  Vitals and nursing note reviewed.   Constitutional:       Appearance: She is cachectic. She is ill-appearing.   HENT:      Head: Normocephalic and atraumatic. No Gastelum's sign.      Right Ear: External ear normal.      Left Ear: External ear normal.      Mouth/Throat:      Mouth: Mucous membranes are dry.   Eyes:      Pupils: Pupils are equal, round,  and reactive to light.   Cardiovascular:      Rate and Rhythm: Bradycardia present. Rhythm irregular.      Pulses: Decreased pulses.      Heart sounds: Murmur heard.    Systolic murmur is present.  Pulmonary:      Effort: Tachypnea and prolonged expiration present.      Breath sounds: Decreased breath sounds present.   Abdominal:      General: Abdomen is protuberant. Bowel sounds are increased. There is distension.      Palpations: There is fluid wave.      Tenderness: There is no abdominal tenderness. There is no guarding or rebound.   Musculoskeletal:      Cervical back: No erythema or signs of trauma.      Right lower le+ Edema present.      Left lower le+ Edema present.   Skin:     General: Skin is warm and dry.      Coloration: Skin is sallow.      Findings: Bruising and ecchymosis present.   Neurological:      Mental Status: She is lethargic and disoriented.      Motor: Weakness present.   Psychiatric:         Attention and Perception: She is inattentive.         Mood and Affect: Mood is anxious.         Speech: Speech is delayed.         Cognition and Memory: Cognition is impaired. Memory is impaired.          Result Review    Result Review:  I have personally reviewed the results from the time of this admission to 3/13/2022 09:15 EDT and agree with these findings:  [x]  Laboratory  [x]  Microbiology  [x]  Radiology  [x]  EKG/Telemetry   [x]  Cardiology/Vascular   []  Pathology  []  Old records  []  Other:        Assessment/Plan        Active Hospital Problems:  Active Hospital Problems    Diagnosis    • **Generalized abdominal pain    • Pleural effusion on right    • Hyperbilirubinemia    • Anasarca    • Abdominal ascites    • Acute on chronic congestive heart failure (HCC)    • Hypokalemia    • Chronic kidney disease (CKD), stage IV (severe) (HCC)    • Chronic constipation    • Mixed hyperlipidemia    • Longstanding persistent atrial fibrillation (HCC)    • Chronic respiratory failure (HCC)    •  Atherosclerosis of native coronary artery of native heart without angina pectoris    • Pulmonary hypertension (HCC)    • Essential hypertension    • Gastroesophageal reflux disease    • Oxygen dependent    • Long term (current) use of anticoagulants    • History of CVA (cerebrovascular accident)      Generalized abdominal pain/abdominal ascites:  -CT abdomen pelvis reviewed, unable to use contrast secondary to CKD stage IV  -Obtain complete abdominal ultrasound  -Start IV Zosyn for possible intra-abdominal infection  -Suspect abdominal ascites secondary to acute on chronic congestive heart failure  -Bedside paracentesis  -N.p.o. for procedure in a.m.  -IV Zofran for nausea  -Trend lactic acid levels to monitor for potential sepsis    Right pleural effusion/anasarca:  -CT abdomen pelvis reviewed  -Oxygen as needed to keep O2 saturation greater than 92%  -Continuous pulse oximetry  -Encourage incentive spirometry use    Hypokalemia:  -Upon arrival to the ED K3.0  -40 M EQ's IV potassium  -Monitor BMP during admission  -Nephrology consulted for evaluation and recommendations    Hyperbilirubinemia:  -CT abdomen pelvis reviewed, unable to use contrast secondary to CKD stage IV  -Obtain complete abdominal ultrasound  -Consult gastroenterology for evaluation and recommendations  -Trend lactic acid levels to monitor for potential sepsis  -SCDs for VTE prophylaxis    Acute on chronic congestive heart failure/pulmonary hypertension:  -EKG reviewed  -Check BNP, magnesium, phosphorus, urine drug screen, troponin  -Echocardiogram in a.m. to evaluate LV function  -Consult cardiology for evaluation and recommendations  -Continuous cardiac monitoring    CKD stage IV:.  -Upon arrival to the ED creatinine 2.16, BUN 32, GFR 22  -Consult nephrology for management during admission  -Nephrotoxic dyes and medications unless urgently indicated  -Monitor intake and output  -Monitor BMP during admission    Mixed hyperlipidemia:  -Encourage  lifestyle modifications  -Encourage dietary modifications    Longstanding persistent atrial fibrillation:  -EKG reviewed  -Resume home medications as appropriate once reconciled  -Continuous cardiac monitoring    Chronic respiratory failure/O2 dependent:  -Home O2 dependent  -DuoNeb nebulizer treatments 4 times daily  -Oxygen as needed keep O2 saturation greater than 92%  -Encourage incentive spirometry use    Essential hypertension/CAD:  -Encourage lifestyle modifications  -Encouraged dietary modification    GERD:  -IV PPI    History of CVA:  -Continue to monitor      DVT prophylaxis:  Mechanical DVT prophylaxis orders are present.                Home medications verified by nursing and/or pharmacy prior to provider review, resume medications as appropriate after medication reconciliation is completed      CODE STATUS:    Code Status (Patient has no pulse and is not breathing): CPR (Attempt to Resuscitate)  Medical Interventions (Patient has pulse or is breathing): Full Support    Admission Status:  I believe this patient meets inpatient status.          The patient was interviewed wearing appropriate personal protective equipment.      Signature: Electronically signed by ZACHARIAH Langley, 03/13/22, 9:37 AM EDT.

## 2022-03-14 PROBLEM — R10.11 RIGHT UPPER QUADRANT ABDOMINAL PAIN: Status: ACTIVE | Noted: 2022-01-01

## 2022-03-14 NOTE — PROGRESS NOTES
Cardiology Progress  Note      Patient Care Team:  Eliza Clayton APRN as PCP - General (Nurse Practitioner)  Eliza Clayton APRN as PCP - Family Medicine  Demond Valiente MD (Cardiology)  Gibson Medina MD as Surgeon (Cardiothoracic Surgery)  Ruthie Edgar MD as Consulting Physician (Nephrology)    PATIENT IDENTIFICATION  Name: Karen Rubio  Age: 86 y.o.  Sex: female  :  1935  MRN: [id number]             REASON FOR FOLLOW-UP:  Shortness of breath  Acute on chronic heart failure with preserved ejection fraction  Mod-severe valvular heart disease  Severe pulmonary hypertension          SUBJECTIVE    Patient seen and examined, chart and labs reviewed.  Continues to feel weak and tired, poor appetite.  Denies any shortness of breath or chest discomfort.  No nausea or vomiting.      REVIEW OF SYSTEMS:  Pertinent items are noted in HPI, all other systems reviewed and negative    OBJECTIVE   Sodium 136  Potassium 3.2 (2.9)  Creatinine 2.08  Hemoglobin 9.3    Lower extremity duplex: Left popliteal fossa fluid collection, normal bilateral lower extremity venous duplex scan    ASSESSMENT  Ascites  Liver cirrhosis  Acute on chronic heart failure with preserved ejection fraction  Valvular heart disease  Hypokalemia  Pulmonary hypertension  Chronic atrial fibrillation  Weakness  Shortness of breath  Chronic kidney disease  Chronic respiratory failure on home O2          RECOMMENDATIONS  Abdominal pain work-up and management as per hospitalist  GI has evaluated with no work-up planned-signed off  Blood pressure and heart rate stable, chronic A. fib  Continue to monitor and replete electrolytes per protocol  Nephrology consulted-diuresing  Continue sildenafil and BB.  Hold Eliquis pending any invasive procedure.  Receiving Lasix 40 mg every 8 hours    2022  Discussed with patient and family at bedside  Valvular heart disease  Ascites  Plans for paracentesis  "today  Chronic atrial fibrillation  Continue supportive care  Anticoagulation is on hold for the paracentesis  Continue diuresis and close monitoring of renal function  Discussed with family and patient at bedside  Echo findings reviewed    Chart and labs reviewed.  History and exam findings are verified with above changes noted.  Assessment and plan notated by APC after being formulated by attending consultant.  Note that greater than 50% of the time spent in care of the patient was provided by attending consultant.                     Vital Signs  Visit Vitals  /67 (BP Location: Left arm, Patient Position: Lying)   Pulse 76   Temp 97.6 °F (36.4 °C) (Oral)   Resp 16   Ht 162.6 cm (64\")   Wt 75.8 kg (167 lb 1.7 oz)   SpO2 94%   BMI 28.68 kg/m²     Oxygen Therapy  SpO2: 94 %  Pulse Oximetry Type: Intermittent  Device (Oxygen Therapy): nasal cannula  Flow (L/min): 2  Flowsheet Rows      Flowsheet Row First Filed Value   Admission Height 162.6 cm (64\") Documented at 03/12/2022 2023   Admission Weight 68.5 kg (151 lb) Documented at 03/12/2022 2023          Intake & Output (last 3 days)         03/11 0701  03/12 0700 03/12 0701  03/13 0700 03/13 0701  03/14 0700 03/14 0701  03/15 0700    P.O.   720     IV Piggyback  100      Total Intake(mL/kg)  100 (1.3) 720 (9.5)     Urine (mL/kg/hr)   300 (0.2)     Stool   0     Total Output   300     Net  +100 +420             Urine Unmeasured Occurrence   1 x     Stool Unmeasured Occurrence   3 x           Lines, Drains & Airways       Active LDAs       Name Placement date Placement time Site Days    Peripheral IV 03/13/22 2142 Anterior;Left Forearm 03/13/22 2142  Forearm  less than 1                           /67 (BP Location: Left arm, Patient Position: Lying)   Pulse 76   Temp 97.6 °F (36.4 °C) (Oral)   Resp 16   Ht 162.6 cm (64\")   Wt 75.8 kg (167 lb 1.7 oz)   SpO2 94%   BMI 28.68 kg/m²   Intake/Output last 3 shifts:  I/O last 3 completed shifts:  In: 820 " [P.O.:720; IV Piggyback:100]  Out: 300 [Urine:300]  Intake/Output this shift:  No intake/output data recorded.    PHYSICAL EXAM:  General:          Well-developed, obese, chronically ill-appearing 86-year-old female who is alert, cooperative, appears uncomfortable and chronically ill, appears stated age  Head:              Normocephalic, atraumatic, mucous membranes moist  Eyes:               Conjunctiva/corneas clear, EOM's intact     Neck:              Supple,  no adenopathy; no JVD or bruit  Lungs:                       Scattered rhonchi with no wheezes  Chest wall:     No tenderness  Heart::             Irregularly irregular rate and rhythm, S1 and S2 normal, 2/6 holosystolic murmur  Abdomen:      Soft, non-tender, distant bowel sounds  Extremities:    Trace edema to lower extremities  Pulses:           2+ and symmetric all extremities  Skin:                No rashes or lesions  Neuro/psych: Awake and alert, will answer simple questions.  Moves all extremities    Scheduled Meds:      albumin human, 37.5 g, Intravenous, Once   Or  albumin human, 50 g, Intravenous, Once   Or  albumin human, 62.5 g, Intravenous, Once   Or  albumin human, 75 g, Intravenous, Once   Or  albumin human, 87.5 g, Intravenous, Once   Or  albumin human, 100 g, Intravenous, Once   Or  albumin human, 112.5 g, Intravenous, Once  albumin human, 25 g, Intravenous, Q8H  allopurinol, 100 mg, Oral, Daily  epoetin chanda-epbx, 20,000 Units, Subcutaneous, Q14 Days  escitalopram, 10 mg, Oral, Daily  furosemide, 40 mg, Intravenous, Q8H  levothyroxine, 100 mcg, Oral, Q AM  metoprolol tartrate, 12.5 mg, Oral, Q12H  pantoprazole, 40 mg, Intravenous, Q AM  piperacillin-tazobactam, 3.375 g, Intravenous, Q12H  polyethylene glycol, 17 g, Oral, Daily  potassium chloride, 20 mEq, Oral, TID With Meals  sildenafil, 20 mg, Oral, Q8H  sodium chloride, 10 mL, Intravenous, Q12H  sucralfate, 1 g, Oral, 4x Daily AC & at Bedtime  ursodiol, 300 mg, Oral,  TID        Continuous Infusions:         PRN Meds:      acetaminophen **OR** [DISCONTINUED] acetaminophen **OR** [DISCONTINUED] acetaminophen    Calcium Gluconate-NaCl **AND** calcium gluconate **AND** Calcium, Ionized    ipratropium-albuterol    magnesium sulfate **OR** magnesium sulfate **OR** magnesium sulfate    melatonin    ondansetron **OR** ondansetron    potassium chloride    potassium chloride    [COMPLETED] Insert peripheral IV **AND** sodium chloride    sodium chloride        Results Review:     I reviewed the patient's new clinical results.    CBC    Results from last 7 days   Lab Units 03/14/22 0233 03/13/22  0550 03/12/22 2105   WBC 10*3/mm3 6.40 6.90 6.40   HEMOGLOBIN g/dL 9.3* 8.9* 9.5*   PLATELETS 10*3/mm3 139* 134* 146     Cr Clearance Estimated Creatinine Clearance: 19.7 mL/min (A) (by C-G formula based on SCr of 2.08 mg/dL (H)).  Coag   Results from last 7 days   Lab Units 03/13/22  0550   INR  1.68*     HbA1C No results found for: HGBA1C  Blood Glucose No results found for: POCGLU  Infection   Results from last 7 days   Lab Units 03/13/22  0550   PROCALCITONIN ng/mL 0.11     CMP   Results from last 7 days   Lab Units 03/14/22 0233 03/13/22 2055 03/13/22  0550 03/12/22  2105 03/10/22  1532   SODIUM mmol/L 135*  --  137 135*  --    POTASSIUM mmol/L 3.2* 2.9* 2.8* 3.0*  --    CHLORIDE mmol/L 98  --  98 97*  --    CO2 mmol/L 23.0  --  27.0 25.0  --    BUN mg/dL 29*  --  31* 32*  --    CREATININE mg/dL 2.08*  --  2.13* 2.16*  --    GLUCOSE mg/dL 103*  --  96 136*  --    ALBUMIN g/dL 3.20*  --  3.40* 3.50 3.6   BILIRUBIN mg/dL 2.1*  --  2.0*  2.0* 2.0* 1.9*   ALK PHOS U/L 103  --  114 116 128   AST (SGOT) U/L 15  --  14 16 18   ALT (SGPT) U/L 7  --  7 7 5*   AMYLASE U/L  --   --   --   --  45   LIPASE U/L  --   --   --  30  --      ABG      UA    Results from last 7 days   Lab Units 03/13/22  1136   NITRITE UA  Negative     HARLEY  No results found for: POCMETH, POCAMPHET, POCBARBITUR, POCBENZO,  POCCOCAINE, POCOPIATES, POCOXYCODO, POCPHENCYC, POCPROPOXY, POCTHC, POCTRICYC  Lysis Labs   Results from last 7 days   Lab Units 03/14/22  0233 03/13/22  0550 03/12/22 2105   INR   --  1.68*  --    HEMOGLOBIN g/dL 9.3* 8.9* 9.5*   PLATELETS 10*3/mm3 139* 134* 146   CREATININE mg/dL 2.08* 2.13* 2.16*     Radiology(recent) CT Abdomen Pelvis Without Contrast    Result Date: 3/12/2022  Impression: 1. Limited evaluation without IV contrast. 2. Small to moderate diffuse peritoneal ascites fluid. No organized abscess seen. 3. Diverticulosis without acute diverticulitis. 4. Anasarca. 5. Atherosclerosis. 6. Stable multilevel degenerative changes in the thoracolumbar spine and stable T12 vertebral body compression. 6. Moderate right pleural effusion. Electronically signed by:  Blair Jeffery M.D.  3/12/2022 9:49 PM    US Abdomen Limited    Result Date: 3/13/2022  Findings compatible with cirrhosis  Status post cholecystectomy  Right upper quadrant ascites    Electronically Signed By-Ole Mojica On:3/13/2022 12:56 PM This report was finalized on [id number] by  Ole Mojica, .    XR Abdomen KUB    Result Date: 3/13/2022  No evidence of impaction or obstruction  Electronically Signed By-Rajan Holley On:3/13/2022 6:14 PM This report was finalized on [id number] by  Rajan Holley, .        Results from last 7 days   Lab Units 03/13/22  1416   CK TOTAL U/L 41   TROPONIN T ng/mL <0.010       Xrays, labs reviewed personally by physician.    ECG/EMG Results (most recent)       Procedure Component Value Units Date/Time    ECG 12 Lead [[id number]] Collected: 03/12/22 2119     Updated: 03/12/22 2120     QT Interval 412 ms     Narrative:      HEART RATE= 88  bpm  RR Interval= 681  ms  AZ Interval=   ms  P Horizontal Axis=   deg  P Front Axis=   deg  QRSD Interval= 115  ms  QT Interval= 412  ms  QRS Axis= 136  deg  T Wave Axis= 26  deg  - ABNORMAL ECG -  Atrial fibrillation  Incomplete right bundle branch block  Anterior  infarct, old  Electronically Signed By:   Date and Time of Study: 2022-03-12 21:19:29    Adult Transthoracic Echo Complete w/ Color, Spectral and Contrast if necessary per protocol [[id number]] Resulted: 03/13/22 0912     Updated: 03/13/22 0919     Target HR (85%) 115 bpm      Max. Pred. HR (100%) 135 bpm      Ao root diam 2.6 cm      Ao pk ekaterina 375.4 cm/sec      Ao V2 VTI 57.5 cm      DUNG(I,D) 0.50 cm2      EDV(cubed) 27.1 ml      EDV(MOD-sp4) 27.2 ml      EF(MOD-bp) 78.0 %      EF(MOD-sp4) 78.4 %      ESV(cubed) 8.6 ml      ESV(MOD-sp4) 5.9 ml      IVS/LVPW 1.12 cm      LV mass(C)d 148.8 grams      LV V1 max PG 1.88 mmHg      LV V1 mean PG 1.21 mmHg      LV V1 max 68.6 cm/sec      LVPWd 1.36 cm      MR max .1 mmHg      MV dec time 0.20 msec      MV V2 VTI 50.7 cm      MVA(VTI) 0.57 cm2      PA V2 max 188.0 cm/sec      RAP systole 15.0 mmHg      RV V1 max PG 0.96 mmHg      RV V1 max 48.9 cm/sec      RV V1 VTI 7.7 cm      RVIDd 3.2 cm      RVSP(TR) 74.1 mmHg      SI(MOD-sp4) 11.7 ml/m2      SV(LVOT) 28.7 ml      SV(MOD-sp4) 21.3 ml      TR max PG 59.1 mmHg      Ao max PG 56.6 mmHg      Ao mean PG 29.9 mmHg      FS 31.7 %      IVSd 1.53 cm      LA dimension(2D) 4.6 cm      LV V1 VTI 15.5 cm      LVIDd 3.0 cm      LVIDs 2.05 cm      LVOT area 1.85 cm2      LVOT diam 1.53 cm      MV max PG 26.3 mmHg      MV mean PG 8.8 mmHg      MR max ekaterina 582.2 cm/sec      MV E max ekaterina 207.6 cm/sec      TR max ekaterina 381.7 cm/sec      LV Restrepo Vol (BSA corrected) 14.9 cm2      LV Sys Vol (BSA corrected) 3.2 cm2     Narrative:      · Estimated left ventricular EF was in agreement with the calculated left   ventricular EF. Left ventricular ejection fraction appears to be greater   than 70%. Left ventricular systolic function is hyperdynamic (EF > 70%).  · Left ventricular wall thickness is consistent with moderate concentric   hypertrophy.  · Moderate mitral valve regurgitation is present.  · There is moderate, bileaflet mitral valve  thickening present.  · Severe tricuspid valve regurgitation is present.  · Estimated right ventricular systolic pressure from tricuspid   regurgitation is markedly elevated (>55 mmHg).  · The right ventricular cavity is severely dilated.  · Moderate to severe mitral valve stenosis is present.  · The right atrial cavity is moderately dilated.  · Moderate to severe aortic valve stenosis is present.  · Peak velocity of the flow distal to the aortic valve is 375.4 cm/s.   Aortic valve maximum pressure gradient is 56.6 mmHg. Aortic valve mean   pressure gradient is 29.9 mmHg.                 Medication Review:   I have reviewed the patient's current medication list  Scheduled Meds:albumin human, 37.5 g, Intravenous, Once   Or  albumin human, 50 g, Intravenous, Once   Or  albumin human, 62.5 g, Intravenous, Once   Or  albumin human, 75 g, Intravenous, Once   Or  albumin human, 87.5 g, Intravenous, Once   Or  albumin human, 100 g, Intravenous, Once   Or  albumin human, 112.5 g, Intravenous, Once  albumin human, 25 g, Intravenous, Q8H  allopurinol, 100 mg, Oral, Daily  epoetin chanda-epbx, 20,000 Units, Subcutaneous, Q14 Days  escitalopram, 10 mg, Oral, Daily  furosemide, 40 mg, Intravenous, Q8H  levothyroxine, 100 mcg, Oral, Q AM  metoprolol tartrate, 12.5 mg, Oral, Q12H  pantoprazole, 40 mg, Intravenous, Q AM  piperacillin-tazobactam, 3.375 g, Intravenous, Q12H  polyethylene glycol, 17 g, Oral, Daily  potassium chloride, 20 mEq, Oral, TID With Meals  sildenafil, 20 mg, Oral, Q8H  sodium chloride, 10 mL, Intravenous, Q12H  sucralfate, 1 g, Oral, 4x Daily AC & at Bedtime  ursodiol, 300 mg, Oral, TID      Continuous Infusions:   PRN Meds:.  acetaminophen **OR** [DISCONTINUED] acetaminophen **OR** [DISCONTINUED] acetaminophen    Calcium Gluconate-NaCl **AND** calcium gluconate **AND** Calcium, Ionized    ipratropium-albuterol    magnesium sulfate **OR** magnesium sulfate **OR** magnesium sulfate    melatonin    ondansetron  **OR** ondansetron    potassium chloride    potassium chloride    [COMPLETED] Insert peripheral IV **AND** sodium chloride    sodium chloride    Imaging:  Imaging Results (Last 72 Hours)       Procedure Component Value Units Date/Time    XR Abdomen KUB [[id number]] Collected: 03/13/22 1812     Updated: 03/13/22 1816    Narrative:      DATE OF EXAM:  3/13/2022 6:08 PM     PROCEDURE:  XR ABDOMEN KUB-     INDICATIONS:  fecal impaction; R10.11-Right upper quadrant pain; A43-Xxlzyuh effusion,  not elsewhere classified; I48.20-Chronic atrial fibrillation,  unspecified     COMPARISON:  No Comparisons Available     TECHNIQUE:   Single radiographic view of the abdomen was obtained.        FINDINGS:  Bowel gas pattern is within normal limits. The rectum does not appear  significantly distended with stool. Stool burden is not excessive. No  suspicious calcifications are demonstrated. There is no free  subdiaphragmatic air        Impression:      No evidence of impaction or obstruction     Electronically Signed By-Rajan Holley On:3/13/2022 6:14 PM  This report was finalized on [id number] by  Rajan Holley, .    US Abdomen Limited [[id number]] Collected: 03/13/22 1253     Updated: 03/13/22 1258    Narrative:      US ABDOMEN LIMITED-     Date of Exam: 3/13/2022 12:10 PM     Indication: Abdominal pain; R10.11-Right upper quadrant pain;  Q45-Alnzdnq effusion, not elsewhere classified; I48.20-Chronic atrial  fibrillation, unspecified.     Comparison: None available.     Technique: Grayscale and color doppler ultrasound images of the abdomen  were obtained.      FINDINGS:  Liver is heterogeneous and somewhat nodular in contour compatible with  cirrhosis. No focal lesion or intrahepatic biliary ductal dilation  noted. The hepatic and portal vein are patent. Evaluation direction of  flow somewhat limited by the patient's inability to breath-hold.     Patient is status post cholecystectomy.  The common bile duct measures  maximally  5.3 mm     The visualized portions of the head and body of the pancreas are normal.   The pancreatic tail is not seen due to bowel gas.     There is no hydronephrosis.  The renal cortex has normal thickness and  echogenicity.  No gross renal masses.       Small to moderate amount of right upper quadrant ascites               Impression:      Findings compatible with cirrhosis     Status post cholecystectomy     Right upper quadrant ascites           Electronically Signed By-Ole Mojica On:3/13/2022 12:56 PM  This report was finalized on [id number] by  Ole Mojica, .    CT Abdomen Pelvis Without Contrast [[id number]] Collected: 03/12/22 2346     Updated: 03/12/22 2351    Narrative:      CT OF THE ABDOMEN AND PELVIS WITHOUT CONTRAST    Clinical indication: Abdominal pain.    Comparison: 2/13/2021.    Procedure: Noncontrast CT images of the abdomen and pelvis were obtained.  CT dose lowering techniques were used, to include: automated exposure control, adjustment for patient size, and or use of iterative reconstruction.    Findings:     Lung Bases: Lung bases are clear. Moderate right pleural effusion. Heart size is normal without pericardial effusion.    Liver and biliary system: The liver is normal in size and configuration without focal lesion. No intra or extrahepatic biliary ductal dilatation is noted. The gallbladder is absent.     Pancreas: The pancreas is unremarkable.     Spleen: The spleen is normal.    Adrenals: The adrenal glands are within normal limits.     Kidneys: The kidneys are symmetric in size and without hydronephrosis.    Gastrointestinal: The stomach and scattered loops of small and large bowel have a nonobstructive pattern. Diverticulosis. The appendix is normal in the right lower quadrant.     Mesentery and retroperitoneum: No mesenteric or retroperitoneal adenopathy. No free air. Small to moderate on diffuse peritoneal ascites fluid. Atherosclerosis.    Pelvis: The urinary bladder  "is moderately distended with a smooth contour. Rectum is normal. No free fluid. No deep pelvic or inguinal adenopathy.     Reproductive: No acute abnormality.    Body wall: Anasarca.    Bones: Stable multilevel degenerative changes in the spine and stable T12 vertebral body compression.      Impression:      Impression:   1. Limited evaluation without IV contrast.  2. Small to moderate diffuse peritoneal ascites fluid. No organized abscess seen.  3. Diverticulosis without acute diverticulitis.  4. Anasarca.  5. Atherosclerosis.  6. Stable multilevel degenerative changes in the thoracolumbar spine and stable T12 vertebral body compression.  6. Moderate right pleural effusion.    Electronically signed by:  Blair Jeffery M.D.    3/12/2022 9:49 PM              ZACHARIAH Edwards  03/14/22  12:46 EDT       EMR Dragon/Transcription:   \"Dictated utilizing Dragon dictation\".       Electronically signed by ZACHARIAH Edwards, 03/14/22, 12:46 PM EDT.    "

## 2022-03-14 NOTE — CASE MANAGEMENT/SOCIAL WORK
Discharge Planning Assessment  [facility]     Patient Name: Karen Rubio  MRN: [id number]  Today's Date: 3/14/2022    Admit Date: 3/12/2022     Discharge Needs Assessment     Row Name 03/14/22 1552       Living Environment    People in Home alone    Current Living Arrangements home    Primary Care Provided by child(maisha);homecare agency    Provides Primary Care For no one, unable/limited ability to care for self    Family Caregiver if Needed child(maisha), adult    Quality of Family Relationships helpful    Able to Return to Prior Arrangements yes       Resource/Environmental Concerns    Resource/Environmental Concerns none    Transportation Concerns none       Transition Planning    Patient/Family Anticipates Transition to home with help/services    Patient/Family Anticipated Services at Transition hospice care    Transportation Anticipated family or friend will provide       Discharge Needs Assessment    Readmission Within the Last 30 Days no previous admission in last 30 days    Equipment Currently Used at Home oxygen;nebulizer;walker, rolling;wheelchair;shower chair;ramp    Concerns to be Addressed discharge planning    Anticipated Changes Related to Illness inability to care for self    Equipment Needed After Discharge none    Discharge Facility/Level of Care Needs other (see comments)  home with hospice    Provided Post Acute Provider List? Yes    Post Acute Provider List Hospice    Delivered To Support Person    Support Person daughter    Method of Delivery In person    Current Discharge Risk chronically ill;physical impairment;lives alone               Discharge Plan     Row Name 03/14/22 1554       Plan    Plan Home with [facility]    Patient/Family in Agreement with Plan yes    Plan Comments Met with patient and family at bedside. Tobi provided the information. Reports patient lives at home alone, has a caregiver (private pay) M-F 10:30a-3p, caregiver will also  some weekends and evening.  She also has 20 hr caregiver support through [employer] for 20hrs/week, 4p-8p. Daughter is able to help patient when a caregiver is not present. Daughter provides transportation. PCP and pharmacy confirmed. Discussed the need for hospice on d/c and daughter is agreeable. Choose [facility], referral to [facility] liaison. Received notice patient is accepted and plan is to d/c today and hospice to admit patient at home this evening. She has informed MD and RN. Discharge orders noted.    Final Discharge Disposition Code 50 - home with hospice    Final Note Home with [facility]              Continued Care and Services - Admitted Since 3/12/2022     Destination Coordination complete.    Service Provider Request Status Selected Services Address Phone Fax Patient Preferred    [facility]   Selected Inpatient Hospice [address] [facility], [place] [address] [phone] -- --              Expected Discharge Date and Time     Expected Discharge Date Expected Discharge Time    Mar 14, 2022          Demographic Summary     Row Name 03/14/22 1552       General Information    Admission Type inpatient    Arrived From emergency department    Required Notices Provided Important Message from Medicare    Referral Source admission list    Reason for Consult discharge planning    Preferred Language English               Functional Status     Row Name 03/14/22 1552       Functional Status    Usual Activity Tolerance moderate    Current Activity Tolerance fair       Functional Status, IADL    Medications assistive person    Meal Preparation assistive person    Housekeeping assistive person    Laundry assistive person    Shopping assistive person                    Patient Forms     Row Name 03/14/22 1556       Patient Forms    Important Message from Medicare ([facility]) --  IM delivered 3/13              Met with patient in room wearing PPE: mask    Maintained distance greater than six feet and spent less than 15 minutes in  the room.          Lexi Davis RN

## 2022-03-14 NOTE — PLAN OF CARE
Goal Outcome Evaluation:  Plan of Care Reviewed With: patient        Progress: no change  Outcome Evaluation: Pt rested well through the night with no complaints. Plan is for patient to undergo bedside paracentesis in the AM, will continue to monitor.

## 2022-03-14 NOTE — NURSING NOTE
PARACENTESIS    Time Arrived in Department: 1300      Consent: yes  Allergies have been Reviewed: yes      ID Band Verified: yes    Method: Bed    Lab Results: Checked    Physician: Roman      Nurse: Marina Garcia RN    IR Care Plan: Person Educated - Patient, Current Knowledge - Understands, Learning Barrier - None, Readiness to Learn - Acceptance, Teaching Topic - Procedure and Evaluation of Teaching - Verbalized Understanding      Neurological: Within Normal Limits    Skin: Warm, Dry and Pale    Respiratory Status: Respirations shallow    Room In: 251      Procedure: Paracentesis    Time Out Completed: yes    Time Out: 1303    Prep Time: 1305    Prep Site 1: Right Lateral Abdomen      Prep: Chlorhexidine and Sterile drape    Procedure Position: Right Side    Guidance: Ultrasound    Fluid Quality: Bloody    Procedure Note: Pt prepped and draped in a sterile fashion. Procedure began at 1310. 1% lidocaine for local anesthesia.,  Pt tolerated procedure well.        Intra Time 1:1325    Intra Assess 1:   LOC: Sleeping  Pain:  No Issues and Sleeping  Skin: Warm and Dry  Respiratory Status:  Even and Unlabored  Intra Procedure Note 1: draining well        Intra Time 2:1340  Intra Assess 2:   LOC: Sleeping  Pain: No Issues, Tolerating and Sleeping  Skin: Warm and Dry  Respiratory Status: Even and Unlabored  Intra Procedure Note 2:         Intra Time 3: 1350    Intra Assess 3:   LOC: Sleeping  Pain: No Issues, Tolerating and Sleeping  Skin: Warm and Dry  Respiratory Status: Even and Unlabored  Intra Procedure Note 3:para completed                Post-Procedure Position: Supine and HOB Elevated    Dressing Site 1: 2x2 and Tegaderm    Post Procedure Status:  Drowsy, oriented, and returned to baseline, Dressing Dry/ Intact, Stable Condition and Dressing properly labeled    Specimen To Lab: no    Total Fluid Removed: 1.7L    Post Procedure Note:  Pt tolerated procedure well.  Albumin per MD order.         Report Given To:  MALENA Crenshaw     Admit/Transfer To: N/A    Transfer Time: N/A    Discharge Time: N/A    Method: Bed    O2 on Transfer: N/A     Accompanied By:  N/A    Clothes Changed:  Needs Assistance    Discharged Location:  Admit    Discharge Teaching:  Inpatient, Care of Dressing, PAtient and Significant Other/ Family

## 2022-03-14 NOTE — PROGRESS NOTES
"PULMONARY CRITICAL CARE Progress  NOTE      PATIENT IDENTIFICATION:  Name: Karen Rubio  MRN: [id number]  :  1935     Age: 86 y.o.  Sex: female    DATE OF Note:  3/14/2022   Referring Physician: Keven Escoto MD                  Subjective:   Feeling weak and tired  Poor appetite  No SOB no chest pain, no nausea or vomiting, no change in bowel habit, no dysuria,  no new  skin rash or itching.      Objective:  tMax 24 hrs: Temp (24hrs), Av.7 °F (36.5 °C), Min:97.6 °F (36.4 °C), Max:97.8 °F (36.6 °C)      Vitals Ranges:   Temp:  [97.6 °F (36.4 °C)-97.8 °F (36.6 °C)] 97.6 °F (36.4 °C)  Heart Rate:  [67-83] 76  Resp:  [16-18] 16  BP: ()/(51-67) 108/67    Intake and Output Last 3 Shifts:   I/O last 3 completed shifts:  In: 820 [P.O.:720; IV Piggyback:100]  Out: 300 [Urine:300]    Exam:  /67 (BP Location: Left arm, Patient Position: Lying)   Pulse 76   Temp 97.6 °F (36.4 °C) (Oral)   Resp 16   Ht 162.6 cm (64\")   Wt 75.8 kg (167 lb 1.7 oz)   SpO2 94%   BMI 28.68 kg/m²     General Appearance:     HEENT:  Normocephalic, without obvious abnormality, Conjunctiva/corneas clear,.  Normal external ear canals, Nares normal, no drainage     Neck:  Supple, symmetrical, trachea midline. No JVD.  Lungs /Chest wall:   Bilateral basal rhonchi, respirations unlabored symmetrical wall movement.     Heart:  Regular rate and rhythm, systolic murmur PMI left sternal border  Abdomen: Soft, non-tender, no masses, no organomegaly.    Extremities: Trace edema no clubbing or Cyanosis        Medications:    Current Facility-Administered Medications:   •  acetaminophen (TYLENOL) tablet 650 mg, 650 mg, Oral, Q4H PRN **OR** [DISCONTINUED] acetaminophen (TYLENOL) 160 MG/5ML solution 650 mg, 650 mg, Oral, Q4H PRN **OR** [DISCONTINUED] acetaminophen (TYLENOL) suppository 650 mg, 650 mg, Rectal, Q4H PRN, Lisette Saucedo, ZACHARIAH  •  albumin human 25 % IV SOLN 37.5 g, 37.5 g, Intravenous, Once **OR** albumin human 25 % " IV SOLN 50 g, 50 g, Intravenous, Once **OR** albumin human 25 % IV SOLN 62.5 g, 62.5 g, Intravenous, Once **OR** albumin human 25 % IV SOLN 75 g, 75 g, Intravenous, Once **OR** albumin human 25 % IV SOLN 87.5 g, 87.5 g, Intravenous, Once **OR** albumin human 25 % IV SOLN 100 g, 100 g, Intravenous, Once **OR** albumin human 25 % IV SOLN 112.5 g, 112.5 g, Intravenous, Once, Lisette Saucedo, ZACHARIAH  •  albumin human 25 % IV SOLN 25 g, 25 g, Intravenous, Q8H, Ruthie Edgar MD, 25 g at 03/14/22 0922  •  allopurinol (ZYLOPRIM) tablet 100 mg, 100 mg, Oral, Daily, Keven Escoto MD, 100 mg at 03/14/22 0921  •  calcium gluconate 1g/50ml 0.675% NaCl IV SOLN, 1 g, Intravenous, PRN **AND** calcium gluconate 2-0.675 GM/100ML NACL IVPB, 2 g, Intravenous, PRN **AND** Calcium, Ionized, , , PRN, Keven Escoto MD  •  epoetin chanda-epbx (RETACRIT) injection 20,000 Units, 20,000 Units, Subcutaneous, Q14 Days, Ruthie Edgar MD, 20,000 Units at 03/14/22 0921  •  escitalopram (LEXAPRO) tablet 10 mg, 10 mg, Oral, Daily, Keven Escoto MD, 10 mg at 03/14/22 0922  •  ferric gluconate (FERRLECIT) 250 MG in sodium chloride 0.9% 100 mL IVPB, 250 mg, Intravenous, Daily, Jenni Harrison APRN, Last Rate: 50 mL/hr at 03/14/22 0923, 250 mg at 03/14/22 0923  •  furosemide (LASIX) injection 40 mg, 40 mg, Intravenous, Q8H, Keven Escoto MD  •  ipratropium-albuterol (DUO-NEB) nebulizer solution 3 mL, 3 mL, Nebulization, Q4H PRN, Keven Escoto MD  •  levothyroxine (SYNTHROID, LEVOTHROID) tablet 100 mcg, 100 mcg, Oral, Q AM, Ruthie Edgar MD, 100 mcg at 03/14/22 0510  •  Magnesium Sulfate 2 gram Bolus, followed by 8 gram infusion (total Mg dose 10 grams)- Mg less than or equal to 1mg/dL, 2 g, Intravenous, PRN **OR** Magnesium Sulfate 2 gram / 50mL Infusion (GIVE X 3 BAGS TO EQUAL 6GM TOTAL DOSE) - Mg 1.1 - 1.5 mg/dl, 2 g, Intravenous, PRN **OR** Magnesium Sulfate 4 gram infusion- Mg 1.6-1.9 mg/dL, 4  g, Intravenous, PRN, Keven Escoto MD  •  melatonin tablet 5 mg, 5 mg, Oral, Nightly PRN, Lisette Saucedo APRN  •  metoprolol tartrate (LOPRESSOR) half tablet 12.5 mg, 12.5 mg, Oral, Q12H, Ruthie Edgar MD, 12.5 mg at 03/14/22 0921  •  ondansetron (ZOFRAN) tablet 4 mg, 4 mg, Oral, Q6H PRN **OR** ondansetron (ZOFRAN) injection 4 mg, 4 mg, Intravenous, Q6H PRN, Lisette Saucedo APRN  •  pantoprazole (PROTONIX) injection 40 mg, 40 mg, Intravenous, Q AM, Lisette Saucedo APRN, 40 mg at 03/14/22 0510  •  piperacillin-tazobactam (ZOSYN) IVPB 3.375 g in 100 mL NS (CD), 3.375 g, Intravenous, Q12H, Lisette Saucedo APRN, 3.375 g at 03/14/22 0043  •  polyethylene glycol (MIRALAX) packet 17 g, 17 g, Oral, Daily, Jenni Harrison APRN, 17 g at 03/13/22 1109  •  potassium chloride (K-DUR,KLOR-CON) CR tablet 20 mEq, 20 mEq, Oral, TID With Meals, Ruthie Edgar MD, 20 mEq at 03/14/22 0922  •  potassium chloride (K-DUR,KLOR-CON) CR tablet 40 mEq, 40 mEq, Oral, PRN, Keven Escoto MD, 40 mEq at 03/14/22 0510  •  potassium chloride (KLOR-CON) packet 40 mEq, 40 mEq, Oral, PRN, Keven Escoto MD  •  sildenafil (REVATIO) tablet 20 mg, 20 mg, Oral, Q8H, Zoey aLguna APRN, 20 mg at 03/14/22 0510  •  [COMPLETED] Insert peripheral IV, , , Once **AND** sodium chloride 0.9 % flush 10 mL, 10 mL, Intravenous, PRN, Paxton Gaston MD  •  sodium chloride 0.9 % flush 10 mL, 10 mL, Intravenous, Q12H, Lisette Saucedo APRN, 10 mL at 03/14/22 0923  •  sodium chloride 0.9 % flush 10 mL, 10 mL, Intravenous, PRN, Lisette Saucedo APRN  •  sucralfate (CARAFATE) tablet 1 g, 1 g, Oral, 4x Daily AC & at Bedtime, Jenni Harrison APRN, 1 g at 03/14/22 0921  •  ursodiol (ACTIGALL) capsule 300 mg, 300 mg, Oral, TID, Vinh Gomez MD, 300 mg at 03/14/22 0922    Data Review:  All labs (24hrs):   Recent Results (from the past 24 hour(s))   Urine Drug Screen - Urine, Catheter In/Out    Collection Time: 03/13/22  11:36 AM    Specimen: Urine, Catheter In/Out   Result Value Ref Range    Amphet/Methamphet, Screen Negative Negative    Barbiturates Screen, Urine Negative Negative    Benzodiazepine Screen, Urine Negative Negative    Cocaine Screen, Urine Negative Negative    Opiate Screen Negative Negative    THC, Screen, Urine Negative Negative    Methadone Screen, Urine Negative Negative    Oxycodone Screen, Urine Negative Negative   Urinalysis With Culture If Indicated - Urine, Catheter In/Out    Collection Time: 03/13/22 11:36 AM    Specimen: Urine, Catheter In/Out   Result Value Ref Range    Color, UA Yellow Yellow, Straw    Appearance, UA Clear Clear    pH, UA 6.0 5.0 - 8.0    Specific Gravity, UA 1.012 1.005 - 1.030    Glucose, UA Negative Negative    Ketones, UA Negative Negative    Bilirubin, UA Negative Negative    Blood, UA Negative Negative    Protein, UA Negative Negative    Leuk Esterase, UA Negative Negative    Nitrite, UA Negative Negative    Urobilinogen, UA 0.2 E.U./dL 0.2 - 1.0 E.U./dL   Troponin    Collection Time: 03/13/22  2:16 PM    Specimen: Blood   Result Value Ref Range    Troponin T <0.010 0.000 - 0.030 ng/mL   Lactic Acid, Plasma    Collection Time: 03/13/22  2:16 PM    Specimen: Blood   Result Value Ref Range    Lactate 2.3 (C) 0.5 - 2.0 mmol/L   CK    Collection Time: 03/13/22  2:16 PM    Specimen: Blood   Result Value Ref Range    Creatine Kinase 41 20 - 180 U/L   Uric Acid    Collection Time: 03/13/22  2:16 PM    Specimen: Blood   Result Value Ref Range    Uric Acid 5.1 2.4 - 5.7 mg/dL   STAT Lactic Acid, Reflex    Collection Time: 03/13/22  8:55 PM    Specimen: Blood   Result Value Ref Range    Lactate 1.6 0.5 - 2.0 mmol/L   Potassium    Collection Time: 03/13/22  8:55 PM    Specimen: Blood   Result Value Ref Range    Potassium 2.9 (L) 3.5 - 5.2 mmol/L   Comprehensive Metabolic Panel    Collection Time: 03/14/22  2:33 AM    Specimen: Blood   Result Value Ref Range    Glucose 103 (H) 65 - 99 mg/dL     BUN 29 (H) 8 - 23 mg/dL    Creatinine 2.08 (H) 0.57 - 1.00 mg/dL    Sodium 135 (L) 136 - 145 mmol/L    Potassium 3.2 (L) 3.5 - 5.2 mmol/L    Chloride 98 98 - 107 mmol/L    CO2 23.0 22.0 - 29.0 mmol/L    Calcium 8.9 8.6 - 10.5 mg/dL    Total Protein 6.5 6.0 - 8.5 g/dL    Albumin 3.20 (L) 3.50 - 5.20 g/dL    ALT (SGPT) 7 1 - 33 U/L    AST (SGOT) 15 1 - 32 U/L    Alkaline Phosphatase 103 39 - 117 U/L    Total Bilirubin 2.1 (H) 0.0 - 1.2 mg/dL    Globulin 3.3 gm/dL    A/G Ratio 1.0 g/dL    BUN/Creatinine Ratio 13.9 7.0 - 25.0    Anion Gap 14.0 5.0 - 15.0 mmol/L    eGFR 23.0 (L) >60.0 mL/min/1.73   CBC (No Diff)    Collection Time: 03/14/22  2:33 AM    Specimen: Blood   Result Value Ref Range    WBC 6.40 3.40 - 10.80 10*3/mm3    RBC 3.67 (L) 3.77 - 5.28 10*6/mm3    Hemoglobin 9.3 (L) 12.0 - 15.9 g/dL    Hematocrit 28.4 (L) 34.0 - 46.6 %    MCV 77.4 (L) 79.0 - 97.0 fL    MCH 25.4 (L) 26.6 - 33.0 pg    MCHC 32.8 31.5 - 35.7 g/dL    RDW 19.2 (H) 12.3 - 15.4 %    RDW-SD 52.1 37.0 - 54.0 fl    MPV 8.7 6.0 - 12.0 fL    Platelets 139 (L) 140 - 450 10*3/mm3   Calcium, Ionized    Collection Time: 03/14/22  2:33 AM    Specimen: Blood   Result Value Ref Range    Ionized Calcium 1.15 (L) 1.20 - 1.30 mmol/L   Magnesium    Collection Time: 03/14/22  2:33 AM    Specimen: Blood   Result Value Ref Range    Magnesium 1.8 1.6 - 2.4 mg/dL   Phosphorus    Collection Time: 03/14/22  2:33 AM    Specimen: Blood   Result Value Ref Range    Phosphorus 2.6 2.5 - 4.5 mg/dL   Lactic Acid, Plasma    Collection Time: 03/14/22  2:33 AM    Specimen: Blood   Result Value Ref Range    Lactate 1.4 0.5 - 2.0 mmol/L        Imaging:  XR Abdomen KUB  Narrative: DATE OF EXAM:  3/13/2022 6:08 PM     PROCEDURE:  XR ABDOMEN KUB-     INDICATIONS:  fecal impaction; R10.11-Right upper quadrant pain; A78-Orkqdvy effusion,  not elsewhere classified; I48.20-Chronic atrial fibrillation,  unspecified     COMPARISON:  No Comparisons Available     TECHNIQUE:   Single  radiographic view of the abdomen was obtained.        FINDINGS:  Bowel gas pattern is within normal limits. The rectum does not appear  significantly distended with stool. Stool burden is not excessive. No  suspicious calcifications are demonstrated. There is no free  subdiaphragmatic air      Impression: No evidence of impaction or obstruction     Electronically Signed By-Rajan Holley On:3/13/2022 6:14 PM  This report was finalized on [id number] by  Rajan Holley, .  US Abdomen Limited  Narrative: US ABDOMEN LIMITED-     Date of Exam: 3/13/2022 12:10 PM     Indication: Abdominal pain; R10.11-Right upper quadrant pain;  F88-Wjvwpbe effusion, not elsewhere classified; I48.20-Chronic atrial  fibrillation, unspecified.     Comparison: None available.     Technique: Grayscale and color doppler ultrasound images of the abdomen  were obtained.      FINDINGS:  Liver is heterogeneous and somewhat nodular in contour compatible with  cirrhosis. No focal lesion or intrahepatic biliary ductal dilation  noted. The hepatic and portal vein are patent. Evaluation direction of  flow somewhat limited by the patient's inability to breath-hold.     Patient is status post cholecystectomy.  The common bile duct measures  maximally 5.3 mm     The visualized portions of the head and body of the pancreas are normal.   The pancreatic tail is not seen due to bowel gas.     There is no hydronephrosis.  The renal cortex has normal thickness and  echogenicity.  No gross renal masses.       Small to moderate amount of right upper quadrant ascites             Impression: Findings compatible with cirrhosis     Status post cholecystectomy     Right upper quadrant ascites           Electronically Signed By-Ole Mojica On:3/13/2022 12:56 PM  This report was finalized on [id number] by  Ole Mojica, .  Adult Transthoracic Echo Complete w/ Color, Spectral and Contrast if necessary per protocol  · Estimated left ventricular EF was in  agreement with the calculated left   ventricular EF. Left ventricular ejection fraction appears to be greater   than 70%. Left ventricular systolic function is hyperdynamic (EF > 70%).  · Left ventricular wall thickness is consistent with moderate concentric   hypertrophy.  · Moderate mitral valve regurgitation is present.  · There is moderate, bileaflet mitral valve thickening present.  · Severe tricuspid valve regurgitation is present.  · Estimated right ventricular systolic pressure from tricuspid   regurgitation is markedly elevated (>55 mmHg).  · The right ventricular cavity is severely dilated.  · Moderate to severe mitral valve stenosis is present.  · The right atrial cavity is moderately dilated.  · Moderate to severe aortic valve stenosis is present.  · Peak velocity of the flow distal to the aortic valve is 375.4 cm/s.   Aortic valve maximum pressure gradient is 56.6 mmHg. Aortic valve mean   pressure gradient is 29.9 mmHg.          ASSESSMENT:    Acute on chronic congestive heart failure (HCC)    Coronary artery disease involving native coronary artery of native heart without angina pectoris    History of CVA (cerebrovascular accident)    Primary hypertension    Pulmonary hypertension (HCC)    Hypokalemia    Chronic respiratory failure (HCC)    Longstanding persistent atrial fibrillation (HCC)    Long term (current) use of anticoagulants    Mixed hyperlipidemia    GERD without esophagitis    Oxygen dependent    Generalized abdominal pain    Chronic kidney disease (CKD), stage IV (severe) (HCC)    Chronic constipation    Pleural effusion on right    Hyperbilirubinemia    Anasarca    Abdominal ascites    COPD (chronic obstructive pulmonary disease) (HCC)     PLAN:  O2 support  Record pulmonary hypertension we will monitor for now and support with oxygen  Need aggressive diuresis per renal  Bronchodilator  Inhaled corticosteroids  Electrolytes/ glycemic control  DVT and GI prophylaxis.    Total Critical care  time in direct medical management (   ) minutes, This time specifically excludes time spent performing procedures.  Santi Shine MD. D, ABSM.     3/14/2022  10:43 EDT

## 2022-03-14 NOTE — PROGRESS NOTES
NEPHROLOGY PROGRESS NOTE------[facility]    [facility]  [phone]  Gautam Anders MD      Patient Care Team:  Eliza Clayton APRN as PCP - General (Nurse Practitioner)  Eliza Clayton APRN as PCP - Family Medicine  Demond Valiente MD (Cardiology)  Gibson Medina MD as Surgeon (Cardiothoracic Surgery)  Ruthie Edgar MD as Consulting Physician (Nephrology)      Provider:  Gautam Anders MD  Patient Name: Karen Rubio  :  1935    SUBJECTIVE:  Follow-up CKD  No chest pain or shortness of breath  Getting paracentesis    Medication:  albumin human, 37.5 g, Intravenous, Once   Or  albumin human, 50 g, Intravenous, Once   Or  albumin human, 62.5 g, Intravenous, Once   Or  albumin human, 75 g, Intravenous, Once   Or  albumin human, 87.5 g, Intravenous, Once   Or  albumin human, 100 g, Intravenous, Once   Or  albumin human, 112.5 g, Intravenous, Once  albumin human, 25 g, Intravenous, Q8H  allopurinol, 100 mg, Oral, Daily  epoetin chanda-epbx, 20,000 Units, Subcutaneous, Q14 Days  escitalopram, 10 mg, Oral, Daily  furosemide, 40 mg, Intravenous, Q8H  levothyroxine, 100 mcg, Oral, Q AM  metoprolol tartrate, 12.5 mg, Oral, Q12H  pantoprazole, 40 mg, Intravenous, Q AM  piperacillin-tazobactam, 3.375 g, Intravenous, Q12H  polyethylene glycol, 17 g, Oral, Daily  potassium chloride, 20 mEq, Oral, TID With Meals  sildenafil, 20 mg, Oral, Q8H  sodium chloride, 10 mL, Intravenous, Q12H  sucralfate, 1 g, Oral, 4x Daily AC & at Bedtime  ursodiol, 300 mg, Oral, TID           OBJECTIVE    Vital Sign Min/Max for last 24 hours  Temp  Min: 97.6 °F (36.4 °C)  Max: 97.8 °F (36.6 °C)   BP  Min: 90/51  Max: 126/64   Pulse  Min: 67  Max: 83   Resp  Min: 16  Max: 18   SpO2  Min: 94 %  Max: 94 %   No data recorded   Weight  Min: 75.8 kg (167 lb 1.7 oz)  Max: 75.8 kg (167 lb 1.7 oz)     Flowsheet Rows    Flowsheet Row First Filed Value  "  Admission Height 162.6 cm (64\") Documented at 03/12/2022 2023   Admission Weight 68.5 kg (151 lb) Documented at 03/12/2022 2023          No intake/output data recorded.  I/O last 3 completed shifts:  In: 820 [P.O.:720; IV Piggyback:100]  Out: 300 [Urine:300]    Physical Exam:  General Appearance: alert, appears stated age and cooperative  Head: normocephalic, without obvious abnormality and atraumatic  Eyes: conjunctivae and sclerae normal and no icterus  Neck: supple and no JVD  Lungs: clear to auscultation and respirations regular  Heart: regular rhythm & normal rate and normal S1, S2  Chest: Wall no abnormalities observed  Abdomen: normal bowel sounds and soft non-tender  Extremities: moves extremities well, no edema, no cyanosis and no redness  Skin: no bleeding, bruising or rash, turgor normal, color normal and no lesions noted  Neurologic: Alert, and oriented. No focal deficits    Labs:    WBC WBC   Date Value Ref Range Status   03/14/2022 6.40 3.40 - 10.80 10*3/mm3 Final   03/13/2022 6.90 3.40 - 10.80 10*3/mm3 Final   03/12/2022 6.40 3.40 - 10.80 10*3/mm3 Final      HGB Hemoglobin   Date Value Ref Range Status   03/14/2022 9.3 (L) 12.0 - 15.9 g/dL Final   03/13/2022 8.9 (L) 12.0 - 15.9 g/dL Final   03/12/2022 9.5 (L) 12.0 - 15.9 g/dL Final      HCT Hematocrit   Date Value Ref Range Status   03/14/2022 28.4 (L) 34.0 - 46.6 % Final   03/13/2022 27.4 (L) 34.0 - 46.6 % Final   03/12/2022 29.4 (L) 34.0 - 46.6 % Final      Platlets No results found for: LABPLAT   MCV MCV   Date Value Ref Range Status   03/14/2022 77.4 (L) 79.0 - 97.0 fL Final   03/13/2022 76.6 (L) 79.0 - 97.0 fL Final   03/12/2022 77.4 (L) 79.0 - 97.0 fL Final          Sodium Sodium   Date Value Ref Range Status   03/14/2022 135 (L) 136 - 145 mmol/L Final   03/13/2022 137 136 - 145 mmol/L Final   03/12/2022 135 (L) 136 - 145 mmol/L Final      Potassium Potassium   Date Value Ref Range Status   03/14/2022 3.2 (L) 3.5 - 5.2 mmol/L Final "   03/13/2022 2.9 (L) 3.5 - 5.2 mmol/L Final   03/13/2022 2.8 (L) 3.5 - 5.2 mmol/L Final   03/12/2022 3.0 (L) 3.5 - 5.2 mmol/L Final      Chloride Chloride   Date Value Ref Range Status   03/14/2022 98 98 - 107 mmol/L Final   03/13/2022 98 98 - 107 mmol/L Final   03/12/2022 97 (L) 98 - 107 mmol/L Final      CO2 CO2   Date Value Ref Range Status   03/14/2022 23.0 22.0 - 29.0 mmol/L Final   03/13/2022 27.0 22.0 - 29.0 mmol/L Final   03/12/2022 25.0 22.0 - 29.0 mmol/L Final      BUN BUN   Date Value Ref Range Status   03/14/2022 29 (H) 8 - 23 mg/dL Final   03/13/2022 31 (H) 8 - 23 mg/dL Final   03/12/2022 32 (H) 8 - 23 mg/dL Final      Creatinine Creatinine   Date Value Ref Range Status   03/14/2022 2.08 (H) 0.57 - 1.00 mg/dL Final   03/13/2022 2.13 (H) 0.57 - 1.00 mg/dL Final   03/12/2022 2.16 (H) 0.57 - 1.00 mg/dL Final      Calcium Calcium   Date Value Ref Range Status   03/14/2022 8.9 8.6 - 10.5 mg/dL Final   03/13/2022 9.1 8.6 - 10.5 mg/dL Final   03/12/2022 9.0 8.6 - 10.5 mg/dL Final      PO4 No components found for: PO4   Albumin Albumin   Date Value Ref Range Status   03/14/2022 3.20 (L) 3.50 - 5.20 g/dL Final   03/13/2022 3.40 (L) 3.50 - 5.20 g/dL Final   03/12/2022 3.50 3.50 - 5.20 g/dL Final      Magnesium Magnesium   Date Value Ref Range Status   03/14/2022 1.8 1.6 - 2.4 mg/dL Final   03/13/2022 1.9 1.6 - 2.4 mg/dL Final   03/12/2022 1.9 1.6 - 2.4 mg/dL Final      Uric Acid No components found for: URIC ACID     Imaging Results (Last 72 Hours)     Procedure Component Value Units Date/Time    XR Abdomen KUB [[id number]] Collected: 03/13/22 1812     Updated: 03/13/22 1816    Narrative:      DATE OF EXAM:  3/13/2022 6:08 PM     PROCEDURE:  XR ABDOMEN KUB-     INDICATIONS:  fecal impaction; R10.11-Right upper quadrant pain; A55-Wdmnkcd effusion,  not elsewhere classified; I48.20-Chronic atrial fibrillation,  unspecified     COMPARISON:  No Comparisons Available     TECHNIQUE:   Single radiographic view of the  abdomen was obtained.        FINDINGS:  Bowel gas pattern is within normal limits. The rectum does not appear  significantly distended with stool. Stool burden is not excessive. No  suspicious calcifications are demonstrated. There is no free  subdiaphragmatic air        Impression:      No evidence of impaction or obstruction     Electronically Signed By-Rajan Holley On:3/13/2022 6:14 PM  This report was finalized on [id number] by  Rajan Holley, .    US Abdomen Limited [[id number]] Collected: 03/13/22 1253     Updated: 03/13/22 1258    Narrative:      US ABDOMEN LIMITED-     Date of Exam: 3/13/2022 12:10 PM     Indication: Abdominal pain; R10.11-Right upper quadrant pain;  F25-Gtmggin effusion, not elsewhere classified; I48.20-Chronic atrial  fibrillation, unspecified.     Comparison: None available.     Technique: Grayscale and color doppler ultrasound images of the abdomen  were obtained.      FINDINGS:  Liver is heterogeneous and somewhat nodular in contour compatible with  cirrhosis. No focal lesion or intrahepatic biliary ductal dilation  noted. The hepatic and portal vein are patent. Evaluation direction of  flow somewhat limited by the patient's inability to breath-hold.     Patient is status post cholecystectomy.  The common bile duct measures  maximally 5.3 mm     The visualized portions of the head and body of the pancreas are normal.   The pancreatic tail is not seen due to bowel gas.     There is no hydronephrosis.  The renal cortex has normal thickness and  echogenicity.  No gross renal masses.       Small to moderate amount of right upper quadrant ascites               Impression:      Findings compatible with cirrhosis     Status post cholecystectomy     Right upper quadrant ascites           Electronically Signed By-Ole Mojica On:3/13/2022 12:56 PM  This report was finalized on [id number] by  Ole Mojica, .    CT Abdomen Pelvis Without Contrast [[id number]] Collected: 03/12/22  2346     Updated: 03/12/22 2351    Narrative:      CT OF THE ABDOMEN AND PELVIS WITHOUT CONTRAST    Clinical indication: Abdominal pain.    Comparison: 2/13/2021.    Procedure: Noncontrast CT images of the abdomen and pelvis were obtained.  CT dose lowering techniques were used, to include: automated exposure control, adjustment for patient size, and or use of iterative reconstruction.    Findings:     Lung Bases: Lung bases are clear. Moderate right pleural effusion. Heart size is normal without pericardial effusion.    Liver and biliary system: The liver is normal in size and configuration without focal lesion. No intra or extrahepatic biliary ductal dilatation is noted. The gallbladder is absent.     Pancreas: The pancreas is unremarkable.     Spleen: The spleen is normal.    Adrenals: The adrenal glands are within normal limits.     Kidneys: The kidneys are symmetric in size and without hydronephrosis.    Gastrointestinal: The stomach and scattered loops of small and large bowel have a nonobstructive pattern. Diverticulosis. The appendix is normal in the right lower quadrant.     Mesentery and retroperitoneum: No mesenteric or retroperitoneal adenopathy. No free air. Small to moderate on diffuse peritoneal ascites fluid. Atherosclerosis.    Pelvis: The urinary bladder is moderately distended with a smooth contour. Rectum is normal. No free fluid. No deep pelvic or inguinal adenopathy.     Reproductive: No acute abnormality.    Body wall: Anasarca.    Bones: Stable multilevel degenerative changes in the spine and stable T12 vertebral body compression.      Impression:      Impression:   1. Limited evaluation without IV contrast.  2. Small to moderate diffuse peritoneal ascites fluid. No organized abscess seen.  3. Diverticulosis without acute diverticulitis.  4. Anasarca.  5. Atherosclerosis.  6. Stable multilevel degenerative changes in the thoracolumbar spine and stable T12 vertebral body compression.  6.  Moderate right pleural effusion.    Electronically signed by:  Blair Jeffery M.D.    3/12/2022 9:49 PM          Results for orders placed during the hospital encounter of 03/12/22    XR Abdomen KUB    Narrative  DATE OF EXAM:  3/13/2022 6:08 PM    PROCEDURE:  XR ABDOMEN KUB-    INDICATIONS:  fecal impaction; R10.11-Right upper quadrant pain; F16-Rctfvsi effusion,  not elsewhere classified; I48.20-Chronic atrial fibrillation,  unspecified    COMPARISON:  No Comparisons Available    TECHNIQUE:  Single radiographic view of the abdomen was obtained.      FINDINGS:  Bowel gas pattern is within normal limits. The rectum does not appear  significantly distended with stool. Stool burden is not excessive. No  suspicious calcifications are demonstrated. There is no free  subdiaphragmatic air    Impression  No evidence of impaction or obstruction    Electronically Signed By-Rajan Holley On:3/13/2022 6:14 PM  This report was finalized on [id number] by  Rajan Holley, .      Results for orders placed during the hospital encounter of 02/01/21    XR Chest 1 View    Narrative  Examination: XR CHEST 1 VW-    Date of Exam: 2/1/2021 8:08 PM    Indication: ?syncope.    Comparison: 01/07/2021    Technique: 1 view of the chest    FINDINGS:  The heart size is upper limits of normal. There are no definite areas of  airspace opacity. There is no septal thickening. No pleural effusion or  pneumothorax. There is probably mitral annulus calcification. No  significant bone abnormality.    Impression  1. No evidence of active disease.    Electronically Signed By-Kiarra Burrell MD On:2/1/2021 8:15 PM  This report was finalized on [id number] by  Kiarra Burrell MD.      Results for orders placed during the hospital encounter of 01/07/21    XR Chest 2 View    Narrative  DATE OF EXAM:  1/7/2021 7:57 AM    PROCEDURE:  XR CHEST 2 VW-    INDICATIONS:  Repeated falls, shortness of breath, coronary artery atherosclerotic  vascular disease, past  tobacco abuse.    COMPARISON:  3/18/2020.    TECHNIQUE:  Two radiologic views of the chest.    FINDINGS:  The heart size is normal. The pulmonary vascular markings are normal.  The lungs and pleural spaces are clear of active disease.  There are  chronic age-related changes involving the bony thorax and thoracic  aorta.    Impression  No active disease.    Electronically Signed By-Александр Eden MD On:1/7/2021 8:03 AM  This report was finalized on [id number] by  Александр Eden MD.      Results for orders placed during the hospital encounter of 03/12/22    Duplex Venous Lower Extremity - Bilateral CAR    Interpretation Summary  · Left popliteal fossa fluid collection.  · Normal bilateral lower extremity venous duplex scan.        ASSESSMENT / PLAN      Acute on chronic congestive heart failure (HCC)    Coronary artery disease involving native coronary artery of native heart without angina pectoris    History of CVA (cerebrovascular accident)    Primary hypertension    Pulmonary hypertension (HCC)    Hypokalemia    Chronic respiratory failure (HCC)    Longstanding persistent atrial fibrillation (HCC)    Long term (current) use of anticoagulants    Mixed hyperlipidemia    GERD without esophagitis    Oxygen dependent    Generalized abdominal pain    Chronic kidney disease (CKD), stage IV (severe) (HCC)    Chronic constipation    Pleural effusion on right    Hyperbilirubinemia    Anasarca    Abdominal ascites    COPD (chronic obstructive pulmonary disease) (HCC)    · 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  · Hypokalemia-replace  · Acute exacerbation of chronic diastolic heart failure/valvular heart disease with MVR and TVR/pulmonary hypertension  · Anemia of CKD  · Atrial fibrillation  · Hypertension with CKD  · Right pleural effusion  · Ascites  · Vitamin D deficiency  · Secondary hyperparathyroidism of renal origin  · Hypotension-add midodrine    Creatinine stable  Continue diuretics  Add midodrine for  hypotension  IV albumin post paracentesis  Monitor renal function fluid status electrolytes    Gautam Anders MD  [facility]  [phone]  03/14/22  12:34 EDT

## 2022-03-14 NOTE — PROCEDURES
Procedure:Ultrasound guided Paracentesis    Consent: Informed    Pre op diagnosis: Cirrhosis, Massive ascites    Post op diagnosis: Cirrhosis, Massive ascites    Anesthesia Provider: .Robin Owens MD    Anesthesia type: Local infiltration with 1% xylocaine     Surgeon: .Robin Owens MD    Assistant: none    Significant Clinical Findings:    Procedure summary:    Allergies , labs and medications were reviewed. Patient was made to lay supine and the area of interest was imaged with ultrasound and fluid verified and marked.  Area of interest was cleaned and draped in a sterile fashion. subsequently local infiltration with xylocaine. Trocar and canula were advanced. Upon verification of the fluid, trocar was steadied and canula advanced further. Trocar was drawn out. Canula was connected to extension tubing and to the vacuum bottle. Subsequently the canula was removed after completion of the procedure.     Findings: Clear straw yellow fluid was obtained. See nursing documentation for volume drained.    Lab Specimen: 0 ml    Complication: none    EBL: 0 ml    Post op condition: Stable. Albumin was given by protocol if needed.    Post op plan: Discharge back to the referring physician.
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
## 2022-04-02 NOTE — CASE MANAGEMENT/SOCIAL WORK
Continued Stay Note   Gab     Patient Name: Karen Rubio  MRN: [id number]  Today's Date: 4/2/2022    Admit Date: 4/2/2022     Discharge Plan     Row Name 04/02/22 1022       Plan    Plan Rosales with [facility].    Plan Comments Call received from Dr. Rodríguez that pt is current with [facility]. She has been evaluated in the ED and family wants to take the pt home. VM left with [facility] that pt was evaluated and will be discharged home.           Fadumo Rock RN    Phone [phone]  Fax [phone]  Phone communication or documentation only - no physical contact with patient or family.

## 2022-04-02 NOTE — H&P
[facility] Medicine Services      Patient Name: Karen Ruibo  : 1935  MRN: [id number]  Primary Care Physician:  Eliza Clayton APRN  Date of admission: 2022      Subjective      Chief Complaint: Fall    History of Present Illness: Karen Rubio is a 86 y.o. female w/PMH of A. fib, CKD, arthritis, CAD, HLD,  HTN, pulmonary HTN, and severe valvular heart disease who presented to [facility] on 2022 due to fall.  Of note, patient is currently with [facility]. Due to patient's condition, HPI obtained from family at bedside.  Family was found down out of bed by family at 0700, review of security cameras revealed mechanical fall at 0200.  Due to bruising of face and left arm she was jere by family to [facility] for evaluation.      In the ED, vitals 97.7, HR 92, RR 18, /68, 99% RA.  Labs notable for glucose 110, sodium 134, bicarb 30, chloride 92, Cr 2.46, Hgb 10.2.  X-ray left forearm show soft tissue swelling without fracture, CT facial bones shows left sided swelling without fracture, and CT head without mass/shift/hemorrhage    Workup showed no acute fracture or immediate decline. Family exressed wishes to take patient back home with [facility].  Case discussed with case management and plan discharge home with hospice.      Review of Systems   Unable to perform ROS: dementia        Personal History     Past Medical History:   Diagnosis Date   • Abnormality of left atrial appendage 2016    Suspected Clot Noted on ESTEFANI   • Acute renal failure (HCC) 2016-[facility]    Front Dehydration   • Arthritis    • Breast density 2017   • Carotid stenosis 2018    R @ 60% and L @ 10-20% Noted on Cardiac Cath & 16-49% on L&R    • COPD (chronic obstructive pulmonary disease) ([facility]) 3/13/2022   • Heart murmur    • History of echocardiogram 10/13/2017    Calcified Aortic Leaftlets; Mild Aortic Stenosis Present; Mild Mitral Stenosis; Bilateral  Enlargement Noted;  Moderate TR; LV Function of 55-60%   • History of echocardiogram 12/5/14-[facility]    Moderate L Vent Hypertrophy Noted; L Atrial Dilation; Moderate MR; Mild TR; Mild-Moderate Aortic Reg Noted; Normal Root Size/No Effusion   • History of echocardiogram 5/16/16-[facility]    Normal Findings with Mild-Moderate MVS Noted   • History of EKG 2004/2016 & 11/5/18-[facility]    All Sinus Rhythm w/Infarct Ischemnic Changes Noted   • HLD (hyperlipidemia)     Controlled w/Meds   • Hx of hyperglycemia    • Hypertension     Controlled w/Meds   • Hypothyroidism     Levothyroxine   • Joint pain    • Left atrial dilatation 12/05/2014    Mildly Noted on Echo   • Left ventricular hypertrophy 12/05/2014    Noted on Echo w/EF @ 55-60%   • Mild aortic regurgitation 12/05/2014    to Moderate Noted on Echo   • Mild aortic stenosis 10/13/2017    Noted on Echo & ESTEFANI-11/5/18-Moderate    • Mild mitral insufficiency 10/13/2017    Noted on Echo & ESTEFANI-11/5/18   • Mild tricuspid regurgitation 12/05/2014    Noted on Echo   • Moderate mitral regurgitation 12/05/2014    Noted on Echo   • Moderate mitral stenosis 10/13/2017    Noted on Echo   • Moderate tricuspid insufficiency 10/13/2017    Noted on Echo   • Pneumonia involving left lung 08/6/16-[facility] ER   • Pulmonary hypertension (HCC) 10/13/2017 & 11/5/18-Cath    Severe Noted on Echo & Cath   • Slurred speech 05/16/2016   • SOB (shortness of breath) 08/2016   • Stroke ([facility])    • Thickened mitral leaflet 10/13/2017 & ESTEFANI-11/5/18    Severely Calcified Noted on Echo   • Tricuspid valve insufficiency 11/05/2018    Noted on ESTEFANI       Past Surgical History:   Procedure Laterality Date   • ATRIAL APPENDAGE EXCLUSION LEFT WITH TRANSESOPHAGEAL ECHOCARDIOGRAM N/A 1/7/2021    Procedure: Atrial Appendage Occlusion;  Surgeon: Harjit Berg MD;  Location: [facility] INVASIVE LOCATION;  Service: Cardiovascular;  Laterality: N/A;   • ATRIAL APPENDAGE EXCLUSION LEFT WITH TRANSESOPHAGEAL ECHOCARDIOGRAM  N/A 1/7/2021    Procedure: Atrial Appendage Occlusion;  Surgeon: Zack Zheng MD;  Location: [facility] CATH INVASIVE LOCATION;  Service: Cardiology;  Laterality: N/A;   • CARDIAC CATHETERIZATION Left 11/5/18-[facility]    w/L Coronary Angiography--Dr. Hernández-RCA @ 60% with Pulmonary Hypertension Noted   • CHOLECYSTECTOMY     • KNEE ARTHROSCOPY     • ESTEFANI  05/17/2016-[facility]    Dr. Hernández--Severly Calcified Mitral Leaflets w/Mild-Moderate Mitral Stenosis/Insufficiency; Sig Aortic Stenosis Noted; Suspecion of L Atrial Appendage Clot Noted   • ESTEFANI  11/5/18-[facility]    Dr. Hernández--Moderate Mitral Insufficiency Noted; Mild-Moderate AVS; Moderate Tricuspid Insufficiency Noted; EF of 65-70%   • TONSILLECTOMY         Family History: family history includes Cancer in her father; Heart disease in her mother; No Known Problems in her brother, maternal aunt, maternal grandfather, maternal grandmother, maternal uncle, paternal aunt, paternal grandfather, paternal grandmother, paternal uncle, sister, and another family member. Otherwise pertinent FHx was reviewed and not pertinent to current issue.    Social History:  reports that she quit smoking about 20 years ago. Her smoking use included cigarettes. She has never used smokeless tobacco. She reports that she does not drink alcohol and does not use drugs.    Home Medications:  Prior to Admission Medications     Prescriptions Last Dose Informant Patient Reported? Taking?    acetaminophen (TYLENOL) 325 MG tablet   No No    Take 2 tablets by mouth Every 4 (Four) Hours As Needed for Mild Pain .    albuterol sulfate  (90 Base) MCG/ACT inhaler   Yes No    Inhale 2 puffs Every 4 (Four) Hours As Needed for Wheezing or Shortness of Air.    allopurinol (ZYLOPRIM) 100 MG tablet   Yes No    Take 1 tablet by mouth Daily.    atorvastatin (LIPITOR) 20 MG tablet   Yes No    TAKE 1 TABLET EVERY DAY    Cholecalciferol (VITAMIN D-3) 1000 units capsule   Yes No    Take 1 capsule by mouth Every Other Day.     Eliquis 5 MG tablet tablet   No No    TAKE 1 TABLET TWICE DAILY    escitalopram (LEXAPRO) 10 MG tablet   Yes No    Take 10 mg by mouth Daily.    furosemide (LASIX) 40 MG tablet   No No    Take 1 tablet by mouth 2 (Two) Times a Day for 30 days.    ipratropium-albuterol (DUO-NEB) 0.5-2.5 mg/3 ml nebulizer   Yes No    Take 3 mL by nebulization 4 (Four) Times a Day As Needed. O2 bellow 90    levothyroxine (SYNTHROID, LEVOTHROID) 75 MCG tablet   Yes No    Take 75 mcg by mouth Daily.    metOLazone (ZAROXOLYN) 2.5 MG tablet   Yes No    Take 1 tablet by mouth Daily As Needed.    metoprolol tartrate (LOPRESSOR) 25 MG tablet   Yes No    Take 12.5 mg by mouth 2 (Two) Times a Day.    ondansetron (ZOFRAN) 4 MG tablet   Yes No    Take 4 mg by mouth Every 8 (Eight) Hours As Needed for Nausea or Vomiting.    potassium chloride (MICRO-K) 10 MEQ CR capsule   Yes No    Take 10 mEq by mouth Daily.    sildenafil (VIAGRA) 25 MG tablet   No No    Take 1 tablet by mouth 2 (Two) Times a Day.            Allergies:  Allergies   Allergen Reactions   • Hydrocodone Shortness Of Breath       Objective      Vitals:   Temp:  [97.7 °F (36.5 °C)] 97.7 °F (36.5 °C)  Heart Rate:  [92-96] 92  Resp:  [18] 18  BP: (113-117)/(68-76) 113/68  Flow (L/min):  [4] 4    Physical Exam  Constitutional:       General: She is not in acute distress.     Appearance: She is not ill-appearing.      Comments: AAOx1 with poor perception   HENT:      Head:      Comments: Left sided soft tissue swelling     Nose: Nose normal. No congestion.      Mouth/Throat:      Pharynx: No oropharyngeal exudate.   Eyes:      General: No scleral icterus.     Pupils: Pupils are equal, round, and reactive to light.      Comments: Extensive bruising over left eye, but patient is able to open it   Cardiovascular:      Rate and Rhythm: Normal rate and regular rhythm.      Heart sounds: Murmur heard.     No friction rub. No gallop.   Pulmonary:      Effort: No respiratory distress.      Breath  sounds: No wheezing or rales.   Abdominal:      General: There is no distension.      Tenderness: There is no abdominal tenderness. There is no guarding.   Musculoskeletal:      Cervical back: Normal range of motion. No rigidity.      Right lower leg: Edema present.      Left lower leg: Edema present.   Skin:     Coloration: Skin is not jaundiced.      Findings: No lesion.      Comments: Left forearm hematoma with bruising and swelling   Neurological:      General: No focal deficit present.      Mental Status: She is alert. Mental status is at baseline.      Motor: Weakness present.         Result Review    Result Review:  I have personally reviewed the results from the time of this admission to 4/2/2022 09:35 EDT and agree with these findings:  [x]  Laboratory  []  Microbiology  [x]  Radiology  []  EKG/Telemetry   []  Cardiology/Vascular   []  Pathology  []  Old records  []  Other:        Assessment/Plan        Active Hospital Problems:  There are no active hospital problems to display for this patient.    Plan:     #Fall  #Dementia    - Found down at home by family at 0700, review of security cameras by family shows down since 0200 2/2 mechanical fall    - x-ray left forearm with soft tissue swelling    - CT facial bones shows left sided swelling without fracture    - CT head without mass/shit/hemorrhage    - suspect 2/2 volume depletion from over-diuresis in setting of dementia    - Patient is current with [facility], family has enough care to watch patient and wish to go back home with hospice as patient not imminently declining nor is surgical intervention warranted    - Plan discharge home with hospice    #CAD  #chronic heart failure  #pHTN  #Mitral Valve Stenosis    - echo on 3/14/22 shows EF >70%, mod MVR, severe TVR, mod/severe MVS, mod-severe AVS    - patient to be discharged back home with hospice    #A. Fib    - hold home lopressor as BP soft    - check EKG     - resume home eliquis when  reconciled, make sure dose adjusted for kidney function    #TRACY on CKD4  #Hypochloremic hyponatremia  #Metabolic alkalosis    - baseline Cr is 2.0    - current creatinine is 2.46    - with sodium of 134 and chloride of 92 suspect volume depletion from poor po intake    - bicarb 30, suspect contraction alkalosis     - DNR-CC, return home with hospice    #Dementia    - unsure of baseline but appears chronic based on history    - Patient as baseline per family    #OA    - DNR-CC    #Anemia    - Hgb 10.2, improved from 9.3 2 weeks ago      #HTN    - BP stable    DVT prophylaxis: DNR-CC  No DVT prophylaxis order currently exists.    CODE STATUS:       Admission Status:  I believe this patient meets observation status.    I discussed the patient's findings and my recommendations with family.    This patient has been examined wearing appropriate Personal Protective Equipment and discussed with Family. 04/02/22      Signature: Electronically signed by Garrett Rodríguez DO, 04/02/22, 10:27 AM EDT.

## 2022-04-02 NOTE — ED PROVIDER NOTES
Subjective   Patient is an 86-year-old female who apparently had fallen at home at 2:30 in the morning per family who states they have a video cameras at her house.  When they checked on her at 730 she was still on the ground.  She has no complaints and is confused.  She is unable offer HPI due to her confusion.  Family is not on location here in the ED.          Review of Systems  Unable to be obtained due to the patient's confusion.  Past Medical History:   Diagnosis Date   • Abnormality of left atrial appendage 05/17/2016    Suspected Clot Noted on ESTEFANI   • Acute renal failure (HCC) 08/06/2016-[facility]    Front Dehydration   • Arthritis    • Breast density 12/2017   • Carotid stenosis 11/05/2018    R @ 60% and L @ 10-20% Noted on Cardiac Cath & 16-49% on L&R    • COPD (chronic obstructive pulmonary disease) ([facility]) 3/13/2022   • Heart murmur    • History of echocardiogram 10/13/2017    Calcified Aortic Leaftlets; Mild Aortic Stenosis Present; Mild Mitral Stenosis; Bilateral Enlargement Noted;  Moderate TR; LV Function of 55-60%   • History of echocardiogram 12/5/14-[facility]    Moderate L Vent Hypertrophy Noted; L Atrial Dilation; Moderate MR; Mild TR; Mild-Moderate Aortic Reg Noted; Normal Root Size/No Effusion   • History of echocardiogram 5/16/16-[facility]    Normal Findings with Mild-Moderate MVS Noted   • History of EKG 2004/2016 & 11/5/18-[facility]    All Sinus Rhythm w/Infarct Ischemnic Changes Noted   • HLD (hyperlipidemia)     Controlled w/Meds   • Hx of hyperglycemia    • Hypertension     Controlled w/Meds   • Hypothyroidism     Levothyroxine   • Joint pain    • Left atrial dilatation 12/05/2014    Mildly Noted on Echo   • Left ventricular hypertrophy 12/05/2014    Noted on Echo w/EF @ 55-60%   • Mild aortic regurgitation 12/05/2014    to Moderate Noted on Echo   • Mild aortic stenosis 10/13/2017    Noted on Echo & ESTEFANI-11/5/18-Moderate    • Mild mitral insufficiency 10/13/2017    Noted on Echo & ESTEFANI-11/5/18   • Mild tricuspid  regurgitation 12/05/2014    Noted on Echo   • Moderate mitral regurgitation 12/05/2014    Noted on Echo   • Moderate mitral stenosis 10/13/2017    Noted on Echo   • Moderate tricuspid insufficiency 10/13/2017    Noted on Echo   • Pneumonia involving left lung 08/6/16-[facility]   • Pulmonary hypertension (HCC) 10/13/2017 & 11/5/18-[facility]    Severe Noted on Echo & Cath   • Slurred speech 05/16/2016   • SOB (shortness of breath) 08/2016   • Stroke (HCC)    • Thickened mitral leaflet 10/13/2017 & ESTEFANI-11/5/18    Severely Calcified Noted on Echo   • Tricuspid valve insufficiency 11/05/2018    Noted on ESTEFANI       Allergies   Allergen Reactions   • Hydrocodone Shortness Of Breath       Past Surgical History:   Procedure Laterality Date   • ATRIAL APPENDAGE EXCLUSION LEFT WITH TRANSESOPHAGEAL ECHOCARDIOGRAM N/A 1/7/2021    Procedure: Atrial Appendage Occlusion;  Surgeon: Harjit Berg MD;  Location: [facility] CATH INVASIVE LOCATION;  Service: Cardiovascular;  Laterality: N/A;   • ATRIAL APPENDAGE EXCLUSION LEFT WITH TRANSESOPHAGEAL ECHOCARDIOGRAM N/A 1/7/2021    Procedure: Atrial Appendage Occlusion;  Surgeon: Zack Zheng MD;  Location: [facility] CATH INVASIVE LOCATION;  Service: Cardiology;  Laterality: N/A;   • CARDIAC CATHETERIZATION Left 11/5/18-[facility]    w/L Coronary Angiography--Dr. Hernández-RCA @ 60% with Pulmonary Hypertension Noted   • CHOLECYSTECTOMY     • KNEE ARTHROSCOPY     • ESTEFANI  05/17/2016-[facility]    Dr. Hernández--Severly Calcified Mitral Leaflets w/Mild-Moderate Mitral Stenosis/Insufficiency; Sig Aortic Stenosis Noted; Suspecion of L Atrial Appendage Clot Noted   • ESTEFANI  11/5/18-[facility]    Dr. Hernández--Moderate Mitral Insufficiency Noted; Mild-Moderate AVS; Moderate Tricuspid Insufficiency Noted; EF of 65-70%   • TONSILLECTOMY         Family History   Problem Relation Age of Onset   • Heart disease Mother    • Cancer Father    • No Known Problems Sister    • No Known Problems Brother    • No Known Problems Maternal Aunt    •  No Known Problems Maternal Uncle    • No Known Problems Paternal Aunt    • No Known Problems Paternal Uncle    • No Known Problems Maternal Grandmother    • No Known Problems Maternal Grandfather    • No Known Problems Paternal Grandmother    • No Known Problems Paternal Grandfather    • No Known Problems Other    • Anemia Neg Hx    • Arrhythmia Neg Hx    • Asthma Neg Hx    • Clotting disorder Neg Hx    • Fainting Neg Hx    • Heart attack Neg Hx    • Heart failure Neg Hx    • Hyperlipidemia Neg Hx    • Hypertension Neg Hx        Social History     Socioeconomic History   • Marital status:    Tobacco Use   • Smoking status: Former Smoker     Types: Cigarettes     Quit date:      Years since quittin.2   • Smokeless tobacco: Never Used   Vaping Use   • Vaping Use: Never used   Substance and Sexual Activity   • Alcohol use: No   • Drug use: No   • Sexual activity: Defer     Birth control/protection: Post-menopausal           Objective   Physical Exam  Neurologic exam shows patient be confused with no focal neurologic deficits.  HEENT exam shows patient have some facial ecchymosis over her forehead left periorbital region.  Extraocular muscles are intact.  TMs are clear.  Neck has no tenderness.  Lungs are clear.  Chest is nontender.  Heart has regular rate rhythm without murmur rub or gallop.  Abdomen is soft nontender.  Patient has normal bowel sounds without rebound or guarding.  Back has no CVA tenderness.  Extremity exam shows swelling and ecchymosis to her left forearm.  Procedures           ED Course            Results for orders placed or performed during the hospital encounter of 22   Comprehensive Metabolic Panel    Specimen: Blood   Result Value Ref Range    Glucose 110 (H) 65 - 99 mg/dL    BUN 50 (H) 8 - 23 mg/dL    Creatinine 2.46 (H) 0.57 - 1.00 mg/dL    Sodium 134 (L) 136 - 145 mmol/L    Potassium 3.8 3.5 - 5.2 mmol/L    Chloride 92 (L) 98 - 107 mmol/L    CO2 30.0 (H) 22.0 - 29.0  mmol/L    Calcium 10.8 (H) 8.6 - 10.5 mg/dL    Total Protein 7.5 6.0 - 8.5 g/dL    Albumin 3.80 3.50 - 5.20 g/dL    ALT (SGPT) 9 1 - 33 U/L    AST (SGOT) 18 1 - 32 U/L    Alkaline Phosphatase 132 (H) 39 - 117 U/L    Total Bilirubin 2.4 (H) 0.0 - 1.2 mg/dL    Globulin 3.7 gm/dL    A/G Ratio 1.0 g/dL    BUN/Creatinine Ratio 20.3 7.0 - 25.0    Anion Gap 12.0 5.0 - 15.0 mmol/L    eGFR 18.8 (L) >60.0 mL/min/1.73   Urinalysis With Microscopic If Indicated (No Culture) - Urine, Catheter    Specimen: Urine, Catheter   Result Value Ref Range    Color, UA Yellow Yellow, Straw    Appearance, UA Clear Clear    pH, UA 5.5 5.0 - 8.0    Specific Gravity, UA 1.013 1.005 - 1.030    Glucose, UA Negative Negative    Ketones, UA Negative Negative    Bilirubin, UA Negative Negative    Blood, UA Negative Negative    Protein, UA Trace (A) Negative    Leuk Esterase, UA Trace (A) Negative    Nitrite, UA Negative Negative    Urobilinogen, UA 1.0 E.U./dL 0.2 - 1.0 E.U./dL   CBC Auto Differential    Specimen: Blood   Result Value Ref Range    WBC 6.70 3.40 - 10.80 10*3/mm3    RBC 3.94 3.77 - 5.28 10*6/mm3    Hemoglobin 10.2 (L) 12.0 - 15.9 g/dL    Hematocrit 30.9 (L) 34.0 - 46.6 %    MCV 78.4 (L) 79.0 - 97.0 fL    MCH 25.8 (L) 26.6 - 33.0 pg    MCHC 32.8 31.5 - 35.7 g/dL    RDW 24.7 (H) 12.3 - 15.4 %    RDW-SD 66.5 (H) 37.0 - 54.0 fl    MPV 7.4 6.0 - 12.0 fL    Platelets 132 (L) 140 - 450 10*3/mm3    Neutrophil % 77.0 (H) 42.7 - 76.0 %    Lymphocyte % 10.3 (L) 19.6 - 45.3 %    Monocyte % 10.5 5.0 - 12.0 %    Eosinophil % 0.3 0.3 - 6.2 %    Basophil % 1.9 (H) 0.0 - 1.5 %    Neutrophils, Absolute 5.20 1.70 - 7.00 10*3/mm3    Lymphocytes, Absolute 0.70 0.70 - 3.10 10*3/mm3    Monocytes, Absolute 0.70 0.10 - 0.90 10*3/mm3    Eosinophils, Absolute 0.00 0.00 - 0.40 10*3/mm3    Basophils, Absolute 0.10 0.00 - 0.20 10*3/mm3    nRBC 0.1 0.0 - 0.2 /100 WBC   Urinalysis, Microscopic Only - Urine, Catheter    Specimen: Urine, Catheter   Result Value Ref  Range    RBC, UA 0-2 (A) None Seen /HPF    WBC, UA 6-12 (A) None Seen /HPF    Bacteria, UA None Seen None Seen /HPF    Squamous Epithelial Cells, UA 0-2 None Seen, 0-2 /HPF    Hyaline Casts, UA 0-2 None Seen /LPF    Methodology Automated Microscopy      XR Forearm 2 View Left    Result Date: 4/2/2022  Soft tissue injury and swelling noted but no fracture or dislocation    Electronically Signed ByMarcus Hebert On:4/2/2022 9:02 AM This report was finalized on [id number] by  Davy Hebert, .    CT Head Without Contrast    Result Date: 4/2/2022   No evidence of acute intracranial process.    Electronically Signed ByMarcus Hebert On:4/2/2022 9:08 AM This report was finalized on [id number] by  Davy Hebert, .    CT Facial Bones Without Contrast    Result Date: 4/2/2022   Left-sided superficial soft tissue swelling but no fracture or other acute abnormality.  Electronically Signed ByMarcus Hebert On:4/2/2022 9:11 AM This report was finalized on [id number] by  Davy Hebert, .                                            MDM  Number of Diagnoses or Management Options  Diagnosis management comments: Patient is findings consistent with renal insufficiency.  She has no other metabolic abnormality.  There is no evidence of acute infectious process.  My review of her CT scan of her head and face shows soft tissue swelling only with no intracranial antibiotic.  X-ray of her left arm shows soft tissue swelling as well as no fracture.  Patient will be admitted for further supportive care and probable placement.       Amount and/or Complexity of Data Reviewed  Clinical lab tests: reviewed  Tests in the radiology section of CPT®: reviewed    Risk of Complications, Morbidity, and/or Mortality  Presenting problems: high  Diagnostic procedures: high  Management options: high    Patient Progress  Patient progress: stable      Final diagnoses:   Dehydration   Concussion with loss of consciousness, initial encounter   Contusion of face,  initial encounter   Fall, initial encounter       ED Disposition  ED Disposition     ED Disposition   Decision to Admit    Condition   --    Comment   --             No follow-up provider specified.       Medication List      No changes were made to your prescriptions during this visit.          Neal Ward MD  04/02/22 0931

## 2022-04-02 NOTE — DISCHARGE SUMMARY
[facility] Medicine Services  DISCHARGE SUMMARY    Patient Name: Karen Rubio  : 1935  MRN: [id number]    Date of Admission: 2022  Discharge Diagnosis: Fall  Date of Discharge:  2022  Primary Care Physician: Eliza Clayton APRN      Presenting Problem:   Dehydration [E86.0]  Fall as cause of accidental injury at home as place of occurrence [W19.XXXA, Y92.009]    Active and Resolved Hospital Problems:  Active Hospital Problems    Diagnosis POA   • Fall as cause of accidental injury at home as place of occurrence [W19.XXXA, Y92.009] Not Applicable      Resolved Hospital Problems   No resolved problems to display.         Hospital Course     Hospital Course:  Karen Rubio is a 86 y.o. female w/PMH of A. fib, CKD, arthritis, CAD, HLD,  HTN, pulmonary HTN, and severe valvular heart disease who presented to [facility] on 2022 due to fall. Patient was found down out of bed by family at 0700, review of security cameras revealed mechanical fall at 0200.  Due to bruising of face and left arm she was jere by family to [facility] for evaluation.  Workup was negative for fracture or necessary surgical intervention.  CT head without mass/hemorrhage/shift.  Family stated patient is currently active with [facility] due to advanced heart failure and that they are able to take care of her and wish her to go back home with hospice as no intervention planned.  Patient discharged home with hospice on 2022        DISCHARGE Follow Up Recommendations for labs and diagnostics:       -Patient to be discharged home with [facility]      Day of Discharge     Vital Signs:  Temp:  [97.7 °F (36.5 °C)] 97.7 °F (36.5 °C)  Heart Rate:  [92-96] 92  Resp:  [18] 18  BP: (113-117)/(68-76) 113/68  Flow (L/min):  [4] 4    Physical Exam:  Physical Exam   Constitutional:       General: She is not in acute distress.     Appearance: She is not ill-appearing.      Comments:  AAOx1 with poor perception   HENT:      Head:      Comments: Left sided soft tissue swelling     Nose: Nose normal. No congestion.      Mouth/Throat:      Pharynx: No oropharyngeal exudate.   Eyes:      General: No scleral icterus.     Pupils: Pupils are equal, round, and reactive to light.      Comments: Extensive bruising over left eye, but patient is able to open it   Cardiovascular:      Rate and Rhythm: Normal rate and regular rhythm.      Heart sounds: Murmur heard.     No friction rub. No gallop.   Pulmonary:      Effort: No respiratory distress.      Breath sounds: No wheezing or rales.   Abdominal:      General: There is no distension.      Tenderness: There is no abdominal tenderness. There is no guarding.   Musculoskeletal:      Cervical back: Normal range of motion. No rigidity.      Right lower leg: Edema present.      Left lower leg: Edema present.   Skin:     Coloration: Skin is not jaundiced.      Findings: No lesion.      Comments: Left forearm hematoma with bruising and swelling   Neurological:      General: No focal deficit present.      Mental Status: She is alert. Mental status is at baseline.      Motor: Weakness present.     Pertinent  and/or Most Recent Results     LAB RESULTS:      Lab 04/02/22  0839   WBC 6.70   HEMOGLOBIN 10.2*   HEMATOCRIT 30.9*   PLATELETS 132*   NEUTROS ABS 5.20   LYMPHS ABS 0.70   MONOS ABS 0.70   EOS ABS 0.00   MCV 78.4*         Lab 04/02/22  0839   SODIUM 134*   POTASSIUM 3.8   CHLORIDE 92*   CO2 30.0*   ANION GAP 12.0   BUN 50*   CREATININE 2.46*   EGFR 18.8*   GLUCOSE 110*   CALCIUM 10.8*         Lab 04/02/22  0839   TOTAL PROTEIN 7.5   ALBUMIN 3.80   GLOBULIN 3.7   ALT (SGPT) 9   AST (SGOT) 18   BILIRUBIN 2.4*   ALK PHOS 132*                     Brief Urine Lab Results  (Last result in the past 365 days)      Color   Clarity   Blood   Leuk Est   Nitrite   Protein   CREAT   Urine HCG        04/02/22 0832 Yellow   Clear   Negative   Trace   Negative   Trace                Microbiology Results (last 10 days)     ** No results found for the last 240 hours. **          CT Abdomen Pelvis Without Contrast    Result Date: 3/12/2022  Impression: Impression: 1. Limited evaluation without IV contrast. 2. Small to moderate diffuse peritoneal ascites fluid. No organized abscess seen. 3. Diverticulosis without acute diverticulitis. 4. Anasarca. 5. Atherosclerosis. 6. Stable multilevel degenerative changes in the thoracolumbar spine and stable T12 vertebral body compression. 6. Moderate right pleural effusion. Electronically signed by:  Blair Jeffery M.D.  3/12/2022 9:49 PM    XR Forearm 2 View Left    Result Date: 4/2/2022  Impression: Soft tissue injury and swelling noted but no fracture or dislocation    Electronically Signed By-Davy Hebert On:4/2/2022 9:02 AM This report was finalized on [id number] by  Davy Hebert, .    CT Head Without Contrast    Result Date: 4/2/2022  Impression:  No evidence of acute intracranial process.    Electronically Signed ByMarcus Hebert On:4/2/2022 9:08 AM This report was finalized on [id number] by  Davy Hebert, .    US Abdomen Limited    Result Date: 3/13/2022  Impression: Findings compatible with cirrhosis  Status post cholecystectomy  Right upper quadrant ascites    Electronically Signed By-Ole Mojica On:3/13/2022 12:56 PM This report was finalized on [id number] by  Ole Mojica, .    CT Facial Bones Without Contrast    Result Date: 4/2/2022  Impression:  Left-sided superficial soft tissue swelling but no fracture or other acute abnormality.  Electronically Signed ByMarcus Hebert On:4/2/2022 9:11 AM This report was finalized on [id number] by  Abi Ruiz    XR Abdomen KUB    Result Date: 3/13/2022  Impression: No evidence of impaction or obstruction  Electronically Signed ByYola Holley On:3/13/2022 6:14 PM This report was finalized on [id number] by  Rajan Hloley, .      Results for orders placed during the hospital  encounter of 03/12/22    Duplex Venous Lower Extremity - Bilateral CAR    Interpretation Summary  · Left popliteal fossa fluid collection.  · Normal bilateral lower extremity venous duplex scan.      Results for orders placed during the hospital encounter of 03/12/22    Duplex Venous Lower Extremity - Bilateral CAR    Interpretation Summary  · Left popliteal fossa fluid collection.  · Normal bilateral lower extremity venous duplex scan.      Results for orders placed during the hospital encounter of 03/12/22    Adult Transthoracic Echo Complete w/ Color, Spectral and Contrast if necessary per protocol    Interpretation Summary  · Estimated left ventricular EF was in agreement with the calculated left ventricular EF. Left ventricular ejection fraction appears to be greater than 70%. Left ventricular systolic function is hyperdynamic (EF > 70%).  · Left ventricular wall thickness is consistent with moderate concentric hypertrophy.  · Moderate mitral valve regurgitation is present.  · There is moderate, bileaflet mitral valve thickening present.  · Severe tricuspid valve regurgitation is present.  · Estimated right ventricular systolic pressure from tricuspid regurgitation is markedly elevated (>55 mmHg).  · The right ventricular cavity is severely dilated.  · Moderate to severe mitral valve stenosis is present.  · The right atrial cavity is moderately dilated.  · Moderate to severe aortic valve stenosis is present.  · Peak velocity of the flow distal to the aortic valve is 375.4 cm/s. Aortic valve maximum pressure gradient is 56.6 mmHg. Aortic valve mean pressure gradient is 29.9 mmHg.      Labs Pending at Discharge:      Procedures Performed           Consults:   Consults     Date and Time Order Name Status Description    4/2/2022  9:26 AM Hospitalist (on-call MD unless specified)      3/13/2022  4:43 PM Inpatient Nephrology Consult Completed     3/13/2022  4:09 AM Inpatient Cardiology Consult Completed     3/13/2022  12:34 AM Pulmonology (on-call MD unless specified) Completed             Discharge Details        Discharge Medications      Stop These Medications    acetaminophen 325 MG tablet  Commonly known as: TYLENOL     albuterol sulfate  (90 Base) MCG/ACT inhaler  Commonly known as: PROVENTIL HFA;VENTOLIN HFA;PROAIR HFA     allopurinol 100 MG tablet  Commonly known as: ZYLOPRIM     atorvastatin 20 MG tablet  Commonly known as: LIPITOR     Eliquis 5 MG tablet tablet  Generic drug: apixaban     escitalopram 10 MG tablet  Commonly known as: LEXAPRO     furosemide 40 MG tablet  Commonly known as: LASIX     ipratropium-albuterol 0.5-2.5 mg/3 ml nebulizer  Commonly known as: DUO-NEB     levothyroxine 75 MCG tablet  Commonly known as: SYNTHROID, LEVOTHROID     metOLazone 2.5 MG tablet  Commonly known as: ZAROXOLYN     metoprolol tartrate 25 MG tablet  Commonly known as: LOPRESSOR     ondansetron 4 MG tablet  Commonly known as: ZOFRAN     potassium chloride 10 MEQ CR capsule  Commonly known as: MICRO-K     sildenafil 25 MG tablet  Commonly known as: VIAGRA     Vitamin D-3 25 MCG (1000 UT) capsule            Allergies   Allergen Reactions   • Hydrocodone Shortness Of Breath         Discharge Disposition: Home with Hospice  Condition on discharge: Poor prognosis      Diet:  Hospital:  Diet Order   Procedures   • Diet Regular         Discharge Activity:         CODE STATUS:  Code Status and Medical Interventions:   Ordered at: 04/02/22 1030     Code Status (Patient has no pulse and is not breathing):    No CPR (Do Not Attempt to Resuscitate)     Medical Interventions (Patient has pulse or is breathing):    Comfort Measures         Future Appointments   Date Time Provider Department Center   8/8/2022 12:50 PM Rebecca Nino MD [facility] None           Time spent on Discharge including face to face service:  25 minutes    This patient has been examined wearing appropriate Personal Protective Equipment and discussed with Daughter  and Nursing. 04/02/22      Signature: Electronically signed by Garrett Rodríguez DO, 04/02/22, 1:52 PM EDT.

## 2022-04-02 NOTE — DISCHARGE PLACEMENT REQUEST
"Karen Bernal (86 y.o. Female)             Date of Birth   1935    Social Security Number       Address   [address] [place] [address] [address] [place] [address]    Home Phone   [phone]    MRN   [id number]       Spiritism   Evangelical    Marital Status                               Admission Date   4/2/22    Admission Type   Emergency    Admitting Provider   Garrett Rodríguez DO    Attending Provider   Garrett Rodríguez DO    Department, Room/Bed   [facility] EMERGENCY DEPARTMENT, 29/29       Discharge Date       Discharge Disposition       Discharge Destination                               Attending Provider: Garrett Rodríguez DO    Allergies: Hydrocodone    Isolation: None   Infection: None   Code Status: Prior   Advance Care Planning Activity    Ht: 165.1 cm (65\")   Wt: 72.6 kg (160 lb)    Admission Cmt: None   Principal Problem: None                Active Insurance as of 4/2/2022     Primary Coverage     Payor Plan Insurance Group Employer/Plan Group    HUMANA MEDICARE REPLACEMENT HUMANA MEDICARE REPLACEMENT [id number]     Payor Plan Address Payor Plan Phone Number Payor Plan Fax Number Effective Dates    [address] [address] [phone]  1/1/2018 - None Entered    [facility] [id number]       Subscriber Name Subscriber Birth Date Member ID       KAREN BERNAL 1935 [id number]                 Emergency Contacts      (Rel.) Home Phone Work Phone Mobile Phone    SOUTH BERNAL (Daughter) -- -- [phone]    GABERAISSA RUSS (Son) -- -- [phone]              "

## 2022-04-02 NOTE — ED NOTES
Spoke with Dr. Rodríguez regarding patient's potential discharge. Per Dr. Rodríguez, the family wanted to the patient to be comfortable at home, if there was no further medical intervention needed since the patient is currently in hospice. Dr. Rodríguez verified this, and the patient is to be discharged to home.

## 2024-06-24 NOTE — PROGRESS NOTES
Reason for follow-up: New onset atrial fibrillation  History of CAD  History of mitral and aortic stenosis  Severe pulmonary hypertension     Patient Care Team:  Eliza Clayton APRN as PCP - General (Nurse Practitioner)  Eliza Clayton APRN as PCP - Family Medicine  Demond Valiente MD (Cardiology)  Gibson Medina MD as Surgeon (Cardiothoracic Surgery)    Subjective .   Feels tired and weak     Review of Systems   Constitution: Positive for malaise/fatigue. Negative for fever.   HENT: Negative for congestion and hearing loss.    Eyes: Negative for double vision and visual disturbance.   Cardiovascular: Positive for dyspnea on exertion. Negative for chest pain, claudication, leg swelling and syncope.   Respiratory: Positive for shortness of breath. Negative for cough.    Endocrine: Negative for cold intolerance.   Skin: Negative for color change and rash.   Musculoskeletal: Negative for arthritis and joint pain.   Gastrointestinal: Negative for abdominal pain and heartburn.   Genitourinary: Negative for hematuria.   Neurological: Negative for excessive daytime sleepiness and dizziness.   Psychiatric/Behavioral: Negative for depression. The patient is not nervous/anxious.    All other systems reviewed and are negative.      Hydrocodone    Scheduled Meds:  apixaban 2.5 mg Oral Q12H   atorvastatin 20 mg Oral Daily   calcium-vitamin D 1 tablet Oral Daily   cefTRIAXone 1 g Intravenous Q24H   dicyclomine 10 mg Oral 4x Daily   docusate sodium 100 mg Oral Daily   doxycycline 100 mg Intravenous Q12H   escitalopram 10 mg Oral Daily   ipratropium-albuterol 3 mL Nebulization Q6H - RT   levothyroxine 50 mcg Oral Q AM   metoprolol tartrate 25 mg Oral Q12H   pantoprazole 40 mg Oral QAM   rifAXIMin 200 mg Oral Q12H   sodium chloride 10 mL Intravenous Q12H   sodium chloride 10 mL Intravenous Q12H     Continuous Infusions:  dilTIAZem 5-15 mg/hr Last Rate: 5 mg/hr (03/10/20 0022)   sodium chloride 100 mL/hr  How Severe Is It?: moderate "Last Rate: 100 mL/hr (03/10/20 0024)     PRN Meds:.•  acetaminophen **OR** acetaminophen **OR** acetaminophen  •  albuterol  •  aluminum-magnesium hydroxide-simethicone  •  bisacodyl  •  bisacodyl  •  magnesium hydroxide  •  magnesium sulfate **OR** magnesium sulfate in D5W 1g/100mL (PREMIX)  •  melatonin  •  nitroglycerin  •  ondansetron **OR** ondansetron  •  potassium chloride  •  potassium chloride  •  sodium chloride  •  sodium chloride    Objective   Looks comfortable lying in the bed    VITAL SIGNS  Vitals:    03/10/20 0513 03/10/20 0600 03/10/20 0723 03/10/20 0727   BP: 132/71 143/89     Pulse: 93 95 84 84   Resp:   16 16   Temp:       TempSrc:       SpO2:  96% 96% 96%   Weight:       Height:           Flowsheet Rows      First Filed Value   Admission Height  170.2 cm (67\") Documented at 03/09/2020 1302   Admission Weight  82.2 kg (181 lb 3.5 oz) Documented at 03/09/2020 1302           TELEMETRY: Atrial fibrillation rate controlled    Physical Exam:  Physical Exam   Constitutional: She is oriented to person, place, and time. She appears well-developed and well-nourished. She is cooperative.   HENT:   Head: Normocephalic and atraumatic.   Mouth/Throat: Uvula is midline and oropharynx is clear and moist. No oral lesions.   Eyes: Conjunctivae are normal. No scleral icterus.   Neck: Trachea normal. Neck supple. No JVD present. Carotid bruit is not present. No thyromegaly present.   Cardiovascular: Normal rate, S1 normal, S2 normal, intact distal pulses and normal pulses. An irregularly irregular rhythm present. PMI is not displaced. Exam reveals no gallop and no friction rub.   Murmur heard.  Pulmonary/Chest: Effort normal and breath sounds normal.   Abdominal: Soft. Bowel sounds are normal.   Musculoskeletal: Normal range of motion.   Neurological: She is alert and oriented to person, place, and time. She has normal strength.   No focal deficits   Skin: Skin is warm. No cyanosis.   Psychiatric: She has a normal " Is This A New Presentation, Or A Follow-Up?: Follow Up Isotretinoin Patient Reported Weight In Pounds (Required For Calculation): 132 mood and affect.                LAB RESULTS (LAST 7 DAYS)    CBC  Results from last 7 days   Lab Units 03/10/20  0532 03/09/20  1418   WBC 10*3/mm3 9.50 11.00*   RBC 10*6/mm3 4.33 4.49   HEMOGLOBIN g/dL 12.1 12.7   HEMATOCRIT % 38.2 39.5   MCV fL 88.3 88.0   PLATELETS 10*3/mm3 159 183       BMP  Results from last 7 days   Lab Units 03/10/20  0532 03/09/20  1418 03/03/20  0903   SODIUM mmol/L 140 139 140   POTASSIUM mmol/L 4.4 3.3* 3.9   CHLORIDE mmol/L 101 95* 98   CO2 mmol/L 24.0 29.0 28.5   BUN mg/dL 29* 27* 23   CREATININE mg/dL 1.43* 1.52* 1.92*   GLUCOSE mg/dL 109* 100* 117*   MAGNESIUM mg/dL 1.7  --   --        CMP Results from last 7 days   Lab Units 03/10/20  0532 03/09/20  1418 03/03/20  0903   SODIUM mmol/L 140 139 140   POTASSIUM mmol/L 4.4 3.3* 3.9   CHLORIDE mmol/L 101 95* 98   CO2 mmol/L 24.0 29.0 28.5   BUN mg/dL 29* 27* 23   CREATININE mg/dL 1.43* 1.52* 1.92*   GLUCOSE mg/dL 109* 100* 117*   ALBUMIN g/dL  --  3.50 4.00   BILIRUBIN mg/dL  --  1.4* 0.8   ALK PHOS U/L  --  228* 114   AST (SGOT) U/L  --  48* 17   ALT (SGPT) U/L  --  31 10         BNP        TROPONIN  Results from last 7 days   Lab Units 03/10/20  0532  03/03/20  0903   CK TOTAL U/L  --   --  19*   TROPONIN T ng/mL <0.010   < >  --     < > = values in this interval not displayed.       CoAg        Creatinine Clearance  Estimated Creatinine Clearance: 32.8 mL/min (A) (by C-G formula based on SCr of 1.43 mg/dL (H)).    ABG        Radiology  Xr Chest 1 View    Result Date: 3/9/2020  Hazy opacities throughout left lung, suggesting pneumonia.  Electronically Signed By-DR. Bernard Arriaga MD On:3/9/2020 2:40 PM This report was finalized on [id number] by DR. Bernard Arriaga MD.            EKG          I personally viewed and interpreted the patient's EKG/Telemetry data:    ECHOCARDIOGRAM:    Results for orders placed in visit on 12/06/18   SCANNED - ECHOCARDIOGRAM     STRESS MYOVIEW:    CARDIAC CATHETERIZATION:    OTHER:          Assessment/Plan       Atrial fibrillation, new onset (CMS/HCC)    Moderate tricuspid regurgitation    Atherosclerosis of native coronary artery of native heart without angina pectoris    History of CVA (cerebrovascular accident)    Hyperlipidemia    Pulmonary hypertension (CMS/HCC)    Mitral valve stenosis    Atrial fibrillation with RVR (CMS/HCC)    CAP (community acquired pneumonia)    Acute renal insufficiency    Hypokalemia    Syncope    Chronic respiratory failure (CMS/HCC)      New onset atrial fibrillation with rapid ventricular rate  Continue aggressive control of rate  Anticoagulation  Severe pulmonary hypertension  History of mitral valve stenosis and aortic valve stenosis  Transesophageal echocardiogram in the morning  Continue aggressive control of hypertension dyslipidemia    I discussed the patients findings and my recommendations with patient and attending nurse    Demond Valiente MD  03/10/20  09:19

## (undated) DEVICE — STPCK 3WY HP ROT

## (undated) DEVICE — Device: Brand: RFP-100A CONNECTOR CABLE

## (undated) DEVICE — 3M™ PATIENT PLATE, CORDED, SPLIT, LARGE, 40 PER CASE, 1179: Brand: 3M™

## (undated) DEVICE — CATH DIAG IMPULSE PIG 5F 100CM

## (undated) DEVICE — PK TRY HEART CATH 50

## (undated) DEVICE — SKIN PREP TRAY W/CHG: Brand: MEDLINE INDUSTRIES, INC.

## (undated) DEVICE — GW XCHG AMPLTZ XSTIF PTFE CRV .035IN 3X180CM

## (undated) DEVICE — ACCESS SHEATH WITH DILATOR: Brand: WATCHMAN™ TRUSEAL™ ACCESS SYSTEM

## (undated) DEVICE — PREF.GUIDING SHEATH W/MULT.CRV: Brand: PREFACE

## (undated) DEVICE — TBG PRESS/MONITOR FIX M/F LL A/ 24IN STRL

## (undated) DEVICE — ELECTRD DEFIB M/FUNC PROPADZ RADIOL 2PK

## (undated) DEVICE — ST ACC MICROPUNCTURE STFF/CANN PLAT/TP 4F 21G 40CM

## (undated) DEVICE — Device: Brand: NRG TRANSSEPTAL NEEDLE

## (undated) DEVICE — INTRO PERFORMER CHECKFLO/LG RAD/BND NO/GW 14F .038IN 30CM